# Patient Record
Sex: MALE | Race: WHITE | NOT HISPANIC OR LATINO | Employment: FULL TIME | URBAN - METROPOLITAN AREA
[De-identification: names, ages, dates, MRNs, and addresses within clinical notes are randomized per-mention and may not be internally consistent; named-entity substitution may affect disease eponyms.]

---

## 2017-01-03 ENCOUNTER — CLINICAL SUPPORT (OUTPATIENT)
Dept: REHABILITATION | Facility: HOSPITAL | Age: 72
End: 2017-01-03
Attending: INTERNAL MEDICINE
Payer: MEDICARE

## 2017-01-03 DIAGNOSIS — G89.29 CHRONIC PAIN OF RIGHT ANKLE: ICD-10-CM

## 2017-01-03 DIAGNOSIS — M25.571 CHRONIC PAIN OF RIGHT ANKLE: ICD-10-CM

## 2017-01-03 DIAGNOSIS — R26.2 DIFFICULTY WALKING: ICD-10-CM

## 2017-01-03 DIAGNOSIS — M79.671 PAIN OF RIGHT HEEL: Primary | ICD-10-CM

## 2017-01-03 PROCEDURE — 97035 APP MDLTY 1+ULTRASOUND EA 15: CPT

## 2017-01-03 PROCEDURE — 97110 THERAPEUTIC EXERCISES: CPT

## 2017-01-03 NOTE — PROGRESS NOTES
Physical Therapy Progress Note     Name: Arnaldo HOLLINGSWORTH [facility] Number: [id number]  Diagnosis:   Encounter Diagnoses   Name Primary?    Difficulty walking     Chronic pain of right ankle     Pain of right heel Yes     Physician: Norm Perdomo MD  Treatment Orders: Therapeutic exercise  Past Medical History   Diagnosis Date    Allergy      seasonal    Gout, joint     Hyperlipidemia        Precautions: standard    Evaluation Date: 8/15/16  Visit # authorized: 1/20  Authorization period: 12/31/16  Plan of care Expiration: 12/30/16    Referring Provider: Norm Perdomo MD    Time in: 1  Time out: 1:45  45 min. 1:1    Subjective     Pt reports: he is feeling better; sometimes he feels some pain upon initiation of gait that improves with the longer he walks; notes consistency with stretching after warm-up and at the end of work out. Pt. denies purchasing heel lift and/or night splint as recommended by MD. Pt. has not been performing his exercises as of late due to not feeling well. Pt. requested to only receive US and kinesiotaping today's visit as a result.  Pain Scale: before treatment: 1 currently; after treatment: 0    Objective      Patient received: individual therapy to increase strength, endurance, ROM, flexibility, posture and core stabilization with 1 patients with activities as follows:     Therapeutic exercise: Arnaldo received therapeutic exercises to develop strength, endurance, ROM, flexibility, posture and core stabilization for 25 minutes including: no exercise performed except for stretching due to illness  warm-up:    Prone:  Supine:    · bridge with isometric hip abduction using BTB: 3x8 with 3 to 5 sec. hold NP  · piriformis stretch bilaterally: 2x1' NP  Sidelying:   · s/l clams: BTB: 2x10 NP  Sitting:    Standing:   Up on two down on one: bilaterally: 3x10 ea. NP  · standing gastroc./soleus stretch on slant board: 2x1 min. bilaterally  · standing hip abduction/extension combined  "motion with BTB: 3x10 with verbal/tactile cues for proper form NP  · TM walking backwards: 5' with verbal cuing for TKE NP  · SLS: 3x30 sec. NP  Machines:  · Leg press: up on two down on one: 140# 2x8 (bilaterally) NP  · Leg press: SL right: 100#: 2x8 NP    Kinesiotaping  Patient was screened for use of kinesiotape and was cleared for use. Pt. received kinesiotaping on right achilles: web cut, along with mechanical correction right achilles tendon with a "I" strip and gastroc. inhibition with "Y" strip (reaapplied 1/3/17)  1. Has the patient ever had a reaction to the adhesive in bandaids? Yes/No: No  2. Has the patient ever uses athletic/kinesiotape in the past? Yes/No: No  3. Is the patient hemodynamically impaired (PE, DVT, CHF, Kidney failure)? Yes/No: No  4. Can the PT/PTA apply the tape to your skin? Yes/No: Yes    Patient was instructed on duration to wear the tape, proper drying techniques for the tape, and to remove the tape if he/she has any skin irritation: including, but not exclusive to, redness/itchiness/discomfort. Patient verbalized understanding of these instructions.    Functional Status Measures:    Intake Score   5th visit 10th visit  Pts Physical FS Primary Measure     64    72  62                           Risk Adjustment Statistical FOTO  51        PT reviewed FOTO scores for Arnaldo Donovan on 01/03/2017.   FOTO scores were entered into EPIC - see media section.    Ultrasound:  Patient receives ultrasound  for pain control and decreased inflammation @ 20% duty cycle, 3 Mhz, applied to right achilles, intensity = 1.2 w/cm2 for 8 minutes.    Written Home Exercises Provided: (5 min.)  including: review of current exercise regimen Pt demo good understanding of the education provided. Arnaldo demonstrated good return demonstration of activities.     Pt. education:  · Posture re-education, body/gait mechanics, HEP, POC compliance; and activity modification/avoidance   · No spiritual or " educational barriers to learning provided  · Pt has no cultural, educational or language barriers to learning provided.    Assessment     PT reeducated on the need to comply with purchase of heel lift and night splint to improve long term outcome. Pt. purchased a heel cushion, but a lift as recommended by MD. PT gave pt. additional medical supplier recommendations in hope to improve compliance. Pt. verbalized understanding and stated he would purchase as recommended. PT also suggested a reduction in frequency of treatment to wean off of care and give time for patient to purchase items and use to not benefit. Pt. agreed with plan. Pt will continue to benefit from skilled outpatient physical therapy to address the remaining functional deficits, provide pt/family education, activity modification/avoidance and to maximize pt's level of independence in the home and community environment.     Anticipated barriers to physical therapy: chronicity of condition    · Pt's spiritual, cultural and educational needs considered and pt agreeable to plan of care and goals as stated below:   Medical necessity is demonstrated by the following IMPAIRMENTS/PROBLEM LIST:  1) Pain limiting function  2) Gait abnormality: right ITB/ER, bilateral ankle EV  3) Hip/knee/ankle weakness  4) Decreased gastroc/soleus/piriformis/ITB flexibility  5) Decreased hip/ankle ROM  6) Joint mobility: bilateral hip posterior glide  7) Lack of HEP     GOALS:   Short Term Goals: 4 weeks  1. Pt. to report decreased right ankle/foot pain </= 4/10 at worst to increase tolerance for community ambulation MET  2. Pt. to demonstrate increased right ankle soleus flexibility to 15 degrees to improve ease with partial squatting   3. Pt. to demonstrate increased MMT for left gluteus medius to 4-/5 to increase stability during SL activities. MET  4. Pt. reports a decrease in sensitivity with stretching and consistent orthotic use. MET   5. Pt to tolerate HEP to improve  ROM and independence with ADL's MET     Long Term Goals: 8 weeks  1. Pt. to report decreased right ankle/foot pain </ = 2/10 at worst to increase tolerance for community ambulation/prolonged standing  2. Pt. to demonstrate increased right ankle soleus flexibility to 20 degrees to improve ease with partial squatting  3. Pt. to demonstrate increased MMT for left gluteus medius to 4+/5 to increase stability during SLS high level activities.  4. Pt. to demonstrate improved LE flexibility of rectus femoris, ITB, and iliopsoas improving his gait mechanics.  5. Pt to demonstrate increased MMT for bilateral ankle EV to 4+/5 to increase ankle stability during WB tasks. MET  6. Pt. demonstrate increased MMT for bilateral ankle DF to 5/5 to increase ease with toe clearance. MET  7. Pt will report at CJ level (20-40% impaired) on LE FOTO survey for mobility to demonstrate increase in LE function and mobility in home and community environment.   8. Pt. to be able to ambulate community distances while wearing orthotics without report of right achilles tendon soreness  9. Pt to be Independent with HEP to improve ROM and independence with ADL's    Plan   Continue with established Plan of Care towards PT goals 1x/wk for an additional 4 to 6wks

## 2017-01-04 NOTE — PLAN OF CARE
Physical Therapy Progress Note     Name: Arnaldo HOLLINGSWORTH [facility] Number: [id number]  Diagnosis:   Encounter Diagnoses   Name Primary?    Difficulty walking     Chronic pain of right ankle     Pain of right heel Yes     Physician: Norm Perdomo MD  Treatment Orders: Therapeutic exercise  Past Medical History   Diagnosis Date    Allergy      seasonal    Gout, joint     Hyperlipidemia        Precautions: standard    Evaluation Date: 8/15/16  Visit # authorized: 1/20  Authorization period: 12/31/16  Plan of care Expiration: 12/30/16    Referring Provider: Norm Perdomo MD    Time in: 1  Time out: 1:45  45 min. 1:1    Subjective     Pt reports: he is feeling better; sometimes he feels some pain upon initiation of gait that improves with the longer he walks; notes consistency with stretching after warm-up and at the end of work out. Pt. denies purchasing heel lift and/or night splint as recommended by MD. Pt. has not been performing his exercises as of late due to not feeling well. Pt. requested to only receive US and kinesiotaping today's visit as a result.  Pain Scale: before treatment: 1 currently; after treatment: 0    Objective      Patient received: individual therapy to increase strength, endurance, ROM, flexibility, posture and core stabilization with 1 patients with activities as follows:     Therapeutic exercise: Arnaldo received therapeutic exercises to develop strength, endurance, ROM, flexibility, posture and core stabilization for 15 minutes including: no exercise performed except for stretching due to illness  warm-up:    Prone:  Supine:    · bridge with isometric hip abduction using BTB: 3x8 with 3 to 5 sec. hold NP  · piriformis stretch bilaterally: 2x1' NP  Sidelying:   · s/l clams: BTB: 2x10 NP  Sitting:    Standing:   Up on two down on one: bilaterally: 3x10 ea. NP  · standing gastroc./soleus stretch on slant board: 2x1 min. bilaterally  · standing hip abduction/extension combined  "motion with BTB: 3x10 with verbal/tactile cues for proper form NP  · TM walking backwards: 5' with verbal cuing for TKE NP  · SLS: 3x30 sec. NP  Machines:  · Leg press: up on two down on one: 140# 2x8 (bilaterally) NP  · Leg press: SL right: 100#: 2x8 NP    Kinesiotaping  Patient was screened for use of kinesiotape and was cleared for use. Pt. received kinesiotaping on right achilles: web cut, along with mechanical correction right achilles tendon with a "I" strip and gastroc. inhibition with "Y" strip (reaapplied 1/3/17)  1. Has the patient ever had a reaction to the adhesive in bandaids? Yes/No: No  2. Has the patient ever uses athletic/kinesiotape in the past? Yes/No: No  3. Is the patient hemodynamically impaired (PE, DVT, CHF, Kidney failure)? Yes/No: No  4. Can the PT/PTA apply the tape to your skin? Yes/No: Yes    Patient was instructed on duration to wear the tape, proper drying techniques for the tape, and to remove the tape if he/she has any skin irritation: including, but not exclusive to, redness/itchiness/discomfort. Patient verbalized understanding of these instructions.    Functional Status Measures:    Intake Score   5th visit 10th visit  Pts Physical FS Primary Measure     64    72  62                           Risk Adjustment Statistical FOTO  51        PT reviewed FOTO scores for Arnaldo Donovan on 01/03/2017.   FOTO scores were entered into EPIC - see media section.    Ultrasound:  Patient receives ultrasound  for pain control and decreased inflammation @ 20% duty cycle, 3 Mhz, applied to right achilles, intensity = 1.2 w/cm2 for 8 minutes.    Written Home Exercises Provided: (5 min.)  including: review of current exercise regimen Pt demo good understanding of the education provided. Arnaldo demonstrated good return demonstration of activities.     Pt. education:  · Posture re-education, body/gait mechanics, HEP, POC compliance; and activity modification/avoidance   · No spiritual or " educational barriers to learning provided  · Pt has no cultural, educational or language barriers to learning provided.    Assessment     PT reeducated on the need to comply with purchase of heel lift and night splint to improve long term outcome. Pt. purchased a heel cushion, but a lift as recommended by MD. PT gave pt. additional medical supplier recommendations in hope to improve compliance. Pt. verbalized understanding and stated he would purchase as recommended. PT also suggested a reduction in frequency of treatment to wean off of care and give time for patient to purchase items and use to not benefit. Pt. agreed with plan. Pt will continue to benefit from skilled outpatient physical therapy to address the remaining functional deficits, provide pt/family education, activity modification/avoidance and to maximize pt's level of independence in the home and community environment.     Anticipated barriers to physical therapy: chronicity of condition    · Pt's spiritual, cultural and educational needs considered and pt agreeable to plan of care and goals as stated below:   Medical necessity is demonstrated by the following IMPAIRMENTS/PROBLEM LIST:  1) Pain limiting function  2) Gait abnormality: right ITB/ER, bilateral ankle EV  3) Hip/knee/ankle weakness  4) Decreased gastroc/soleus/piriformis/ITB flexibility  5) Decreased hip/ankle ROM  6) Joint mobility: bilateral hip posterior glide  7) Lack of HEP     GOALS:   Short Term Goals: 4 weeks  1. Pt. to report decreased right ankle/foot pain </= 4/10 at worst to increase tolerance for community ambulation MET  2. Pt. to demonstrate increased right ankle soleus flexibility to 15 degrees to improve ease with partial squatting   3. Pt. to demonstrate increased MMT for left gluteus medius to 4-/5 to increase stability during SL activities. MET  4. Pt. reports a decrease in sensitivity with stretching and consistent orthotic use. MET   5. Pt to tolerate HEP to improve  ROM and independence with ADL's MET     Long Term Goals: 8 weeks  1. Pt. to report decreased right ankle/foot pain </ = 2/10 at worst to increase tolerance for community ambulation/prolonged standing  2. Pt. to demonstrate increased right ankle soleus flexibility to 20 degrees to improve ease with partial squatting  3. Pt. to demonstrate increased MMT for left gluteus medius to 4+/5 to increase stability during SLS high level activities.  4. Pt. to demonstrate improved LE flexibility of rectus femoris, ITB, and iliopsoas improving his gait mechanics.  5. Pt to demonstrate increased MMT for bilateral ankle EV to 4+/5 to increase ankle stability during WB tasks. MET  6. Pt. demonstrate increased MMT for bilateral ankle DF to 5/5 to increase ease with toe clearance. MET  7. Pt will report at CJ level (20-40% impaired) on LE FOTO survey for mobility to demonstrate increase in LE function and mobility in home and community environment.   8. Pt. to be able to ambulate community distances while wearing orthotics without report of right achilles tendon soreness  9. Pt to be Independent with HEP to improve ROM and independence with ADL's    Plan   Continue with established Plan of Care towards PT goals 1x/wk for an additional 4 to 6wks

## 2017-01-06 ENCOUNTER — CLINICAL SUPPORT (OUTPATIENT)
Dept: REHABILITATION | Facility: HOSPITAL | Age: 72
End: 2017-01-06
Attending: INTERNAL MEDICINE
Payer: MEDICARE

## 2017-01-06 ENCOUNTER — OFFICE VISIT (OUTPATIENT)
Dept: DERMATOLOGY | Facility: CLINIC | Age: 72
End: 2017-01-06
Payer: MEDICARE

## 2017-01-06 DIAGNOSIS — M25.571 RIGHT ANKLE PAIN, UNSPECIFIED CHRONICITY: ICD-10-CM

## 2017-01-06 DIAGNOSIS — L71.9 ROSACEA, ACNE: ICD-10-CM

## 2017-01-06 DIAGNOSIS — L71.9 ROSACEA: Primary | ICD-10-CM

## 2017-01-06 DIAGNOSIS — B07.0 PLANTAR WART: ICD-10-CM

## 2017-01-06 DIAGNOSIS — R26.2 DIFFICULTY WALKING: ICD-10-CM

## 2017-01-06 DIAGNOSIS — B07.9 VIRAL WARTS, UNSPECIFIED TYPE: ICD-10-CM

## 2017-01-06 DIAGNOSIS — M79.671 PAIN OF RIGHT HEEL: Primary | ICD-10-CM

## 2017-01-06 PROCEDURE — 1159F MED LIST DOCD IN RCRD: CPT | Mod: S$GLB,,, | Performed by: DERMATOLOGY

## 2017-01-06 PROCEDURE — 97110 THERAPEUTIC EXERCISES: CPT

## 2017-01-06 PROCEDURE — 99999 PR PBB SHADOW E&M-EST. PATIENT-LVL II: CPT | Mod: PBBFAC,,, | Performed by: DERMATOLOGY

## 2017-01-06 PROCEDURE — 17110 DESTRUCTION B9 LES UP TO 14: CPT | Mod: S$GLB,,, | Performed by: DERMATOLOGY

## 2017-01-06 PROCEDURE — 99213 OFFICE O/P EST LOW 20 MIN: CPT | Mod: 25,S$GLB,, | Performed by: DERMATOLOGY

## 2017-01-06 PROCEDURE — 97035 APP MDLTY 1+ULTRASOUND EA 15: CPT

## 2017-01-06 PROCEDURE — 1157F ADVNC CARE PLAN IN RCRD: CPT | Mod: S$GLB,,, | Performed by: DERMATOLOGY

## 2017-01-06 PROCEDURE — 1160F RVW MEDS BY RX/DR IN RCRD: CPT | Mod: S$GLB,,, | Performed by: DERMATOLOGY

## 2017-01-06 RX ORDER — DOXYCYCLINE 40 MG/1
40 CAPSULE ORAL DAILY
Qty: 30 CAPSULE | Refills: 5 | Status: SHIPPED | OUTPATIENT
Start: 2017-01-06 | End: 2017-01-11

## 2017-01-06 NOTE — LETTER
January 8, 2017      Xiao Turner MD  [address]  [facility] [address]           [facility] - Dermatology  [address]  [place] [address]  Phone: [phone]  Fax: [phone]          Patient: Arnaldo Donovan   MR Number: [id number]   YOB: 1945   Date of Visit: 1/6/2017       Dear Dr. Xiao Turner:    Thank you for referring Arnaldo Donovan to me for evaluation. Attached you will find relevant portions of my assessment and plan of care.    If you have questions, please do not hesitate to call me. I look forward to following Arnaldo Donovan along with you.    Sincerely,    Pamela Naik MD    Enclosure  CC:  No Recipients    If you would like to receive this communication electronically, please contact [email or web address] or [phone] to request more information on [facility] access.    For providers and/or their staff who would like to refer a patient to [facility], please contact us through our one-stop-shop provider referral line, [facility], at [phone].    If you feel you have received this communication in error or would no longer like to receive these types of communications, please e-mail [email or web address]

## 2017-01-06 NOTE — PATIENT INSTRUCTIONS
CRYOSURGERY      Your doctor has used a method called cryosurgery to treat your skin condition. Cryosurgery refers to the use of very cold substances to treat a variety of skin conditions such as warts, pre-skin cancers, molluscum contagiosum, sun spots, and several benign growths. The substance we use in cryosurgery is liquid nitrogen and is so cold (-195 degrees Celsius) that is burns when administered.     Following treatment in the office, the skin may immediately burn and become red. You may find the area around the lesion is affected as well. It is sometimes necessary to treat not only the lesion, but a small area of the surrounding normal skin to achieve a good response.     A blister, and even a blood filled blister, may form after treatment.   This is a normal response. If the blister is painful, it is acceptable to sterilize a needle and with rubbing alcohol and gently pop the blister. It is important that you gently wash the area with soap and warm water as the blister fluid may contain wart virus if a wart was treated. Do no remove the roof of the blister.     The area treated can take anywhere from 1-3 weeks to heal. Healing time depends on the kind skin lesion treated, the location, and how aggressively the lesion was treated. It is recommended that the areas treated are covered with Vaseline or bacitracin ointment and a band-aid. If a band-aid is not practical, just ointment applied several times per day will do. Keeping these areas moist will speed the healing time.    Treatment with liquid nitrogen can leave a scar. In dark skin, it may be a light or dark scar, in light skin it may be a white or pink scar. These will generally fade with time, but may never go away completely.     If you have any concerns after your treatment, please feel free to call the office.       [address] [facility] [place] [address]/ [phone] FAX/ [email or web address]    Summer Sun Protection      The  [facility] would like to remind you of the importance of sun protection all year round and particularly during the summer when the suns rays are the strongest. It has been proven that both acute and chronic sun exposure damages our cells and leads to skin cancer. Beyond skin cancer, the sun causes 90% of the symptoms of pre-mature skin aging, including wrinkles, lentigines (brown spots), and thin, easily bruised skin. Proper sun protection can help prevent these unwanted conditions.    Many patients report that the dont go in the sun. It has been shown that the average person receives 18 hours of incidental sun exposure per week during activities such as walking through parking lots, driving, or sitting next to windows. This accumulates to several bad sunburns per year!    In choosing sunscreen, you want one that protects against both UVA and UVB rays. It is recommended that you use one of SPF 30 or higher. It is important to apply the sunscreen about 20 minutes prior to sun exposure. Most sunscreens are chemical sunscreens and a reaction must take place in the skin so that they are effective. If they are applied and then you are immediately exposed to the sun or start sweating, this reaction has not had time to take place and you are therefore unprotected. Sunscreen needs to be reapplied every 2 hours if you are participating in water sports or sweating. We recommend Elta MD or Neutrogena Ultra Sheer Dry Touch SPF 55 for daily use; however there are many options and it is most important for you to find one that you will use on a consistent basis.    If you have sensitive skin, you may do best with a sunscreen that contains only physical blockers such as titanium dioxide or zinc oxide. These are typically thicker and harder to apply, however they afford very good protection. Neutrogena Sensitive Skin, Blue Lizard Sensitive Skin (pink top) or Neutrogena Pure and Free are popular ones.      Aside from sunscreen, clothes with UV protection, wide brimmed hats, and sunglasses are other means of sun protection that we recommend.                        [facility]  [address] [facility] [address] [facility]  [facility] [address]  Dept: [phone]  Dept Fax: [phone]

## 2017-01-06 NOTE — PROGRESS NOTES
Subjective:       Patient ID:  Arnaldo Donovan is a 71 y.o. male who presents for   Chief Complaint   Patient presents with    Lesion     Left Foot     Lesion   Affected locations: left foot  Duration: 3 years  Signs and Symptoms: Hard.  Severity: mild  Timing: intermittent  Aggravated by: friction  Relieving factors/Treatments tried: nothing  Improvement on treatment: no relief      Also interested in treatment for rosacea. Present x yrs. Gets redness and bumps. Has tried metrogel and sodium sulfacetamide/sulfur cleanser with only minimal relief.    Review of Systems   Constitutional: Positive for fatigue. Negative for fever, chills, weight loss, weight gain, night sweats and malaise.   Skin: Negative for daily sunscreen use and recent sunburn.   Hematologic/Lymphatic: Does not bruise/bleed easily.        Objective:    Physical Exam   Constitutional: He appears well-developed and well-nourished. No distress.   Neurological: He is alert and oriented to person, place, and time. He is not disoriented.   Psychiatric: He has a normal mood and affect.   Skin:   Areas Examined (abnormalities noted in diagram):   Head / Face Inspection Performed  Neck Inspection Performed  LLE Inspection Performed                  Diagram Legend     Erythematous scaling macule/papule c/w actinic keratosis       Vascular papule c/w angioma      Pigmented verrucoid papule/plaque c/w seborrheic keratosis      Yellow umbilicated papule c/w sebaceous hyperplasia      Irregularly shaped tan macule c/w lentigo     1-2 mm smooth white papules consistent with Milia      Movable subcutaneous cyst with punctum c/w epidermal inclusion cyst      Subcutaneous movable cyst c/w pilar cyst      Firm pink to brown papule c/w dermatofibroma      Pedunculated fleshy papule(s) c/w skin tag(s)      Evenly pigmented macule c/w junctional nevus     Mildly variegated pigmented, slightly irregular-bordered macule c/w mildly atypical nevus      Flesh colored  to evenly pigmented papule c/w intradermal nevus       Pink pearly papule/plaque c/w basal cell carcinoma      Erythematous hyperkeratotic cursted plaque c/w SCC      Surgical scar with no sign of skin cancer recurrence      Open and closed comedones      Inflammatory papules and pustules      Verrucoid papule consistent consistent with wart     Erythematous eczematous patches and plaques     Dystrophic onycholytic nail with subungual debris c/w onychomycosis     Umbilicated papule    Erythematous-base heme-crusted tan verrucoid plaque consistent with inflamed seborrheic keratosis     Erythematous Silvery Scaling Plaque c/w Psoriasis     See annotation      Assessment / Plan:        Rosacea  -     ORACEA 40 mg capsule; Take 1 capsule (40 mg total) by mouth once daily.  Dispense: 30 capsule; Refill: 5  Pt will continue with his other treatments as well.  Patient instructed in importance of daily broad spectrum sunscreen use with spf at least 30. Sun avoidance and topical protection/protective clothing discussed.    Plantar wart  Cryosurgery procedure note:    Verbal consent from the patient is obtained. Liquid nitrogen cryosurgery is applied to 1 verruca with prior paring, as detailed in the physical exam, to produce a freeze injury. 1 consecutive freeze thaw cycle applied to each verruca. The patient is aware that blisters (possibly blood blisters) may form.      Return in about 4 weeks (around 2/3/2017).

## 2017-01-06 NOTE — PROGRESS NOTES
Physical Therapy Progress Note     Name[facility]SW[id number]  Diagnosis:   No diagnosis found.  Physician: Norm Perdomo MD  Treatment Orders: Therapeutic exercise  Past Medical History   Diagnosis Date    Allergy      seasonal    Gout, joint     Hyperlipidemia        Precautions: standard    Evaluation Date: 8/15/16  Visit # authorized: 2/20  Authorization period: 12/31/16  Plan of care Expiration: 12/30/16    Referring Provider: Norm Perdomo MD    Time in: 9  Time out: 10:10  30 min. 1:1    Subjective     Pt reports: he is feeling better; denies much of any pain. Pt. purchased heel lifts, but pt. states the heel lifts push him out of shoes with the orthotics. Pt. has not purchased the night splint as of yet.  Pain Scale: before treatment: 1 currently; after treatment: 0    Objective      Patient received: individual therapy to increase strength, endurance, ROM, flexibility, posture and core stabilization with 1 patients with activities as follows:     Therapeutic exercise: Arnaldo received therapeutic exercises to develop strength, endurance, ROM, flexibility, posture and core stabilization for 25 minutes including: no exercise performed except for stretching due to illness  warm-up:    Prone:  Supine:    · bridge with isometric hip abduction using BTB: 3x8 with 3 to 5 sec. hold NP  · piriformis stretch bilaterally: 2x1' NP  Sidelying:   · s/l clams: BTB: 2x10 NP  Sitting:    Standing:   Up on two down on one: bilaterally: 3x10 ea.   · standing gastroc./soleus stretch on slant board: 2x1 min. bilaterally  · standing hip abduction/extension combined motion with BTB: 3x10 with verbal/tactile cues for proper form   · TM walking backwards: 5' with verbal cuing for TKE NP  · SLS: 3x30 sec.  Machines:  · Leg press: up on two down on one: 140# 2x8 (bilaterally) NP  · Leg press: SL right: 100#: 2x8 NP    Kinesiotaping  Patient was screened for use of kinesiotape and was cleared  "for use. Pt. received kinesiotaping on right achilles: web cut, along with mechanical correction right achilles tendon with a "I" strip and gastroc. inhibition with "Y" strip (reaapplied 1/6/17)  1. Has the patient ever had a reaction to the adhesive in bandaids? Yes/No: No  2. Has the patient ever uses athletic/kinesiotape in the past? Yes/No: No  3. Is the patient hemodynamically impaired (PE, DVT, CHF, Kidney failure)? Yes/No: No  4. Can the PT/PTA apply the tape to your skin? Yes/No: Yes    Patient was instructed on duration to wear the tape, proper drying techniques for the tape, and to remove the tape if he/she has any skin irritation: including, but not exclusive to, redness/itchiness/discomfort. Patient verbalized understanding of these instructions.    Functional Status Measures:    Intake Score   5th visit 10th visit  Pts Physical FS Primary Measure     64    72  62                           Risk Adjustment Statistical FOTO  51        PT reviewed FOTO scores for Arnaldo Donovan on 01/06/2017.   FOTO scores were entered into [employer] - see media section.    Ultrasound:  Patient receives ultrasound  for pain control and decreased inflammation @ 20% duty cycle, 3 Mhz, applied to right achilles, intensity = 1.2 w/cm2 for 8 minutes.    Written Home Exercises Provided: (5 min.)  including: review of current exercise regimen Pt demo good understanding of the education provided. Aranldo demonstrated good return demonstration of activities.     Pt. education:  · Posture re-education, body/gait mechanics, HEP, POC compliance; and activity modification/avoidance   · No spiritual or educational barriers to learning provided  · Pt has no cultural, educational or language barriers to learning provided.    Assessment     PT educated to pull apart the heel lift and use the heel lift at the least amount. Pt. verbalized understanding. Pt. will purchase nigh splint. Will continue to progress as kal. Pt will continue to " benefit from skilled outpatient physical therapy to address the remaining functional deficits, provide pt/family education, activity modification/avoidance and to maximize pt's level of independence in the home and community environment.     Anticipated barriers to physical therapy: chronicity of condition    · Pt's spiritual, cultural and educational needs considered and pt agreeable to plan of care and goals as stated below:   Medical necessity is demonstrated by the following IMPAIRMENTS/PROBLEM LIST:  1) Pain limiting function  2) Gait abnormality: right ITB/ER, bilateral ankle EV  3) Hip/knee/ankle weakness  4) Decreased gastroc/soleus/piriformis/ITB flexibility  5) Decreased hip/ankle ROM  6) Joint mobility: bilateral hip posterior glide  7) Lack of HEP     GOALS:   Short Term Goals: 4 weeks  1. Pt. to report decreased right ankle/foot pain </= 4/10 at worst to increase tolerance for community ambulation MET  2. Pt. to demonstrate increased right ankle soleus flexibility to 15 degrees to improve ease with partial squatting   3. Pt. to demonstrate increased MMT for left gluteus medius to 4-/5 to increase stability during SL activities. MET  4. Pt. reports a decrease in sensitivity with stretching and consistent orthotic use. MET   5. Pt to tolerate HEP to improve ROM and independence with ADL's MET     Long Term Goals: 8 weeks  1. Pt. to report decreased right ankle/foot pain </ = 2/10 at worst to increase tolerance for community ambulation/prolonged standing  2. Pt. to demonstrate increased right ankle soleus flexibility to 20 degrees to improve ease with partial squatting  3. Pt. to demonstrate increased MMT for left gluteus medius to 4+/5 to increase stability during SLS high level activities.  4. Pt. to demonstrate improved LE flexibility of rectus femoris, ITB, and iliopsoas improving his gait mechanics.  5. Pt to demonstrate increased MMT for bilateral ankle EV to 4+/5 to increase ankle stability during  WB tasks. MET  6. Pt. demonstrate increased MMT for bilateral ankle DF to 5/5 to increase ease with toe clearance. MET  7. Pt will report at CJ level (20-40% impaired) on LE FOTO survey for mobility to demonstrate increase in LE function and mobility in home and community environment.   8. Pt. to be able to ambulate community distances while wearing orthotics without report of right achilles tendon soreness  9. Pt to be Independent with HEP to improve ROM and independence with ADL's    Plan   Continue with established Plan of Care towards PT goals 1x/wk for an additional 4 to 6wks

## 2017-01-09 ENCOUNTER — OFFICE VISIT (OUTPATIENT)
Dept: INTERNAL MEDICINE | Facility: CLINIC | Age: 72
End: 2017-01-09
Attending: FAMILY MEDICINE
Payer: MEDICARE

## 2017-01-09 VITALS
HEART RATE: 78 BPM | SYSTOLIC BLOOD PRESSURE: 132 MMHG | BODY MASS INDEX: 30.65 KG/M2 | WEIGHT: 231.25 LBS | HEIGHT: 73 IN | DIASTOLIC BLOOD PRESSURE: 88 MMHG

## 2017-01-09 DIAGNOSIS — I10 HYPERTENSION, BENIGN: ICD-10-CM

## 2017-01-09 PROCEDURE — 1160F RVW MEDS BY RX/DR IN RCRD: CPT | Mod: S$GLB,,, | Performed by: FAMILY MEDICINE

## 2017-01-09 PROCEDURE — 1126F AMNT PAIN NOTED NONE PRSNT: CPT | Mod: S$GLB,,, | Performed by: FAMILY MEDICINE

## 2017-01-09 PROCEDURE — 99213 OFFICE O/P EST LOW 20 MIN: CPT | Mod: S$GLB,,, | Performed by: FAMILY MEDICINE

## 2017-01-09 PROCEDURE — 99499 UNLISTED E&M SERVICE: CPT | Mod: S$GLB,,, | Performed by: FAMILY MEDICINE

## 2017-01-09 PROCEDURE — 1157F ADVNC CARE PLAN IN RCRD: CPT | Mod: S$GLB,,, | Performed by: FAMILY MEDICINE

## 2017-01-09 PROCEDURE — 3079F DIAST BP 80-89 MM HG: CPT | Mod: S$GLB,,, | Performed by: FAMILY MEDICINE

## 2017-01-09 PROCEDURE — 99999 PR PBB SHADOW E&M-EST. PATIENT-LVL III: CPT | Mod: PBBFAC,,, | Performed by: FAMILY MEDICINE

## 2017-01-09 PROCEDURE — 3075F SYST BP GE 130 - 139MM HG: CPT | Mod: S$GLB,,, | Performed by: FAMILY MEDICINE

## 2017-01-09 PROCEDURE — 1159F MED LIST DOCD IN RCRD: CPT | Mod: S$GLB,,, | Performed by: FAMILY MEDICINE

## 2017-01-09 RX ORDER — AMLODIPINE BESYLATE 5 MG/1
5 TABLET ORAL DAILY
Qty: 90 TABLET | Refills: 1 | Status: SHIPPED | OUTPATIENT
Start: 2017-01-09 | End: 2017-10-19

## 2017-01-09 NOTE — PATIENT INSTRUCTIONS
Your test results will be communicated to you via: My [facility], Telephone or Letter.  If you have not received your test results within one week. Please contact the clinic.

## 2017-01-10 NOTE — PROGRESS NOTES
Subjective:       Patient ID: Arnaldo Donovan is a 71 y.o. male.    Chief Complaint: Hypertension (6 mos f/u)    HPI Comments: Pt presents today for BP f/u. Feels great. No concerns today. Is going to PT for tendon issues, and states that this is why doing cardio has been hard for him.  Also Needs med RF    Hypertension   Pertinent negatives include no chest pain, palpitations or shortness of breath.     Review of Systems   Constitutional: Negative for activity change and appetite change.   HENT: Negative.    Eyes: Negative for photophobia and visual disturbance.   Respiratory: Negative for chest tightness and shortness of breath.    Cardiovascular: Negative for chest pain, palpitations and leg swelling.   Neurological: Negative for dizziness, weakness and light-headedness.       Objective:      Physical Exam    Assessment:       1. Hypertension, benign        Plan:         HTN: controlled on meds  Hypertension, benign  -     amlodipine (NORVASC) 5 MG tablet; Take 1 tablet (5 mg total) by mouth once daily.  Dispense: 90 tablet; Refill: 1    lifestyle mods encouraged such as swimming       RTC prn symptoms or q 6 mos for annual exam  Pt is being seen by PT and states that his tendonitis is controlled at this time

## 2017-01-11 DIAGNOSIS — L71.9 ROSACEA: Primary | ICD-10-CM

## 2017-01-11 RX ORDER — DOXYCYCLINE 50 MG/1
50 CAPSULE ORAL DAILY
Qty: 30 CAPSULE | Refills: 3 | Status: SHIPPED | OUTPATIENT
Start: 2017-01-11 | End: 2017-05-29 | Stop reason: SDUPTHER

## 2017-01-12 ENCOUNTER — TELEPHONE (OUTPATIENT)
Dept: DERMATOLOGY | Facility: CLINIC | Age: 72
End: 2017-01-12

## 2017-01-12 ENCOUNTER — CLINICAL SUPPORT (OUTPATIENT)
Dept: REHABILITATION | Facility: HOSPITAL | Age: 72
End: 2017-01-12
Attending: INTERNAL MEDICINE
Payer: MEDICARE

## 2017-01-12 DIAGNOSIS — R26.2 DIFFICULTY WALKING: ICD-10-CM

## 2017-01-12 DIAGNOSIS — M25.571 CHRONIC PAIN OF RIGHT ANKLE: ICD-10-CM

## 2017-01-12 DIAGNOSIS — G89.29 CHRONIC PAIN OF RIGHT ANKLE: ICD-10-CM

## 2017-01-12 DIAGNOSIS — M79.671 PAIN OF RIGHT HEEL: Primary | ICD-10-CM

## 2017-01-12 PROCEDURE — 97110 THERAPEUTIC EXERCISES: CPT

## 2017-01-12 PROCEDURE — 97035 APP MDLTY 1+ULTRASOUND EA 15: CPT

## 2017-01-12 NOTE — PROGRESS NOTES
Physical Therapy Progress Note     Name: Arnaldo HOLLINGSWORTH [facility] Number: [id number]  Diagnosis:   Encounter Diagnoses   Name Primary?    Difficulty walking     Chronic pain of right ankle     Pain of right heel Yes     Physician: Norm Perdomo MD  Treatment Orders: Therapeutic exercise  Past Medical History   Diagnosis Date    Allergy      seasonal    Gout, joint     Hyperlipidemia        Precautions: standard    Evaluation Date: 8/15/16  Visit # authorized: 2/20  Authorization period: 12/31/16  Plan of care Expiration: 12/30/16    Referring Provider: Norm Perdomo MD    Time in: 9  Time out: 10  30 min. 1:1    Subjective     Pt reports: he is feeling better; denies much of any pain. Pt. wore in heel lifts bilaterally measuring 1/4 inch. Pt. notes compliance with use, although, unaware of assistance. Pt. notes compliance with stretching ,but has not returned to the gym since sickness two weeks ago.   Pain Scale: before treatment: 1 currently; after treatment: 0    Objective      Patient received: individual therapy to increase strength, endurance, ROM, flexibility, posture and core stabilization with 1 patients with activities as follows:     Therapeutic exercise: Arnaldo received therapeutic exercises to develop strength, endurance, ROM, flexibility, posture and core stabilization for 25 minutes including: no exercise performed except for stretching due to illness  warm-up:    Prone:  Supine:    · bridge with isometric hip abduction using BTB: 3x8 with 3 to 5 sec. hold NP  · piriformis stretch bilaterally: 2x1' NP  Sidelying:   · s/l clams: BTB: 2x10 NP  Sitting:  · ankle pres with GTB all planes: 2x8  Standing:   Up on two down on one: bilaterally: 3x10 ea.   · standing gastroc./soleus stretch on slant board: 2x1 min. bilaterally  · standing hip abduction/extension combined motion with BTB: 3x10 with verbal/tactile cues for proper form   · TM walking backwards: 5' with verbal cuing for TKE  NP  · SLS: 3x30 sec.  Machines:  · Leg press: up on two down on one: 140# 2x8 (bilaterally) NP  · Leg press: SL right: 100#: 2x8 NP    Manual therapy: Arnaldo  received the following manual therapy techniques x 5 min. to include soft tissue and joint mobilizations were applied to the: right achilles To include:   Vacuum/cupping: STM was performed to right achilles to decrease muscle tightness, increase circulation and promote healing process for 5 min. The pt's skin was monitored for redness adjusting pressure as needed. The pt was instructed in possible side effects of bruising and/or soreness.     Kinesiotaping  Patient was screened for use of kinesiotape and was cleared for use. Pt. received kinesiotaping on right achilles: web cut for swelling  1. Has the patient ever had a reaction to the adhesive in bandaids? Yes/No: No  2. Has the patient ever uses athletic/kinesiotape in the past? Yes/No: No  3. Is the patient hemodynamically impaired (PE, DVT, CHF, Kidney failure)? Yes/No: No  4. Can the PT/PTA apply the tape to your skin? Yes/No: Yes    Patient was instructed on duration to wear the tape, proper drying techniques for the tape, and to remove the tape if he/she has any skin irritation: including, but not exclusive to, redness/itchiness/discomfort. Patient verbalized understanding of these instructions.    Functional Status Measures:    Intake Score   5th visit 10th visit  Pts Physical FS Primary Measure     64    72  62                           Risk Adjustment Statistical FOTO  51        PT reviewed FOTO scores for Arnaldo Donovan on 01/12/2017.   FOTO scores were entered into EPIC - see media section.    Ultrasound:  Patient receives ultrasound  for pain control and decreased inflammation @ 20% duty cycle, 3 Mhz, applied to right achilles, intensity = 1.2 w/cm2 for 8 minutes.    Written Home Exercises Provided: (5 min.)  including: review of current exercise regimen Pt demo good understanding of the  education provided. Arnaldo demonstrated good return demonstration of activities.     Pt. education:  · Posture re-education, body/gait mechanics, HEP, POC compliance; and activity modification/avoidance   · No spiritual or educational barriers to learning provided  · Pt has no cultural, educational or language barriers to learning provided.    Assessment     Pt. still has purchased night splint as recommended by MD. Pt. notes compliance with stretching, heel lifts, and activity avoidance. Pt. was able to return to 3mile walk with minimal to no irritation afterwards. Pt. was given an updated HEP, additional kinesiotape, and advised to purchase night splint to assist with resting achilles tendon at night. Pt. verbalized understanding. Will continue to progress as kal. Reassessment next visit. Pt will continue to benefit from skilled outpatient physical therapy to address the remaining functional deficits, provide pt/family education, activity modification/avoidance and to maximize pt's level of independence in the home and community environment.     Anticipated barriers to physical therapy: chronicity of condition    · Pt's spiritual, cultural and educational needs considered and pt agreeable to plan of care and goals as stated below:   Medical necessity is demonstrated by the following IMPAIRMENTS/PROBLEM LIST:  1) Pain limiting function  2) Gait abnormality: right ITB/ER, bilateral ankle EV  3) Hip/knee/ankle weakness  4) Decreased gastroc/soleus/piriformis/ITB flexibility  5) Decreased hip/ankle ROM  6) Joint mobility: bilateral hip posterior glide  7) Lack of HEP     GOALS:   Short Term Goals: 4 weeks  1. Pt. to report decreased right ankle/foot pain </= 4/10 at worst to increase tolerance for community ambulation MET  2. Pt. to demonstrate increased right ankle soleus flexibility to 15 degrees to improve ease with partial squatting   3. Pt. to demonstrate increased MMT for left gluteus medius to 4-/5 to increase  stability during SL activities. MET  4. Pt. reports a decrease in sensitivity with stretching and consistent orthotic use. MET   5. Pt to tolerate HEP to improve ROM and independence with ADL's MET     Long Term Goals: 8 weeks  1. Pt. to report decreased right ankle/foot pain </ = 2/10 at worst to increase tolerance for community ambulation/prolonged standing  2. Pt. to demonstrate increased right ankle soleus flexibility to 20 degrees to improve ease with partial squatting  3. Pt. to demonstrate increased MMT for left gluteus medius to 4+/5 to increase stability during SLS high level activities.  4. Pt. to demonstrate improved LE flexibility of rectus femoris, ITB, and iliopsoas improving his gait mechanics.  5. Pt to demonstrate increased MMT for bilateral ankle EV to 4+/5 to increase ankle stability during WB tasks. MET  6. Pt. demonstrate increased MMT for bilateral ankle DF to 5/5 to increase ease with toe clearance. MET  7. Pt will report at CJ level (20-40% impaired) on LE FOTO survey for mobility to demonstrate increase in LE function and mobility in home and community environment.   8. Pt. to be able to ambulate community distances while wearing orthotics without report of right achilles tendon soreness  9. Pt to be Independent with HEP to improve ROM and independence with ADL's    Plan   Continue with established Plan of Care towards PT goals 1x/wk for an additional 4 to 6wks

## 2017-01-20 ENCOUNTER — DOCUMENTATION ONLY (OUTPATIENT)
Dept: REHABILITATION | Facility: HOSPITAL | Age: 72
End: 2017-01-20

## 2017-01-20 NOTE — PROGRESS NOTES
Pt. arrived for scheduled appointment a 1/2 hr late. Pt. chose to defer appointment due to busy schedule. Pt. states he has not purchased the night splint. Pt. will comply with next scheduled appointment on Friday the 27th.

## 2017-01-27 ENCOUNTER — CLINICAL SUPPORT (OUTPATIENT)
Dept: REHABILITATION | Facility: HOSPITAL | Age: 72
End: 2017-01-27
Attending: INTERNAL MEDICINE
Payer: MEDICARE

## 2017-01-27 DIAGNOSIS — R26.2 DIFFICULTY WALKING: ICD-10-CM

## 2017-01-27 DIAGNOSIS — M25.571 CHRONIC PAIN OF RIGHT ANKLE: ICD-10-CM

## 2017-01-27 DIAGNOSIS — M79.671 PAIN OF RIGHT HEEL: Primary | ICD-10-CM

## 2017-01-27 DIAGNOSIS — G89.29 CHRONIC PAIN OF RIGHT ANKLE: ICD-10-CM

## 2017-01-27 PROCEDURE — 97110 THERAPEUTIC EXERCISES: CPT

## 2017-01-27 NOTE — PROGRESS NOTES
Physical Therapy Reassessment     Name: Arnaldo HOLLINGSWORTH [facility] Number: [id number]  Diagnosis:   Encounter Diagnoses   Name Primary?    Difficulty walking     Chronic pain of right ankle     Pain of right heel Yes     Physician: Norm Perdomo MD  Treatment Orders: Therapeutic exercise  Past Medical History   Diagnosis Date    Allergy      seasonal    Gout, joint     Hyperlipidemia        Precautions: standard    Evaluation Date: 8/15/16  Visit # authorized: 4/20 (19 visits in total)  Authorization period: 12/31/16  Plan of care Expiration: 12/30/16    Referring Provider: Norm Perdomo MD    Time in: 8:30  Time out: 9:30  30 min. 1:1    Subjective     Pt reports: he walked yesterday without any pain; compliance with stretching daily and heel raises 5 out of 7 days. Last week he got a little fasciculatioin in his calf that came and went; occurred on/off throughout the day. Notes improvement in muscle cramping with walking (2 miles in the morning and 2 miles in the evening). Denies compliance with hip strengthening: not performing bridges and/or clams as recommended or plantar fascia stretch.   Pain Scale: before treatment: .5 currently; after treatment: 0    Objective      Patient received: individual therapy to increase strength, endurance, ROM, flexibility, posture and core stabilization with 1 patients with activities as follows:     Therapeutic exercise: Arnaldo received therapeutic exercises to develop strength, endurance, ROM, flexibility, posture and core stabilization for 25 minutes including:   Ankle Active/Passive ROM: (measured in degrees)   Ankle RLE LLE   Dorsiflexion with knees straight 10/15 10/10   Dorsiflexion with knees bent 15/20 15/20   Great toe extension 45/55 55/60   Plantarflexion 50 50   Inversion 40 40   Eversion 15 20       Strength: (graded 1-5 out of 5)      RLE LLE   Hip flexion:  4/5 4/5   Hip ER 5/5 5-/5   Hip IR 5/5 5/5   Knee extension: 5/5 5/5   Ankle DF: 5/5  "5/5   Great toe: 5/5 5-/5   Posterior fibers of Gluteus  medius: 5-/5 4/5   Ankle IV: 5-/5 5-/5   Ankle EV: 5-/5 5-/5   Knee flexion:  5-/5 4+/5   Ankle PF 5/5 5/5   Hip extension 3/5 3-/5   Gluteus rachael 3-/5 3/5       Joint Mobility: TCJ: 3-/6; bilateral: posterior glide hip: 2+/6      Palpation: TTP right achilles tendon at calcaneus      warm-up:    Prone:  Supine:    · bridge with isometric hip abduction using BTB: 3x8 with 3 to 5 sec. hold reviewed  · piriformis stretch bilaterally: 2x1' NP  Sidelying:   · s/l clams: BTB: 2x10 NP  Sitting:  · ankle pres with GTB all planes: 2x8  Standing:   plantar fascia stretch: 3x30 sec.    Up on two down on one: bilaterally: 3x10 ea.   · standing gastroc./soleus stretch on slant board: 2x1 min. bilaterally  · standing hip abduction/extension combined motion with BTB: 3x10 with verbal/tactile cues for proper form   · TM walking backwards: 5' with verbal cuing for TKE NP  · SLS: 3x30 sec.  Machines:  · Leg press: up on two down on one: 140# 2x8 (bilaterally) NP  · Leg press: SL right: 100#: 2x8 NP    Manual therapy: Arnaldo  received the following manual therapy techniques x 5 min. to include soft tissue and joint mobilizations were applied to the: right achilles To include:   Vacuum/cupping: STM was performed to right achilles to decrease muscle tightness, increase circulation and promote healing process for 5 min. The pt's skin was monitored for redness adjusting pressure as needed. The pt was instructed in possible side effects of bruising and/or soreness.     Kinesiotaping  Patient was screened for use of kinesiotape and was cleared for use. Pt. received kinesiotaping on right achilles: web cut for swelling and fascia lift up with an "I" strip   1. Has the patient ever had a reaction to the adhesive in bandaids? Yes/No: No  2. Has the patient ever uses athletic/kinesiotape in the past? Yes/No: No  3. Is the patient hemodynamically impaired (PE, DVT, CHF, Kidney " failure)? Yes/No: No  4. Can the PT/PTA apply the tape to your skin? Yes/No: Yes    Patient was instructed on duration to wear the tape, proper drying techniques for the tape, and to remove the tape if he/she has any skin irritation: including, but not exclusive to, redness/itchiness/discomfort. Patient verbalized understanding of these instructions.    Functional Status Measures:    Intake Score   5th visit 10th visit  Pts Physical FS Primary Measure     64    72  62                           Risk Adjustment Statistical FOTO  51        PT reviewed FOTO scores for Arnaldo Donovan on 01/27/2017.   FOTO scores were entered into [employer] - see media section.    Written Home Exercises Provided: (5 min.)  including: review of current exercise regimen Pt demo good understanding of the education provided. Arnaldo demonstrated good return demonstration of activities.     Pt. education:  · Posture re-education, body/gait mechanics, HEP, POC compliance; and activity modification/avoidance   · No spiritual or educational barriers to learning provided  · Pt has no cultural, educational or language barriers to learning provided.    Assessment     Pt. notes less pain overall; however, has not been compliant with all of his exercises as recommended. Pt. was reeducated on the need to comply with hip strengthening to assist with level pelvis during SLS including walking. Additionally, pt. lost 10 deg. of his great toe extension more than likely associated with not performing his stretching. Pt. reeducated on normal gait mechanics and the assistance of the great toe extension to assist with push off. Pt. verbalizes understanding and states he with comply with recommendations. Pt. purchased night splint; however, has been only been wearing it for a few days. Unable to assess benefit as of yet..     Pt.'s lack of compliance with exercise regimen appears to be the cause of his slow progress. Will reassess necessity of tx next visit. Pt.  will continue to benefit from skilled outpatient physical therapy to address the remaining functional deficits, provide pt/family education, activity modification/avoidance and to maximize pt's level of independence in the home and community environment.     Anticipated barriers to physical therapy: chronicity of condition    · Pt's spiritual, cultural and educational needs considered and pt agreeable to plan of care and goals as stated below:   Medical necessity is demonstrated by the following IMPAIRMENTS/PROBLEM LIST:  1) Pain limiting function  2) Gait abnormality: right ITB/ER, bilateral ankle EV  3) Hip/knee/ankle weakness  4) Decreased gastroc/soleus/piriformis/ITB flexibility  5) Decreased hip/ankle ROM  6) Joint mobility: bilateral hip posterior glide  7) Lack of HEP     GOALS:   Short Term Goals: 4 weeks  1. Pt. to report decreased right ankle/foot pain </= 4/10 at worst to increase tolerance for community ambulation MET  2. Pt. to demonstrate increased right ankle soleus flexibility to 15 degrees to improve ease with partial squatting MET  3. Pt. to demonstrate increased MMT for left gluteus medius to 4-/5 to increase stability during SL activities. MET  4. Pt. reports a decrease in sensitivity with stretching and consistent orthotic use. MET   5. Pt to tolerate HEP to improve ROM and independence with ADL's MET     Long Term Goals: 8 weeks  1. Pt. to report decreased right ankle/foot pain </ = 2/10 at worst to increase tolerance for community ambulation/prolonged standing MET  2. Pt. to demonstrate increased right ankle soleus flexibility to 20 degrees to improve ease with partial squatting  3. Pt. to demonstrate increased MMT for left gluteus medius to 4+/5 to increase stability during SLS high level activities.  4. Pt. to demonstrate improved LE flexibility of rectus femoris, ITB, and iliopsoas improving his gait mechanics.  5. Pt to demonstrate increased MMT for bilateral ankle EV to 4+/5 to increase  ankle stability during WB tasks. MET  6. Pt. demonstrate increased MMT for bilateral ankle DF to 5/5 to increase ease with toe clearance. MET  7. Pt will report at CJ level (20-40% impaired) on LE FOTO survey for mobility to demonstrate increase in LE function and mobility in home and community environment.   8. Pt. to be able to ambulate community distances while wearing orthotics without report of right achilles tendon soreness  9. Pt to be Independent with HEP to improve ROM and independence with ADL's    Plan   Continue with established Plan of Care towards PT goals 1x/wk for an additional 4 to 6wks

## 2017-01-31 RX ORDER — ALLOPURINOL 300 MG/1
300 TABLET ORAL DAILY
Qty: 90 TABLET | Refills: 1 | Status: SHIPPED | OUTPATIENT
Start: 2017-01-31 | End: 2017-05-29 | Stop reason: SDUPTHER

## 2017-01-31 NOTE — TELEPHONE ENCOUNTER
----- Message from Joan Garrison sent at 1/31/2017 12:29 PM CST -----  [facility] called they need a refill approval for the patient medication   allopurinol (ZYLOPRIM) 300 MG tablet 90 tablet. Please fax to [phone]

## 2017-02-03 ENCOUNTER — CLINICAL SUPPORT (OUTPATIENT)
Dept: REHABILITATION | Facility: HOSPITAL | Age: 72
End: 2017-02-03
Attending: INTERNAL MEDICINE
Payer: MEDICARE

## 2017-02-03 DIAGNOSIS — G89.29 CHRONIC PAIN OF RIGHT ANKLE: ICD-10-CM

## 2017-02-03 DIAGNOSIS — M79.671 PAIN OF RIGHT HEEL: Primary | ICD-10-CM

## 2017-02-03 DIAGNOSIS — R26.2 DIFFICULTY WALKING: ICD-10-CM

## 2017-02-03 DIAGNOSIS — M25.571 CHRONIC PAIN OF RIGHT ANKLE: ICD-10-CM

## 2017-02-03 PROCEDURE — G8980 MOBILITY D/C STATUS: HCPCS | Mod: CJ

## 2017-02-03 PROCEDURE — G8978 MOBILITY CURRENT STATUS: HCPCS | Mod: CJ

## 2017-02-03 PROCEDURE — 97110 THERAPEUTIC EXERCISES: CPT

## 2017-02-03 NOTE — PROGRESS NOTES
Physical Therapy Progress Note     Name: Arnaldo HOLLINGSWORTH [facility] Number: [id number]  Diagnosis:   Encounter Diagnoses   Name Primary?    Difficulty walking     Chronic pain of right ankle     Pain of right heel Yes     Physician: Norm Perdomo MD  Treatment Orders: Therapeutic exercise  Past Medical History   Diagnosis Date    Allergy      seasonal    Gout, joint     Hyperlipidemia        Precautions: standard    Evaluation Date: 8/15/16  Visit # authorized: 5/20 (20 visits in total)  Authorization period: 12/31/16  Plan of care Expiration: 2/24/17    Referring Provider: Norm Perdomo MD    Time in: 8:30  Time out: 930  30 min. 1:1    Subjective     Pt reports: he is feeling pretty good; not much of any pain. Pt. notes increased compliance with plantar fascia stretching, hip/ankle strengthening, and night splint/heel lifts.   Pain Scale: before treatment: .5 currently; after treatment: 0    Objective      Patient received: individual therapy to increase strength, endurance, ROM, flexibility, posture and core stabilization with 1 patients with activities as follows:     Therapeutic exercise: Arnaldo received therapeutic exercises to develop strength, endurance, ROM, flexibility, posture and core stabilization for 25 minutes including:       warm-up:  TM: walking 1.8mph 5' with verbal/visual cues for proper gait mechanics    Supine:    · bridge with isometric hip abduction using BTB: 3x8 with 3 to 5 sec. hold reviewed  · piriformis stretch bilaterally: 2x1' NP  Sidelying:   · s/l clams: BTB: 2x10  Sitting:  · ankle pres with GTB all planes: 2x8 review for HEP  Standing:   plantar fascia stretch: 3x30 sec.    Up on two down on one: bilaterally: 3x10 ea.   · standing gastroc./soleus stretch on slant board: 2x1 min. bilaterally  · standing hip abduction/extension combined motion with BTB: 3x10 with verbal/tactile cues for proper form NP  · SLS: 3x30 sec. NP  Machines:  · Leg press: up on two down on  one: 140# 2x8 (bilaterally) NP  · Leg press: SL right: 100#: 2x8 NP    Manual therapy: rAnaldo  received the following manual therapy techniques x 5 min. to include soft tissue and joint mobilizations were applied to the: right achilles To include:   Joint mobilization  · left lateral hip distractionx3    Functional Status Measures:    Intake Score   5th visit 10th visit      2/3/17  Pts Physical FS Primary Measure     64    72  62                     64    Risk Adjustment Statistical FOTO  51        PT reviewed FOTO scores for Arnaldo Donovan on 02/03/2017.   FOTO scores were entered into [employer] - see media section.    Written Home Exercises Provided: (5 min.)  including: review of current exercise regimen Pt demo good understanding of the education provided. Arnaldo demonstrated good return demonstration of activities.     Pt. education:  · Posture re-education, body/gait mechanics, HEP, POC compliance; and activity modification/avoidance   · No spiritual or educational barriers to learning provided  · Pt has no cultural, educational or language barriers to learning provided.    Assessment     Pt. agreed with plan to continue on his own until seen by Podiatrist. Pt. was given reeducation on necessity for proper gait mechanics, hip/ankle strengthening, stretching, and compliance with night splint. Pt. is still challenged with engaging gluteus medius during gait as he often demonstrates a lurch type gait and scuffs feet. Pt. was reeducated on proper heel strike to assist with hip/pelvis stability and gastroc/soleus length. Pt. verbalized understanding and states he will be compliant with recommendations. Pt. was given an updated HEP and will be reassessed next visit after MD f/u.    Anticipated barriers to physical therapy: chronicity of condition    · Pt's spiritual, cultural and educational needs considered and pt agreeable to plan of care and goals as stated below:   Medical necessity is demonstrated by the  following IMPAIRMENTS/PROBLEM LIST:  1) Pain limiting function  2) Gait abnormality: right ITB/ER, bilateral ankle EV  3) Hip/knee/ankle weakness  4) Decreased gastroc/soleus/piriformis/ITB flexibility  5) Decreased hip/ankle ROM  6) Joint mobility: bilateral hip posterior glide  7) Lack of HEP     GOALS:   Short Term Goals: 4 weeks  1. Pt. to report decreased right ankle/foot pain </= 4/10 at worst to increase tolerance for community ambulation MET  2. Pt. to demonstrate increased right ankle soleus flexibility to 15 degrees to improve ease with partial squatting MET  3. Pt. to demonstrate increased MMT for left gluteus medius to 4-/5 to increase stability during SL activities. MET  4. Pt. reports a decrease in sensitivity with stretching and consistent orthotic use. MET   5. Pt to tolerate HEP to improve ROM and independence with ADL's MET     Long Term Goals: 8 weeks  1. Pt. to report decreased right ankle/foot pain </ = 2/10 at worst to increase tolerance for community ambulation/prolonged standing MET  2. Pt. to demonstrate increased right ankle soleus flexibility to 20 degrees to improve ease with partial squatting  3. Pt. to demonstrate increased MMT for left gluteus medius to 4+/5 to increase stability during SLS high level activities.  4. Pt. to demonstrate improved LE flexibility of rectus femoris, ITB, and iliopsoas improving his gait mechanics.  5. Pt to demonstrate increased MMT for bilateral ankle EV to 4+/5 to increase ankle stability during WB tasks. MET  6. Pt. demonstrate increased MMT for bilateral ankle DF to 5/5 to increase ease with toe clearance. MET  7. Pt will report at CJ level (20-40% impaired) on LE FOTO survey for mobility to demonstrate increase in LE function and mobility in home and community environment.   8. Pt. to be able to ambulate community distances while wearing orthotics without report of right achilles tendon soreness  9. Pt to be Independent with HEP to improve ROM and  independence with ADL's    Plan   Continue with established Plan of Care towards PT goals 1x/wk for an additional 4 to 6wks

## 2017-02-03 NOTE — PLAN OF CARE
Physical Therapy Progress Note     Name: Arnaldo HOLLINGSWORTH [facility] Number: [id number]  Diagnosis:   Encounter Diagnoses   Name Primary?    Difficulty walking     Chronic pain of right ankle     Pain of right heel Yes     Physician: Norm Perdomo MD  Treatment Orders: Therapeutic exercise  Past Medical History   Diagnosis Date    Allergy      seasonal    Gout, joint     Hyperlipidemia        Precautions: standard    Evaluation Date: 8/15/16  Visit # authorized: 5/20 (20 visits in total)  Authorization period: 12/31/16  Plan of care Expiration: 2/24/17    Referring Provider: Norm Perdomo MD    Time in: 8:30  Time out: 930  30 min. 1:1    Subjective     Pt reports: he is feeling pretty good; not much of any pain. Pt. notes increased compliance with plantar fascia stretching, hip/ankle strengthening, and night splint/heel lifts.   Pain Scale: before treatment: .5 currently; after treatment: 0    Objective      Patient received: individual therapy to increase strength, endurance, ROM, flexibility, posture and core stabilization with 1 patients with activities as follows:     Therapeutic exercise: Arnaldo received therapeutic exercises to develop strength, endurance, ROM, flexibility, posture and core stabilization for 25 minutes including:       warm-up:  TM: walking 1.8mph 5' with verbal/visual cues for proper gait mechanics    Supine:    · bridge with isometric hip abduction using BTB: 3x8 with 3 to 5 sec. hold reviewed  · piriformis stretch bilaterally: 2x1' NP  Sidelying:   · s/l clams: BTB: 2x10  Sitting:  · ankle pres with GTB all planes: 2x8 review for HEP  Standing:   plantar fascia stretch: 3x30 sec.    Up on two down on one: bilaterally: 3x10 ea.   · standing gastroc./soleus stretch on slant board: 2x1 min. bilaterally  · standing hip abduction/extension combined motion with BTB: 3x10 with verbal/tactile cues for proper form NP  · SLS: 3x30 sec. NP  Machines:  · Leg press: up on two down on  one: 140# 2x8 (bilaterally) NP  · Leg press: SL right: 100#: 2x8 NP    Manual therapy: Arnaldo  received the following manual therapy techniques x 5 min. to include soft tissue and joint mobilizations were applied to the: right achilles To include:   Joint mobilization  · left lateral hip distractionx3    Functional Status Measures:    Intake Score   5th visit 10th visit      2/3/17  Pts Physical FS Primary Measure     64    72  62                     64    Risk Adjustment Statistical FOTO  51        PT reviewed FOTO scores for Arnaldo Donovan on 02/03/2017.   FOTO scores were entered into [employer] - see media section.    Written Home Exercises Provided: (5 min.)  including: review of current exercise regimen Pt demo good understanding of the education provided. Arnaldo demonstrated good return demonstration of activities.     Pt. education:  · Posture re-education, body/gait mechanics, HEP, POC compliance; and activity modification/avoidance   · No spiritual or educational barriers to learning provided  · Pt has no cultural, educational or language barriers to learning provided.    Assessment     Pt. agreed with plan to continue on his own until seen by Podiatrist. Pt. was given reeducation on necessity for proper gait mechanics, hip/ankle strengthening, stretching, and compliance with night splint. Pt. is still challenged with engaging gluteus medius during gait as he often demonstrates a lurch type gait and scuffs feet. Pt. was reeducated on proper heel strike to assist with hip/pelvis stability and gastroc/soleus length. Pt. verbalized understanding and states he will be compliant with recommendations. Pt. was given an updated HEP and will be reassessed next visit after MD f/u.    Anticipated barriers to physical therapy: chronicity of condition    · Pt's spiritual, cultural and educational needs considered and pt agreeable to plan of care and goals as stated below:   Medical necessity is demonstrated by the  following IMPAIRMENTS/PROBLEM LIST:  1) Pain limiting function  2) Gait abnormality: right ITB/ER, bilateral ankle EV  3) Hip/knee/ankle weakness  4) Decreased gastroc/soleus/piriformis/ITB flexibility  5) Decreased hip/ankle ROM  6) Joint mobility: bilateral hip posterior glide  7) Lack of HEP     GOALS:   Short Term Goals: 4 weeks  1. Pt. to report decreased right ankle/foot pain </= 4/10 at worst to increase tolerance for community ambulation MET  2. Pt. to demonstrate increased right ankle soleus flexibility to 15 degrees to improve ease with partial squatting MET  3. Pt. to demonstrate increased MMT for left gluteus medius to 4-/5 to increase stability during SL activities. MET  4. Pt. reports a decrease in sensitivity with stretching and consistent orthotic use. MET   5. Pt to tolerate HEP to improve ROM and independence with ADL's MET     Long Term Goals: 8 weeks  1. Pt. to report decreased right ankle/foot pain </ = 2/10 at worst to increase tolerance for community ambulation/prolonged standing MET  2. Pt. to demonstrate increased right ankle soleus flexibility to 20 degrees to improve ease with partial squatting  3. Pt. to demonstrate increased MMT for left gluteus medius to 4+/5 to increase stability during SLS high level activities.  4. Pt. to demonstrate improved LE flexibility of rectus femoris, ITB, and iliopsoas improving his gait mechanics.  5. Pt to demonstrate increased MMT for bilateral ankle EV to 4+/5 to increase ankle stability during WB tasks. MET  6. Pt. demonstrate increased MMT for bilateral ankle DF to 5/5 to increase ease with toe clearance. MET  7. Pt will report at CJ level (20-40% impaired) on LE FOTO survey for mobility to demonstrate increase in LE function and mobility in home and community environment.   8. Pt. to be able to ambulate community distances while wearing orthotics without report of right achilles tendon soreness  9. Pt to be Independent with HEP to improve ROM and  independence with ADL's    Plan   Continue with established Plan of Care towards PT goals 1x/wk for an additional 4 to 6wks

## 2017-02-17 ENCOUNTER — OFFICE VISIT (OUTPATIENT)
Dept: PODIATRY | Facility: CLINIC | Age: 72
End: 2017-02-17
Payer: MEDICARE

## 2017-02-17 VITALS — WEIGHT: 237 LBS | BODY MASS INDEX: 31.41 KG/M2 | HEIGHT: 73 IN

## 2017-02-17 DIAGNOSIS — M76.61 ACHILLES TENDINITIS OF RIGHT LOWER EXTREMITY: Primary | ICD-10-CM

## 2017-02-17 DIAGNOSIS — G89.29 CHRONIC PAIN OF RIGHT ANKLE: ICD-10-CM

## 2017-02-17 DIAGNOSIS — M25.571 CHRONIC PAIN OF RIGHT ANKLE: ICD-10-CM

## 2017-02-17 PROCEDURE — 1160F RVW MEDS BY RX/DR IN RCRD: CPT | Mod: S$GLB,,, | Performed by: PODIATRIST

## 2017-02-17 PROCEDURE — 99999 PR PBB SHADOW E&M-EST. PATIENT-LVL III: CPT | Mod: PBBFAC,,, | Performed by: PODIATRIST

## 2017-02-17 PROCEDURE — 1125F AMNT PAIN NOTED PAIN PRSNT: CPT | Mod: S$GLB,,, | Performed by: PODIATRIST

## 2017-02-17 PROCEDURE — 1157F ADVNC CARE PLAN IN RCRD: CPT | Mod: S$GLB,,, | Performed by: PODIATRIST

## 2017-02-17 PROCEDURE — 1159F MED LIST DOCD IN RCRD: CPT | Mod: S$GLB,,, | Performed by: PODIATRIST

## 2017-02-17 PROCEDURE — 99212 OFFICE O/P EST SF 10 MIN: CPT | Mod: S$GLB,,, | Performed by: PODIATRIST

## 2017-02-17 NOTE — PROGRESS NOTES
Subjective:      Patient ID: Arnaldo Donovan is a 71 y.o. male.    Chief Complaint: Tendonitis (right)    Sharp and sore pain right posterior ankle/achilles.  Some improvement from stretches, heel lifts, orthotics, activity modification, prn nsaid.  Denies trauma and surgery.    Review of Systems   Constitution: Negative for chills, diaphoresis, fever, malaise/fatigue and night sweats.   Cardiovascular: Negative for claudication, cyanosis, leg swelling and syncope.   Skin: Negative for color change, dry skin, nail changes, rash, suspicious lesions and unusual hair distribution.   Musculoskeletal: Positive for stiffness. Negative for falls, joint pain, joint swelling, muscle cramps and muscle weakness.   Gastrointestinal: Negative for constipation, diarrhea, nausea and vomiting.   Neurological: Negative for brief paralysis, disturbances in coordination, focal weakness, numbness, paresthesias, sensory change and tremors.           Objective:      Physical Exam   Constitutional: He appears well-developed and well-nourished. He is cooperative. No distress.   Cardiovascular:   Pulses:       Popliteal pulses are 2+ on the right side, and 2+ on the left side.        Dorsalis pedis pulses are 1+ on the right side, and 1+ on the left side.        Posterior tibial pulses are 2+ on the right side, and 2+ on the left side.   Capillary refill 3 seconds all toes/distal feet, all toes/both feet warm to touch.      Negative lymphadenopathy bilateral popliteal fossa and tarsal tunnel.      Negavie lower extremity edema bilateral.     Musculoskeletal:        Right ankle: He exhibits normal range of motion, no swelling, no ecchymosis, no deformity, no laceration and normal pulse. Achilles tendon exhibits pain. Achilles tendon exhibits no defect (bulbus hypertrophy without defect about 8cm proximal to insertion) and normal Rodgers's test results.   Normal angle, base, station of gait. All ten toes without clubbing, cyanosis, or  signs of ischemia.  No pain to palpation bilateral lower extremities.  Range of motion, stability, muscle strength, and muscle tone normal bilateral feet and legs.     Lymphadenopathy: No inguinal adenopathy noted on the right or left side.   Negative lymphadenopathy bilateral popliteal fossa and tarsal tunnel.   Neurological: He is alert. He has normal strength. He displays no atrophy and no tremor. No sensory deficit. He exhibits normal muscle tone. He displays no seizure activity. Gait normal.   Reflex Scores:       Patellar reflexes are 2+ on the right side and 2+ on the left side.       Achilles reflexes are 2+ on the right side and 2+ on the left side.  Negative tinel sign to percussion sural, superficial peroneal, deep peroneal, saphenous, and posterior tibial nerves right and left ankles and feet.     Skin: Skin is warm, dry and intact. No abrasion, no bruising, no burn, no ecchymosis, no laceration, no lesion and no rash noted. He is not diaphoretic. No cyanosis or erythema. No pallor. Nails show no clubbing.     Skin is normal age and health appropriate color, turgor, texture, and temperature bilateral lower extremities without ulceration, hyperpigmentation, discoloration, masses nodules or cords palpated.  No ecchymosis, erythema, edema, or cardinal signs of infection bilateral lower extremities.               Assessment:       Encounter Diagnoses   Name Primary?    Achilles tendinitis of right lower extremity Yes    Chronic pain of right ankle          Plan:       Arnaldo was seen today for tendonitis.    Diagnoses and all orders for this visit:    Achilles tendinitis of right lower extremity    Chronic pain of right ankle      I counseled the patient on his conditions, their implications and medical management.        Continue stretches, inserts, athletic shoes with increased activity, PT, nsaid prn.    Continue night splint, heel lifts, custom orthotics.          Return if symptoms worsen or fail to  improve.

## 2017-05-29 ENCOUNTER — PATIENT MESSAGE (OUTPATIENT)
Dept: INTERNAL MEDICINE | Facility: CLINIC | Age: 72
End: 2017-05-29

## 2017-05-29 ENCOUNTER — PATIENT MESSAGE (OUTPATIENT)
Dept: DERMATOLOGY | Facility: CLINIC | Age: 72
End: 2017-05-29

## 2017-05-29 DIAGNOSIS — M1A.00X0 IDIOPATHIC CHRONIC GOUT WITHOUT TOPHUS, UNSPECIFIED SITE: Primary | ICD-10-CM

## 2017-05-29 DIAGNOSIS — L71.9 ROSACEA: ICD-10-CM

## 2017-05-29 DIAGNOSIS — M76.60 ACHILLES TENDINITIS, UNSPECIFIED LATERALITY: ICD-10-CM

## 2017-05-30 RX ORDER — ALLOPURINOL 300 MG/1
300 TABLET ORAL DAILY
Qty: 30 TABLET | Refills: 1 | Status: SHIPPED | OUTPATIENT
Start: 2017-05-30 | End: 2017-09-30 | Stop reason: SDUPTHER

## 2017-05-30 NOTE — TELEPHONE ENCOUNTER
Left a detailed message informing the pt hat his rx and ref for [facility] was approve will fax to [facility]

## 2017-06-01 ENCOUNTER — PATIENT MESSAGE (OUTPATIENT)
Dept: DERMATOLOGY | Facility: CLINIC | Age: 72
End: 2017-06-01

## 2017-06-01 RX ORDER — DOXYCYCLINE 50 MG/1
50 CAPSULE ORAL DAILY
Qty: 30 CAPSULE | Refills: 3 | Status: SHIPPED | OUTPATIENT
Start: 2017-06-01 | End: 2017-10-04 | Stop reason: SDUPTHER

## 2017-08-25 DIAGNOSIS — Z12.11 COLON CANCER SCREENING: ICD-10-CM

## 2017-09-30 DIAGNOSIS — M1A.00X0 IDIOPATHIC CHRONIC GOUT WITHOUT TOPHUS, UNSPECIFIED SITE: ICD-10-CM

## 2017-10-02 RX ORDER — ALLOPURINOL 300 MG/1
300 TABLET ORAL DAILY
Qty: 30 TABLET | Refills: 1 | Status: SHIPPED | OUTPATIENT
Start: 2017-10-02 | End: 2017-12-11 | Stop reason: SDUPTHER

## 2017-10-03 DIAGNOSIS — L71.9 ROSACEA: ICD-10-CM

## 2017-10-04 DIAGNOSIS — L71.9 ROSACEA: ICD-10-CM

## 2017-10-04 RX ORDER — DOXYCYCLINE 50 MG/1
50 CAPSULE ORAL DAILY
Qty: 30 CAPSULE | Refills: 3 | OUTPATIENT
Start: 2017-10-04

## 2017-10-05 RX ORDER — DOXYCYCLINE 50 MG/1
50 CAPSULE ORAL DAILY
Qty: 30 CAPSULE | Refills: 3 | Status: SHIPPED | OUTPATIENT
Start: 2017-10-05 | End: 2018-05-08 | Stop reason: SINTOL

## 2017-10-18 ENCOUNTER — TELEPHONE (OUTPATIENT)
Dept: BARIATRICS | Facility: CLINIC | Age: 72
End: 2017-10-18

## 2017-10-18 NOTE — PROGRESS NOTES
Subjective:       Patient ID: Arnaldo Donovan is a 72 y.o. male.    Chief Complaint: No chief complaint on file.    CC:I would like to lose about 15-20 lbs.     Current attempts at weight loss: New pt to me, referred by No referring provider defined for this encounter. , with Patient Active Problem List:     Rosacea     Gout, chronic     Abnormal blood sugar     Hyperlipemia     Essential hypertension- stopped amlodipine on his own. States most home readings have been ok     Difficulty walking     Right ankle pain     Pain of right heel  Lab Results       Component                Value               Date                       CHOL                     272 (H)             07/14/2016                 CHOL                     260 (H)             09/02/2015                 CHOL                     251 (H)             04/09/2015            Lab Results       Component                Value               Date                       HDL                      67                  07/14/2016                 HDL                      61                  09/02/2015                 HDL                      52                  04/09/2015            Lab Results       Component                Value               Date                       LDLCALC                  182.8 (H)           07/14/2016                 LDLCALC                  175.4 (H)           09/02/2015                 LDLCALC                  164.6 (H)           04/09/2015            Lab Results       Component                Value               Date                       TRIG                     111                 07/14/2016                 TRIG                     118                 09/02/2015                 TRIG                     172 (H)             04/09/2015            Lab Results       Component                Value               Date                       CHOLHDL                  24.6                07/14/2016                 CHOLHDL                  23.5        "         09/02/2015                 CHOLHDL                  20.7                04/09/2015               Does exercise 3-4 times a week walking. Has tendonitis     Previous diet attempts: Signed up for ww. Has not used it yet.     History of medication for loss: Denies    Heaviest weight:237# earlier this year.     Lightest weight: 150# when in the [employer]    Goal weight: 190-200#      Typical eating patterns: . Lives with wife. Share in cooking.   Breakfast: Half grapefruit, 4-5 oz OJ and corn bread. Weekends: OJ, scrambled egg(2 eggs) with cornbread or english muffin     Lunch: [employer] yesterday. [place] - chicken salad with chips, sushi. Weekends: similar or popcorn or yogurt.   Dinner: Chicken with sweet potato and peas. Sometimes with salad. Weekends: Chinese food- pepper shrimp with dumplings.     Snacks: Skinny pop, blueberries and SF whipped cream.     Beverages: Water, wine- mostly on weekends, OJ, Coffee black. Occasional beers    Willingness to change: 6/10      BMR: 1861        Review of Systems   Constitutional: Negative for chills and fever.   Respiratory: Negative for apnea and shortness of breath.         Some snoring.    Cardiovascular: Negative for chest pain and leg swelling.   Gastrointestinal: Positive for constipation. Negative for diarrhea.        + GERD   Genitourinary: Negative for difficulty urinating and dysuria.        Nocturia 2-4 times a night.    Musculoskeletal: Negative for arthralgias and back pain.   Neurological: Negative for dizziness and light-headedness.   Psychiatric/Behavioral: Positive for sleep disturbance. Negative for dysphoric mood. The patient is not nervous/anxious.        Objective:     BP (!) 150/80   Pulse 83   Ht 6' 1" (1.854 m)   Wt 105 kg (231 lb 7.7 oz)   BMI 30.54 kg/m²     Physical Exam   Constitutional: He is oriented to person, place, and time. He appears well-developed. No distress.   obesity   HENT:   Head: Normocephalic and atraumatic.   Eyes: " Pupils are equal, round, and reactive to light. No scleral icterus.   Neck: Normal range of motion. Neck supple. No thyromegaly present.   Cardiovascular: Normal rate, regular rhythm and normal heart sounds.  Exam reveals no gallop and no friction rub.    No murmur heard.  Pulmonary/Chest: Effort normal and breath sounds normal. No respiratory distress. He has no wheezes.   Abdominal: Soft. Bowel sounds are normal. He exhibits no distension. There is no tenderness.   Musculoskeletal: Normal range of motion. He exhibits no edema.   Neurological: He is alert and oriented to person, place, and time.   Skin: Skin is warm and dry.   Psychiatric: He has a normal mood and affect. His behavior is normal. Judgment normal.   Vitals reviewed.      Assessment:       1. Essential hypertension    2. Obesity (BMI 30.0-34.9)        Plan:         1. Essential hypertension  Follow-up with PCP. Expect improvement with weight loss. Avoid stimulant anorectics     2. Obesity (BMI 30.0-34.9)   Contrave and topiramate discussed as medication. He will message in 2 weeks about if he feels he would want to add meds.     Continue with exercise. Add some type of resistance training 2-3 days a week. These can be body weight exercises, light weight or elastic bands. [employer] and [employer] are great sources for free work out plans and videos.      Limit alcohol to 4 drinks per week.       3 meals a day made up of the following:  Unlimited green vegetables, tomatoes, mushrooms, spaghetti squash, cauliflower, meat, poultry, seafood, eggs and hard cheeses.   Milk and plain yogurt  Dressings, seasonings, condiments, etc should have less than 2 g sugars.   beans or nuts can have 1 x a day.   1-2 servings of citrus fruits, berries, pineapple or melon a day (1/2 cup)  Avoid fried foods    No grains, rice, pasta, potatoes, bread, corn, peas, oatmeal, grits, tortillas, crackers, chips    No soda, sweet tea, juices or lemonade    [email or web address] for  recipes. Moderate carb intake

## 2017-10-19 ENCOUNTER — OFFICE VISIT (OUTPATIENT)
Dept: INTERNAL MEDICINE | Facility: CLINIC | Age: 72
End: 2017-10-19
Payer: MEDICARE

## 2017-10-19 ENCOUNTER — OFFICE VISIT (OUTPATIENT)
Dept: BARIATRICS | Facility: CLINIC | Age: 72
End: 2017-10-19
Payer: MEDICARE

## 2017-10-19 ENCOUNTER — IMMUNIZATION (OUTPATIENT)
Dept: INTERNAL MEDICINE | Facility: CLINIC | Age: 72
End: 2017-10-19
Payer: MEDICARE

## 2017-10-19 VITALS
BODY MASS INDEX: 30.68 KG/M2 | HEART RATE: 83 BPM | DIASTOLIC BLOOD PRESSURE: 90 MMHG | SYSTOLIC BLOOD PRESSURE: 136 MMHG | HEIGHT: 73 IN | WEIGHT: 231.5 LBS

## 2017-10-19 VITALS
HEART RATE: 83 BPM | SYSTOLIC BLOOD PRESSURE: 150 MMHG | WEIGHT: 231.5 LBS | DIASTOLIC BLOOD PRESSURE: 80 MMHG | HEIGHT: 73 IN | BODY MASS INDEX: 30.68 KG/M2

## 2017-10-19 DIAGNOSIS — I70.0 ATHEROSCLEROSIS OF AORTA: ICD-10-CM

## 2017-10-19 DIAGNOSIS — E78.5 HYPERLIPIDEMIA, UNSPECIFIED HYPERLIPIDEMIA TYPE: ICD-10-CM

## 2017-10-19 DIAGNOSIS — I77.819 ECTATIC AORTA: ICD-10-CM

## 2017-10-19 DIAGNOSIS — M1A.00X0 IDIOPATHIC CHRONIC GOUT WITHOUT TOPHUS, UNSPECIFIED SITE: ICD-10-CM

## 2017-10-19 DIAGNOSIS — I10 ESSENTIAL HYPERTENSION: ICD-10-CM

## 2017-10-19 DIAGNOSIS — Z00.00 ENCOUNTER FOR PREVENTIVE HEALTH EXAMINATION: Primary | ICD-10-CM

## 2017-10-19 DIAGNOSIS — L71.9 ROSACEA: ICD-10-CM

## 2017-10-19 DIAGNOSIS — Z23 NEED FOR 23-POLYVALENT PNEUMOCOCCAL POLYSACCHARIDE VACCINE: ICD-10-CM

## 2017-10-19 DIAGNOSIS — E66.9 OBESITY (BMI 30.0-34.9): ICD-10-CM

## 2017-10-19 DIAGNOSIS — R73.09 ABNORMAL BLOOD SUGAR: ICD-10-CM

## 2017-10-19 DIAGNOSIS — I10 ESSENTIAL HYPERTENSION: Primary | ICD-10-CM

## 2017-10-19 PROBLEM — E66.811 OBESITY (BMI 30.0-34.9): Status: ACTIVE | Noted: 2017-10-19

## 2017-10-19 PROCEDURE — G0009 ADMIN PNEUMOCOCCAL VACCINE: HCPCS | Mod: S$GLB,,, | Performed by: NURSE PRACTITIONER

## 2017-10-19 PROCEDURE — 99215 OFFICE O/P EST HI 40 MIN: CPT | Mod: S$GLB,,, | Performed by: INTERNAL MEDICINE

## 2017-10-19 PROCEDURE — 90662 IIV NO PRSV INCREASED AG IM: CPT | Mod: S$GLB,,, | Performed by: FAMILY MEDICINE

## 2017-10-19 PROCEDURE — G0008 ADMIN INFLUENZA VIRUS VAC: HCPCS | Mod: S$GLB,,, | Performed by: FAMILY MEDICINE

## 2017-10-19 PROCEDURE — 99999 PR PBB SHADOW E&M-EST. PATIENT-LVL III: CPT | Mod: PBBFAC,,, | Performed by: INTERNAL MEDICINE

## 2017-10-19 PROCEDURE — 90732 PPSV23 VACC 2 YRS+ SUBQ/IM: CPT | Mod: S$GLB,,, | Performed by: NURSE PRACTITIONER

## 2017-10-19 PROCEDURE — 99499 UNLISTED E&M SERVICE: CPT | Mod: S$GLB,,, | Performed by: INTERNAL MEDICINE

## 2017-10-19 PROCEDURE — G0439 PPPS, SUBSEQ VISIT: HCPCS | Mod: S$GLB,,, | Performed by: NURSE PRACTITIONER

## 2017-10-19 PROCEDURE — 99999 PR PBB SHADOW E&M-EST. PATIENT-LVL IV: CPT | Mod: PBBFAC,,, | Performed by: NURSE PRACTITIONER

## 2017-10-19 PROCEDURE — 99499 UNLISTED E&M SERVICE: CPT | Mod: S$GLB,,, | Performed by: NURSE PRACTITIONER

## 2017-10-19 NOTE — PROGRESS NOTES
"Arnaldo Donovan presented for a  Medicare AWV and comprehensive Health Risk Assessment today. The following components were reviewed and updated:    · Medical history  · Family History  · Social history  · Allergies and Current Medications  · Health Risk Assessment  · Health Maintenance  · Care Team     ** See Completed Assessments for Annual Wellness Visit within the encounter summary.**       The following assessments were completed:  · Living Situation  · CAGE  · Depression Screening  · Timed Get Up and Go  · Whisper Test  · Cognitive Function Screening  · Nutrition Screening  · ADL Screening  · PAQ Screening          Vitals:    10/19/17 0933   BP: (!) 136/90   BP Location: Right arm   Patient Position: Sitting   Pulse: 83   Weight: 105 kg (231 lb 7.7 oz)   Height: 6' 1" (1.854 m)     Body mass index is 30.54 kg/m².  Physical Exam   Constitutional: He is oriented to person, place, and time. He appears well-developed and well-nourished. No distress.   HENT:   Head: Normocephalic and atraumatic.   Eyes: No scleral icterus.   Neck: Normal range of motion. Neck supple.   Cardiovascular: Normal rate, regular rhythm, normal heart sounds and intact distal pulses.    Pulmonary/Chest: Effort normal and breath sounds normal. No respiratory distress.   Abdominal: Soft. Bowel sounds are normal. He exhibits no distension.   Musculoskeletal: Normal range of motion. He exhibits no edema.   Neurological: He is alert and oriented to person, place, and time.   Skin: Skin is warm and dry. He is not diaphoretic.   Psychiatric: He has a normal mood and affect. His behavior is normal.         Diagnoses and health risks identified today and associated recommendations/orders:    1. Encounter for preventive health examination  Assessments completed  Preventative health recommendations reviewed    - Ambulatory Referral to Medical Fitness (MEDFIT)  - [facility] ASSIGN QUESTIONNAIRE SERIES (MEDFIT)  - [facility] Patient Entered [facility] " [facility] (MEDFIT)    2. Atherosclerosis of aorta  Stable. Seen on us from 07/19/16  Patient has taken a statin in the past, but stopped this d/t leg cramping  Followed by PCP.     3. Ectatic aorta  Stable. Seen on us from 07/19/16  Slight infrarenal ectasia within the distal aorta  Encouraged adequate blood pressure control  Followed by PCP.     4. Essential hypertension  Stable.  Discussed decreasing salt and caffeine intake, staying hydrating.  Monitoring blood pressure occasionally at home   Followed by PCP.     5. Hyperlipidemia, unspecified hyperlipidemia type  Stable.   Patient has taken a statin in the past, but stopped this d/t leg cramping  Followed by PCP.     6. Abnormal blood sugar  Stable. Last HgbA1C was 5.9  Diet controlled  Followed by PCP.     7. Rosacea  Stable.   Controlled with current medical therapy  Followed by dermatology    8. Idiopathic chronic gout without tophus, unspecified site  Stable.   Controlled with current medical therapy  Followed by PCP.     9. Obesity (BMI 30.0-34.9)  Stable.   Medical fitness referral placed  Followed by bariatrics and pcp    10. Need for 23-polyvalent pneumococcal polysaccharide vaccine  - (In Office Administered) Pneumococcal Polysaccharide Vaccine (23 Valent) (SQ/IM)      Provided Arnaldo with a 5-10 year written screening schedule and personal prevention plan. Recommendations were developed using the USPSTF age appropriate recommendations. Education, counseling, and referrals were provided as needed. After Visit Summary printed and given to patient which includes a list of additional screenings\tests needed.    Return in about 4 weeks (around 11/16/2017) for a routine visit with your primary care provider or sooner if problems arise. .    Anel Briceno NP

## 2017-10-19 NOTE — LETTER
[facility]  [address]  [facility] [address]  Phone: [phone]  Fax: [phone] October 19, 2017      Xiao Turner MD  [address]  [facility] [address]    Patient: Arnaldo Donovan   MR Number: [id number]   YOB: 1945   Date of Visit: 10/19/2017     Dear Dr. Turner:    Thank you for referring Arnaldo Donovan to me for evaluation. Below are the relevant portions of my assessment and plan of care.    ASSESSMENT  1. Essential hypertension    2. Obesity (BMI 30.0-34.9)      PLAN: 1. Essential hypertension - Follow-up with PCP. Expect improvement with weight loss. Avoid stimulant anorectics      2. Obesity (BMI 30.0-34.9) - Contrave and Topiramate discussed as medication. He will message in 2 weeks about if he feels he would want to add meds.      Continue with exercise. Add some type of resistance training 2-3 days a week. These can be body weight exercises, light weight or elastic bands. [employer] and [employer] are great sources for free work out plans and videos.       Limit alcohol to 4 drinks per week. [email or web address] for recipes. Moderate carb intake    The patient was given individualized diet, exercise, and follow-up instructions.      If you have questions, please do not hesitate to call me. I look forward to following Arnaldo along with you.    Sincerely,    Donna Groves MD   Medical Weight Loss   [facility]   SATURNINO/sujey

## 2017-10-19 NOTE — PATIENT INSTRUCTIONS
Contrave and topiramate discussed as medication.     Continue with exercise. Add some type of resistance training 2-3 days a week. These can be body weight exercises, light weight or elastic bands. [employer] and [employer] are great sources for free work out plans and videos.      Limit alcohol to 4 drinks per week.       3 meals a day made up of the following:  Unlimited green vegetables, tomatoes, mushrooms, spaghetti squash, cauliflower, meat, poultry, seafood, eggs and hard cheeses.   Milk and plain yogurt  Dressings, seasonings, condiments, etc should have less than 2 g sugars.   beans or nuts can have 1 x a day.   1-2 servings of citrus fruits, berries, pineapple or melon a day (1/2 cup)  Avoid fried foods    No grains, rice, pasta, potatoes, bread, corn, peas, oatmeal, grits, tortillas, crackers, chips    No soda, sweet tea, juices or lemonade    Www.dietdoctor.[email or web address] for recipes. Moderate carb intake    Fruits and Vegetables       Include 1-2 servings of fruit daily.      1 serving of fruit includes ½ cup unsweetened applesauce, ½ medium banana, tennis ball size piece of fruit, 17 grapes, 1 cup melon, 1 cup strawberries, ¼ cup dried fruit     Include 2-3 servings of vegetables daily. 1 serving is 1 cup raw or ½ cup cooked.     Non-starchy vegetables include artichoke, asparagus, baby corn, bamboo shoots, beans: green/Italian/wax, bean sprouts, beets, broccoli, [place] sprouts, cabbage, carrots, cauliflower, celery, cucumber, eggplant, green onions or scallions, greens, jicama, leeks, mushrooms, okra, onions, pea pods, peppers, radishes, spinach, summer squash, tomatoes and salsa, turnips, vegetable juice cocktail, water chestnuts, zucchini        Meal Ideas for Regular Bariatric Diet  *Recipes and products available at www.bariatriceating.com      Breakfast: (15-20g protein)    - Egg white omelet: 2 egg whites or ½ cup Egg Beaters. (Optional proteins: cheese, shrimp, black beans, chicken, sliced turkey)  (Optional veggies: tomatoes, salsa, spinach, mushrooms, onions, green peppers, or small slice avocado)     - Egg and sausage: 1 egg or ¼ cup Egg Beaters (any variety), with 1 iman or 2 links of Turkey sausage or Veggie breakfast sausage (Terabitz or Apttus)    - Crust-less breakfast quiche: To make a glass pie dish, mix 4oz part skim Ricotta, 1 cup skim milk, and 2 eggs as your base. Add protein: shredded cheese, sliced lean ham or turkey, turkey alberts/sausage. Add veggies: tomato, onion, green onion, mushroom, green pepper, spinach, etc.    - Yogurt parfait: Mix 1 - 6oz container Dannon Light N Fit vanilla yogurt, with ¼ cup Kashi Go Lean cereal    - Cottage cheese and fruit: ½ cup part-skim cottage cheese or ricotta cheese topped with fresh fruit or sugar free preserves     - Cheryl Mcbride's Vanilla Egg custard* (add 2 Tbsp instant coffee granules to make Cappuccino Custard*)    - Hi-Protein café latte (skim milk, decaf coffee, 1 scoop protein powder). Optional to add Sugar free syrup or extract flavoring.    Lunch: (20-30g protein)    - ½ cup Black bean soup (Homemade or Progresso), with ¼ cup shredded low-fat cheese. Top with chopped tomato or fresh salsa.     - Lean deli turkey breast and low-fat sliced cheese, mustard or light choudhary to moisten, rolled up together, or wrapped in a [place] lettuce leaf    - Chicken salad made from dinner leftovers, moisten with low-fat salad dressing or light choudhary. Also try leftover salmon, shrimp, tuna or boiled eggs. Serve ½ cup over dark green salad    - Fat-free canned refried beans, topped with ¼ cup shredded low-fat cheese. Top with chopped tomato or fresh salsa.     - Greek salad: Top mixed greens with 1-2oz grilled chicken, tomatoes, red onions, 2-3 kalamata olives, and sprinkle lightly with feta cheese. Spritz with Balsamic vinegar to taste.     - Crust-less lunch quiche: To make a glass pie dish, mix 4oz part skim Ricotta, 1 cup skim milk, and 2 eggs as your  base. Add protein: shredded cheese, sliced lean ham or turkey, shrimp, chicken. Add veggies: tomato, onion, green onion, mushroom, green pepper, spinach, artichoke, broccoli, etc.    - Pizza bake: tomato sauce, low-fat shredded mozzarella and turkey pepperoni or Taiwanese alberts. Add any veggies.    - Cucumber crab bites: Spread ¼ cup crab dip (lump crabmeat + light cream cheese and green onions) over sliced cucumber.     - Chicken with light spinach and artichoke dip*: Puree in : 6oz cooked and drained spinach, 2 cloves garlic, 1 can cannelloni beans, ½ cup chopped green onions, 1 can drained artichoke hearts (not marinated in oil), lemon juice and basil. Mix in 2oz chopped up chicken.    Supper: (20-30g protein)    - Serve grilled fish over dark green salad tossed with low-fat dressing, served with grilled asparagus matta     - Rotisserie chicken salad: served with sliced strawberries, walnuts, fat-free feta cheese crumbles and 1 tbsp Chavezs Own Light Raspberry [place] Vinaigrette    - Shrimp cocktail: Dip cold boiled shrimp in homemade low-sugar cocktail sauce (1/2 cup Capri One Carb ketchup, 2 tbsp horseradish, 1/4 tsp hot sauce, 1 tsp Worcestershire sauce, 1 tbsp freshly-squeezed lemon juice). Serve with dark green salad, walnuts, and crumbled blue cheese drizzled with olive oil and Balsamic vinegar    - Tuna Melt: Spread tuna salad onto 2 thick slices of tomato. Top with low-fat cheese and broil until cheese is melted. May also be made with chicken salad of shrimp salad. Beemer with different types of cheeses.    - Homemade low-fat Chili using extra lean ground beef or ground turkey. Top with shredded cheese and salsa as desired. May add dollop fat-free sour cream if desired    - Dinner Omelet with shrimp or chicken and onion, green peppers and chives.    - No noodle lasagna: Use sliced zucchini or eggplant in place of noodles.  Layer with part skim ricotta cheese and low sugar meat sauce  (use very lean ground beef or ground turkey).    - Mexican chicken bake: Bake chunks of chicken breast or thigh with taco seasoning, Pace brand enchilada sauce, green onions and low-fat cheese. Serve with ¼ cup black beans or fat free refried beans topped with chopped tomatoes or salsa.    - Kathryn frozen meatballs, simmered in Classico Marinara sauce. Different flavors of salsa or spaghetti sauce create different dishes! Sprinkle with parmesan cheese. Serve with grilled or steamed veggies, or a dark green salad.    - Simmer boneless skinless chicken thigh chunks in Classico Marinara sauce or roasted salsa until tender with chopped onion, bell pepper, garlic, mushrooms, spinach, etc.     - Hamburger, without the bun, dressed the way you like. Served with grilled or steamed veggies.    - Eggplant parmesan: Bake slices of eggplant at 350 degrees for 15 minutes. Layer tomato sauce, sliced eggplant and low-fat mozzarella cheese in a baking dish and cover with foil. Bake 30-40 more minutes or until bubbly. Uncover and bake at 400 degrees for about 15 more minutes, or until top is slightly crisp.    - Fish tacos: grilled/baked white fish, wrapped in [place] lettuce leaf, topped with salsa, shredded low-fat cheese, and light coleslaw.    Snacks: (100-200 calories; >5g protein)    - 1 low-fat cheese stick with 8 cherry tomatoes or 1 serving fresh fruit  - 4 thin slices fat-free turkey breast and 1 slice low-fat cheese  - 4 thin slices fat-free honey ham with wedge of melon  - 1/4 cup unsalted nuts with ½ cup fruit  - 6-oz container Dannon Light n Fit vanilla yogurt, topped with 1oz unsalted nuts         - apple, celery or baby carrots spread with 2 Tbsp natural peanut butter or almond butter   - apple slices with 1 oz slice low-fat cheese  - celery, cucumber, bell pepper or baby carrots dipped in ¼ cup hummus bean spread or light spinach and artichoke dip (*recipe in lunch section)  - 100 calorie bag microwave light  popcorn with 3 tbsp grated parmesan cheese  - Kobi Valdez Beef Steak - 14g protein! (similar to beef jerky)  - 2 wedges [place] - Light Herb & Garlic Cheese with sliced cucumber or green bell pepper  - 1/2 cup low-fat cottage cheese with ¼ cup fruit or ¼ cup salsa  - RTD Protein drinks: Atkins, Low Carb Slim Fast, EAS light, Muscle Milk Light, etc.  - Homemade Protein drinks: GNC Soy95, Isopure, Nectar, UNJURY, Whey Gourmet, etc. Mix 1 scoop powder with 8oz skim/1% milk or light soymilk.  - Protein bars: Atkins, EAS, Pure Protein, Think Thin, Detour, etc. Must have 0-4 grams sugar - Read the label.    Takeout Options: No more than twice/week  Deli - Salads (no pasta or rice), meats, cheeses. Roasted chicken. Lox (salmon)    Mexican - Platters which don't include tortillas, chips, or rice. Go easy on the beans. Example: Fajitas without the tortillas. Ask the  not to bring chips to the table if they are too tempting.    Greek - Meat or fish and vegetable, but no bread or rice. Including hummus, baba ganoush, etc, is OK. Most sit-down Greek restaurants can provide you with cucumber slices for dipping instead of gloria bread.    Fast Food (Avoid as much as possible) - Salads (no croutons and limit salad dressing to 2 tbsp), grilled chicken sandwich without the bun and ask for no choudhary. Nathalias low fat chili or Taco Bell pintos and cheese.    BBQ - The meats are fine if you ask for sauces on the side, but most of the traditional side dishes are loaded with carbs. Nikunj slaw, baked beans and BBQ sauce are typically made with sugar.    Chinese - Nothing deep-fried, no rice or noodles. Many Chinese sauces have starch and sugar in them, so you'll have to use your judgement. If you find that these sauces trigger cravings, or cause Dumping, you can ask for the sauce to be made without sugar or just use soy sauce.    Eating well to be healthy and lose weight does not have to be hard. It also does not have to be time  consuming or expensive. There a lots of ways you can work in healthy choices into your day. Many of these are easy, quick and even family friendly!    Homemade hazelnut au lait  Brew your favorite brand of hazelnut flavored coffee (Community makes a good one). Microwave 1/2 cup of milk that fits your eating plan (whole, skim or sugar-free almond milk can all work). Add half to 1 oz sugar free hazelnut syrup.     Quick and easy breakfast  1-2 boiled eggs or mini-frittatas with a tangerine. The boiled eggs and mini-frittatas can both be made ahead and last for up to 4 days in the refrigerator. Bonus if you portion them out in ready to go containers or zipper bags.     Breakfast Egg Muffins with Alberts and Spinach  Makes 12 muffins  Ingredients    6 eggs  ¼ cup milk  ¼ teaspoon salt  2 cups grated cheddar cheese  3/4 cup spinach, cooked and drained (about 8 oz fresh spinach)  6 alberts slices, cooked, drained of fat, and chopped  1/2 cup grated Parmesan cheese (optional)    Instructions      Preheat oven to 350 degrees. Use a regular 12-cup muffin pan. Spray the muffin pan with non-stick cooking spray.  In a large bowl, beat eggs until smooth. Add milk, salt, Cheddar cheese and mix. Stir spinach, cooked alberts into the egg mixture. Ladle the egg mixture into greased muffin cups ¾ full.  Top each muffin cup with grated Parmesan cheese.  Bake for 25 minutes. Remove from the oven, let the muffins cool for 30 minutes before removing them from the pan.      Be a brown bagger! When you make dinner, plan for an extra helping. When you serve your plate for dinner, serve an additional helping into a container that you can take with you the next day. If you don't have a refrigerator available during the day, an insulated lunch bag and ice packs will help you safely store you lunch.     Cold Brewed Iced tea. Fill a pitcher with 64 oz filtered water. Add either 4 regular tea bags of your choice or a large iced tea bag. Refrigerate  over night then remove the tea bags. The tea will not be bitter and is super flavorful. Get creative! Try combinations like green tea and hibiscus tea or black tea with lemon zinger. Add orange or lemon slices for even more flavor.     Snack wisely. Protein filled snacks will fill you up, allowing you to get by with fewer calories. String cheese, pork skins (chicharrones), turkey pepperoni, or celery with cream cheese will all fit the bill.       Ditch the take out. Turkey tacos (with or without a low carb tortilla), burgers (without the bun), or fun stir fries are all quick and easy. The whole family will be happy, and you can save calories and money.      Orange Chicken Stir stein with asparagus   Makes 6 servings  Ingredients:    1.5 lbs boneless skinless chicken breast/tenders, diced into 1-inch pieces  1 Tbsp extra virgin olive or avocado oil, divided  2 lb asparagus, end portions trimmed and remainder diced into 1 1/2-inch pieces  1 small yellow onion, sliced into thin strips  8 oz button mushrooms, sliced  1 Tbsp peeled and finely grated fresh julissa  4 cloves garlic, minced  1/2 cup low-sodium chicken broth  Juice of 2 fresh oranges  2 Tbsp low sodium soy sauce  2 Tbsp cornstarch  Sea salt and freshly ground black pepper    Directions:    In a 12-inch non-stick wok, heat 1/2 oil over moderately high heat. Once oil is hot, add diced chicken and season lightly with salt and pepper. Sauté until cooked through, tossing occasionally, about 5-6 minutes.  Place chicken on a large plate and set aside. Return wok, reduce to medium-high heat, add remaining oil.  Once oil is hot, add asparagus, yellow onion and mushrooms, and sauté until tender-crisp, about 4 - 5 minutes, adding in garlic and julissa during the last 1 minute of sautéing.  Meanwhile, in a mixing bowl whisk together chicken broth, orange juice, soy sauce and cornstarch until well blended.  Pour chicken broth mixture into skillet with veggies, season with  salt and pepper to taste, and bring mixture to a light boil, stirring constantly. Allow mixture to gently boil, stirring constantly, until thickened, about 1 minute.  Toss chicken into mixture and serve immediately over cauliflower rice or Shirataki noodles.      Skinny Chicken Tortilla Soup  Makes 7 servings    2 teaspoons olive oil  1 cup onion, chopped (about 1 small)  2 cups celery, sliced (about 4 medium stalks)  4 garlic cloves, minced  4 medium tomatoes, chopped  2 cups water  4 cups low-sodium organic chicken broth  3 cups chopped and/or shredded rotisserie chicken, skinless  2 cups sliced carrots (about 3 medium)  1 teaspoon dried oregano leaves  2 teaspoons chili powder  1 teaspoon garlic powder  2 teaspoons cumin  ½ teaspoon cayenne pepper (add less or omit, if you don't want a spicy soup)  ½ teaspoon sea salt + more to taste  ½ teaspoon pepper + more to taste    Directions:   Put all ingredients into a large crock pot. Cook on low for 5-6 hours.     Optional garnish with chopped avocado, chopped fresh cilantro, crumbled Cotija cheese, sour cream, Greek yogurt, your favorite hot sauce.           Vegan Avocado Banana Chocolate Pudding  Makes 4 servings  Ingredients    1 1/2 ripe avocados  2 ripe bananas  6 tbsp raw cacao powder or unsweetened cocoa powder  2-3 tbsp maple syrup (or calorie free sweetener)  1/4 cup almond milk  Instructions    Blend everything together in a  until the consistency is smooth and velvety. Taste and see if more sweetener is needed and stir to make sure everything is evenly mixed. Blend a second time if needed.  Top with banana slices, raw cacao nibs, almond butter, or any other toppings and enjoy!

## 2017-10-19 NOTE — PATIENT INSTRUCTIONS
Counseling and Referral of Other Preventative  (Italic type indicates deductible and co-insurance are waived)    Patient Name: Arnaldo Donovan  Today's Date: 10/19/2017      SERVICE LIMITATIONS RECOMMENDATION    Vaccines    · Pneumococcal (once after 65)    · Influenza (annually)    · Hepatitis B (if medium/high risk)    · Prevnar 13      Hepatitis B medium/high risk factors:       - End-stage renal disease       - Hemophiliacs who received Factor VII or         IX concentrates       - Clients of institutions for the mentally             retarded       - Persons who live in the same house as          a HepB carrier       - Homosexual men       - Illicit injectable drug abusers     Pneumococcal: Ordered today     Influenza: Ordered today     Hepatitis B: Not given in clinic     Prevnar 13: Done, no repeat necessary    Prostate cancer screening (annually to age 75)     Prostate specific antigen (PSA) Shared decision making with Provider. Sometimes a co-pay may be required if the patient decides to have this test. The USPSTF no longer recommends prostate cancer screening routinely in medicine: every 1 year, defer to PCP    Colorectal cancer screening (to age 75)    · Fecal occult blood test (annual)  · Flexible sigmoidoscopy (5y)  · Screening colonoscopy (10y)  · Barium enema   Recommended to patient, declined    Diabetes self-management training (no USPSTF recommendations)  Requires referral by treating physician for patient with diabetes or renal disease. 10 hours of initial DSMT sessions of no less than 30 minutes each in a continuous 12-month period. 2 hours of follow-up DSMT in subsequent years.  N/A    Glaucoma screening (no USPSTF recommendation)  Diabetes mellitus, family history   , age 50 or over    American, age 65 or over  Recommend follow up with eye care professional regularly       Medical nutrition therapy for diabetes or renal disease (no recommended schedule)  Requires  referral by treating physician for patient with diabetes or renal disease or kidney transplant within the past 3 years.  Can be provided in same year as diabetes self-management training (DSMT), and CMS recommends medical nutrition therapy take place after DSMT. Up to 3 hours for initial year and 2 hours in subsequent years.  Order to medical fitness placed    Cardiovascular screening blood tests (every 5 years)  · Fasting lipid panel  Order as a panel if possible  Last done 07/16, recommend to repeat every 1  year    Diabetes screening tests (at least every 3 years, Medicare covers annually or at 6-month intervals for prediabetic patients)  · Fasting blood sugar (FBS) or glucose tolerance test (GTT)  Patient must be diagnosed with one of the following:       - Hypertension       - Dyslipidemia       - Obesity (BMI 30kg/m2)       - Previous elevated impaired FBS or GTT       ... or any two of the following:       - Overweight (BMI 25 but <30)       - Family history of diabetes       - Age 65 or older       - History of gestational diabetes or birth of baby weighing more than 9 pounds  Last done 07/16, recommend to repeat every 1  year    Abdominal aortic aneurysm screening (once)  · Sonogram   Limited to patients who meet one of the following criteria:       - Men who are 65-75 years old and have smoked more than 100 cigarette in their lifetime       - Anyone with a family history of abdominal aortic aneurysm       - Anyone recommended for screening by the USPSTF  Done, no repeat necessary    HIV screening (annually for increased risk patients)  · HIV-1 and HIV-2 by EIA, or RACH, rapid antibody test or oral mucosa transudate  Patients must be at increased risk for HIV infection per USPSTF guidelines or pregnant. Tests covered annually for patient at increased risk or as requested by the patient. Pregnant patients may receive up to 3 tests during pregnancy.  Risks discussed, screening is not recommended    Smoking  cessation counseling (up to 8 sessions per year)  Patients must be asymptomatic of tobacco-related conditions to receive as a preventative service.  Non-smoker    Subsequent annual wellness visit  At least 12 months since last AWV  Return in one year     The following information is provided to all patients.  This information is to help you find resources for any of the problems found today that may be affecting your health:                Living healthy guide: www.[facility].[place].[facility]      Understanding Diabetes: www.diabetes.org      Eating healthy: www.cdc.gov/healthyweight      CDC home safety checklist: www.cdc.gov/steadi/patient.html      Agency on Aging: www.goea.[place].[facility]      Alcoholics anonymous (AA): www.aa.org      Physical Activity: www.kate.nih.gov/xn9hcgf      Tobacco use: www.quitwithusla.org   \    Schedule appointment with Dr. Turner and eye doctor at check out desk.

## 2017-11-21 ENCOUNTER — PATIENT MESSAGE (OUTPATIENT)
Dept: DERMATOLOGY | Facility: CLINIC | Age: 72
End: 2017-11-21

## 2017-11-21 DIAGNOSIS — L71.9 ROSACEA: Primary | ICD-10-CM

## 2017-11-30 DIAGNOSIS — M1A.00X0 IDIOPATHIC CHRONIC GOUT WITHOUT TOPHUS, UNSPECIFIED SITE: ICD-10-CM

## 2017-11-30 RX ORDER — ALLOPURINOL 300 MG/1
TABLET ORAL
Qty: 30 TABLET | Refills: 1 | OUTPATIENT
Start: 2017-11-30

## 2017-12-04 DIAGNOSIS — M1A.00X0 IDIOPATHIC CHRONIC GOUT WITHOUT TOPHUS, UNSPECIFIED SITE: ICD-10-CM

## 2017-12-04 RX ORDER — ALLOPURINOL 300 MG/1
300 TABLET ORAL DAILY
Qty: 30 TABLET | Refills: 1 | OUTPATIENT
Start: 2017-12-04

## 2017-12-09 ENCOUNTER — PATIENT MESSAGE (OUTPATIENT)
Dept: INTERNAL MEDICINE | Facility: CLINIC | Age: 72
End: 2017-12-09

## 2017-12-09 DIAGNOSIS — M1A.00X0 IDIOPATHIC CHRONIC GOUT WITHOUT TOPHUS, UNSPECIFIED SITE: ICD-10-CM

## 2017-12-11 DIAGNOSIS — M1A.00X0 IDIOPATHIC CHRONIC GOUT WITHOUT TOPHUS, UNSPECIFIED SITE: ICD-10-CM

## 2017-12-11 RX ORDER — ALLOPURINOL 300 MG/1
TABLET ORAL
Qty: 30 TABLET | Refills: 1 | OUTPATIENT
Start: 2017-12-11

## 2017-12-11 RX ORDER — ALLOPURINOL 300 MG/1
300 TABLET ORAL DAILY
Qty: 90 TABLET | Refills: 0 | Status: SHIPPED | OUTPATIENT
Start: 2017-12-11 | End: 2018-03-10 | Stop reason: SDUPTHER

## 2018-02-28 ENCOUNTER — DOCUMENTATION ONLY (OUTPATIENT)
Dept: REHABILITATION | Facility: HOSPITAL | Age: 73
End: 2018-02-28

## 2018-02-28 NOTE — PROGRESS NOTES
PHYSICAL THERAPY DISCHARGE SUMMARY     [id number]    Diagnosis: Difficulty walking, chronic right ankle, and pain in heel  Physician: Norm Perdomo MD  Treatment Orders: Therapeutic exercise  Past Medical History:   Diagnosis Date    Allergy     seasonal    Gout, joint     Hyperlipidemia        Initial visit: 8/16/16  Date of Last visit: 2/3/17  Date of Discharge Note:  2/28/18  Total Visits Received: 19  Missed Visits: 3  ASSESSMENT   Status Towards Goals Met:  Not met due to non-compliance    Goals Not achieved and why: see above    Discharge reason : Pt has not re-scheduled further follow-up sessions    G-CODE: Discharge g-code not filed due to discharge note being documentation only. Upon eval Pt was at CJ at least 20% < 40% impaired, limited or restricted on the FOTO with g-code goal set at CJ at least 20% < 40% impaired, limited or restricted..    PLAN   This patient is discharged from Physical Therapy Services.     Medical necessity is demonstrated by the following IMPAIRMENTS/PROBLEM LIST:  1) Pain limiting function  2) Gait abnormality: right ITB/ER, bilateral ankle EV  3) Hip/knee/ankle weakness  4) Decreased gastroc/soleus/piriformis/ITB flexibility  5) Decreased hip/ankle ROM  6) Joint mobility: bilateral hip posterior glide  7) Lack of HEP     GOALS:   Short Term Goals: 4 weeks  1. Pt. to report decreased right ankle/foot pain </= 4/10 at worst to increase tolerance for community ambulation MET  2. Pt. to demonstrate increased right ankle soleus flexibility to 15 degrees to improve ease with partial squatting MET  3. Pt. to demonstrate increased MMT for left gluteus medius to 4-/5 to increase stability during SL activities. MET  4. Pt. reports a decrease in sensitivity with stretching and consistent orthotic use. MET   5. Pt to tolerate HEP to improve ROM and independence with ADL's MET     Long Term Goals: 8 weeks  1. Pt. to report decreased right  ankle/foot pain </ = 2/10 at worst to increase tolerance for community ambulation/prolonged standing MET  2. Pt. to demonstrate increased right ankle soleus flexibility to 20 degrees to improve ease with partial squatting  3. Pt. to demonstrate increased MMT for left gluteus medius to 4+/5 to increase stability during SLS high level activities.  4. Pt. to demonstrate improved LE flexibility of rectus femoris, ITB, and iliopsoas improving his gait mechanics.  5. Pt to demonstrate increased MMT for bilateral ankle EV to 4+/5 to increase ankle stability during WB tasks. MET  6. Pt. demonstrate increased MMT for bilateral ankle DF to 5/5 to increase ease with toe clearance. MET  7. Pt will report at CJ level (20-40% impaired) on LE FOTO survey for mobility to demonstrate increase in LE function and mobility in home and community environment.   8. Pt. to be able to ambulate community distances while wearing orthotics without report of right achilles tendon soreness  9. Pt to be Independent with HEP to improve ROM and independence with ADL's

## 2018-03-10 DIAGNOSIS — M1A.00X0 IDIOPATHIC CHRONIC GOUT WITHOUT TOPHUS, UNSPECIFIED SITE: ICD-10-CM

## 2018-03-10 RX ORDER — ALLOPURINOL 300 MG/1
300 TABLET ORAL DAILY
Qty: 90 TABLET | Refills: 0 | Status: SHIPPED | OUTPATIENT
Start: 2018-03-10 | End: 2018-06-12 | Stop reason: SDUPTHER

## 2018-03-29 ENCOUNTER — TELEPHONE (OUTPATIENT)
Dept: INTERNAL MEDICINE | Facility: CLINIC | Age: 73
End: 2018-03-29

## 2018-03-29 NOTE — TELEPHONE ENCOUNTER
Call to patient and stated that provider recommended over the counter tylenol rather than aspirin. Patient voiced understanding.  Valentina Griffith RN

## 2018-03-29 NOTE — TELEPHONE ENCOUNTER
Patient taking Monodox prescribed by Dr. Botello. Patient c/o side effects related to Monodox. Last dose was Tuesday evening. Patient has chosen to stop taking Monodox. Encouraged to notify Dr. Botello to identify if the medication needs to adjusted or substituted.    Requesting provider advice to taking over the counter aspirin for the side effects.  Valentina Griffith RN

## 2018-03-29 NOTE — TELEPHONE ENCOUNTER
----- Message from Emily Ronald sent at 3/29/2018  9:03 AM CDT -----  Contact: pt  _  1st Request  _  2nd Request  _  3rd Request        Who: pt    Why: Requesting a call back in regards to side affects from doxycycline (MONODOX) 50 MG Cap. Aching, headaches and nausausness.  Please return the call at earliest convenience. Thanks!    What Number to Call Back:[phone]      When to Expect a call back: (Within 24 hours)

## 2018-05-08 ENCOUNTER — OFFICE VISIT (OUTPATIENT)
Dept: INTERNAL MEDICINE | Facility: CLINIC | Age: 73
End: 2018-05-08
Attending: FAMILY MEDICINE
Payer: MEDICARE

## 2018-05-08 VITALS
SYSTOLIC BLOOD PRESSURE: 150 MMHG | DIASTOLIC BLOOD PRESSURE: 86 MMHG | HEIGHT: 73 IN | BODY MASS INDEX: 29.65 KG/M2 | HEART RATE: 74 BPM | WEIGHT: 223.75 LBS

## 2018-05-08 DIAGNOSIS — Z00.00 ENCOUNTER FOR WELLNESS EXAMINATION: ICD-10-CM

## 2018-05-08 DIAGNOSIS — L71.9 ROSACEA: ICD-10-CM

## 2018-05-08 DIAGNOSIS — Z12.11 SCREEN FOR COLON CANCER: ICD-10-CM

## 2018-05-08 DIAGNOSIS — M1A.00X0 IDIOPATHIC CHRONIC GOUT WITHOUT TOPHUS, UNSPECIFIED SITE: ICD-10-CM

## 2018-05-08 DIAGNOSIS — I70.0 ATHEROSCLEROSIS OF AORTA: ICD-10-CM

## 2018-05-08 DIAGNOSIS — R35.0 INCREASED FREQUENCY OF URINATION: ICD-10-CM

## 2018-05-08 DIAGNOSIS — E78.5 HYPERLIPIDEMIA, UNSPECIFIED HYPERLIPIDEMIA TYPE: ICD-10-CM

## 2018-05-08 DIAGNOSIS — Z12.5 ENCOUNTER FOR SCREENING FOR MALIGNANT NEOPLASM OF PROSTATE: ICD-10-CM

## 2018-05-08 DIAGNOSIS — I10 ESSENTIAL HYPERTENSION: Primary | ICD-10-CM

## 2018-05-08 PROCEDURE — 3079F DIAST BP 80-89 MM HG: CPT | Mod: CPTII,S$GLB,, | Performed by: FAMILY MEDICINE

## 2018-05-08 PROCEDURE — 99214 OFFICE O/P EST MOD 30 MIN: CPT | Mod: S$GLB,,, | Performed by: FAMILY MEDICINE

## 2018-05-08 PROCEDURE — 3077F SYST BP >= 140 MM HG: CPT | Mod: CPTII,S$GLB,, | Performed by: FAMILY MEDICINE

## 2018-05-08 PROCEDURE — 99499 UNLISTED E&M SERVICE: CPT | Mod: S$PBB,,, | Performed by: FAMILY MEDICINE

## 2018-05-08 PROCEDURE — 99999 PR PBB SHADOW E&M-EST. PATIENT-LVL III: CPT | Mod: PBBFAC,,, | Performed by: FAMILY MEDICINE

## 2018-05-08 RX ORDER — VALSARTAN 160 MG/1
160 TABLET ORAL DAILY
Qty: 30 TABLET | Refills: 1 | Status: SHIPPED | OUTPATIENT
Start: 2018-05-08 | End: 2018-06-05 | Stop reason: SDUPTHER

## 2018-05-08 NOTE — PATIENT INSTRUCTIONS
Your test results will be communicated to you via: [facility], Telephone or Letter.  If you have not received your test results within one week. Please contact the clinic.        Our policy is that you take your blood pressure medication two hours prior to coming to your scheduled appointment.  If at that time your blood pressure is elevated you will have to return within 2 -4 weeks for a nurse blood pressure follow up until control.    Thanks for choosing [facility].

## 2018-05-08 NOTE — PROGRESS NOTES
Subjective:       Patient ID: Arnaldo Donovan is a 73 y.o. male.    Chief Complaint: Medicare AWV and Hypertension    Pt presents today for annual exam as well as follow up for HTN. Per pt, he was taking norvasc at 5 mg and then on his own doubled the dosing to 10 mg. Pt states that his readings at home have been in the low 140's/90's. Pt states that he is not sure what to do now since he stopped the amlodipine due to LE swelling.  Knows that he does need something moving forward    otw feels well      Hypertension   Pertinent negatives include no chest pain, palpitations or shortness of breath.     Review of Systems   Constitutional: Negative for activity change and appetite change.   HENT: Negative.    Eyes: Negative for photophobia and visual disturbance.   Respiratory: Negative for chest tightness and shortness of breath.    Cardiovascular: Negative for chest pain, palpitations and leg swelling.   Neurological: Positive for dizziness. Negative for weakness and light-headedness.   All other systems reviewed and are negative.      Objective:      Physical Exam   Constitutional: He is oriented to person, place, and time. He appears well-developed and well-nourished. No distress.   HENT:   Head: Normocephalic and atraumatic.   Right Ear: External ear normal.   Left Ear: External ear normal.   Mouth/Throat: Oropharynx is clear and moist. No oropharyngeal exudate.   Eyes: Conjunctivae and EOM are normal. Pupils are equal, round, and reactive to light.   Neck: Normal range of motion. Neck supple. No thyromegaly present.   Cardiovascular: Normal rate, regular rhythm, normal heart sounds and intact distal pulses.    No murmur heard.  Pulmonary/Chest: Effort normal and breath sounds normal. No respiratory distress. He has no wheezes. He has no rales. He exhibits no tenderness.   Abdominal: Soft. Bowel sounds are normal. He exhibits no distension and no mass. There is no tenderness. There is no rebound and no guarding.    Musculoskeletal: Normal range of motion. He exhibits no edema or tenderness.   Lymphadenopathy:     He has no cervical adenopathy.   Neurological: He is alert and oriented to person, place, and time. He has normal reflexes. He displays normal reflexes. No cranial nerve deficit. He exhibits normal muscle tone. Coordination normal.   Skin: Skin is warm and dry. He is not diaphoretic. No erythema. No pallor.   Psychiatric: He has a normal mood and affect. His behavior is normal. Judgment and thought content normal.       Assessment:       1. Encounter for wellness examination    2. Essential hypertension    3. Atherosclerosis of aorta    4. Hyperlipidemia, unspecified hyperlipidemia type    5. Rosacea    6. Idiopathic chronic gout without tophus, unspecified site    7. Increased frequency of urination    8. Screen for colon cancer    9. Encounter for screening for malignant neoplasm of prostate         Plan:       Annual PE wnl.  Labs pending.    HTN: would suggest valsartan 160 mg daily. SE's of med d.w pt.   RTC 2 weeks for nurse Bp check    ED prompts d/w pt  RTC prn symptoms  Encounter for wellness examination  -     CBC auto differential; Future; Expected date: 05/08/2018  -     Comprehensive metabolic panel; Future; Expected date: 05/08/2018  -     Lipid panel; Future; Expected date: 05/08/2018  -     TSH; Future; Expected date: 05/08/2018  -     PSA, Screening; Future; Expected date: 05/08/2018    Essential hypertension  -     CBC auto differential; Future; Expected date: 05/08/2018  -     Comprehensive metabolic panel; Future; Expected date: 05/08/2018  -     Lipid panel; Future; Expected date: 05/08/2018  -     TSH; Future; Expected date: 05/08/2018  -     PSA, Screening; Future; Expected date: 05/08/2018  -     valsartan (DIOVAN) 160 MG tablet; Take 1 tablet (160 mg total) by mouth once daily.  Dispense: 30 tablet; Refill: 1    Atherosclerosis of aorta  -     CBC auto differential; Future; Expected date:  05/08/2018  -     Comprehensive metabolic panel; Future; Expected date: 05/08/2018  -     Lipid panel; Future; Expected date: 05/08/2018  -     TSH; Future; Expected date: 05/08/2018  -     PSA, Screening; Future; Expected date: 05/08/2018  -     valsartan (DIOVAN) 160 MG tablet; Take 1 tablet (160 mg total) by mouth once daily.  Dispense: 30 tablet; Refill: 1    Hyperlipidemia, unspecified hyperlipidemia type  -     CBC auto differential; Future; Expected date: 05/08/2018  -     Comprehensive metabolic panel; Future; Expected date: 05/08/2018  -     Lipid panel; Future; Expected date: 05/08/2018  -     TSH; Future; Expected date: 05/08/2018  -     PSA, Screening; Future; Expected date: 05/08/2018  -     valsartan (DIOVAN) 160 MG tablet; Take 1 tablet (160 mg total) by mouth once daily.  Dispense: 30 tablet; Refill: 1    Rosacea  -     CBC auto differential; Future; Expected date: 05/08/2018  -     Comprehensive metabolic panel; Future; Expected date: 05/08/2018  -     Lipid panel; Future; Expected date: 05/08/2018  -     TSH; Future; Expected date: 05/08/2018  -     PSA, Screening; Future; Expected date: 05/08/2018  -     valsartan (DIOVAN) 160 MG tablet; Take 1 tablet (160 mg total) by mouth once daily.  Dispense: 30 tablet; Refill: 1    Idiopathic chronic gout without tophus, unspecified site  -     CBC auto differential; Future; Expected date: 05/08/2018  -     Comprehensive metabolic panel; Future; Expected date: 05/08/2018  -     Lipid panel; Future; Expected date: 05/08/2018  -     TSH; Future; Expected date: 05/08/2018  -     PSA, Screening; Future; Expected date: 05/08/2018  -     valsartan (DIOVAN) 160 MG tablet; Take 1 tablet (160 mg total) by mouth once daily.  Dispense: 30 tablet; Refill: 1    Increased frequency of urination  -     PSA, Screening; Future; Expected date: 05/08/2018    Screen for colon cancer  -     Ambulatory referral to Gastroenterology    Encounter for screening for malignant neoplasm  of prostate   -     PSA, Screening; Future; Expected date: 05/08/2018

## 2018-05-09 ENCOUNTER — LAB VISIT (OUTPATIENT)
Dept: LAB | Facility: OTHER | Age: 73
End: 2018-05-09
Attending: FAMILY MEDICINE
Payer: MEDICARE

## 2018-05-09 ENCOUNTER — PATIENT MESSAGE (OUTPATIENT)
Dept: INTERNAL MEDICINE | Facility: CLINIC | Age: 73
End: 2018-05-09

## 2018-05-09 DIAGNOSIS — I70.0 ATHEROSCLEROSIS OF AORTA: ICD-10-CM

## 2018-05-09 DIAGNOSIS — Z00.00 ENCOUNTER FOR WELLNESS EXAMINATION: ICD-10-CM

## 2018-05-09 DIAGNOSIS — M1A.00X0 IDIOPATHIC CHRONIC GOUT WITHOUT TOPHUS, UNSPECIFIED SITE: ICD-10-CM

## 2018-05-09 DIAGNOSIS — E78.5 HYPERLIPIDEMIA, UNSPECIFIED HYPERLIPIDEMIA TYPE: ICD-10-CM

## 2018-05-09 DIAGNOSIS — I10 ESSENTIAL HYPERTENSION: ICD-10-CM

## 2018-05-09 DIAGNOSIS — Z12.5 ENCOUNTER FOR SCREENING FOR MALIGNANT NEOPLASM OF PROSTATE: ICD-10-CM

## 2018-05-09 DIAGNOSIS — R35.0 INCREASED FREQUENCY OF URINATION: ICD-10-CM

## 2018-05-09 DIAGNOSIS — R73.09 ELEVATED GLUCOSE: Primary | ICD-10-CM

## 2018-05-09 DIAGNOSIS — L71.9 ROSACEA: ICD-10-CM

## 2018-05-09 DIAGNOSIS — R73.09 ELEVATED GLUCOSE: ICD-10-CM

## 2018-05-09 LAB
ALBUMIN SERPL BCP-MCNC: 3.6 G/DL
ALP SERPL-CCNC: 67 U/L
ALT SERPL W/O P-5'-P-CCNC: 11 U/L
ANION GAP SERPL CALC-SCNC: 5 MMOL/L
AST SERPL-CCNC: 17 U/L
BASOPHILS # BLD AUTO: 0.02 K/UL
BASOPHILS NFR BLD: 0.3 %
BILIRUB SERPL-MCNC: 0.7 MG/DL
BUN SERPL-MCNC: 18 MG/DL
CALCIUM SERPL-MCNC: 8.7 MG/DL
CHLORIDE SERPL-SCNC: 106 MMOL/L
CHOLEST SERPL-MCNC: 233 MG/DL
CHOLEST/HDLC SERPL: 3.9 {RATIO}
CO2 SERPL-SCNC: 29 MMOL/L
COMPLEXED PSA SERPL-MCNC: 0.52 NG/ML
CREAT SERPL-MCNC: 0.9 MG/DL
DIFFERENTIAL METHOD: NORMAL
EOSINOPHIL # BLD AUTO: 0.4 K/UL
EOSINOPHIL NFR BLD: 6 %
ERYTHROCYTE [DISTWIDTH] IN BLOOD BY AUTOMATED COUNT: 13.6 %
EST. GFR  (AFRICAN AMERICAN): >60 ML/MIN/1.73 M^2
EST. GFR  (NON AFRICAN AMERICAN): >60 ML/MIN/1.73 M^2
ESTIMATED AVG GLUCOSE: 114 MG/DL
GLUCOSE SERPL-MCNC: 124 MG/DL
HBA1C MFR BLD HPLC: 5.6 %
HCT VFR BLD AUTO: 45.4 %
HDLC SERPL-MCNC: 60 MG/DL
HDLC SERPL: 25.8 %
HGB BLD-MCNC: 14.9 G/DL
LDLC SERPL CALC-MCNC: 155.6 MG/DL
LYMPHOCYTES # BLD AUTO: 2.5 K/UL
LYMPHOCYTES NFR BLD: 42.5 %
MCH RBC QN AUTO: 29.7 PG
MCHC RBC AUTO-ENTMCNC: 32.8 G/DL
MCV RBC AUTO: 91 FL
MONOCYTES # BLD AUTO: 0.5 K/UL
MONOCYTES NFR BLD: 8.7 %
NEUTROPHILS # BLD AUTO: 2.5 K/UL
NEUTROPHILS NFR BLD: 42.3 %
NONHDLC SERPL-MCNC: 173 MG/DL
PLATELET # BLD AUTO: 210 K/UL
PMV BLD AUTO: 10.5 FL
POTASSIUM SERPL-SCNC: 4.5 MMOL/L
PROT SERPL-MCNC: 6.8 G/DL
RBC # BLD AUTO: 5.01 M/UL
SODIUM SERPL-SCNC: 140 MMOL/L
TRIGL SERPL-MCNC: 87 MG/DL
TSH SERPL DL<=0.005 MIU/L-ACNC: 1.09 UIU/ML
WBC # BLD AUTO: 5.97 K/UL

## 2018-05-09 PROCEDURE — 80061 LIPID PANEL: CPT

## 2018-05-09 PROCEDURE — 80053 COMPREHEN METABOLIC PANEL: CPT

## 2018-05-09 PROCEDURE — 83036 HEMOGLOBIN GLYCOSYLATED A1C: CPT

## 2018-05-09 PROCEDURE — 36415 COLL VENOUS BLD VENIPUNCTURE: CPT

## 2018-05-09 PROCEDURE — 84443 ASSAY THYROID STIM HORMONE: CPT

## 2018-05-09 PROCEDURE — 85025 COMPLETE CBC W/AUTO DIFF WBC: CPT

## 2018-05-09 PROCEDURE — 84153 ASSAY OF PSA TOTAL: CPT

## 2018-05-09 NOTE — TELEPHONE ENCOUNTER
a1c added please see if lab can add, or pt will need to come in to have this done since glucose is elevated.  Thanks,.  PV

## 2018-05-14 ENCOUNTER — PES CALL (OUTPATIENT)
Dept: ADMINISTRATIVE | Facility: CLINIC | Age: 73
End: 2018-05-14

## 2018-06-05 DIAGNOSIS — E78.5 HYPERLIPIDEMIA, UNSPECIFIED HYPERLIPIDEMIA TYPE: ICD-10-CM

## 2018-06-05 DIAGNOSIS — M1A.00X0 IDIOPATHIC CHRONIC GOUT WITHOUT TOPHUS, UNSPECIFIED SITE: ICD-10-CM

## 2018-06-05 DIAGNOSIS — I10 ESSENTIAL HYPERTENSION: ICD-10-CM

## 2018-06-05 DIAGNOSIS — L71.9 ROSACEA: ICD-10-CM

## 2018-06-05 DIAGNOSIS — I70.0 ATHEROSCLEROSIS OF AORTA: ICD-10-CM

## 2018-06-05 RX ORDER — VALSARTAN 160 MG/1
160 TABLET ORAL DAILY
Qty: 90 TABLET | Refills: 1 | Status: SHIPPED | OUTPATIENT
Start: 2018-06-05 | End: 2018-08-07 | Stop reason: SDUPTHER

## 2018-06-05 NOTE — TELEPHONE ENCOUNTER
Patient Comment: When I went for my consult on the colonoscopy my blood pressure was 109/67. Also, unlike the previous blood pressure med, I am not having side effects. My prescription needs to be renewed. Would do a prescription for a 90-day supply?

## 2018-06-12 DIAGNOSIS — M1A.00X0 IDIOPATHIC CHRONIC GOUT WITHOUT TOPHUS, UNSPECIFIED SITE: ICD-10-CM

## 2018-06-12 RX ORDER — ALLOPURINOL 300 MG/1
300 TABLET ORAL DAILY
Qty: 90 TABLET | Refills: 0 | Status: SHIPPED | OUTPATIENT
Start: 2018-06-12 | End: 2018-09-12 | Stop reason: SDUPTHER

## 2018-06-20 DIAGNOSIS — L71.9 ROSACEA: ICD-10-CM

## 2018-06-20 RX ORDER — IVERMECTIN 10 MG/G
CREAM TOPICAL
Qty: 30 G | Refills: 3 | Status: SHIPPED | OUTPATIENT
Start: 2018-06-20 | End: 2018-09-12

## 2018-06-20 NOTE — TELEPHONE ENCOUNTER
I have printed out the Rx for soolantra as requested. Please find out if he wants you to mail it to him or if he wants to come pick it up. It's in the red folder on Richelle's desk.

## 2018-08-07 ENCOUNTER — OFFICE VISIT (OUTPATIENT)
Dept: PRIMARY CARE CLINIC | Facility: CLINIC | Age: 73
End: 2018-08-07
Attending: FAMILY MEDICINE
Payer: MEDICARE

## 2018-08-07 VITALS
SYSTOLIC BLOOD PRESSURE: 120 MMHG | DIASTOLIC BLOOD PRESSURE: 86 MMHG | HEART RATE: 67 BPM | WEIGHT: 226.31 LBS | HEIGHT: 73 IN | BODY MASS INDEX: 29.99 KG/M2

## 2018-08-07 DIAGNOSIS — I70.0 ATHEROSCLEROSIS OF AORTA: ICD-10-CM

## 2018-08-07 DIAGNOSIS — M1A.00X0 IDIOPATHIC CHRONIC GOUT WITHOUT TOPHUS, UNSPECIFIED SITE: ICD-10-CM

## 2018-08-07 DIAGNOSIS — L71.9 ROSACEA: ICD-10-CM

## 2018-08-07 DIAGNOSIS — E78.5 HYPERLIPIDEMIA, UNSPECIFIED HYPERLIPIDEMIA TYPE: ICD-10-CM

## 2018-08-07 DIAGNOSIS — M79.645 THUMB PAIN, LEFT: Primary | ICD-10-CM

## 2018-08-07 DIAGNOSIS — I10 ESSENTIAL HYPERTENSION: ICD-10-CM

## 2018-08-07 PROCEDURE — 99214 OFFICE O/P EST MOD 30 MIN: CPT | Mod: S$GLB,,, | Performed by: FAMILY MEDICINE

## 2018-08-07 PROCEDURE — 3079F DIAST BP 80-89 MM HG: CPT | Mod: CPTII,S$GLB,, | Performed by: FAMILY MEDICINE

## 2018-08-07 PROCEDURE — 3074F SYST BP LT 130 MM HG: CPT | Mod: CPTII,S$GLB,, | Performed by: FAMILY MEDICINE

## 2018-08-07 PROCEDURE — 99999 PR PBB SHADOW E&M-EST. PATIENT-LVL III: CPT | Mod: PBBFAC,,, | Performed by: FAMILY MEDICINE

## 2018-08-07 RX ORDER — VALSARTAN 160 MG/1
160 TABLET ORAL DAILY
Qty: 90 TABLET | Refills: 1 | Status: SHIPPED | OUTPATIENT
Start: 2018-08-07 | End: 2019-05-08 | Stop reason: SDUPTHER

## 2018-08-15 NOTE — PROGRESS NOTES
Subjective:       Patient ID: Arnaldo Donovan is a 73 y.o. male.    Chief Complaint: Hypertension    Pt presents today for BP f/u. Feels great. No concerns today exc for pain along base of thumb due to guitar playing left hand. Denies any numbness, tingling,   Also Needs med RF      Hypertension   Pertinent negatives include no chest pain, palpitations or shortness of breath.     Review of Systems   Constitutional: Negative for activity change and appetite change.   HENT: Negative.    Eyes: Negative for photophobia and visual disturbance.   Respiratory: Negative for chest tightness and shortness of breath.    Cardiovascular: Negative for chest pain, palpitations and leg swelling.   Musculoskeletal: Positive for arthralgias.   Neurological: Negative for dizziness, weakness and light-headedness.   All other systems reviewed and are negative.      Objective:      Physical Exam   Constitutional: He is oriented to person, place, and time. He appears well-developed and well-nourished.   HENT:   Head: Normocephalic and atraumatic.   Eyes: Conjunctivae and EOM are normal. Pupils are equal, round, and reactive to light. Right eye exhibits no discharge. Left eye exhibits no discharge. No scleral icterus.   Neck: Normal range of motion. Neck supple. No JVD present.   Cardiovascular: Normal rate, regular rhythm, normal heart sounds and intact distal pulses.   Pulmonary/Chest: Effort normal and breath sounds normal.   Musculoskeletal: Normal range of motion. He exhibits tenderness (pos point tenderness and triangle at base of elft thumb. tinnels and phalens neg. pos radial and  5/5). He exhibits no edema.   Neurological: He is alert and oriented to person, place, and time. He has normal reflexes. No cranial nerve deficit. He exhibits normal muscle tone. Coordination normal.   Skin: Skin is warm and dry.   Psychiatric: He has a normal mood and affect. His behavior is normal. Judgment and thought content normal.        Assessment:       1. Essential hypertension    2. Atherosclerosis of aorta    3. Hyperlipidemia, unspecified hyperlipidemia type    4. Rosacea    5. Idiopathic chronic gout without tophus, unspecified site      6. Thumb pain  Plan:         HTN: controlled on meds  Essential hypertension  -     valsartan (DIOVAN) 160 MG tablet; Take 1 tablet (160 mg total) by mouth once daily.  Dispense: 90 tablet; Refill: 1    Atherosclerosis of aorta  -     valsartan (DIOVAN) 160 MG tablet; Take 1 tablet (160 mg total) by mouth once daily.  Dispense: 90 tablet; Refill: 1    Hyperlipidemia, unspecified hyperlipidemia type  -     valsartan (DIOVAN) 160 MG tablet; Take 1 tablet (160 mg total) by mouth once daily.  Dispense: 90 tablet; Refill: 1    Rosacea  -     valsartan (DIOVAN) 160 MG tablet; Take 1 tablet (160 mg total) by mouth once daily.  Dispense: 90 tablet; Refill: 1    Idiopathic chronic gout without tophus, unspecified site  -     valsartan (DIOVAN) 160 MG tablet; Take 1 tablet (160 mg total) by mouth once daily.  Dispense: 90 tablet; Refill: 1    lifestyle mods encouraged such as swimming       RTC prn symptoms or q 6 mos for annual exam  Pt advised to obtain wrist splint, NSAIDS, and avoid triggering activity

## 2018-09-12 ENCOUNTER — PATIENT MESSAGE (OUTPATIENT)
Dept: DERMATOLOGY | Facility: CLINIC | Age: 73
End: 2018-09-12

## 2018-09-12 DIAGNOSIS — M1A.00X0 IDIOPATHIC CHRONIC GOUT WITHOUT TOPHUS, UNSPECIFIED SITE: ICD-10-CM

## 2018-09-12 DIAGNOSIS — L71.9 ROSACEA: ICD-10-CM

## 2018-09-12 RX ORDER — ALLOPURINOL 300 MG/1
300 TABLET ORAL DAILY
Qty: 90 TABLET | Refills: 0 | Status: SHIPPED | OUTPATIENT
Start: 2018-09-12 | End: 2018-11-23 | Stop reason: SDUPTHER

## 2018-09-12 RX ORDER — IVERMECTIN 10 MG/G
CREAM TOPICAL
Qty: 60 G | Refills: 3 | Status: SHIPPED | OUTPATIENT
Start: 2018-09-12

## 2018-09-12 NOTE — TELEPHONE ENCOUNTER
----- Message from Cristina Mccain sent at 9/12/2018 11:54 AM CDT -----  Contact: [facility]       Can the clinic reply in [facility]: no    Please refill the medication(s) listed below. Please call the patient when the prescription(s) is ready for  at this phone number   [phone] (home)       Medication #1 allopurinol (ZYLOPRIM) 300 MG tablet        Preferred Pharmacy:   [facility]/pharmacy #[id number] - [place] - [address]  [address]  [facility] [address]  Phone: [phone] Fax: [phone]

## 2018-10-09 ENCOUNTER — PES CALL (OUTPATIENT)
Dept: ADMINISTRATIVE | Facility: CLINIC | Age: 73
End: 2018-10-09

## 2018-11-13 ENCOUNTER — PES CALL (OUTPATIENT)
Dept: ADMINISTRATIVE | Facility: CLINIC | Age: 73
End: 2018-11-13

## 2018-11-21 ENCOUNTER — PATIENT MESSAGE (OUTPATIENT)
Dept: PRIMARY CARE CLINIC | Facility: CLINIC | Age: 73
End: 2018-11-21

## 2018-11-21 DIAGNOSIS — M1A.00X0 IDIOPATHIC CHRONIC GOUT WITHOUT TOPHUS, UNSPECIFIED SITE: ICD-10-CM

## 2018-11-22 ENCOUNTER — PATIENT MESSAGE (OUTPATIENT)
Dept: PRIMARY CARE CLINIC | Facility: CLINIC | Age: 73
End: 2018-11-22

## 2018-11-23 RX ORDER — ALLOPURINOL 300 MG/1
300 TABLET ORAL DAILY
Qty: 4 TABLET | Refills: 2 | Status: SHIPPED | OUTPATIENT
Start: 2018-11-23 | End: 2018-11-27 | Stop reason: SDUPTHER

## 2018-11-23 NOTE — TELEPHONE ENCOUNTER
RACHEL for patient on home and mobile that medication was sent to [facility] on [address] in [place], [place]. Advised him he needed to call or go on [facility] to make an appointment to be seen.

## 2018-11-23 NOTE — TELEPHONE ENCOUNTER
Pended 3 day supply of allopurinol to [facility] pharmacy in [place], per patient's request. Sent message to covering provider.

## 2018-11-27 ENCOUNTER — TELEPHONE (OUTPATIENT)
Dept: ADMINISTRATIVE | Facility: HOSPITAL | Age: 73
End: 2018-11-27

## 2018-11-27 DIAGNOSIS — M1A.00X0 IDIOPATHIC CHRONIC GOUT WITHOUT TOPHUS, UNSPECIFIED SITE: Primary | ICD-10-CM

## 2018-11-27 RX ORDER — ALLOPURINOL 300 MG/1
300 TABLET ORAL DAILY
Qty: 90 TABLET | Refills: 1 | Status: SHIPPED | OUTPATIENT
Start: 2018-11-27 | End: 2019-01-30 | Stop reason: SDUPTHER

## 2018-11-27 NOTE — TELEPHONE ENCOUNTER
The patient was phoned about considering statin therapy but there was no answer and I was unable to leave a message.    Nesha STEPHENSON LPN  Clinical Care Coordinator  Internal Medicine  [employer]

## 2018-11-28 ENCOUNTER — OFFICE VISIT (OUTPATIENT)
Dept: PRIMARY CARE CLINIC | Facility: CLINIC | Age: 73
End: 2018-11-28
Attending: FAMILY MEDICINE
Payer: MEDICARE

## 2018-11-28 VITALS
DIASTOLIC BLOOD PRESSURE: 90 MMHG | HEIGHT: 72 IN | BODY MASS INDEX: 30.28 KG/M2 | HEART RATE: 63 BPM | WEIGHT: 223.56 LBS | SYSTOLIC BLOOD PRESSURE: 120 MMHG

## 2018-11-28 DIAGNOSIS — I10 ESSENTIAL HYPERTENSION: ICD-10-CM

## 2018-11-28 DIAGNOSIS — J00 COMMON COLD: Primary | ICD-10-CM

## 2018-11-28 DIAGNOSIS — J30.2 SEASONAL ALLERGIES: ICD-10-CM

## 2018-11-28 PROCEDURE — 99999 PR PBB SHADOW E&M-EST. PATIENT-LVL III: CPT | Mod: PBBFAC,HCNC,, | Performed by: FAMILY MEDICINE

## 2018-11-28 PROCEDURE — 99213 OFFICE O/P EST LOW 20 MIN: CPT | Mod: HCNC,S$GLB,, | Performed by: FAMILY MEDICINE

## 2018-11-28 PROCEDURE — 3080F DIAST BP >= 90 MM HG: CPT | Mod: CPTII,HCNC,S$GLB, | Performed by: FAMILY MEDICINE

## 2018-11-28 PROCEDURE — 3074F SYST BP LT 130 MM HG: CPT | Mod: CPTII,HCNC,S$GLB, | Performed by: FAMILY MEDICINE

## 2018-11-28 PROCEDURE — 1101F PT FALLS ASSESS-DOCD LE1/YR: CPT | Mod: CPTII,HCNC,S$GLB, | Performed by: FAMILY MEDICINE

## 2018-11-28 NOTE — PROGRESS NOTES
Subjective:       Patient ID: Arnaldo Donovan is a 73 y.o. male.    Chief Complaint: Cough (nonproductive x 3 week) and Nasal Congestion    Pt presents today for concerns that he is having a cough that is still persisting x 3 months. Per pt, it has improved however and he feels better overall with cough easing up. Denies any f/n/c/v/d/HA/cp/sob/rash/travel abroad/sick contacts    Has taken delysm which helps for the cough           Review of Systems   Constitutional: Positive for activity change. Negative for appetite change, chills, diaphoresis, fatigue, fever and unexpected weight change.   HENT: Positive for congestion, postnasal drip, rhinorrhea, sinus pressure and sore throat. Negative for ear discharge, ear pain, facial swelling, hearing loss, nosebleeds and tinnitus.    Eyes: Negative.    Respiratory: Positive for cough.    Cardiovascular: Negative.    Gastrointestinal: Negative.    Genitourinary: Negative.    Musculoskeletal: Negative.  Negative for neck pain and neck stiffness.   Skin: Negative.    Neurological: Negative.    Psychiatric/Behavioral: Negative.    All other systems reviewed and are negative.      Objective:      Physical Exam   Constitutional: He is oriented to person, place, and time. He appears well-developed and well-nourished.   HENT:   Head: Normocephalic and atraumatic.   Right Ear: No tenderness. Tympanic membrane is injected. Tympanic membrane is not erythematous. A middle ear effusion is present.   Left Ear: No tenderness. Tympanic membrane is not erythematous. A middle ear effusion is present.   Nose: Mucosal edema, rhinorrhea and sinus tenderness present. Right sinus exhibits frontal sinus tenderness. Left sinus exhibits frontal sinus tenderness.   Mouth/Throat: Uvula is midline and mucous membranes are normal. Posterior oropharyngeal edema and posterior oropharyngeal erythema present. No oropharyngeal exudate or tonsillar abscesses.   Eyes: Conjunctivae and EOM are normal.  Pupils are equal, round, and reactive to light. Right eye exhibits no discharge. Left eye exhibits no discharge. No scleral icterus.   Neck: Normal range of motion. Neck supple. No JVD present.   Cardiovascular: Normal rate, regular rhythm, normal heart sounds and intact distal pulses.   Pulmonary/Chest: Effort normal and breath sounds normal.   Musculoskeletal: Normal range of motion. He exhibits no edema or tenderness.   Neurological: He is alert and oriented to person, place, and time. He has normal reflexes. No cranial nerve deficit. He exhibits normal muscle tone. Coordination normal.   Skin: Skin is warm and dry.   Psychiatric: He has a normal mood and affect. His behavior is normal. Judgment and thought content normal.       Assessment:       1. Essential hypertension        Plan:       HTN controlled  Reassured pt that cough is resolving and exam shows some seasonal congestion as well as perhaps a common cold caused by a  Virus. Pt reassured. Rest, fluids and NSAIDS prn  RTC prn symptoms

## 2019-01-02 ENCOUNTER — TELEPHONE (OUTPATIENT)
Dept: PRIMARY CARE CLINIC | Facility: CLINIC | Age: 74
End: 2019-01-02

## 2019-01-02 NOTE — TELEPHONE ENCOUNTER
Mr. Donovan states that he had persistent cough when he was seen 11/28 , and now again right before polo coughing and nasal congestion had been taking the delysm previous wondering if there was anything else he could take? Informed  that he could try the Coricidin HBP, mucinex ,zyrtec , Flonase OTC for the next couple because if viral it just have to run it's course. And if not better by Monday call for an appointment. Conversation was understood and it ended.

## 2019-01-02 NOTE — TELEPHONE ENCOUNTER
----- Message from Mely Mojica sent at 1/2/2019  2:44 PM CST -----  Contact: PT            Name of Who is Calling: MAKAYLA JOSHI [[id number]]      What is the request in detail:pt calling in regards to cough..please advise      Can the clinic reply by [facility]: no      What Number to [facility]: [phone]

## 2019-01-29 ENCOUNTER — PATIENT MESSAGE (OUTPATIENT)
Dept: PRIMARY CARE CLINIC | Facility: CLINIC | Age: 74
End: 2019-01-29

## 2019-01-30 RX ORDER — ALLOPURINOL 300 MG/1
1 TABLET ORAL DAILY
COMMUNITY
Start: 2019-01-29 | End: 2019-07-30 | Stop reason: SDUPTHER

## 2019-05-06 PROBLEM — Z78.9 STATIN INTOLERANCE: Status: ACTIVE | Noted: 2019-05-06

## 2019-05-08 ENCOUNTER — OFFICE VISIT (OUTPATIENT)
Dept: PRIMARY CARE CLINIC | Facility: CLINIC | Age: 74
End: 2019-05-08
Attending: FAMILY MEDICINE
Payer: MEDICARE

## 2019-05-08 ENCOUNTER — TELEPHONE (OUTPATIENT)
Dept: DERMATOLOGY | Facility: CLINIC | Age: 74
End: 2019-05-08

## 2019-05-08 VITALS
DIASTOLIC BLOOD PRESSURE: 80 MMHG | BODY MASS INDEX: 30.93 KG/M2 | HEIGHT: 72 IN | OXYGEN SATURATION: 97 % | HEART RATE: 64 BPM | WEIGHT: 228.38 LBS | SYSTOLIC BLOOD PRESSURE: 126 MMHG

## 2019-05-08 DIAGNOSIS — Z00.00 ENCOUNTER FOR MEDICARE ANNUAL WELLNESS EXAM: ICD-10-CM

## 2019-05-08 DIAGNOSIS — Z12.5 ENCOUNTER FOR SCREENING FOR MALIGNANT NEOPLASM OF PROSTATE: ICD-10-CM

## 2019-05-08 DIAGNOSIS — I10 ESSENTIAL HYPERTENSION: ICD-10-CM

## 2019-05-08 DIAGNOSIS — I10 ESSENTIAL (PRIMARY) HYPERTENSION: ICD-10-CM

## 2019-05-08 DIAGNOSIS — I70.0 ATHEROSCLEROSIS OF AORTA: ICD-10-CM

## 2019-05-08 DIAGNOSIS — E78.5 HYPERLIPIDEMIA, UNSPECIFIED HYPERLIPIDEMIA TYPE: ICD-10-CM

## 2019-05-08 DIAGNOSIS — L71.9 ROSACEA: ICD-10-CM

## 2019-05-08 DIAGNOSIS — R79.9 ABNORMAL FINDING OF BLOOD CHEMISTRY: ICD-10-CM

## 2019-05-08 DIAGNOSIS — N40.1 BENIGN PROSTATIC HYPERPLASIA WITH URINARY FREQUENCY: ICD-10-CM

## 2019-05-08 DIAGNOSIS — R35.0 INCREASED FREQUENCY OF URINATION: Primary | ICD-10-CM

## 2019-05-08 DIAGNOSIS — D22.9 NEVUS: ICD-10-CM

## 2019-05-08 DIAGNOSIS — M1A.00X0 IDIOPATHIC CHRONIC GOUT WITHOUT TOPHUS, UNSPECIFIED SITE: ICD-10-CM

## 2019-05-08 DIAGNOSIS — R35.0 BENIGN PROSTATIC HYPERPLASIA WITH URINARY FREQUENCY: ICD-10-CM

## 2019-05-08 PROCEDURE — 1101F PT FALLS ASSESS-DOCD LE1/YR: CPT | Mod: HCNC,CPTII,S$GLB, | Performed by: FAMILY MEDICINE

## 2019-05-08 PROCEDURE — 3079F DIAST BP 80-89 MM HG: CPT | Mod: HCNC,CPTII,S$GLB, | Performed by: FAMILY MEDICINE

## 2019-05-08 PROCEDURE — 99214 OFFICE O/P EST MOD 30 MIN: CPT | Mod: HCNC,S$GLB,, | Performed by: FAMILY MEDICINE

## 2019-05-08 PROCEDURE — 99499 RISK ADDL DX/OHS AUDIT: ICD-10-PCS | Mod: S$GLB,,, | Performed by: FAMILY MEDICINE

## 2019-05-08 PROCEDURE — 3074F PR MOST RECENT SYSTOLIC BLOOD PRESSURE < 130 MM HG: ICD-10-PCS | Mod: HCNC,CPTII,S$GLB, | Performed by: FAMILY MEDICINE

## 2019-05-08 PROCEDURE — 99214 PR OFFICE/OUTPT VISIT, EST, LEVL IV, 30-39 MIN: ICD-10-PCS | Mod: HCNC,S$GLB,, | Performed by: FAMILY MEDICINE

## 2019-05-08 PROCEDURE — 99999 PR PBB SHADOW E&M-EST. PATIENT-LVL V: CPT | Mod: PBBFAC,HCNC,, | Performed by: FAMILY MEDICINE

## 2019-05-08 PROCEDURE — 3079F PR MOST RECENT DIASTOLIC BLOOD PRESSURE 80-89 MM HG: ICD-10-PCS | Mod: HCNC,CPTII,S$GLB, | Performed by: FAMILY MEDICINE

## 2019-05-08 PROCEDURE — 1101F PR PT FALLS ASSESS DOC 0-1 FALLS W/OUT INJ PAST YR: ICD-10-PCS | Mod: HCNC,CPTII,S$GLB, | Performed by: FAMILY MEDICINE

## 2019-05-08 PROCEDURE — 99499 UNLISTED E&M SERVICE: CPT | Mod: S$GLB,,, | Performed by: FAMILY MEDICINE

## 2019-05-08 PROCEDURE — 99999 PR PBB SHADOW E&M-EST. PATIENT-LVL V: ICD-10-PCS | Mod: PBBFAC,HCNC,, | Performed by: FAMILY MEDICINE

## 2019-05-08 PROCEDURE — 3074F SYST BP LT 130 MM HG: CPT | Mod: HCNC,CPTII,S$GLB, | Performed by: FAMILY MEDICINE

## 2019-05-08 RX ORDER — VALSARTAN 160 MG/1
160 TABLET ORAL DAILY
Qty: 90 TABLET | Refills: 1 | Status: SHIPPED | OUTPATIENT
Start: 2019-05-08 | End: 2019-11-23 | Stop reason: SDUPTHER

## 2019-05-08 RX ORDER — TAMSULOSIN HYDROCHLORIDE 0.4 MG/1
0.4 CAPSULE ORAL DAILY
Qty: 30 CAPSULE | Refills: 1 | Status: SHIPPED | OUTPATIENT
Start: 2019-05-08 | End: 2019-06-11 | Stop reason: SDUPTHER

## 2019-05-08 NOTE — PROGRESS NOTES
Subjective:       Patient ID: Arnaldo Donovan is a 74 y.o. male.    Chief Complaint: Annual Exam    Pt presents today for annual exam as well as follow up for HTN follow up as well. Took his med this AM and with repeat reading was in 120's. otw feels well   Wants me to look at two moles one left foot and one on forehead  Also does admit that he has increased urination at night. Denies any increased thirst. Pt denies any blood in urine nor any dribbling,    Pt also notes that on occasion he feels like his scrotum is turning on itself. Pt states that it is painful for a few mins and then resolves. Has happened only a few times and last one awhile back.    Cannot think of any triggering factors but does note he wears tight pants and this is when he feels this occurring.     PSA normal last year. Will repeat this year      Hypertension   Pertinent negatives include no chest pain, palpitations or shortness of breath.     Review of Systems   Constitutional: Negative for activity change and appetite change.   HENT: Negative.    Eyes: Negative for photophobia and visual disturbance.   Respiratory: Negative for chest tightness and shortness of breath.    Cardiovascular: Negative for chest pain, palpitations and leg swelling.   Genitourinary: Positive for frequency and testicular pain. Negative for difficulty urinating, discharge, dysuria, enuresis, flank pain, genital sores, hematuria, penile pain, penile swelling and scrotal swelling.   Skin: Positive for color change.   Neurological: Negative for dizziness, weakness and light-headedness.   All other systems reviewed and are negative.      Objective:      Physical Exam   Constitutional: He is oriented to person, place, and time. He appears well-developed and well-nourished. No distress.   HENT:   Head: Normocephalic and atraumatic.   Right Ear: External ear normal.   Left Ear: External ear normal.   Mouth/Throat: Oropharynx is clear and moist. No oropharyngeal exudate.    Eyes: Pupils are equal, round, and reactive to light. Conjunctivae and EOM are normal.   Neck: Normal range of motion. Neck supple. No thyromegaly present.   Cardiovascular: Normal rate, regular rhythm, normal heart sounds and intact distal pulses.   No murmur heard.  Pulmonary/Chest: Effort normal and breath sounds normal. No respiratory distress. He has no wheezes. He has no rales. He exhibits no tenderness.   Abdominal: Soft. Bowel sounds are normal. He exhibits no distension and no mass. There is no tenderness. There is no rebound and no guarding.   Musculoskeletal: Normal range of motion. He exhibits no edema or tenderness.   Lymphadenopathy:     He has no cervical adenopathy.   Neurological: He is alert and oriented to person, place, and time. He has normal reflexes. He displays normal reflexes. No cranial nerve deficit. He exhibits normal muscle tone. Coordination normal.   Skin: Skin is warm and dry. He is not diaphoretic. No erythema. No pallor.        Psychiatric: He has a normal mood and affect. His behavior is normal. Judgment and thought content normal.       Assessment:       1. Encounter for Medicare annual wellness exam    2. Essential (primary) hypertension     3. Encounter for screening for malignant neoplasm of prostate     4. Essential hypertension    5. Atherosclerosis of aorta    6. Hyperlipidemia, unspecified hyperlipidemia type    7. Rosacea    8. Idiopathic chronic gout without tophus, unspecified site    9. Nevus    10. Increased frequency of urination    11. Abnormal finding of blood chemistry         Plan:       Annual PE wnl.  Labs pending.  Scrotal pain: observe and see if pattern. Wear loose clothing. Consider referral if symptoms persist  BPH: PSA pending. Trial of flomax. SE's of med d/w pt  Moles: refer to derm for skin screen  HTN: repeat is controlled.      ED prompts d/w pt  RTC prn symptoms  Encounter for Medicare annual wellness exam  -     CBC auto differential; Future;  Expected date: 05/08/2019  -     Comprehensive metabolic panel; Future; Expected date: 05/08/2019  -     Lipid panel; Future; Expected date: 05/08/2019  -     TSH; Future; Expected date: 05/08/2019  -     PSA, Screening; Future; Expected date: 05/08/2019    Essential (primary) hypertension   -     Lipid panel; Future; Expected date: 05/08/2019  -     TSH; Future; Expected date: 05/08/2019  -     Hypertension Digital Medicine ([facility]) Enrollment Order  -     Hypertension Digital Medicine ([facility]): Assign Onboarding Questionnaires    Encounter for screening for malignant neoplasm of prostate   -     PSA, Screening; Future; Expected date: 05/08/2019    Essential hypertension  -     valsartan (DIOVAN) 160 MG tablet; Take 1 tablet (160 mg total) by mouth once daily.  Dispense: 90 tablet; Refill: 1    Atherosclerosis of aorta  -     valsartan (DIOVAN) 160 MG tablet; Take 1 tablet (160 mg total) by mouth once daily.  Dispense: 90 tablet; Refill: 1    Hyperlipidemia, unspecified hyperlipidemia type  -     valsartan (DIOVAN) 160 MG tablet; Take 1 tablet (160 mg total) by mouth once daily.  Dispense: 90 tablet; Refill: 1    Rosacea  -     valsartan (DIOVAN) 160 MG tablet; Take 1 tablet (160 mg total) by mouth once daily.  Dispense: 90 tablet; Refill: 1    Idiopathic chronic gout without tophus, unspecified site  -     valsartan (DIOVAN) 160 MG tablet; Take 1 tablet (160 mg total) by mouth once daily.  Dispense: 90 tablet; Refill: 1    Nevus  -     Ambulatory Referral to Dermatology    Increased frequency of urination  -     Hemoglobin A1c; Future; Expected date: 05/08/2019    Abnormal finding of blood chemistry   -     Hemoglobin A1c; Future; Expected date: 05/08/2019    Other orders  -     tamsulosin (FLOMAX) 0.4 mg Cap; Take 1 capsule (0.4 mg total) by mouth once daily.  Dispense: 30 capsule; Refill: 1

## 2019-05-08 NOTE — TELEPHONE ENCOUNTER
Called PT in regards to helping schedule sooner appointment. LVM for PT to call back at his convenience.

## 2019-05-09 ENCOUNTER — LAB VISIT (OUTPATIENT)
Dept: LAB | Facility: HOSPITAL | Age: 74
End: 2019-05-09
Attending: FAMILY MEDICINE
Payer: MEDICARE

## 2019-05-09 DIAGNOSIS — Z00.00 ENCOUNTER FOR MEDICARE ANNUAL WELLNESS EXAM: ICD-10-CM

## 2019-05-09 DIAGNOSIS — R35.0 INCREASED FREQUENCY OF URINATION: ICD-10-CM

## 2019-05-09 DIAGNOSIS — Z12.5 ENCOUNTER FOR SCREENING FOR MALIGNANT NEOPLASM OF PROSTATE: ICD-10-CM

## 2019-05-09 DIAGNOSIS — R79.9 ABNORMAL FINDING OF BLOOD CHEMISTRY: ICD-10-CM

## 2019-05-09 DIAGNOSIS — I10 ESSENTIAL (PRIMARY) HYPERTENSION: ICD-10-CM

## 2019-05-09 LAB
ALBUMIN SERPL BCP-MCNC: 3.7 G/DL (ref 3.5–5.2)
ALP SERPL-CCNC: 69 U/L (ref 55–135)
ALT SERPL W/O P-5'-P-CCNC: 14 U/L (ref 10–44)
ANION GAP SERPL CALC-SCNC: 8 MMOL/L (ref 8–16)
AST SERPL-CCNC: 19 U/L (ref 10–40)
BASOPHILS # BLD AUTO: 0.04 K/UL (ref 0–0.2)
BASOPHILS NFR BLD: 0.7 % (ref 0–1.9)
BILIRUB SERPL-MCNC: 0.7 MG/DL (ref 0.1–1)
BUN SERPL-MCNC: 17 MG/DL (ref 8–23)
CALCIUM SERPL-MCNC: 9.3 MG/DL (ref 8.7–10.5)
CHLORIDE SERPL-SCNC: 103 MMOL/L (ref 95–110)
CHOLEST SERPL-MCNC: 279 MG/DL (ref 120–199)
CHOLEST/HDLC SERPL: 4.2 {RATIO} (ref 2–5)
CO2 SERPL-SCNC: 27 MMOL/L (ref 23–29)
COMPLEXED PSA SERPL-MCNC: 0.36 NG/ML (ref 0–4)
CREAT SERPL-MCNC: 1 MG/DL (ref 0.5–1.4)
DIFFERENTIAL METHOD: NORMAL
EOSINOPHIL # BLD AUTO: 0.3 K/UL (ref 0–0.5)
EOSINOPHIL NFR BLD: 5.6 % (ref 0–8)
ERYTHROCYTE [DISTWIDTH] IN BLOOD BY AUTOMATED COUNT: 13.2 % (ref 11.5–14.5)
EST. GFR  (AFRICAN AMERICAN): >60 ML/MIN/1.73 M^2
EST. GFR  (NON AFRICAN AMERICAN): >60 ML/MIN/1.73 M^2
ESTIMATED AVG GLUCOSE: 117 MG/DL (ref 68–131)
GLUCOSE SERPL-MCNC: 117 MG/DL (ref 70–110)
HBA1C MFR BLD HPLC: 5.7 % (ref 4–5.6)
HCT VFR BLD AUTO: 45.9 % (ref 40–54)
HDLC SERPL-MCNC: 66 MG/DL (ref 40–75)
HDLC SERPL: 23.7 % (ref 20–50)
HGB BLD-MCNC: 14.8 G/DL (ref 14–18)
IMM GRANULOCYTES # BLD AUTO: 0.01 K/UL (ref 0–0.04)
IMM GRANULOCYTES NFR BLD AUTO: 0.2 % (ref 0–0.5)
LDLC SERPL CALC-MCNC: 194.8 MG/DL (ref 63–159)
LYMPHOCYTES # BLD AUTO: 2.5 K/UL (ref 1–4.8)
LYMPHOCYTES NFR BLD: 41.1 % (ref 18–48)
MCH RBC QN AUTO: 29.1 PG (ref 27–31)
MCHC RBC AUTO-ENTMCNC: 32.2 G/DL (ref 32–36)
MCV RBC AUTO: 90 FL (ref 82–98)
MONOCYTES # BLD AUTO: 0.6 K/UL (ref 0.3–1)
MONOCYTES NFR BLD: 9.5 % (ref 4–15)
NEUTROPHILS # BLD AUTO: 2.6 K/UL (ref 1.8–7.7)
NEUTROPHILS NFR BLD: 42.9 % (ref 38–73)
NONHDLC SERPL-MCNC: 213 MG/DL
NRBC BLD-RTO: 0 /100 WBC
PLATELET # BLD AUTO: 195 K/UL (ref 150–350)
PMV BLD AUTO: 11.2 FL (ref 9.2–12.9)
POTASSIUM SERPL-SCNC: 4.6 MMOL/L (ref 3.5–5.1)
PROT SERPL-MCNC: 7 G/DL (ref 6–8.4)
RBC # BLD AUTO: 5.08 M/UL (ref 4.6–6.2)
SODIUM SERPL-SCNC: 138 MMOL/L (ref 136–145)
TRIGL SERPL-MCNC: 91 MG/DL (ref 30–150)
TSH SERPL DL<=0.005 MIU/L-ACNC: 1.43 UIU/ML (ref 0.4–4)
WBC # BLD AUTO: 6.08 K/UL (ref 3.9–12.7)

## 2019-05-09 PROCEDURE — 85025 COMPLETE CBC W/AUTO DIFF WBC: CPT | Mod: HCNC

## 2019-05-09 PROCEDURE — 84153 ASSAY OF PSA TOTAL: CPT | Mod: HCNC

## 2019-05-09 PROCEDURE — 80053 COMPREHEN METABOLIC PANEL: CPT | Mod: HCNC

## 2019-05-09 PROCEDURE — 36415 COLL VENOUS BLD VENIPUNCTURE: CPT | Mod: HCNC,PN

## 2019-05-09 PROCEDURE — 83036 HEMOGLOBIN GLYCOSYLATED A1C: CPT | Mod: HCNC

## 2019-05-09 PROCEDURE — 84443 ASSAY THYROID STIM HORMONE: CPT | Mod: HCNC

## 2019-05-09 PROCEDURE — 80061 LIPID PANEL: CPT | Mod: HCNC

## 2019-06-06 ENCOUNTER — TELEPHONE (OUTPATIENT)
Dept: DERMATOLOGY | Facility: CLINIC | Age: 74
End: 2019-06-06

## 2019-06-06 ENCOUNTER — PATIENT MESSAGE (OUTPATIENT)
Dept: DERMATOLOGY | Facility: CLINIC | Age: 74
End: 2019-06-06

## 2019-06-06 NOTE — TELEPHONE ENCOUNTER
L/M's on pt.'s cell and home #'s. Dr Ortega will be out of the office on Friday. He's to call back to reschedule.

## 2019-06-10 ENCOUNTER — OFFICE VISIT (OUTPATIENT)
Dept: DERMATOLOGY | Facility: CLINIC | Age: 74
End: 2019-06-10
Payer: MEDICARE

## 2019-06-10 DIAGNOSIS — D18.00 ANGIOMA: ICD-10-CM

## 2019-06-10 DIAGNOSIS — B07.0 VERRUCA PLANTARIS: ICD-10-CM

## 2019-06-10 DIAGNOSIS — L82.1 SEBORRHEIC KERATOSIS: Primary | ICD-10-CM

## 2019-06-10 DIAGNOSIS — D22.5 MELANOCYTIC NEVI OF TRUNK: ICD-10-CM

## 2019-06-10 DIAGNOSIS — D22.4 MELANOCYTIC NEVUS OF SCALP: ICD-10-CM

## 2019-06-10 PROCEDURE — 99213 OFFICE O/P EST LOW 20 MIN: CPT | Mod: 25,S$GLB,, | Performed by: DERMATOLOGY

## 2019-06-10 PROCEDURE — 1101F PR PT FALLS ASSESS DOC 0-1 FALLS W/OUT INJ PAST YR: ICD-10-PCS | Mod: CPTII,S$GLB,, | Performed by: DERMATOLOGY

## 2019-06-10 PROCEDURE — 17110 DESTRUCTION B9 LES UP TO 14: CPT | Mod: S$GLB,,, | Performed by: DERMATOLOGY

## 2019-06-10 PROCEDURE — 17110 PR DESTRUCTION BENIGN LESIONS UP TO 14: ICD-10-PCS | Mod: S$GLB,,, | Performed by: DERMATOLOGY

## 2019-06-10 PROCEDURE — 1101F PT FALLS ASSESS-DOCD LE1/YR: CPT | Mod: CPTII,S$GLB,, | Performed by: DERMATOLOGY

## 2019-06-10 PROCEDURE — 99213 PR OFFICE/OUTPT VISIT, EST, LEVL III, 20-29 MIN: ICD-10-PCS | Mod: 25,S$GLB,, | Performed by: DERMATOLOGY

## 2019-06-10 NOTE — LETTER
Purnima 10, 2019        Xiao Turner MD  [address] [place] [address]             [facility] - Dermatology  [address] [address]  Phone: [phone]  Fax: [phone]   Patient: Arnaldo Donovan   MR Number: [id number]   YOB: 1945   Date of Visit: 6/10/2019       Dear Dr. Turner:    Thank you for referring Arnaldo Donovan to me for evaluation. Below are the relevant portions of my assessment and plan of care.            If you have questions, please do not hesitate to call me. I look forward to following Arnaldo along with you.    Sincerely,      Rae Ortega MD           CC  No Recipients

## 2019-06-10 NOTE — PROGRESS NOTES
Subjective:       Patient ID:  Arnaldo Donovan is a 74 y.o. male who presents for   Chief Complaint   Patient presents with    Skin Check     TBSE    Lesion     forehead, 3 months, denies bleeding or pain     PT is here today for TBSE with a c/o of lesion to his forehead that has been present for 3 months.       Lesion  - Initial  Affected locations: forehead  Duration: 3 months  Signs / symptoms: asymptomatic  Severity: mild  Timing: constant  Aggravated by: nothing  Relieving factors/Treatments tried: nothing  Improvement on treatment: no relief      Review of Systems   Skin: Negative for daily sunscreen use, activity-related sunscreen use and wears hat.   Hematologic/Lymphatic: Does not bruise/bleed easily.        Objective:    Physical Exam   Constitutional: He appears well-developed and well-nourished. No distress.   HENT:   Mouth/Throat: Lips normal.    Eyes: Lids are normal.  No conjunctival no injection.   Neurological: He is alert and oriented to person, place, and time. He is not disoriented.   Psychiatric: He has a normal mood and affect.   Skin:   Areas Examined (abnormalities noted in diagram):   Scalp / Hair Palpated and Inspected  Head / Face Inspection Performed  Neck Inspection Performed  Chest / Axilla Inspection Performed  Abdomen Inspection Performed  Genitals / Buttocks / Groin Inspection Performed  Back Inspection Performed  RUE Inspected  LUE Inspection Performed  RLE Inspected  LLE Inspection Performed  Nails and Digits Inspection Performed                       Diagram Legend     Erythematous scaling macule/papule c/w actinic keratosis       Vascular papule c/w angioma      Pigmented verrucoid papule/plaque c/w seborrheic keratosis      Yellow umbilicated papule c/w sebaceous hyperplasia      Irregularly shaped tan macule c/w lentigo     1-2 mm smooth white papules consistent with Milia      Movable subcutaneous cyst with punctum c/w epidermal inclusion cyst      Subcutaneous movable  cyst c/w pilar cyst      Firm pink to brown papule c/w dermatofibroma      Pedunculated fleshy papule(s) c/w skin tag(s)      Evenly pigmented macule c/w junctional nevus     Mildly variegated pigmented, slightly irregular-bordered macule c/w mildly atypical nevus      Flesh colored to evenly pigmented papule c/w intradermal nevus       Pink pearly papule/plaque c/w basal cell carcinoma      Erythematous hyperkeratotic cursted plaque c/w SCC      Surgical scar with no sign of skin cancer recurrence      Open and closed comedones      Inflammatory papules and pustules      Verrucoid papule consistent consistent with wart     Erythematous eczematous patches and plaques     Dystrophic onycholytic nail with subungual debris c/w onychomycosis     Umbilicated papule    Erythematous-base heme-crusted tan verrucoid plaque consistent with inflamed seborrheic keratosis     Erythematous Silvery Scaling Plaque c/w Psoriasis     See annotation      Assessment / Plan:        Seborrheic keratosis  These are benign, inherited growths without a malignant potential. Reassurance given to patient. No treatment is necessary. Handout was provided.    Angioma  This is a benign vascular lesion. Reassurance given. No treatment required.    Melanocytic nevi of trunk  Melanocytic nevus of scalp  Multiple benign-appearing nevi present on exam today. Reassurance provided. Counseled patient to periodically examine moles and return to clinic if any changes in size, shape, or color are noted or if it becomes symptomatic (bleeding, itching, pain, etc).    Verruca plantaris  Cryosurgery procedure note:  Verbal consent obtained from patient, including but not limited to risk of hypopigmentation/hyperpigmentation, scar, recurrence of lesion, and need for multiple treatments. After paring lesions with a #15 blade, liquid nitrogen cryosurgery was applied to 1 lesion(s) to produce a freeze injury. 2 consecutive freeze-thaw cycle(s) were applied to each  lesion. Counseled patient that blisters (possibly blood blisters) may form and instructed patient on wound care with gentle cleansing and use of Vaseline ointment to keep moist until healed. Handout was provided, and patient was instructed to return to clinic in 3-4 weeks if lesions do not completely resolve.    Follow up in about 1 month (around 7/8/2019), or if symptoms worsen or fail to improve.

## 2019-06-11 RX ORDER — TAMSULOSIN HYDROCHLORIDE 0.4 MG/1
CAPSULE ORAL
Qty: 90 CAPSULE | Refills: 1 | Status: SHIPPED | OUTPATIENT
Start: 2019-06-11 | End: 2020-02-18 | Stop reason: SDUPTHER

## 2019-07-18 ENCOUNTER — OFFICE VISIT (OUTPATIENT)
Dept: DERMATOLOGY | Facility: CLINIC | Age: 74
End: 2019-07-18
Payer: MEDICARE

## 2019-07-18 DIAGNOSIS — B07.0 VERRUCA PLANTARIS: Primary | ICD-10-CM

## 2019-07-18 PROCEDURE — 99499 UNLISTED E&M SERVICE: CPT | Mod: S$GLB,,, | Performed by: DERMATOLOGY

## 2019-07-18 PROCEDURE — 99499 NO LOS: ICD-10-PCS | Mod: S$GLB,,, | Performed by: DERMATOLOGY

## 2019-07-18 PROCEDURE — 17110 PR DESTRUCTION BENIGN LESIONS UP TO 14: ICD-10-PCS | Mod: S$GLB,,, | Performed by: DERMATOLOGY

## 2019-07-18 PROCEDURE — 17110 DESTRUCTION B9 LES UP TO 14: CPT | Mod: S$GLB,,, | Performed by: DERMATOLOGY

## 2019-07-18 NOTE — PROGRESS NOTES
Subjective:       Patient ID:  Arnaldo Donovan is a 74 y.o. male who presents for   Chief Complaint   Patient presents with    Warts     L heel     Warts  - Follow-up  Symptom course: improving  Currently using: OTC sal acid.  Affected locations: left foot  Signs / symptoms: rough and dryness      Review of Systems   Musculoskeletal: Negative for joint swelling and arthralgias.   Skin: Negative for recent sunburn.   Hematologic/Lymphatic: Does not bruise/bleed easily.        Objective:    Physical Exam   Constitutional: He appears well-developed and well-nourished. No distress.   Neurological: He is alert and oriented to person, place, and time. He is not disoriented.   Psychiatric: He has a normal mood and affect.   Skin:   Areas Examined (abnormalities noted in diagram):   LLE Inspection Performed             Diagram Legend     Erythematous scaling macule/papule c/w actinic keratosis       Vascular papule c/w angioma      Pigmented verrucoid papule/plaque c/w seborrheic keratosis      Yellow umbilicated papule c/w sebaceous hyperplasia      Irregularly shaped tan macule c/w lentigo     1-2 mm smooth white papules consistent with Milia      Movable subcutaneous cyst with punctum c/w epidermal inclusion cyst      Subcutaneous movable cyst c/w pilar cyst      Firm pink to brown papule c/w dermatofibroma      Pedunculated fleshy papule(s) c/w skin tag(s)      Evenly pigmented macule c/w junctional nevus     Mildly variegated pigmented, slightly irregular-bordered macule c/w mildly atypical nevus      Flesh colored to evenly pigmented papule c/w intradermal nevus       Pink pearly papule/plaque c/w basal cell carcinoma      Erythematous hyperkeratotic cursted plaque c/w SCC      Surgical scar with no sign of skin cancer recurrence      Open and closed comedones      Inflammatory papules and pustules      Verrucoid papule consistent consistent with wart     Erythematous eczematous patches and plaques      Dystrophic onycholytic nail with subungual debris c/w onychomycosis     Umbilicated papule    Erythematous-base heme-crusted tan verrucoid plaque consistent with inflamed seborrheic keratosis     Erythematous Silvery Scaling Plaque c/w Psoriasis     See annotation      Assessment / Plan:        Verruca plantaris    Cryosurgery procedure note:  Verbal consent obtained from patient, including but not limited to risk of hypopigmentation/hyperpigmentation, scar, recurrence of lesion, and need for multiple treatments. After paring lesions with a #15 blade, liquid nitrogen cryosurgery was applied to 1 lesion(s) to produce a freeze injury. 2 consecutive freeze-thaw cycle(s) were applied to each lesion. Counseled patient that blisters (possibly blood blisters) may form and instructed patient on wound care with gentle cleansing and use of Vaseline ointment to keep moist until healed. Handout was provided, and patient was instructed to return to clinic in 3-4 weeks if lesions do not completely resolve.    Follow up in about 4 weeks (around 8/15/2019).

## 2019-07-30 RX ORDER — ALLOPURINOL 300 MG/1
TABLET ORAL
Qty: 90 TABLET | Refills: 1 | Status: SHIPPED | OUTPATIENT
Start: 2019-07-30 | End: 2020-01-16

## 2019-08-19 ENCOUNTER — OFFICE VISIT (OUTPATIENT)
Dept: DERMATOLOGY | Facility: CLINIC | Age: 74
End: 2019-08-19
Payer: MEDICARE

## 2019-08-19 DIAGNOSIS — B07.0 VERRUCA PLANTARIS: Primary | ICD-10-CM

## 2019-08-19 PROCEDURE — 17110 DESTRUCTION B9 LES UP TO 14: CPT | Mod: S$GLB,,, | Performed by: DERMATOLOGY

## 2019-08-19 PROCEDURE — 99499 UNLISTED E&M SERVICE: CPT | Mod: S$GLB,,, | Performed by: DERMATOLOGY

## 2019-08-19 PROCEDURE — 99499 NO LOS: ICD-10-PCS | Mod: S$GLB,,, | Performed by: DERMATOLOGY

## 2019-08-19 PROCEDURE — 17110 PR DESTRUCTION BENIGN LESIONS UP TO 14: ICD-10-PCS | Mod: S$GLB,,, | Performed by: DERMATOLOGY

## 2019-08-19 NOTE — PROGRESS NOTES
Subjective:       Patient ID:  Arnaldo Donovan is a 74 y.o. male who presents for   Chief Complaint   Patient presents with    Warts     left heel, smaller      Warts  - Follow-up  Symptom course: improving  Currently using: cryo.  Affected locations: left foot  Signs / symptoms: asymptomatic  Severity: mild to moderate      Review of Systems   Skin: Negative for itching and rash.   Hematologic/Lymphatic: Does not bruise/bleed easily.        Objective:    Physical Exam   Constitutional: He appears well-developed and well-nourished. No distress.   HENT:   Mouth/Throat: Lips normal.    Eyes: Lids are normal.  No conjunctival no injection.   Neurological: He is alert and oriented to person, place, and time. He is not disoriented.   Psychiatric: He has a normal mood and affect.   Skin:   Areas Examined (abnormalities noted in diagram):   LLE Inspection Performed             Diagram Legend     Erythematous scaling macule/papule c/w actinic keratosis       Vascular papule c/w angioma      Pigmented verrucoid papule/plaque c/w seborrheic keratosis      Yellow umbilicated papule c/w sebaceous hyperplasia      Irregularly shaped tan macule c/w lentigo     1-2 mm smooth white papules consistent with Milia      Movable subcutaneous cyst with punctum c/w epidermal inclusion cyst      Subcutaneous movable cyst c/w pilar cyst      Firm pink to brown papule c/w dermatofibroma      Pedunculated fleshy papule(s) c/w skin tag(s)      Evenly pigmented macule c/w junctional nevus     Mildly variegated pigmented, slightly irregular-bordered macule c/w mildly atypical nevus      Flesh colored to evenly pigmented papule c/w intradermal nevus       Pink pearly papule/plaque c/w basal cell carcinoma      Erythematous hyperkeratotic cursted plaque c/w SCC      Surgical scar with no sign of skin cancer recurrence      Open and closed comedones      Inflammatory papules and pustules      Verrucoid papule consistent consistent with wart      Erythematous eczematous patches and plaques     Dystrophic onycholytic nail with subungual debris c/w onychomycosis     Umbilicated papule    Erythematous-base heme-crusted tan verrucoid plaque consistent with inflamed seborrheic keratosis     Erythematous Silvery Scaling Plaque c/w Psoriasis     See annotation      Assessment / Plan:        Verruca plantaris    Cryosurgery procedure note:  Verbal consent obtained from patient, including but not limited to risk of hypopigmentation/hyperpigmentation, scar, recurrence of lesion, and need for multiple treatments. After paring lesions with a #15 blade, liquid nitrogen cryosurgery was applied to 1 lesion(s) to produce a freeze injury. 2 consecutive freeze-thaw cycle(s) were applied to each lesion. Counseled patient that blisters (possibly blood blisters) may form and instructed patient on wound care with gentle cleansing and use of Vaseline ointment to keep moist until healed. Handout was provided, and patient was instructed to return to clinic in 3-4 weeks if lesions do not completely resolve.    Restart sal acid at home.    Follow up in about 4 weeks (around 9/16/2019), or if symptoms worsen or fail to improve.

## 2019-11-23 DIAGNOSIS — L71.9 ROSACEA: ICD-10-CM

## 2019-11-23 DIAGNOSIS — I10 ESSENTIAL HYPERTENSION: ICD-10-CM

## 2019-11-23 DIAGNOSIS — E78.5 HYPERLIPIDEMIA, UNSPECIFIED HYPERLIPIDEMIA TYPE: ICD-10-CM

## 2019-11-23 DIAGNOSIS — I70.0 ATHEROSCLEROSIS OF AORTA: ICD-10-CM

## 2019-11-23 DIAGNOSIS — M1A.00X0 IDIOPATHIC CHRONIC GOUT WITHOUT TOPHUS, UNSPECIFIED SITE: ICD-10-CM

## 2019-11-25 RX ORDER — VALSARTAN 160 MG/1
TABLET ORAL
Qty: 90 TABLET | Refills: 1 | Status: SHIPPED | OUTPATIENT
Start: 2019-11-25 | End: 2020-03-23

## 2020-01-16 RX ORDER — ALLOPURINOL 300 MG/1
TABLET ORAL
Qty: 90 TABLET | Refills: 1 | Status: SHIPPED | OUTPATIENT
Start: 2020-01-16 | End: 2020-07-17 | Stop reason: SDUPTHER

## 2020-02-18 ENCOUNTER — OFFICE VISIT (OUTPATIENT)
Dept: PRIMARY CARE CLINIC | Facility: CLINIC | Age: 75
End: 2020-02-18
Attending: FAMILY MEDICINE
Payer: MEDICARE

## 2020-02-18 VITALS
HEART RATE: 66 BPM | BODY MASS INDEX: 30.28 KG/M2 | DIASTOLIC BLOOD PRESSURE: 80 MMHG | OXYGEN SATURATION: 98 % | HEIGHT: 73 IN | SYSTOLIC BLOOD PRESSURE: 122 MMHG | WEIGHT: 228.5 LBS

## 2020-02-18 DIAGNOSIS — R79.9 ABNORMAL FINDING OF BLOOD CHEMISTRY, UNSPECIFIED: ICD-10-CM

## 2020-02-18 DIAGNOSIS — R94.6 ABNORMAL RESULTS OF THYROID FUNCTION STUDIES: ICD-10-CM

## 2020-02-18 DIAGNOSIS — I10 ESSENTIAL HYPERTENSION: ICD-10-CM

## 2020-02-18 DIAGNOSIS — Z00.00 ENCOUNTER FOR MEDICARE ANNUAL WELLNESS EXAM: ICD-10-CM

## 2020-02-18 DIAGNOSIS — M25.561 CHRONIC PAIN OF RIGHT KNEE: Primary | ICD-10-CM

## 2020-02-18 DIAGNOSIS — E78.5 HYPERLIPIDEMIA, UNSPECIFIED HYPERLIPIDEMIA TYPE: ICD-10-CM

## 2020-02-18 DIAGNOSIS — Z12.5 SCREENING FOR MALIGNANT NEOPLASM OF PROSTATE: ICD-10-CM

## 2020-02-18 DIAGNOSIS — G89.29 CHRONIC PAIN OF RIGHT KNEE: Primary | ICD-10-CM

## 2020-02-18 PROCEDURE — 3079F DIAST BP 80-89 MM HG: CPT | Mod: CPTII,S$GLB,, | Performed by: FAMILY MEDICINE

## 2020-02-18 PROCEDURE — 3074F PR MOST RECENT SYSTOLIC BLOOD PRESSURE < 130 MM HG: ICD-10-PCS | Mod: CPTII,S$GLB,, | Performed by: FAMILY MEDICINE

## 2020-02-18 PROCEDURE — 3074F SYST BP LT 130 MM HG: CPT | Mod: CPTII,S$GLB,, | Performed by: FAMILY MEDICINE

## 2020-02-18 PROCEDURE — 99214 PR OFFICE/OUTPT VISIT, EST, LEVL IV, 30-39 MIN: ICD-10-PCS | Mod: S$GLB,,, | Performed by: FAMILY MEDICINE

## 2020-02-18 PROCEDURE — 1101F PT FALLS ASSESS-DOCD LE1/YR: CPT | Mod: CPTII,S$GLB,, | Performed by: FAMILY MEDICINE

## 2020-02-18 PROCEDURE — 99999 PR PBB SHADOW E&M-EST. PATIENT-LVL IV: ICD-10-PCS | Mod: PBBFAC,,, | Performed by: FAMILY MEDICINE

## 2020-02-18 PROCEDURE — 1159F MED LIST DOCD IN RCRD: CPT | Mod: S$GLB,,, | Performed by: FAMILY MEDICINE

## 2020-02-18 PROCEDURE — 3079F PR MOST RECENT DIASTOLIC BLOOD PRESSURE 80-89 MM HG: ICD-10-PCS | Mod: CPTII,S$GLB,, | Performed by: FAMILY MEDICINE

## 2020-02-18 PROCEDURE — 1126F AMNT PAIN NOTED NONE PRSNT: CPT | Mod: S$GLB,,, | Performed by: FAMILY MEDICINE

## 2020-02-18 PROCEDURE — 1126F PR PAIN SEVERITY QUANTIFIED, NO PAIN PRESENT: ICD-10-PCS | Mod: S$GLB,,, | Performed by: FAMILY MEDICINE

## 2020-02-18 PROCEDURE — 99214 OFFICE O/P EST MOD 30 MIN: CPT | Mod: S$GLB,,, | Performed by: FAMILY MEDICINE

## 2020-02-18 PROCEDURE — 1101F PR PT FALLS ASSESS DOC 0-1 FALLS W/OUT INJ PAST YR: ICD-10-PCS | Mod: CPTII,S$GLB,, | Performed by: FAMILY MEDICINE

## 2020-02-18 PROCEDURE — 99999 PR PBB SHADOW E&M-EST. PATIENT-LVL IV: CPT | Mod: PBBFAC,,, | Performed by: FAMILY MEDICINE

## 2020-02-18 PROCEDURE — 1159F PR MEDICATION LIST DOCUMENTED IN MEDICAL RECORD: ICD-10-PCS | Mod: S$GLB,,, | Performed by: FAMILY MEDICINE

## 2020-02-18 RX ORDER — TAMSULOSIN HYDROCHLORIDE 0.4 MG/1
1 CAPSULE ORAL DAILY
Qty: 90 CAPSULE | Refills: 1 | Status: SHIPPED | OUTPATIENT
Start: 2020-02-18 | End: 2020-08-09

## 2020-02-18 NOTE — PATIENT INSTRUCTIONS
Arnaldo,     We are always striving for excellence. Should you receive a patient experience survey electronically or by mail, we would appreciate if you would take a few moments to give us your feedback. These surveys let us know our strengths as well as areas of opportunity for improvement to better serve you.    Thank you for your time,  Candida Mas MA

## 2020-02-18 NOTE — PROGRESS NOTES
Subjective:       Patient ID: Arnaldo Donovan is a 74 y.o. male.    Chief Complaint: Follow-up (6 months f/u)    Pt presents today for annual exam as well as follow up for HTN. Feels well  Does c/o knee pain right. Noticed when he runs and when he goes up and down stairs. Has not taken anything for this    Hypertension   Pertinent negatives include no chest pain, palpitations or shortness of breath.   Follow-up   Pertinent negatives include no chest pain or weakness.     Review of Systems   Constitutional: Negative.  Negative for activity change and appetite change.   HENT: Negative.    Eyes: Negative for photophobia and visual disturbance.   Respiratory: Negative for chest tightness and shortness of breath.    Cardiovascular: Negative for chest pain, palpitations and leg swelling.   Neurological: Negative for dizziness, weakness and light-headedness.   All other systems reviewed and are negative.      Objective:      Physical Exam   Constitutional: He is oriented to person, place, and time. He appears well-developed and well-nourished. No distress.   HENT:   Head: Normocephalic and atraumatic.   Right Ear: External ear normal.   Left Ear: External ear normal.   Mouth/Throat: Oropharynx is clear and moist. No oropharyngeal exudate.   Eyes: Pupils are equal, round, and reactive to light. Conjunctivae and EOM are normal.   Neck: Normal range of motion. Neck supple. No thyromegaly present.   Cardiovascular: Normal rate, regular rhythm, normal heart sounds and intact distal pulses.   No murmur heard.  Pulmonary/Chest: Effort normal and breath sounds normal. No respiratory distress. He has no wheezes. He has no rales. He exhibits no tenderness.   Abdominal: Soft. Bowel sounds are normal. He exhibits no distension and no mass. There is no tenderness. There is no rebound and no guarding.   Musculoskeletal: Normal range of motion. He exhibits tenderness (pos crepitus right knee ). He exhibits no edema.    Lymphadenopathy:     He has no cervical adenopathy.   Neurological: He is alert and oriented to person, place, and time. He has normal reflexes. He displays normal reflexes. No cranial nerve deficit. He exhibits normal muscle tone. Coordination normal.   Skin: Skin is warm and dry. He is not diaphoretic. No erythema. No pallor.   Psychiatric: He has a normal mood and affect. His behavior is normal. Judgment and thought content normal.       Assessment:       1. Encounter for Medicare annual wellness exam    2. Hyperlipidemia, unspecified hyperlipidemia type    3. Essential hypertension    4. Abnormal results of thyroid function studies     5. Abnormal finding of blood chemistry, unspecified     6. Chronic pain of right knee    7. Screening for malignant neoplasm of prostate        Plan:       Annual PE wnl.  Labs pending.    HTN: would suggest valsartan 160 mg daily. SE's of med d.w pt.   RTC 2 weeks for nurse Bp check    ED prompts d/w pt  RTC prn symptoms  Encounter for Medicare annual wellness exam  -     TSH; Future; Expected date: 02/18/2020  -     CBC auto differential; Future; Expected date: 02/18/2020  -     Lipid panel; Future; Expected date: 02/18/2020  -     Comprehensive metabolic panel; Future; Expected date: 02/18/2020    Hyperlipidemia, unspecified hyperlipidemia type  -     Lipid panel; Future; Expected date: 02/18/2020  -     Ambulatory referral/consult to Cardiology; Future; Expected date: 02/25/2020    Essential hypertension  -     Comprehensive metabolic panel; Future; Expected date: 02/18/2020  -     Ambulatory referral/consult to Cardiology; Future; Expected date: 02/25/2020    Abnormal results of thyroid function studies   -     TSH; Future; Expected date: 02/18/2020    Abnormal finding of blood chemistry, unspecified   -     CBC auto differential; Future; Expected date: 02/18/2020    Chronic pain of right knee  -     Ambulatory referral/consult to Sports Medicine; Future; Expected date:  02/25/2020    Screening for malignant neoplasm of prostate  -     PSA, Screening; Future; Expected date: 02/18/2020    Other orders  -     tamsulosin (FLOMAX) 0.4 mg Cap; Take 1 capsule (0.4 mg total) by mouth once daily.  Dispense: 90 capsule; Refill: 1    RTC prn symptoms    Pt wants referral to [facility] sports med for knee pain

## 2020-03-18 ENCOUNTER — OFFICE VISIT (OUTPATIENT)
Dept: CARDIOLOGY | Facility: CLINIC | Age: 75
End: 2020-03-18
Payer: MEDICARE

## 2020-03-18 DIAGNOSIS — Z78.9 STATIN INTOLERANCE: Primary | ICD-10-CM

## 2020-03-18 DIAGNOSIS — E55.9 VITAMIN D DEFICIENCY, UNSPECIFIED: Primary | ICD-10-CM

## 2020-03-18 DIAGNOSIS — E78.00 PURE HYPERCHOLESTEROLEMIA: ICD-10-CM

## 2020-03-18 DIAGNOSIS — I10 ESSENTIAL HYPERTENSION: ICD-10-CM

## 2020-03-18 PROCEDURE — 1101F PT FALLS ASSESS-DOCD LE1/YR: CPT | Mod: HCNC,CPTII,95, | Performed by: INTERNAL MEDICINE

## 2020-03-18 PROCEDURE — 1101F PR PT FALLS ASSESS DOC 0-1 FALLS W/OUT INJ PAST YR: ICD-10-PCS | Mod: HCNC,CPTII,95, | Performed by: INTERNAL MEDICINE

## 2020-03-18 PROCEDURE — 1159F PR MEDICATION LIST DOCUMENTED IN MEDICAL RECORD: ICD-10-PCS | Mod: HCNC,95,, | Performed by: INTERNAL MEDICINE

## 2020-03-18 PROCEDURE — 1159F MED LIST DOCD IN RCRD: CPT | Mod: HCNC,95,, | Performed by: INTERNAL MEDICINE

## 2020-03-18 PROCEDURE — 99203 PR OFFICE/OUTPT VISIT, NEW, LEVL III, 30-44 MIN: ICD-10-PCS | Mod: HCNC,95,, | Performed by: INTERNAL MEDICINE

## 2020-03-18 PROCEDURE — 99203 OFFICE O/P NEW LOW 30 MIN: CPT | Mod: HCNC,95,, | Performed by: INTERNAL MEDICINE

## 2020-03-18 RX ORDER — PRAVASTATIN SODIUM 20 MG/1
20 TABLET ORAL DAILY
Qty: 30 TABLET | Refills: 11 | Status: SHIPPED | OUTPATIENT
Start: 2020-03-18 | End: 2021-04-28

## 2020-03-18 NOTE — PROGRESS NOTES
Subjective:   Patient ID:  Arnaldo Donovan is a 75 y.o. male who presents for evaluation of No chief complaint on file.      HPI: .The patient location is: home  The chief complaint leading to consultation is: hyperlipidemia and statin intolerance  Visit type: Virtual visit with synchronous audio and video  Total time spent with patient: 30 minutes  Each patient to whom he or she provides medical services by telemedicine is:  (1) informed of the relationship between the physician and patient and the respective role of any other health care provider with respect to management of the patient; and (2) notified that he or she may decline to receive medical services by telemedicine and may withdraw from such care at any time.    Notes: He presents today for evaluation of hyperlipidemia. He was on atorvastatin and simvastatin in the past but stopped them a few years ago due to significant myalgias. His symptoms resolved after stopping the medication. He has treated HTN and no other cardiac history. He currently walks 3-4 days a week and does not follow any particular diet. A friend of his told him about the PCSK-9 inhibitors, and he is wondering if he is a candidate. He has no known vascular disease but was told he had vascular calcifications seen on an xray by Sports Medicine.    Past Medical History:   Diagnosis Date    Allergy     seasonal    Gout, joint     Hyperlipidemia     Hypertension        Past Surgical History:   Procedure Laterality Date    VASECTOMY  1988       Social History     Socioeconomic History    Marital status:      Spouse name: Not on file    Number of children: Not on file    Years of education: Not on file    Highest education level: Not on file   Occupational History    Occupation:      Employer: [employer]   Social Needs    Financial resource strain: Not on file    Food insecurity:     Worry: Not on file     Inability: Not on file    Transportation needs:      Medical: Not on file     Non-medical: Not on file   Tobacco Use    Smoking status: Former Smoker     Packs/day: 0.50     Years: 15.00     Pack years: 7.50     Last attempt to quit: 10/19/1977     Years since quittin.4    Smokeless tobacco: Never Used   Substance and Sexual Activity    Alcohol use: Yes     Comment: 3-4 times weekly    Drug use: No    Sexual activity: Yes     Partners: Female     Comment:  with 3 children   Lifestyle    Physical activity:     Days per week: Not on file     Minutes per session: Not on file    Stress: Not on file   Relationships    Social connections:     Talks on phone: Not on file     Gets together: Not on file     Attends Yazidism service: Not on file     Active member of club or organization: Not on file     Attends meetings of clubs or organizations: Not on file     Relationship status: Not on file   Other Topics Concern    Not on file   Social History Narrative    Not on file       Family History   Problem Relation Age of Onset    Hypertension Mother     Myasthenia gravis Mother         affects eye    Dementia Father     No Known Problems Sister     No Known Problems Brother     No Known Problems Daughter     Melanoma Neg Hx        Patient's Medications   New Prescriptions    PRAVASTATIN (PRAVACHOL) 20 MG TABLET    Take 1 tablet (20 mg total) by mouth once daily.   Previous Medications    ALLOPURINOL (ZYLOPRIM) 300 MG TABLET    TAKE 1 TABLET (300 MG TOTAL) BY MOUTH ONCE DAILY    IVERMECTIN (SOOLANTRA) 1 % CREA    AAA on face qhs and wash off in morning    TAMSULOSIN (FLOMAX) 0.4 MG CAP    Take 1 capsule (0.4 mg total) by mouth once daily.    VALSARTAN (DIOVAN) 160 MG TABLET    TAKE 1 TABLET BY MOUTH ONCE DAILY   Modified Medications    No medications on file   Discontinued Medications    No medications on file       Review of Systems   Constitution: Negative for malaise/fatigue and weight gain.   HENT: Negative for hearing loss.    Eyes: Negative for  visual disturbance.   Cardiovascular: Negative for chest pain, claudication, dyspnea on exertion, leg swelling, near-syncope, orthopnea, palpitations, paroxysmal nocturnal dyspnea and syncope.   Respiratory: Negative for cough, shortness of breath, sleep disturbances due to breathing, snoring and wheezing.    Endocrine: Negative for cold intolerance, heat intolerance, polydipsia, polyphagia and polyuria.   Hematologic/Lymphatic: Negative for bleeding problem. Does not bruise/bleed easily.   Skin: Negative for rash and suspicious lesions.   Musculoskeletal: Negative for arthritis, falls, joint pain, muscle weakness and myalgias.   Gastrointestinal: Negative for abdominal pain, change in bowel habit, constipation, diarrhea, heartburn, hematochezia, melena and nausea.   Genitourinary: Negative for hematuria and nocturia.   Neurological: Negative for excessive daytime sleepiness, dizziness, headaches, light-headedness, loss of balance and weakness.   Psychiatric/Behavioral: Negative for depression. The patient is not nervous/anxious.    Allergic/Immunologic: Negative for environmental allergies.       There were no vitals taken for this visit.    Objective:   Physical Exam    Lab Results   Component Value Date     05/09/2019    K 4.6 05/09/2019     05/09/2019    CO2 27 05/09/2019    BUN 17 05/09/2019    CREATININE 1.0 05/09/2019     (H) 05/09/2019    HGBA1C 5.7 (H) 05/09/2019    AST 19 05/09/2019    ALT 14 05/09/2019    ALBUMIN 3.7 05/09/2019    PROT 7.0 05/09/2019    BILITOT 0.7 05/09/2019    WBC 6.08 05/09/2019    HGB 14.8 05/09/2019    HCT 45.9 05/09/2019    MCV 90 05/09/2019     05/09/2019    TSH 1.426 05/09/2019    CHOL 279 (H) 05/09/2019    HDL 66 05/09/2019    LDLCALC 194.8 (H) 05/09/2019    TRIG 91 05/09/2019       Assessment:     1. Statin intolerance    2. Essential hypertension :Continue valsartan.   3. Pure hypercholesterolemia : He is not currently a candidate for a PCSK-9  inhibitor due to lack of documented cardiovascular disease. Recommend starting magnesium oxide 200 mg daily and co-Q 10 200 mg daily for two weeks. After two weeks, start pravastatin 20 mg daily. If tolerating after one month will up-titrate to the maximum tolerated dose and then recheck FLP. We discussed adding zetia once he is on the maximal tolerated dose of pravastatin. Following a heart health diet such as the Mediterranean Diet is recommended in addition to 150 minutes a week of moderate intensity exercise to lower cholesterol, maintain a healthy body weight, and improve overall cardiovascular health. Moving to a plant based diet is another option for lowering LDL. He will follow up vis My Chart in 4 weeks to let me know how he is doing on the pravastatin.         Plan:     Diagnoses and all orders for this visit:    Statin intolerance  -     pravastatin (PRAVACHOL) 20 MG tablet; Take 1 tablet (20 mg total) by mouth once daily.    Essential hypertension    Pure hypercholesterolemia  -     pravastatin (PRAVACHOL) 20 MG tablet; Take 1 tablet (20 mg total) by mouth once daily.        Thank you for allowing me to participate in this patient's care. Please do not hesitate to contact me with any questions or concerns.

## 2020-03-19 ENCOUNTER — LAB VISIT (OUTPATIENT)
Dept: LAB | Facility: OTHER | Age: 75
End: 2020-03-19
Attending: FAMILY MEDICINE
Payer: MEDICARE

## 2020-03-19 ENCOUNTER — PATIENT MESSAGE (OUTPATIENT)
Dept: CARDIOLOGY | Facility: CLINIC | Age: 75
End: 2020-03-19

## 2020-03-19 DIAGNOSIS — I10 ESSENTIAL HYPERTENSION: ICD-10-CM

## 2020-03-19 DIAGNOSIS — E78.5 HYPERLIPIDEMIA, UNSPECIFIED HYPERLIPIDEMIA TYPE: ICD-10-CM

## 2020-03-19 DIAGNOSIS — R94.6 ABNORMAL RESULTS OF THYROID FUNCTION STUDIES: ICD-10-CM

## 2020-03-19 DIAGNOSIS — R79.9 ABNORMAL FINDING OF BLOOD CHEMISTRY, UNSPECIFIED: ICD-10-CM

## 2020-03-19 DIAGNOSIS — Z12.5 SCREENING FOR MALIGNANT NEOPLASM OF PROSTATE: ICD-10-CM

## 2020-03-19 DIAGNOSIS — E55.9 VITAMIN D DEFICIENCY, UNSPECIFIED: ICD-10-CM

## 2020-03-19 DIAGNOSIS — Z00.00 ENCOUNTER FOR MEDICARE ANNUAL WELLNESS EXAM: ICD-10-CM

## 2020-03-19 LAB
25(OH)D3+25(OH)D2 SERPL-MCNC: 12 NG/ML (ref 30–96)
ALBUMIN SERPL BCP-MCNC: 3.8 G/DL (ref 3.5–5.2)
ALP SERPL-CCNC: 64 U/L (ref 55–135)
ALT SERPL W/O P-5'-P-CCNC: 15 U/L (ref 10–44)
ANION GAP SERPL CALC-SCNC: 9 MMOL/L (ref 8–16)
AST SERPL-CCNC: 17 U/L (ref 10–40)
BASOPHILS # BLD AUTO: 0.04 K/UL (ref 0–0.2)
BASOPHILS NFR BLD: 0.6 % (ref 0–1.9)
BILIRUB SERPL-MCNC: 0.7 MG/DL (ref 0.1–1)
BUN SERPL-MCNC: 16 MG/DL (ref 8–23)
CALCIUM SERPL-MCNC: 9.3 MG/DL (ref 8.7–10.5)
CHLORIDE SERPL-SCNC: 105 MMOL/L (ref 95–110)
CHOLEST SERPL-MCNC: 246 MG/DL (ref 120–199)
CHOLEST/HDLC SERPL: 3.8 {RATIO} (ref 2–5)
CO2 SERPL-SCNC: 25 MMOL/L (ref 23–29)
COMPLEXED PSA SERPL-MCNC: 0.41 NG/ML (ref 0–4)
CREAT SERPL-MCNC: 1 MG/DL (ref 0.5–1.4)
DIFFERENTIAL METHOD: ABNORMAL
EOSINOPHIL # BLD AUTO: 0.4 K/UL (ref 0–0.5)
EOSINOPHIL NFR BLD: 6.6 % (ref 0–8)
ERYTHROCYTE [DISTWIDTH] IN BLOOD BY AUTOMATED COUNT: 13.2 % (ref 11.5–14.5)
EST. GFR  (AFRICAN AMERICAN): >60 ML/MIN/1.73 M^2
EST. GFR  (NON AFRICAN AMERICAN): >60 ML/MIN/1.73 M^2
GLUCOSE SERPL-MCNC: 106 MG/DL (ref 70–110)
HCT VFR BLD AUTO: 45.1 % (ref 40–54)
HDLC SERPL-MCNC: 64 MG/DL (ref 40–75)
HDLC SERPL: 26 % (ref 20–50)
HGB BLD-MCNC: 14.3 G/DL (ref 14–18)
IMM GRANULOCYTES # BLD AUTO: 0.01 K/UL (ref 0–0.04)
IMM GRANULOCYTES NFR BLD AUTO: 0.1 % (ref 0–0.5)
LDLC SERPL CALC-MCNC: 167 MG/DL (ref 63–159)
LYMPHOCYTES # BLD AUTO: 2.9 K/UL (ref 1–4.8)
LYMPHOCYTES NFR BLD: 43.1 % (ref 18–48)
MCH RBC QN AUTO: 28.7 PG (ref 27–31)
MCHC RBC AUTO-ENTMCNC: 31.7 G/DL (ref 32–36)
MCV RBC AUTO: 91 FL (ref 82–98)
MONOCYTES # BLD AUTO: 0.5 K/UL (ref 0.3–1)
MONOCYTES NFR BLD: 8.1 % (ref 4–15)
NEUTROPHILS # BLD AUTO: 2.8 K/UL (ref 1.8–7.7)
NEUTROPHILS NFR BLD: 41.5 % (ref 38–73)
NONHDLC SERPL-MCNC: 182 MG/DL
NRBC BLD-RTO: 0 /100 WBC
PLATELET # BLD AUTO: 195 K/UL (ref 150–350)
PMV BLD AUTO: 10.8 FL (ref 9.2–12.9)
POTASSIUM SERPL-SCNC: 4.5 MMOL/L (ref 3.5–5.1)
PROT SERPL-MCNC: 6.9 G/DL (ref 6–8.4)
RBC # BLD AUTO: 4.98 M/UL (ref 4.6–6.2)
SODIUM SERPL-SCNC: 139 MMOL/L (ref 136–145)
TRIGL SERPL-MCNC: 75 MG/DL (ref 30–150)
TSH SERPL DL<=0.005 MIU/L-ACNC: 1.38 UIU/ML (ref 0.4–4)
WBC # BLD AUTO: 6.7 K/UL (ref 3.9–12.7)

## 2020-03-19 PROCEDURE — 85025 COMPLETE CBC W/AUTO DIFF WBC: CPT | Mod: HCNC

## 2020-03-19 PROCEDURE — 82306 VITAMIN D 25 HYDROXY: CPT | Mod: HCNC

## 2020-03-19 PROCEDURE — 84443 ASSAY THYROID STIM HORMONE: CPT | Mod: HCNC

## 2020-03-19 PROCEDURE — 80053 COMPREHEN METABOLIC PANEL: CPT | Mod: HCNC

## 2020-03-19 PROCEDURE — 36415 COLL VENOUS BLD VENIPUNCTURE: CPT | Mod: HCNC

## 2020-03-19 PROCEDURE — 80061 LIPID PANEL: CPT | Mod: HCNC

## 2020-03-19 PROCEDURE — 84153 ASSAY OF PSA TOTAL: CPT | Mod: HCNC

## 2020-03-22 DIAGNOSIS — M1A.00X0 IDIOPATHIC CHRONIC GOUT WITHOUT TOPHUS, UNSPECIFIED SITE: ICD-10-CM

## 2020-03-22 DIAGNOSIS — E78.5 HYPERLIPIDEMIA, UNSPECIFIED HYPERLIPIDEMIA TYPE: ICD-10-CM

## 2020-03-22 DIAGNOSIS — I70.0 ATHEROSCLEROSIS OF AORTA: ICD-10-CM

## 2020-03-22 DIAGNOSIS — L71.9 ROSACEA: ICD-10-CM

## 2020-03-22 DIAGNOSIS — I10 ESSENTIAL HYPERTENSION: ICD-10-CM

## 2020-03-23 RX ORDER — VALSARTAN 160 MG/1
TABLET ORAL
Qty: 90 TABLET | Refills: 0 | Status: SHIPPED | OUTPATIENT
Start: 2020-03-23 | End: 2020-07-06

## 2020-03-24 ENCOUNTER — PATIENT MESSAGE (OUTPATIENT)
Dept: PRIMARY CARE CLINIC | Facility: CLINIC | Age: 75
End: 2020-03-24

## 2020-03-25 ENCOUNTER — TELEPHONE (OUTPATIENT)
Dept: PRIMARY CARE CLINIC | Facility: CLINIC | Age: 75
End: 2020-03-25

## 2020-03-26 ENCOUNTER — TELEPHONE (OUTPATIENT)
Dept: UROLOGY | Facility: CLINIC | Age: 75
End: 2020-03-26

## 2020-03-26 ENCOUNTER — TELEPHONE (OUTPATIENT)
Dept: PRIMARY CARE CLINIC | Facility: CLINIC | Age: 75
End: 2020-03-26

## 2020-03-26 ENCOUNTER — OFFICE VISIT (OUTPATIENT)
Dept: INTERNAL MEDICINE | Facility: CLINIC | Age: 75
End: 2020-03-26
Payer: MEDICARE

## 2020-03-26 DIAGNOSIS — L29.0 ANAL PRURITUS: ICD-10-CM

## 2020-03-26 DIAGNOSIS — K62.5 RECTAL BLEEDING: Primary | ICD-10-CM

## 2020-03-26 DIAGNOSIS — K63.5 POLYP OF COLON, UNSPECIFIED PART OF COLON, UNSPECIFIED TYPE: ICD-10-CM

## 2020-03-26 DIAGNOSIS — K57.90 DIVERTICULOSIS: ICD-10-CM

## 2020-03-26 PROCEDURE — 1101F PR PT FALLS ASSESS DOC 0-1 FALLS W/OUT INJ PAST YR: ICD-10-PCS | Mod: HCNC,CPTII,95, | Performed by: INTERNAL MEDICINE

## 2020-03-26 PROCEDURE — 99214 PR OFFICE/OUTPT VISIT, EST, LEVL IV, 30-39 MIN: ICD-10-PCS | Mod: HCNC,95,, | Performed by: INTERNAL MEDICINE

## 2020-03-26 PROCEDURE — 1159F MED LIST DOCD IN RCRD: CPT | Mod: HCNC,95,, | Performed by: INTERNAL MEDICINE

## 2020-03-26 PROCEDURE — 1159F PR MEDICATION LIST DOCUMENTED IN MEDICAL RECORD: ICD-10-PCS | Mod: HCNC,95,, | Performed by: INTERNAL MEDICINE

## 2020-03-26 PROCEDURE — 99214 OFFICE O/P EST MOD 30 MIN: CPT | Mod: HCNC,95,, | Performed by: INTERNAL MEDICINE

## 2020-03-26 PROCEDURE — 1101F PT FALLS ASSESS-DOCD LE1/YR: CPT | Mod: HCNC,CPTII,95, | Performed by: INTERNAL MEDICINE

## 2020-03-26 RX ORDER — POLYETHYLENE GLYCOL 3350 17 G/17G
17 POWDER, FOR SOLUTION ORAL DAILY
Qty: 30 EACH | Refills: 11 | Status: SHIPPED | OUTPATIENT
Start: 2020-03-26 | End: 2021-04-28

## 2020-03-26 NOTE — TELEPHONE ENCOUNTER
"Patient reports bloody stool two days ago. Patient states he noticed a "fair amount of blood" and then a "trace amount" of bright red blood and reports taking Advil for several days prior as well as mild abdominal discomfort. Denies blood today, fever or nausea. Please advise.    ----- Message from Jonnie Poole sent at 3/25/2020  4:49 PM CDT -----  Contact: MAKAYLA JOSHI [[id number]]  Name of Who is Calling: MAKAYLA JOSHI [[id number]]    What is the request in detail:MAKAYLA JOSHI [[id number]] is requesting a call back in regards to blood in stool ...  Please contact to further discuss and advise      Can the clinic reply by [facility]: yes      What Number to Call Back if not in [facility]:  [phone]    "

## 2020-03-26 NOTE — PROGRESS NOTES
The patient location is: home  The chief complaint leading to consultation is: rectal bleeding.  The patient elected for a virtual visit to decrease risk of exposure to Coronavirus.  Visit type: Virtual visit with synchronous audio and video  Total time spent with patient: 30 minutes  Each patient to whom he or she provides medical services by telemedicine is:  (1) informed of the relationship between the physician and patient and the respective role of any other health care provider with respect to management of the patient; and (2) notified that he or she may decline to receive medical services by telemedicine and may withdraw from such care at any time.    Subjective:       Patient ID: Arnaldo Donovan is a 75 y.o. male who  has a past medical history of Allergy, Gout, joint, Hyperlipidemia, and Hypertension.    Chief Complaint: Rectal Bleeding     History was obtained from the patient and supplemented through chart review   He is a patient of Dr. Turner.  Was last seen  for chronic right knee pain.    HPI    Blood in Stool:  Has hard, pebbly stools.  Eats a well balanced diet.  Blood in stool started 2 days ago.  Does not remember straining that day.  Had 5 BMs that day, which was unusual; blood in stool gradually increased in intensity with each BM and lightened up.  No bleeding yesterday.  Last episode today with trace amount of bright red on the side of his stool.  No rectal pain.  Had mild abd discomfort at the mid abd inferior to the umbilicus 2 days ago that improved after BM; abd pain resolved.  Has some rectal itching.    Normal PO intake.  No N/V.  No melena.  Denies CP, SOB, lightheadedness, dizziness.  Has chronic palpitations, but no change.    Has been taking ibuprofen, 1-2/day for 2-3 weeks for ITB; was taking it daily and recently stopped it d/t rectal bleeding.  Not on anticoagulation.   ETOH: some glasses of wine 2-3 nights, 2 cocktails.  No h/o liver disease.    Labs last week without  anemia, renal dysfunction, transaminitis.    C scope  with [facility] with 1 polyp, mild diverticulosis.  Recommend high-fiber diet, colonoscopy in 5 years.    Review of Systems   Constitutional: Negative for activity change and fever.   HENT: Negative for rhinorrhea and sneezing.    Eyes: Negative for redness and itching.   Respiratory: Negative for shortness of breath and wheezing.    Cardiovascular: Negative for chest pain and palpitations.   Gastrointestinal: Positive for abdominal pain, blood in stool and constipation. Negative for diarrhea, nausea, rectal pain and vomiting.   Genitourinary: Negative for dysuria and hematuria.   Musculoskeletal: Positive for arthralgias. Negative for gait problem.   Skin: Negative for color change and rash.   Neurological: Negative for dizziness and light-headedness.   Hematological: Negative for adenopathy.   Psychiatric/Behavioral: Negative for confusion. The patient is not nervous/anxious.          Past Medical History:   Diagnosis Date    Allergy     seasonal    Gout, joint     Hyperlipidemia     Hypertension      Past Surgical History:   Procedure Laterality Date    VASECTOMY  1988     Family History   Problem Relation Age of Onset    Hypertension Mother     Myasthenia gravis Mother         affects eye    Dementia Father     No Known Problems Sister     No Known Problems Brother     No Known Problems Daughter     Melanoma Neg Hx      Social History     Socioeconomic History    Marital status:      Spouse name: Not on file    Number of children: Not on file    Years of education: Not on file    Highest education level: Not on file   Occupational History    Occupation:      Employer: [employer]   Social Needs    Financial resource strain: Not on file    Food insecurity:     Worry: Not on file     Inability: Not on file    Transportation needs:     Medical: Not on file     Non-medical: Not on file   Tobacco Use    Smoking status:  Former Smoker     Packs/day: 0.50     Years: 15.00     Pack years: 7.50     Last attempt to quit: 10/19/1977     Years since quittin.4    Smokeless tobacco: Never Used   Substance and Sexual Activity    Alcohol use: Yes     Comment: 3-4 times weekly    Drug use: No    Sexual activity: Yes     Partners: Female     Comment:  with 3 children   Lifestyle    Physical activity:     Days per week: Not on file     Minutes per session: Not on file    Stress: Not on file   Relationships    Social connections:     Talks on phone: Not on file     Gets together: Not on file     Attends Hindu service: Not on file     Active member of club or organization: Not on file     Attends meetings of clubs or organizations: Not on file     Relationship status: Not on file   Other Topics Concern    Not on file   Social History Narrative    Not on file     Objective:      There were no vitals filed for this visit.   Physical Exam   Constitutional: He appears well-developed and well-nourished. No distress.   HENT:   Head: Normocephalic and atraumatic.   Eyes: Right eye exhibits no discharge. Left eye exhibits no discharge.   Pulmonary/Chest: Effort normal. No respiratory distress.   Speaking in complete sentences.   Neurological: He is alert.   Skin: He is not diaphoretic. No erythema. No pallor.   Psychiatric: He has a normal mood and affect. His behavior is normal.         Lab Results   Component Value Date    WBC 6.70 2020    HGB 14.3 2020    HCT 45.1 2020     2020    CHOL 246 (H) 2020    TRIG 75 2020    HDL 64 2020    ALT 15 2020    AST 17 2020     2020    K 4.5 2020     2020    CREATININE 1.0 2020    BUN 16 2020    CO2 25 2020    TSH 1.381 2020    PSA 0.41 2020    GLUF 109 2009    HGBA1C 5.7 (H) 2019       The 10-year ASCVD risk score (Mickie CHISHOLM Jr., et al., 2013) is: 24.6%    Values  used to calculate the score:      Age: 75 years      Sex: Male      Is Non- : No      Diabetic: No      Tobacco smoker: No      Systolic Blood Pressure: 122 mmHg      Is BP treated: Yes      HDL Cholesterol: 64 mg/dL      Total Cholesterol: 246 mg/dL    (Imaging have been independently reviewed)  Abdominal ultrasound with plaque, slight ectasia.  No aneurysm.    Assessment:       1. Rectal bleeding    2. Polyp of colon, unspecified part of colon, unspecified type    3. Diverticulosis    4. Anal pruritus          Plan:       Arnaldo was seen today for rectal bleeding.    Diagnoses and all orders for this visit:    Rectal bleeding  Comments:  DDx hemorrhoids, fissure, diverticulosis. Hydration, fiber. Miralax, stool softeners PRN. Stopped NSAIDs. If persistent or worsening, refer to GI. ED prompts.  Orders:  -     polyethylene glycol (GLYCOLAX) 17 gram PwPk; Take 17 g by mouth once daily. 17 g dissolved in 8 oz of water once daily and titrate up or down (max 34 g daily) to effect.    Polyp of colon, unspecified part of colon, unspecified type  Comments:  Cscope 2018. Rec repeating in 5 years.    Diverticulosis  Comments:  Rec hydration, high fiber diet.  Orders:  -     polyethylene glycol (GLYCOLAX) 17 gram PwPk; Take 17 g by mouth once daily. 17 g dissolved in 8 oz of water once daily and titrate up or down (max 34 g daily) to effect.    Anal pruritus  Comments:  Avoid moisture. Can try barrier ointments, topical steroids, but no more than 7 days to avoid atrophy.    Other orders  -     Cancel: Occult blood x 1, stool; Future  -     Cancel: Occult blood x 1, stool; Future  -     Cancel: Occult blood x 1, stool; Future  -     Cancel: Ambulatory referral/consult to Gastroenterology; Future         Side effects of medication(s) were discussed in detail and patient voiced understanding.  Patient will call back for any issues or complications.     RTC PRN.

## 2020-03-26 NOTE — TELEPHONE ENCOUNTER
Per MD instructions, told patient to avoid Advil and instead use Tylenol, maintain adequate fiber intake and monitor for blood, n/v and abdominal pain. Instructed patient to notify office if any of the above symptoms occur, and that severe symptoms could require ED visit. Patient expressed understanding.

## 2020-03-26 NOTE — TELEPHONE ENCOUNTER
Pt had c-scope less than 2 years ago per records. Would try avoiding advil or similar and use tylenol. Ensure fiber intake is adequate. As long as not having anymore blood, abd pain, n/v or other concerning symptoms, then ok to monitor for now. However, if any of above recurs, he will need appt. If severe, then ED.

## 2020-05-06 ENCOUNTER — OFFICE VISIT (OUTPATIENT)
Dept: ORTHOPEDICS | Facility: CLINIC | Age: 75
End: 2020-05-06
Payer: MEDICARE

## 2020-05-06 VITALS
HEIGHT: 73 IN | SYSTOLIC BLOOD PRESSURE: 130 MMHG | WEIGHT: 228 LBS | DIASTOLIC BLOOD PRESSURE: 82 MMHG | BODY MASS INDEX: 30.22 KG/M2

## 2020-05-06 DIAGNOSIS — M99.05 SOMATIC DYSFUNCTION OF PELVIS REGION: ICD-10-CM

## 2020-05-06 DIAGNOSIS — M25.561 CHRONIC PAIN OF RIGHT KNEE: Primary | ICD-10-CM

## 2020-05-06 DIAGNOSIS — M99.06 SOMATIC DYSFUNCTION OF LOWER EXTREMITY: ICD-10-CM

## 2020-05-06 DIAGNOSIS — G89.29 CHRONIC PAIN OF RIGHT KNEE: Primary | ICD-10-CM

## 2020-05-06 DIAGNOSIS — M76.31 IT BAND SYNDROME, RIGHT: ICD-10-CM

## 2020-05-06 PROCEDURE — 97110 THERAPEUTIC EXERCISES: CPT | Mod: GP,HCNC,S$GLB, | Performed by: ORTHOPAEDIC SURGERY

## 2020-05-06 PROCEDURE — 97110 PR THERAPEUTIC EXERCISES: ICD-10-PCS | Mod: GP,HCNC,S$GLB, | Performed by: ORTHOPAEDIC SURGERY

## 2020-05-06 PROCEDURE — 98925 OSTEOPATH MANJ 1-2 REGIONS: CPT | Mod: HCNC,S$GLB,, | Performed by: ORTHOPAEDIC SURGERY

## 2020-05-06 PROCEDURE — 1101F PR PT FALLS ASSESS DOC 0-1 FALLS W/OUT INJ PAST YR: ICD-10-PCS | Mod: HCNC,CPTII,S$GLB, | Performed by: ORTHOPAEDIC SURGERY

## 2020-05-06 PROCEDURE — 99999 PR PBB SHADOW E&M-EST. PATIENT-LVL II: ICD-10-PCS | Mod: PBBFAC,HCNC,, | Performed by: ORTHOPAEDIC SURGERY

## 2020-05-06 PROCEDURE — 99203 OFFICE O/P NEW LOW 30 MIN: CPT | Mod: 25,HCNC,S$GLB, | Performed by: ORTHOPAEDIC SURGERY

## 2020-05-06 PROCEDURE — 1159F MED LIST DOCD IN RCRD: CPT | Mod: HCNC,S$GLB,, | Performed by: ORTHOPAEDIC SURGERY

## 2020-05-06 PROCEDURE — 99203 PR OFFICE/OUTPT VISIT, NEW, LEVL III, 30-44 MIN: ICD-10-PCS | Mod: 25,HCNC,S$GLB, | Performed by: ORTHOPAEDIC SURGERY

## 2020-05-06 PROCEDURE — 98925 PR OSTEOPATHIC MANIP,1-2 BODY REGN: ICD-10-PCS | Mod: HCNC,S$GLB,, | Performed by: ORTHOPAEDIC SURGERY

## 2020-05-06 PROCEDURE — 99999 PR PBB SHADOW E&M-EST. PATIENT-LVL II: CPT | Mod: PBBFAC,HCNC,, | Performed by: ORTHOPAEDIC SURGERY

## 2020-05-06 PROCEDURE — 1125F AMNT PAIN NOTED PAIN PRSNT: CPT | Mod: HCNC,S$GLB,, | Performed by: ORTHOPAEDIC SURGERY

## 2020-05-06 PROCEDURE — 3075F PR MOST RECENT SYSTOLIC BLOOD PRESS GE 130-139MM HG: ICD-10-PCS | Mod: HCNC,CPTII,S$GLB, | Performed by: ORTHOPAEDIC SURGERY

## 2020-05-06 PROCEDURE — 1101F PT FALLS ASSESS-DOCD LE1/YR: CPT | Mod: HCNC,CPTII,S$GLB, | Performed by: ORTHOPAEDIC SURGERY

## 2020-05-06 PROCEDURE — 1159F PR MEDICATION LIST DOCUMENTED IN MEDICAL RECORD: ICD-10-PCS | Mod: HCNC,S$GLB,, | Performed by: ORTHOPAEDIC SURGERY

## 2020-05-06 PROCEDURE — 3079F PR MOST RECENT DIASTOLIC BLOOD PRESSURE 80-89 MM HG: ICD-10-PCS | Mod: HCNC,CPTII,S$GLB, | Performed by: ORTHOPAEDIC SURGERY

## 2020-05-06 PROCEDURE — 1125F PR PAIN SEVERITY QUANTIFIED, PAIN PRESENT: ICD-10-PCS | Mod: HCNC,S$GLB,, | Performed by: ORTHOPAEDIC SURGERY

## 2020-05-06 PROCEDURE — 3075F SYST BP GE 130 - 139MM HG: CPT | Mod: HCNC,CPTII,S$GLB, | Performed by: ORTHOPAEDIC SURGERY

## 2020-05-06 PROCEDURE — 3079F DIAST BP 80-89 MM HG: CPT | Mod: HCNC,CPTII,S$GLB, | Performed by: ORTHOPAEDIC SURGERY

## 2020-05-06 NOTE — PROGRESS NOTES
"CC: right knee pain    75 y.o. Male presents today for evaluation of his right knee pain. Patient is unable to recall a specific mechanism of knee injury and denies prior history of knee injury. He does recall falling forward directly onto his knee approximately 6 months ago, but does not believe this is contributing to his current bout of pain. Patient reports he had been frequently walking for exercise and then began to add running to his routine over the past 2-3 months. When asked where his pain is located he gestures to the lateral aspect of his knee and describes the quality of his pain as aching. He also admits to feeling as though his knee is "tight" and can have difficulty fully flexing his knee. Patient states he has continued to exercise, but has since stopped running and cut back on the frequency of his walks. He denies radicular symptoms and denies this problem waking him up at night. Patient admits to initially seeking care for this problem at [facility] 3/2/2020.  How long: Patient admits to experiencing right knee pain since the beginning of February.   What makes it better: Patient has not been able to identify anything to improve his pain at this time.   What makes it worse: Patient admits to increased pain with walking up and down stairs.   Does it radiate: Patient admits to experiencing his pain radiate proximally towards his hip when performing banded hip abduction.  Attempted treatments: Patient states she has performing quadriceps, hamstrings and calf stretches several times per week. He is taking tumeric daily in addition to applying ice. Patient had CSI administered 3/2/2020 but reports he experienced less than 24 hours of pain relief with this. He denies prior history of knee surgery.    Pain score: 4/10  Any mechanical symptoms: Patient admits to mechanical symptoms with pivoting motions.   Feelings of instability: Denies feelings of instability.   Affect on ADLs: Denies this " problem affecting his ability to perform ADLs.     REVIEW OF SYSTEMS:   Constitution: Patient denies fever, chills, night sweats, and weight changes.  Eyes: Patient denies eye pain or vision changes.  HENT: Patient denies headache, ear pain, sore throat, or nasal discharge.  CVS: Patient denies chest pain.  Lungs: Patient denies shortness of breath or cough.  Abd: Patient denies stomach pain, nausea, or vomiting.  Skin: Patient denies skin rash or itching.    Hematologic/Lymphatic: Patient denies easy bruising.   Musculoskeletal: Patient denies recent falls. See HPI.  Psych: Patient denies any current anxiety or nervousness.    PAST MEDICAL HISTORY:   Past Medical History:   Diagnosis Date    Allergy     seasonal    Gout, joint     Hyperlipidemia     Hypertension        PAST SURGICAL HISTORY:   Past Surgical History:   Procedure Laterality Date    VASECTOMY         FAMILY HISTORY:   Family History   Problem Relation Age of Onset    Hypertension Mother     Myasthenia gravis Mother         affects eye    Dementia Father     No Known Problems Sister     No Known Problems Brother     No Known Problems Daughter     Melanoma Neg Hx        SOCIAL HISTORY:   Social History     Socioeconomic History    Marital status:      Spouse name: Not on file    Number of children: Not on file    Years of education: Not on file    Highest education level: Not on file   Occupational History    Occupation:      Employer: [employer]   Social Needs    Financial resource strain: Not on file    Food insecurity:     Worry: Not on file     Inability: Not on file    Transportation needs:     Medical: Not on file     Non-medical: Not on file   Tobacco Use    Smoking status: Former Smoker     Packs/day: 0.50     Years: 15.00     Pack years: 7.50     Last attempt to quit: 10/19/1977     Years since quittin.5    Smokeless tobacco: Never Used   Substance and Sexual Activity    Alcohol use: Yes      "Comment: 3-4 times weekly    Drug use: No    Sexual activity: Yes     Partners: Female     Comment:  with 3 children   Lifestyle    Physical activity:     Days per week: Not on file     Minutes per session: Not on file    Stress: Not on file   Relationships    Social connections:     Talks on phone: Not on file     Gets together: Not on file     Attends [employer] service: Not on file     Active member of club or organization: Not on file     Attends meetings of clubs or organizations: Not on file     Relationship status: Not on file   Other Topics Concern    Not on file   Social History Narrative    Not on file       MEDICATIONS:     Current Outpatient Medications:     allopurinol (ZYLOPRIM) 300 MG tablet, TAKE 1 TABLET (300 MG TOTAL) BY MOUTH ONCE DAILY, Disp: 90 tablet, Rfl: 1    ivermectin (SOOLANTRA) 1 % Crea, AAA on face qhs and wash off in morning, Disp: 60 g, Rfl: 3    polyethylene glycol (GLYCOLAX) 17 gram PwPk, Take 17 g by mouth once daily. 17 g dissolved in 8 oz of water once daily and titrate up or down (max 34 g daily) to effect., Disp: 30 each, Rfl: 11    pravastatin (PRAVACHOL) 20 MG tablet, Take 1 tablet (20 mg total) by mouth once daily., Disp: 30 tablet, Rfl: 11    tamsulosin (FLOMAX) 0.4 mg Cap, Take 1 capsule (0.4 mg total) by mouth once daily., Disp: 90 capsule, Rfl: 1    valsartan (DIOVAN) 160 MG tablet, TAKE 1 TABLET BY MOUTH ONCE DAILY, Disp: 90 tablet, Rfl: 0    ALLERGIES:   Review of patient's allergies indicates:   Allergen Reactions    Doxycycline      HEADACHE (PRESSURE)    Statins-hmg-coa reductase inhibitors         PHYSICAL EXAMINATION:  /82   Ht 6' 1" (1.854 m)   Wt 103.4 kg (228 lb)   BMI 30.08 kg/m²   Vitals signs and nursing note have been reviewed.  General: In no acute distress, well developed, well nourished, no diaphoresis  Eyes: EOM full and smooth, no eye redness or discharge  HENT: normocephalic and atraumatic, neck supple, trachea midline, no " nasal discharge, no external ear redness or discharge  Cardiovascular: 2+ and symmetric DP pulses bilaterally, no LE edema  Lungs: respirations non-labored, no conversational dyspnea   Abd: non-distended, no rigidity  MSK: no amputation or deformity, no swelling of extremities  Neuro: AAOx3, CN2-12 grossly intact  Skin: No rashes, warm and dry  Psychiatric: cooperative, pleasant, mood and affect appropriate for age    MUSCULOSKELETAL EXAM:    RIGHT KNEE EXAMINATION   Affected side is compared to contralateral knee     Observation:  No edema, erythema, ecchymosis, or effusion noted.  No muscle atrophy of the thighs and calves noted.  No obvious bony deformities noted.   No genu valgus/varum noted. No recurvatum noted.    No tibial internal/external torsion.    No pes planus/cavus.    Tenderness:  Patella - none    Lateral joint line - present, inferior  Quad tendon - none   Medial joint line - none  Patellar tendon - none   Medial plica - none  Tibial tubercle - none   Lateral plica - none  Pes anserine - none   MCL prox - none  Distal ITB - present   MCL distal - none  MFC - none    LCL prox - none  LFC - none    LCL distal - none  Tibia - none    Fibula - none    No obvious bursae, plicae, popliteal cysts, or tendon derangement palpated.          ROM:   Active extension to 0° on left without hyperextension, lag, crepitus, or patellar J sign.   Active extension to 0° on right without hyperextension, lag, crepitus, or patellar J sign.   Active flexion to 135° on left and 135° on right.    Strength: (bilaterally)  Knee Flexion - 5/5 on left and 5/5 on right  Knee Extension - 5/5 on left and 5/5 on right  Hip Flexion - 5/5 on left and 5/5 on right  Hip Extension - 5/5 on left and 5/5 on right  Ankle dorsiflexion - 5/5 on left and 5/5 on right  Ankle Plantarflexion - 5/5 on left and 5/5 on right    Patellofemoral Exam:  Patellar ballottement - negative  Bulge sign - negative  Patellar grind - negative  No patellar  laxity with medial and lateral translation   No apprehension with medial and lateral patellar translation.     Meniscus Testing:     No pain with terminal extension.  Mild pain with forced flexion at lateral knee  Melyssas test - negative   Bounce home test - negative    Ligament Testing:  Lachman's test - negative  No laxity with anterior drawer.  No laxity with posterior drawer.    No laxity with varus testing at 0 and 30 degrees.  No laxity with valgus testing at 0 and 30 degrees.    IT Band Testing:  Lisandros test - negative   Noble Compression test - positive    Myofascial restriction right lower extremity  Fascial herniated trigger point posterior aspect of the distal IT band on the right  Right anterior innominate rotation    Neurovascular Examination:   Normal gait without antalgia.  Sensation intact to light touch in the obturator, lateral/intermediate/medial/posterior femoral cutaneous, saphenous, and common peroneal nerves bilaterally.  Pulses intact at the DP and PT arteries bilaterally.    Capillary refill intact <2 seconds in all toes bilaterally.      IMAGIN. X-ray obtained 3/2/2020 at [facility] due to right knee pain.  Disc not available, patient only has report.   2. X-ray images were reviewed personally by me and then directly with patient.  3. FINDINGS: X-ray images obtained demonstrate mild multi-compartment osteophytosis and borderline tibiofemoral joint space narrowing. There is quadriceps and patellar enthesopathy and a small joint effusion.   4. IMPRESSION: As above.       ASSESSMENT:      ICD-10-CM ICD-9-CM   1. Chronic pain of right knee M25.561 719.46    G89.29 338.29   2. It band syndrome, right M76.31 728.89   3. Somatic dysfunction of pelvis region M99.05 739.5   4. Somatic dysfunction of lower extremity M99.06 739.6         PLAN:  1-2.  Chronic right knee pain/IT band syndrome -     - Arnaldo admits to right knee pain beginning in early February without known mechanism  of injury.  Pain bothers him the most when walking up and down stairs and with lateral motion.  He was initially seen at [facility] in March and he was given an intra-articular cortisone injection, which offered him no relief.    - XRs obtained 3/2/2020 from [facility]. See above for further detail on the report brought in by the patient.    - Based on his description/body language of pain and somatic dysfunction identified on exam, I discussed osteopathic manipulation as a treatment option today.  He consents to evaluation and treatment.  See below.    - HEP for IT band prescribed today, including clams, 4-directional hip, glute max/glute med retraining. Handout provided, explained, and exercises were demonstrated as needed. Encouraged to do daily.  [address]98 HOME EXERCISE PROGRAM (HEP):  The patient was taught a homegoing physical therapy regimen as described above. The patient demonstrated understanding of the exercises and proper technique of their execution. This interaction took 15 minutes.       3-4.  Somatic dysfunction of pelvis, lower extremity regions -     - OMT 1-2 regions. Oral consent obtained. Reviewed benefits and potential side effects. OMT indicated today due to signs and symptoms as well as local and remote somatic dysfunction findings and their related neurokinetic, lymphatic, fascial and/or arteriovenous body connections. OMT techniques used: Myofascial Release, Muscle Energy and Fascial Distortion Model. Treatment was tolerated well. Improvement noted in segmental mobility post-treatment in dysfunctional regions. There were no adverse events and no complications immediately following treatment. Advised plenty of water to help alleviate soreness.      Future planning includes - possibly more OMT if helpful and if indicated, formal physical therapy    All questions were answered to the best of my ability and all concerns were addressed at this time.    Follow up in 2-3 weeks for above, or  sooner if needed.      This note is dictated using the M*Modal Fluency Direct word recognition program. There are word recognition mistakes that are occasionally missed on review.    As per the guidelines from [facility], this patient's condition is considered time sensitive.  The visit could not be done via telemedicine. Treatment performed during this visit was medically necessary is to avoid further harm to the patient's underlying condition.

## 2020-05-06 NOTE — LETTER
May 7, 2020      Xiao Turner MD  [address]  [address]  [place] [address]           [facility]  [address]  [facility] [address]  Phone: [phone]  Fax: [phone]          Patient: Arnaldo Donovan   MR Number: [id number]   YOB: 1945   Date of Visit: 5/6/2020       Dear Dr. Xiao Turner:    Thank you for referring Arnaldo Donovan to me for evaluation. Attached you will find relevant portions of my assessment and plan of care.    If you have questions, please do not hesitate to call me. I look forward to following Arnaldo Donovan along with you.    Sincerely,    Joe Solis, DO    Enclosure  CC:  No Recipients    If you would like to receive this communication electronically, please contact [email or web address] or [phone] to request more information on [employer] access.    For providers and/or their staff who would like to refer a patient to [facility], please contact us through our one-stop-shop provider referral line, [facility], at [phone].    If you feel you have received this communication in error or would no longer like to receive these types of communications, please e-mail [email or web address]

## 2020-05-20 ENCOUNTER — OFFICE VISIT (OUTPATIENT)
Dept: ORTHOPEDICS | Facility: CLINIC | Age: 75
End: 2020-05-20
Payer: MEDICARE

## 2020-05-20 VITALS
WEIGHT: 228 LBS | SYSTOLIC BLOOD PRESSURE: 120 MMHG | HEIGHT: 73 IN | BODY MASS INDEX: 30.22 KG/M2 | DIASTOLIC BLOOD PRESSURE: 76 MMHG

## 2020-05-20 DIAGNOSIS — M76.31 IT BAND SYNDROME, RIGHT: ICD-10-CM

## 2020-05-20 DIAGNOSIS — M25.561 CHRONIC PAIN OF RIGHT KNEE: Primary | ICD-10-CM

## 2020-05-20 DIAGNOSIS — G89.29 CHRONIC PAIN OF RIGHT KNEE: Primary | ICD-10-CM

## 2020-05-20 PROCEDURE — 3078F DIAST BP <80 MM HG: CPT | Mod: HCNC,CPTII,S$GLB, | Performed by: ORTHOPAEDIC SURGERY

## 2020-05-20 PROCEDURE — 1101F PR PT FALLS ASSESS DOC 0-1 FALLS W/OUT INJ PAST YR: ICD-10-PCS | Mod: HCNC,CPTII,S$GLB, | Performed by: ORTHOPAEDIC SURGERY

## 2020-05-20 PROCEDURE — 1125F PR PAIN SEVERITY QUANTIFIED, PAIN PRESENT: ICD-10-PCS | Mod: HCNC,S$GLB,, | Performed by: ORTHOPAEDIC SURGERY

## 2020-05-20 PROCEDURE — 99214 PR OFFICE/OUTPT VISIT, EST, LEVL IV, 30-39 MIN: ICD-10-PCS | Mod: HCNC,S$GLB,, | Performed by: ORTHOPAEDIC SURGERY

## 2020-05-20 PROCEDURE — 1101F PT FALLS ASSESS-DOCD LE1/YR: CPT | Mod: HCNC,CPTII,S$GLB, | Performed by: ORTHOPAEDIC SURGERY

## 2020-05-20 PROCEDURE — 1125F AMNT PAIN NOTED PAIN PRSNT: CPT | Mod: HCNC,S$GLB,, | Performed by: ORTHOPAEDIC SURGERY

## 2020-05-20 PROCEDURE — 1159F PR MEDICATION LIST DOCUMENTED IN MEDICAL RECORD: ICD-10-PCS | Mod: HCNC,S$GLB,, | Performed by: ORTHOPAEDIC SURGERY

## 2020-05-20 PROCEDURE — 3074F SYST BP LT 130 MM HG: CPT | Mod: HCNC,CPTII,S$GLB, | Performed by: ORTHOPAEDIC SURGERY

## 2020-05-20 PROCEDURE — 99999 PR PBB SHADOW E&M-EST. PATIENT-LVL III: CPT | Mod: PBBFAC,HCNC,, | Performed by: ORTHOPAEDIC SURGERY

## 2020-05-20 PROCEDURE — 99214 OFFICE O/P EST MOD 30 MIN: CPT | Mod: HCNC,S$GLB,, | Performed by: ORTHOPAEDIC SURGERY

## 2020-05-20 PROCEDURE — 1159F MED LIST DOCD IN RCRD: CPT | Mod: HCNC,S$GLB,, | Performed by: ORTHOPAEDIC SURGERY

## 2020-05-20 PROCEDURE — 3074F PR MOST RECENT SYSTOLIC BLOOD PRESSURE < 130 MM HG: ICD-10-PCS | Mod: HCNC,CPTII,S$GLB, | Performed by: ORTHOPAEDIC SURGERY

## 2020-05-20 PROCEDURE — 3078F PR MOST RECENT DIASTOLIC BLOOD PRESSURE < 80 MM HG: ICD-10-PCS | Mod: HCNC,CPTII,S$GLB, | Performed by: ORTHOPAEDIC SURGERY

## 2020-05-20 PROCEDURE — 99999 PR PBB SHADOW E&M-EST. PATIENT-LVL III: ICD-10-PCS | Mod: PBBFAC,HCNC,, | Performed by: ORTHOPAEDIC SURGERY

## 2020-05-20 RX ORDER — MELOXICAM 7.5 MG/1
7.5 TABLET ORAL DAILY
Qty: 14 TABLET | Refills: 0 | Status: SHIPPED | OUTPATIENT
Start: 2020-05-20 | End: 2020-08-27 | Stop reason: SDUPTHER

## 2020-05-20 NOTE — PROGRESS NOTES
"CC: right knee pain    Arnaldo is here today for follow up evaluation of his right knee pain. Patient reports his pain is 5/10 today. He admits to one day of pain relief following OMT. He states he has been performing his HEP twice per week and has not appreciated an improvement in his symptoms with this. Patient states he has been walking frequently for exercise and will walk on average 2-2.5 miles. He admits to increased pain with this activity and has at times noticed that he is limping. When asked where he hurts he gestures to the lateral aspect of his right knee and indicates his pain will radiate anteriorly in front of his knee. He denies falls or trauma since his last visit.     Recall from visit on 5/6/2020  75 y.o. Male presents today for evaluation of his right knee pain. Patient is unable to recall a specific mechanism of knee injury and denies prior history of knee injury. He does recall falling forward directly onto his knee approximately 6 months ago, but does not believe this is contributing to his current bout of pain. Patient reports he had been frequently walking for exercise and then began to add running to his routine over the past 2-3 months. When asked where his pain is located he gestures to the lateral aspect of his knee and describes the quality of his pain as aching. He also admits to feeling as though his knee is "tight" and can have difficulty fully flexing his knee. Patient states he has continued to exercise, but has since stopped running and cut back on the frequency of his walks. He denies radicular symptoms and denies this problem waking him up at night. Patient admits to initially seeking care for this problem at [facility] 3/2/2020.  How long: Patient admits to experiencing right knee pain since the beginning of February.   What makes it better: Patient has not been able to identify anything to improve his pain at this time.   What makes it worse: Patient admits to " increased pain with walking up and down stairs.   Does it radiate: Patient admits to experiencing his pain radiate proximally towards his hip when performing banded hip abduction.  Attempted treatments: Patient states she has performing quadriceps, hamstrings and calf stretches several times per week. He is taking tumeric daily in addition to applying ice. Patient had CSI administered 3/2/2020 but reports he experienced less than 24 hours of pain relief with this. He denies prior history of knee surgery.    Pain score: 4/10  Any mechanical symptoms: Patient admits to mechanical symptoms with pivoting motions.   Feelings of instability: Denies feelings of instability.   Affect on ADLs: Denies this problem affecting his ability to perform ADLs.     REVIEW OF SYSTEMS:   Constitution: Patient denies fever, chills, night sweats, and weight changes.  Eyes: Patient denies eye pain or vision changes.  HENT: Patient denies headache, ear pain, sore throat, or nasal discharge.  CVS: Patient denies chest pain.  Lungs: Patient denies shortness of breath or cough.  Abd: Patient denies stomach pain, nausea, or vomiting.  Skin: Patient denies skin rash or itching.    Hematologic/Lymphatic: Patient denies easy bruising.   Musculoskeletal: Patient denies recent falls. See HPI.  Psych: Patient denies any current anxiety or nervousness.    PAST MEDICAL HISTORY:   Past Medical History:   Diagnosis Date    Allergy     seasonal    Gout, joint     Hyperlipidemia     Hypertension        PAST SURGICAL HISTORY:   Past Surgical History:   Procedure Laterality Date    VASECTOMY  1988       FAMILY HISTORY:   Family History   Problem Relation Age of Onset    Hypertension Mother     Myasthenia gravis Mother         affects eye    Dementia Father     No Known Problems Sister     No Known Problems Brother     No Known Problems Daughter     Melanoma Neg Hx        SOCIAL HISTORY:   Social History     Socioeconomic History    Marital  status:      Spouse name: Not on file    Number of children: Not on file    Years of education: Not on file    Highest education level: Not on file   Occupational History    Occupation:      Employer: [employer]   Social Needs    Financial resource strain: Not on file    Food insecurity:     Worry: Not on file     Inability: Not on file    Transportation needs:     Medical: Not on file     Non-medical: Not on file   Tobacco Use    Smoking status: Former Smoker     Packs/day: 0.50     Years: 15.00     Pack years: 7.50     Last attempt to quit: 10/19/1977     Years since quittin.6    Smokeless tobacco: Never Used   Substance and Sexual Activity    Alcohol use: Yes     Comment: 3-4 times weekly    Drug use: No    Sexual activity: Yes     Partners: Female     Comment:  with 3 children   Lifestyle    Physical activity:     Days per week: Not on file     Minutes per session: Not on file    Stress: Not on file   Relationships    Social connections:     Talks on phone: Not on file     Gets together: Not on file     Attends Mandaeism service: Not on file     Active member of club or organization: Not on file     Attends meetings of clubs or organizations: Not on file     Relationship status: Not on file   Other Topics Concern    Not on file   Social History Narrative    Not on file       MEDICATIONS:     Current Outpatient Medications:     allopurinol (ZYLOPRIM) 300 MG tablet, TAKE 1 TABLET (300 MG TOTAL) BY MOUTH ONCE DAILY, Disp: 90 tablet, Rfl: 1    ivermectin (SOOLANTRA) 1 % Crea, AAA on face qhs and wash off in morning, Disp: 60 g, Rfl: 3    meloxicam (MOBIC) 7.5 MG tablet, Take 1 tablet (7.5 mg total) by mouth once daily., Disp: 14 tablet, Rfl: 0    polyethylene glycol (GLYCOLAX) 17 gram PwPk, Take 17 g by mouth once daily. 17 g dissolved in 8 oz of water once daily and titrate up or down (max 34 g daily) to effect., Disp: 30 each, Rfl: 11    pravastatin (PRAVACHOL)  "20 MG tablet, Take 1 tablet (20 mg total) by mouth once daily., Disp: 30 tablet, Rfl: 11    tamsulosin (FLOMAX) 0.4 mg Cap, Take 1 capsule (0.4 mg total) by mouth once daily., Disp: 90 capsule, Rfl: 1    valsartan (DIOVAN) 160 MG tablet, TAKE 1 TABLET BY MOUTH ONCE DAILY, Disp: 90 tablet, Rfl: 0    ALLERGIES:   Review of patient's allergies indicates:   Allergen Reactions    Doxycycline      HEADACHE (PRESSURE)    Statins-hmg-coa reductase inhibitors         PHYSICAL EXAMINATION:  /76   Ht 6' 1" (1.854 m)   Wt 103.4 kg (228 lb)   BMI 30.08 kg/m²   Vitals signs and nursing note have been reviewed.  General: In no acute distress, well developed, well nourished, no diaphoresis  Eyes: EOM full and smooth, no eye redness or discharge  HENT: normocephalic and atraumatic, neck supple, trachea midline, no nasal discharge, no external ear redness or discharge  Cardiovascular: 2+ and symmetric DP pulses bilaterally, no LE edema  Lungs: respirations non-labored, no conversational dyspnea   Abd: non-distended, no rigidity  MSK: no amputation or deformity, no swelling of extremities  Neuro: AAOx3, CN2-12 grossly intact  Skin: No rashes, warm and dry  Psychiatric: cooperative, pleasant, mood and affect appropriate for age    MUSCULOSKELETAL EXAM:    RIGHT KNEE EXAMINATION   Affected side is compared to contralateral knee     Observation:  No edema, erythema, ecchymosis, or effusion noted.  No muscle atrophy of the thighs and calves noted.  No obvious bony deformities noted.   No genu valgus/varum noted. No recurvatum noted.    No tibial internal/external torsion.    No pes planus/cavus.    Tenderness:  Patella - none    Lateral joint line - present, inferior  Quad tendon - none   Medial joint line - none  Patellar tendon - none   Medial plica - none  Tibial tubercle - none   Lateral plica - none  Pes anserine - none   MCL prox - none  Distal ITB - present   MCL distal - none  MFC - none    LCL prox - none  LFC - " none    LCL distal - none  Tibia - none    Fibula - none  Tenderness along the distal IT band up to mid thigh    No obvious bursae, plicae, popliteal cysts, or tendon derangement palpated.          ROM:   Active extension to 0° on left without hyperextension, lag, crepitus, or patellar J sign.   Active extension to 0° on right without hyperextension, lag, crepitus, or patellar J sign.   Active flexion to 135° on left and 135° on right.    Strength: (bilaterally)  Knee Flexion - 5/5 on left and 5/5 on right  Knee Extension - 5/5 on left and 5/5 on right  Hip Flexion - 5/5 on left and 5/5 on right  Hip Extension - 5/5 on left and 5/5 on right  Ankle dorsiflexion - 5/5 on left and 5/5 on right  Ankle Plantarflexion - 5/5 on left and 5/5 on right    Patellofemoral Exam:  Patellar ballottement - negative  Bulge sign - negative  Patellar grind - negative  No patellar laxity with medial and lateral translation   No apprehension with medial and lateral patellar translation.     Meniscus Testing:     Mild pain with terminal extension.  Mild pain with forced flexion at lateral knee  Melyssas test - negative   Bounce home test - negative    Ligament Testing:  Lachman's test - negative  No laxity with anterior drawer.  No laxity with posterior drawer.    No laxity with varus testing at 0 and 30 degrees.  No laxity with valgus testing at 0 and 30 degrees.    IT Band Testing:  Lisandros test - positive  Noble Compression test - positive    Myofascial restriction right lower extremity  Fascial herniated trigger point posterior aspect of the distal IT band on the right  Right anterior innominate rotation    Neurovascular Examination:   Normal gait without antalgia.  Sensation intact to light touch in the obturator, lateral/intermediate/medial/posterior femoral cutaneous, saphenous, and common peroneal nerves bilaterally.  Pulses intact at the DP and PT arteries bilaterally.    Capillary refill intact <2 seconds in all toes  bilaterally.      ASSESSMENT:      ICD-10-CM ICD-9-CM   1. Chronic pain of right knee M25.561 719.46    G89.29 338.29   2. It band syndrome, right M76.31 728.89         PLAN:  1-2.  Chronic right knee pain/IT band syndrome - unchanged    - Arnaldo admits to right knee pain beginning in early February without known mechanism of injury.  Pain bothers him the most when walking up and down stairs and with lateral motion.  He was initially seen at [facility] in March and he was given an intra-articular cortisone injection, which offered him no relief. He admits to pain relief with OMT for one day and states his pain has since remained unchanged.    - Continue with previously prescribed HEP for IT band prescribed today, including clams, 4-directional hip, glute max/glute med retraining.  To help with exercises, physical therapy referral has been placed.    - Mobic 7.5 mg daily for 2 weeks to help with the anti-inflammatory effect.  He does admit to some rectal bleeding that was related to an anti-inflammatory in the past, so I discussed the risks of this and to take this medicine with food and to stop it immediately if he notices rectal bleeding return.  Understanding was expressed.      Future planning includes - possibly more OMT if indicated, if not improved with PT consider MRI, increase Mobic does if tolerated well    All questions were answered to the best of my ability and all concerns were addressed at this time.    Follow up after physical therapy, or sooner if needed.      This note is dictated using the M*Modal Fluency Direct word recognition program. There are word recognition mistakes that are occasionally missed on review.

## 2020-05-20 NOTE — PROGRESS NOTES
"CC: right knee pain    Pain with walking // OMT relief for 24 hours // 2 HEP     75 y.o. Male presents today for evaluation of his right knee pain. Patient is unable to recall a specific mechanism of knee injury and denies prior history of knee injury. He does recall falling forward directly onto his knee approximately 6 months ago, but does not believe this is contributing to his current bout of pain. Patient reports he had been frequently walking for exercise and then began to add running to his routine over the past 2-3 months. When asked where his pain is located he gestures to the lateral aspect of his knee and describes the quality of his pain as aching. He also admits to feeling as though his knee is "tight" and can have difficulty fully flexing his knee. Patient states he has continued to exercise, but has since stopped running and cut back on the frequency of his walks. He denies radicular symptoms and denies this problem waking him up at night. Patient admits to initially seeking care for this problem at [facility] 3/2/2020.  How long: Patient admits to experiencing right knee pain since the beginning of February.   What makes it better: Patient has not been able to identify anything to improve his pain at this time.   What makes it worse: Patient admits to increased pain with walking up and down stairs.   Does it radiate: Patient admits to experiencing his pain radiate proximally towards his hip when performing banded hip abduction.  Attempted treatments: Patient states she has performing quadriceps, hamstrings and calf stretches several times per week. He is taking tumeric daily in addition to applying ice. Patient had CSI administered 3/2/2020 but reports he experienced less than 24 hours of pain relief with this. He denies prior history of knee surgery.    Pain score: 4/10  Any mechanical symptoms: Patient admits to mechanical symptoms with pivoting motions.   Feelings of instability: " Denies feelings of instability.   Affect on ADLs: Denies this problem affecting his ability to perform ADLs.     REVIEW OF SYSTEMS:   Constitution: Patient denies fever, chills, night sweats, and weight changes.  Eyes: Patient denies eye pain or vision changes.  HENT: Patient denies headache, ear pain, sore throat, or nasal discharge.  CVS: Patient denies chest pain.  Lungs: Patient denies shortness of breath or cough.  Abd: Patient denies stomach pain, nausea, or vomiting.  Skin: Patient denies skin rash or itching.    Hematologic/Lymphatic: Patient denies easy bruising.   Musculoskeletal: Patient denies recent falls. See HPI.  Psych: Patient denies any current anxiety or nervousness.    PAST MEDICAL HISTORY:   Past Medical History:   Diagnosis Date    Allergy     seasonal    Gout, joint     Hyperlipidemia     Hypertension        PAST SURGICAL HISTORY:   Past Surgical History:   Procedure Laterality Date    VASECTOMY         FAMILY HISTORY:   Family History   Problem Relation Age of Onset    Hypertension Mother     Myasthenia gravis Mother         affects eye    Dementia Father     No Known Problems Sister     No Known Problems Brother     No Known Problems Daughter     Melanoma Neg Hx        SOCIAL HISTORY:   Social History     Socioeconomic History    Marital status:      Spouse name: Not on file    Number of children: Not on file    Years of education: Not on file    Highest education level: Not on file   Occupational History    Occupation:      Employer: [employer]   Social Needs    Financial resource strain: Not on file    Food insecurity:     Worry: Not on file     Inability: Not on file    Transportation needs:     Medical: Not on file     Non-medical: Not on file   Tobacco Use    Smoking status: Former Smoker     Packs/day: 0.50     Years: 15.00     Pack years: 7.50     Last attempt to quit: 10/19/1977     Years since quittin.6    Smokeless tobacco: Never  "Used   Substance and Sexual Activity    Alcohol use: Yes     Comment: 3-4 times weekly    Drug use: No    Sexual activity: Yes     Partners: Female     Comment:  with 3 children   Lifestyle    Physical activity:     Days per week: Not on file     Minutes per session: Not on file    Stress: Not on file   Relationships    Social connections:     Talks on phone: Not on file     Gets together: Not on file     Attends Caodaism service: Not on file     Active member of club or organization: Not on file     Attends meetings of clubs or organizations: Not on file     Relationship status: Not on file   Other Topics Concern    Not on file   Social History Narrative    Not on file       MEDICATIONS:     Current Outpatient Medications:     allopurinol (ZYLOPRIM) 300 MG tablet, TAKE 1 TABLET (300 MG TOTAL) BY MOUTH ONCE DAILY, Disp: 90 tablet, Rfl: 1    ivermectin (SOOLANTRA) 1 % Crea, AAA on face qhs and wash off in morning, Disp: 60 g, Rfl: 3    polyethylene glycol (GLYCOLAX) 17 gram PwPk, Take 17 g by mouth once daily. 17 g dissolved in 8 oz of water once daily and titrate up or down (max 34 g daily) to effect., Disp: 30 each, Rfl: 11    pravastatin (PRAVACHOL) 20 MG tablet, Take 1 tablet (20 mg total) by mouth once daily., Disp: 30 tablet, Rfl: 11    tamsulosin (FLOMAX) 0.4 mg Cap, Take 1 capsule (0.4 mg total) by mouth once daily., Disp: 90 capsule, Rfl: 1    valsartan (DIOVAN) 160 MG tablet, TAKE 1 TABLET BY MOUTH ONCE DAILY, Disp: 90 tablet, Rfl: 0    ALLERGIES:   Review of patient's allergies indicates:   Allergen Reactions    Doxycycline      HEADACHE (PRESSURE)    Statins-hmg-coa reductase inhibitors         PHYSICAL EXAMINATION:  /76   Ht 6' 1" (1.854 m)   Wt 103.4 kg (228 lb)   BMI 30.08 kg/m²   Vitals signs and nursing note have been reviewed.  General: In no acute distress, well developed, well nourished, no diaphoresis  Eyes: EOM full and smooth, no eye redness or discharge  HENT: " normocephalic and atraumatic, neck supple, trachea midline, no nasal discharge, no external ear redness or discharge  Cardiovascular: 2+ and symmetric DP pulses bilaterally, no LE edema  Lungs: respirations non-labored, no conversational dyspnea   Abd: non-distended, no rigidity  MSK: no amputation or deformity, no swelling of extremities  Neuro: AAOx3, CN2-12 grossly intact  Skin: No rashes, warm and dry  Psychiatric: cooperative, pleasant, mood and affect appropriate for age    MUSCULOSKELETAL EXAM:    RIGHT KNEE EXAMINATION   Affected side is compared to contralateral knee     Observation:  No edema, erythema, ecchymosis, or effusion noted.  No muscle atrophy of the thighs and calves noted.  No obvious bony deformities noted.   No genu valgus/varum noted. No recurvatum noted.    No tibial internal/external torsion.    No pes planus/cavus.    Tenderness:  Patella - none    Lateral joint line - present, inferior  Quad tendon - none   Medial joint line - none  Patellar tendon - none   Medial plica - none  Tibial tubercle - none   Lateral plica - none  Pes anserine - none   MCL prox - none  Distal ITB - present   MCL distal - none  MFC - none    LCL prox - none  LFC - none    LCL distal - none  Tibia - none    Fibula - none    No obvious bursae, plicae, popliteal cysts, or tendon derangement palpated.          ROM:   Active extension to 0° on left without hyperextension, lag, crepitus, or patellar J sign.   Active extension to 0° on right without hyperextension, lag, crepitus, or patellar J sign.   Active flexion to 135° on left and 135° on right.    Strength: (bilaterally)  Knee Flexion - 5/5 on left and 5/5 on right  Knee Extension - 5/5 on left and 5/5 on right  Hip Flexion - 5/5 on left and 5/5 on right  Hip Extension - 5/5 on left and 5/5 on right  Ankle dorsiflexion - 5/5 on left and 5/5 on right  Ankle Plantarflexion - 5/5 on left and 5/5 on right    Patellofemoral Exam:  Patellar ballottement -  negative  Bulge sign - negative  Patellar grind - negative  No patellar laxity with medial and lateral translation   No apprehension with medial and lateral patellar translation.     Meniscus Testing:     No pain with terminal extension.  Mild pain with forced flexion at lateral knee  Melyssas test - negative   Bounce home test - negative    Ligament Testing:  Lachman's test - negative  No laxity with anterior drawer.  No laxity with posterior drawer.    No laxity with varus testing at 0 and 30 degrees.  No laxity with valgus testing at 0 and 30 degrees.    IT Band Testing:  Lisandros test - negative   Noble Compression test - positive    Myofascial restriction right lower extremity  Fascial herniated trigger point posterior aspect of the distal IT band on the right  Right anterior innominate rotation    Neurovascular Examination:   Normal gait without antalgia.  Sensation intact to light touch in the obturator, lateral/intermediate/medial/posterior femoral cutaneous, saphenous, and common peroneal nerves bilaterally.  Pulses intact at the DP and PT arteries bilaterally.    Capillary refill intact <2 seconds in all toes bilaterally.      IMAGIN. X-ray obtained 3/2/2020 at [facility] due to right knee pain.  Disc not available, patient only has report.   2. X-ray images were reviewed personally by me and then directly with patient.  3. FINDINGS: X-ray images obtained demonstrate mild multi-compartment osteophytosis and borderline tibiofemoral joint space narrowing. There is quadriceps and patellar enthesopathy and a small joint effusion.   4. IMPRESSION: As above.       ASSESSMENT:    No diagnosis found.      PLAN:  1-2.  Chronic right knee pain/IT band syndrome -     - Arnaldo admits to right knee pain beginning in early February without known mechanism of injury.  Pain bothers him the most when walking up and down stairs and with lateral motion.  He was initially seen at [facility] in March and he was  given an intra-articular cortisone injection, which offered him no relief.    - XRs obtained 3/2/2020 from [facility]. See above for further detail on the report brought in by the patient.    - Based on his description/body language of pain and somatic dysfunction identified on exam, I discussed osteopathic manipulation as a treatment option today.  He consents to evaluation and treatment.  See below.    - HEP for IT band prescribed today, including clams, 4-directional hip, glute max/glute med retraining. Handout provided, explained, and exercises were demonstrated as needed. Encouraged to do daily.  46231 HOME EXERCISE PROGRAM (HEP):  The patient was taught a homegoing physical therapy regimen as described above. The patient demonstrated understanding of the exercises and proper technique of their execution. This interaction took 15 minutes.       3-4.  Somatic dysfunction of pelvis, lower extremity regions -     - OMT 1-2 regions. Oral consent obtained. Reviewed benefits and potential side effects. OMT indicated today due to signs and symptoms as well as local and remote somatic dysfunction findings and their related neurokinetic, lymphatic, fascial and/or arteriovenous body connections. OMT techniques used: Myofascial Release, Muscle Energy and Fascial Distortion Model. Treatment was tolerated well. Improvement noted in segmental mobility post-treatment in dysfunctional regions. There were no adverse events and no complications immediately following treatment. Advised plenty of water to help alleviate soreness.      Future planning includes - possibly more OMT if helpful and if indicated, formal physical therapy    All questions were answered to the best of my ability and all concerns were addressed at this time.    Follow up in 2-3 weeks for above, or sooner if needed.      This note is dictated using the M*Modal Fluency Direct word recognition program. There are word recognition mistakes that are  occasionally missed on review.    As per the guidelines from [facility], this patient's condition is considered time sensitive.  The visit could not be done via telemedicine. Treatment performed during this visit was medically necessary is to avoid further harm to the patient's underlying condition.

## 2020-05-27 ENCOUNTER — CLINICAL SUPPORT (OUTPATIENT)
Dept: REHABILITATION | Facility: OTHER | Age: 75
End: 2020-05-27
Payer: MEDICARE

## 2020-05-27 DIAGNOSIS — M76.31 IT BAND SYNDROME, RIGHT: ICD-10-CM

## 2020-05-27 DIAGNOSIS — R29.898 WEAKNESS OF BOTH HIPS: ICD-10-CM

## 2020-05-27 DIAGNOSIS — G89.29 CHRONIC PAIN OF RIGHT KNEE: ICD-10-CM

## 2020-05-27 DIAGNOSIS — M25.561 CHRONIC PAIN OF RIGHT KNEE: ICD-10-CM

## 2020-05-27 PROCEDURE — 97161 PT EVAL LOW COMPLEX 20 MIN: CPT | Mod: HCNC,PN | Performed by: PHYSICAL THERAPIST

## 2020-05-27 NOTE — PLAN OF CARE
[facility]  Physical Therapy Initial Evaluation    Date: 5/27/2020   Name: Arnaldo Donovan  Clinic Number: [id number]    Therapy Diagnosis:   1. Chronic pain of right knee  Ambulatory referral/consult to Physical/Occupational Therapy   2. It band syndrome, right  Ambulatory referral/consult to Physical/Occupational Therapy   3. Weakness of both hips         Physician: Joe Solis,     Physician Orders: PT Eval and Treat   Neuromuscular re-education, gait retraining, eccentric strengthening of the TFL, glutes, knee musculature, establish HEP, other modalities as seen fit     Distal IT band syndrome       Medical Diagnosis from Referral: M25.561,G89.29 (ICD-10-CM) - Chronic pain of right knee M76.31 (ICD-10-CM) - It band syndrome, right     Evaluation Date: 5/27/2020  Authorization Period Expiration: 6/21/2020  Plan of Care Expiration: 8/21/2020  Visit # / Visits authorized: 1/ 1    Time In: 10:50am  Time Out: 11:30am  Total Appointment Time (timed & untimed codes): 40 minutes    Precautions: Standard    Subjective   Date of onset: 3 months  History of current condition - Arnaldo reports: Onset of R knee pain with no known injury. Pain is lateral R knee worse with prolonged sitting, ascending/ descending stairs, prolonged running and walking. He had to stop running and will get pain around 1.5 mile rosi walking. Patient reports marked improvements since beginning the anti-inflammatories. No pain at rest and does not disturb sleep at night. Reports clicking R knee. Denies any buckling or locking. He would like to learn what exercises are safe for him to perform and worried the medication is just masking underlying issues. Hx of other R sided injuries which he has had PT for including Chronic achilles tendonitis and calf pain. Achilles tendonitis resolved with the PT and calf pain was attributed to cholesterol medications that has since resolved. Denies any back pain. Has rigid custom  orthotics from 20 years ago that he wears while running and in his work shoes daily.      Medical History:   Past Medical History:   Diagnosis Date    Allergy     seasonal    Gout, joint     Hyperlipidemia     Hypertension        Surgical History:   Arnaldo Donovan  has a past surgical history that includes Vasectomy (1988).    Medications:   Arnaldo has a current medication list which includes the following prescription(s): allopurinol, ivermectin, meloxicam, polyethylene glycol, pravastatin, tamsulosin, and valsartan.    Allergies:   Review of patient's allergies indicates:   Allergen Reactions    Doxycycline      HEADACHE (PRESSURE)    Statins-hmg-coa reductase inhibitors         Imaging, xrays hip::     NO RADIOGRAPHIC ABNORMALITIES ARE SEEN.      Prior Therapy: yes, for achilles tenonitis   Social History:    Occupation: sedentary desk work, working from home    Prior Level of Function: marathon runner, 4-6 miles recreational just prior to R knee pain  Current Level of Function: walking frequently for exercise and will walk on average 2-2.5 miles with a little difficulty, quite a bit of difficulty running, difficulty squatting and painful with stair climbing       Pain:  Current 0/10, worst 6/10, best 0/10   Location: right lateral knee and thigh  Description: Aching and Tight  Aggravating Factors: Standing and up/down stairs,   Easing Factors: rest and antiinflammatories     Pts goals: To be able to return to running    Objective     Observation: calm and pleasant mood    Posture: increased thoracic kyphosis, anterior pelvic shift, level iliac crest and popliteal folds       Selective Functional Movement Assessment/ Lower quarter screen:  FN: functional, non-painful  FP: functional, painful  DP: dysfunctional, painful  DN: dysfunctional, non-painful    Multi-Segmental Flexion: DN (fingertips to mid shin)  Multi-Segmental Extension: DN (Non uniform spinal curve, acromion does not pass  heels)  Multi-Segmental Rotation:   Right:DN (50%)  Left:DN (50%)      Lower Extremity Strength  Right LE  Left LE    Knee extension: 4+/5 Knee extension: 5/5   Knee flexion: 5/5 Knee flexion: 5/5   Hip flexion: 5/5 Hip flexion: 5/5   Hip extension:  4-/5 Hip extension: 4-/5   Hip abduction: 4+/5 Hip abduction: 5/5   Hip adduction: 5/5 Hip adduction 5/5   Ankle dorsiflexion: 5/5 Ankle dorsiflexion: 5/5   Ankle plantarflexion: 5/5 Ankle plantarflexion: 5/5         Special Tests:  Thessaly's: positive  SL Squat: positive R   Noble Compression test - positive  Supine to sit: Right anterior innominate rotation   Melyssa's: negative    Function:    - SLS R: <5 sec with increased postural sway  - SLS L: <5 sec with LOB  - Squat: dysfunctional, painful   - Sit <--> Stand:indep  - Bed Mobility: indep        Joint Mobility: hypomobility grossly thoracolumbar spine to PAs, decreased R medial glides and tilt  Patellar mobility     Palpation: TTP gerdy's tubercle,     Sensation: BUE light touch grossly intact    Flexibility:    Ely's test: R = 110 degrees ; L = 115 degrees   SLR: 50 deg B   Lisandro's test: R = + ; L = +   Shaw test: R = + ; L = + for 2 jt tighntes    Edema: absent      Limitation/Restriction for FOTO hip Survey    Therapist reviewed FOTO scores for Arnaldo Donovan on 5/27/2020.   FOTO documents entered into [employer] - see Media section.    Limitation Score: 45% (31% goal)         TREATMENT     Home Exercises and Patient Education Provided    Education provided:   - continue with HEP issued in doctor's office   -educated on role of PT including benefits of shoe orthotics, gluteal strengthening, and neuromuscular re-education for management of condition    Written Home Exercises Provided: no, will issue next visit due to time constraints.      Assessment   Arnaldo is a 75 y.o. male referred to outpatient Physical Therapy with a medical diagnosis of R iliotibial band syndrome and main c/o lateral R knee pain. Pt  presents with decreased flexibility hip flexors, hamstrings, TFL, weakness through B gluteals, decreased motor control/ stability, spinal stiffness.     Pt prognosis is Good.   Pt will benefit from skilled outpatient Physical Therapy to address the deficits stated above and in the chart below, provide pt/family education, and to maximize pt's level of independence.     Plan of care discussed with patient: Yes  Pt's spiritual, cultural and educational needs considered and patient is agreeable to the plan of care and goals as stated below:     Anticipated Barriers for therapy: none    Medical Necessity is demonstrated by the following  History  Co-morbidities and personal factors that may impact the plan of care Co-morbidities:   none    Personal Factors:   no deficits     low   Examination  Body Structures and Functions, activity limitations and participation restrictions that may impact the plan of care Body Regions:   back  lower extremities    Body Systems:    ROM  strength  gait  motor control    Participation Restrictions:   Running, walking, stairs    Activity limitations:   Learning and applying knowledge  no deficits    General Tasks and Commands  no deficits    Communication  no deficits    Mobility  walking    Self care  no deficits    Domestic Life  no deficits    Interactions/Relationships  no deficits    Life Areas  no deficits    Community and Social Life  recreation and leisure         high   Clinical Presentation stable and uncomplicated low   Decision Making/ Complexity Score: low     Goals:  Short Term Goals: 6 weeks   1. Patient to demonstrate increased flexibility hip flexors as noted by 10 degree increase in prone knee flexion for improved posture and gait  2. Patient to report <4/10 pain with walking 2 miles or greater  3. Patient to be able to ascend/ descend 1 flight of stairs with minimal to no difficulty    Long Term Goals: 12 weeks   1. Patient to be independent with home exercise program  for improved self management of condition  2. Patient to have decreased subjective report of disability as noted by a score of 31% on the FOTO questionnaire   3. Patient to increased strength in BLE's to 5/5 or greater for improved performance of ADL's   4. Patient to have improved balance as noted by SLS 10 sec or greater for return to running    Plan   Plan of care Certification: 5/27/2020 to 8/21/2020.    Outpatient Physical Therapy 2 times weekly for 10 weeks to include the following interventions: Gait Training, Manual Therapy, Moist Heat/ Ice, Neuromuscular Re-ed, Patient Education, Therapeutic Activites, Therapeutic Exercise and dry needling.     Sheeba Salinas, PT

## 2020-06-02 ENCOUNTER — CLINICAL SUPPORT (OUTPATIENT)
Dept: REHABILITATION | Facility: OTHER | Age: 75
End: 2020-06-02
Payer: MEDICARE

## 2020-06-02 DIAGNOSIS — M76.31 IT BAND SYNDROME, RIGHT: ICD-10-CM

## 2020-06-02 DIAGNOSIS — R29.898 WEAKNESS OF BOTH HIPS: ICD-10-CM

## 2020-06-02 PROCEDURE — 97110 THERAPEUTIC EXERCISES: CPT | Mod: HCNC,PN | Performed by: PHYSICAL THERAPIST

## 2020-06-02 PROCEDURE — 97140 MANUAL THERAPY 1/> REGIONS: CPT | Mod: HCNC,PN | Performed by: PHYSICAL THERAPIST

## 2020-06-02 NOTE — PROGRESS NOTES
"  Physical Therapy Treatment Note     Name: Arnaldo HOLLINGSWORTH [facility] Number: [id number]    Therapy Diagnosis:   Encounter Diagnoses   Name Primary?    It band syndrome, right     Weakness of both hips      Physician: Joe Solis DO    Visit Date: 6/2/2020       Medical Diagnosis from Referral: M25.561,G89.29 (ICD-10-CM) - Chronic pain of right knee M76.31 (ICD-10-CM) - It band syndrome, right      Evaluation Date: 5/27/2020  Authorization Period Expiration: 6/21/2020  Plan of Care Expiration: 8/21/2020  Visit # / Visits authorized: 1/ 1      Time In: 12:15pm  Time Out: 1:00pm  Total Billable Time: 45 minutes    Precautions: Standard    Subjective     Pt reports: walking 2 miles and some lateral knee pain during the walk. Woke up with some pain this morning but none currently   He was compliant with home exercise program.  Response to previous treatment: none  Functional change: none    Pain: 0/10  Location: right lateral knee      Objective     Arnaldo received therapeutic exercises to develop strength, endurance, ROM, posture and core stabilization for 25 minutes including:    Supine IT band str with strap 30" x 3  Piriformis str 30" x 3  Prone rectus femoris str 2 min (pt prefers standing due to discomfort lying prone with mask donned)  Bridging with ball squeeze 5" x 20  Clams BTB 3 sec x 20 ea    Arnaldo received the following manual therapy techniques: Joint mobilizations were applied to the: R knee for 10 minutes, including:    Patella joint mobilizations all planes 30' x 3  Tibiofemoral joint mobs AP  PA to tibfib jt    Application of FDN: Pt educated on benefits and potential side effects of dry needling. Educated pt on benefits, precautions, side effects followign IDN. Educated pt to use heat following treatment sessions if pt is experiencing pain or soreness. Pt verbalized good understanding of education.  Pt signed written consent to dry needling Rx. Pt gave verbal consent for DN    Pt received dry " needling to the below listed muscles using 75mm, 60mm needles.  Tensor fascia latae R  Gluteus medius/ minimus R  Vastus lateralis R (2 points)      Home Exercises Provided and Patient Education Provided     Education provided:   - issued HEP with gluteal/ TFL stretching    Written Home Exercises Provided: yes.  Exercises were reviewed and Arnaldo was able to demonstrate them prior to the end of the session.  Arnaldo demonstrated good  understanding of the education provided.     See EMR under Patient Instructions for exercises provided 6/2/2020.    Assessment     Good tolerance to dry needling without adverse effects.   Arnaldo is progressing well towards his goals.   Pt prognosis is Good.     Pt will continue to benefit from skilled outpatient physical therapy to address the deficits listed in the problem list box on initial evaluation, provide pt/family education and to maximize pt's level of independence in the home and community environment.     Pt's spiritual, cultural and educational needs considered and pt agreeable to plan of care and goals.     Anticipated barriers to physical therapy: none    Goals:   Short Term Goals: 6 weeks   1. Patient to demonstrate increased flexibility hip flexors as noted by 10 degree increase in prone knee flexion for improved posture and gait- in progress, not met  2. Patient to report <4/10 pain with walking 2 miles or greater - in progress, not met  3. Patient to be able to ascend/ descend 1 flight of stairs with minimal to no difficulty - in progress, not met     Long Term Goals: 12 weeks   1. Patient to be independent with home exercise program for improved self management of condition  - in progress, not met  2. Patient to have decreased subjective report of disability as noted by a score of 31% on the FOTO questionnaire  - in progress, not met  3. Patient to increased strength in BLE's to 5/5 or greater for improved performance of ADL's  - in progress, not met  4. Patient to  have improved balance as noted by SLS 10 sec or greater for return to running - in progress, not met    Plan     Continue per POC for neuromuscular re-education, gluteal strengthening, core stabilization, and manual therapy     Sheeba Salinas, PT

## 2020-06-04 ENCOUNTER — CLINICAL SUPPORT (OUTPATIENT)
Dept: REHABILITATION | Facility: OTHER | Age: 75
End: 2020-06-04
Payer: MEDICARE

## 2020-06-04 DIAGNOSIS — R29.898 WEAKNESS OF BOTH HIPS: ICD-10-CM

## 2020-06-04 DIAGNOSIS — M76.31 IT BAND SYNDROME, RIGHT: ICD-10-CM

## 2020-06-04 PROCEDURE — 97110 THERAPEUTIC EXERCISES: CPT | Mod: HCNC,PN

## 2020-06-04 PROCEDURE — 97140 MANUAL THERAPY 1/> REGIONS: CPT | Mod: HCNC,PN

## 2020-06-04 NOTE — PROGRESS NOTES
"  Physical Therapy Treatment Note     Name: Arnaldo HOLLINGSWORTH [facility] Number: [id number]    Therapy Diagnosis:   Encounter Diagnoses   Name Primary?    It band syndrome, right     Weakness of both hips      Physician: Joe Solis DO    Visit Date: 6/4/2020       Medical Diagnosis from Referral: M25.561,G89.29 (ICD-10-CM) - Chronic pain of right knee M76.31 (ICD-10-CM) - It band syndrome, right      Evaluation Date: 5/27/2020  Authorization Period Expiration: 6/21/2020  Plan of Care Expiration: 8/21/2020  Visit # / Visits authorized: 3 (1/20)    Time In: 1:45 pm  Time Out: 2:27  pm  Total Billable Time: 43 minutes    Precautions: Standard    Subjective     Pt reports: he did fine with the last session. States he walked twice since his last session. Stated he had R lateral thigh soreness last night. Yesterday was the last day he was on anti-inflammatory medication.    He was compliant with home exercise program.  Response to previous treatment: felt good  Functional change: no change reported    Pain: 1/10  Location: right lateral knee      Objective     Arnaldo received therapeutic exercises to develop strength, endurance, ROM, posture and core stabilization for 12 minutes including:    Supine IT band str with strap 30" x 3  Piriformis str 30" x 3  Prone rectus femoris str 2 min (pt prefers standing due to discomfort lying prone with mask donned)  Bridging with ball squeeze 5" x 20  Clams BTB 3 sec x 20 ea    Arnaldo received the following manual therapy techniques: Joint mobilizations were applied to the: R knee for 10 minutes, including:    Patella joint mobilizations all planes 30' x 3  Tibiofemoral joint mobs AP  PA to tibfib jt    15 minutes Application of FDN: Pt educated on benefits and potential side effects of dry needling. Educated pt on benefits, precautions, side effects followign IDN. Educated pt to use heat following treatment sessions if pt is experiencing pain or soreness. Pt verbalized good " understanding of education.  Pt signed written consent to dry needling Rx. Pt gave verbal consent for DN    Pt received dry needling to the below listed muscles using 75mm, 60mm needles.  Tensor fascia latae R  Gluteus medius/ minimus R  Mid IT band  Vastus lateralis R (2 points)      Home Exercises Provided and Patient Education Provided     Education provided:     No new HEP given today    Written Home Exercises Provided: Patient instructed to continue with previous HEP  Exercises were reviewed and Arnaldo was able to demonstrate them prior to the end of the session.  Arnaldo demonstrated good  understanding of the education provided.     See EMR under Patient Instructions for exercises provided 6/2/2020.    Assessment     Good soft tissue response to dry needling evident by increased grasp with unilateral winding at all insertion points. Winding performed every 5 minutes during treatment. No adverse effects following treatment.    Arnaldo is progressing well towards his goals.   Pt prognosis is Good.     Pt will continue to benefit from skilled outpatient physical therapy to address the deficits listed in the problem list box on initial evaluation, provide pt/family education and to maximize pt's level of independence in the home and community environment.     Pt's spiritual, cultural and educational needs considered and pt agreeable to plan of care and goals.     Anticipated barriers to physical therapy: none    Goals:   Short Term Goals: 6 weeks   1. Patient to demonstrate increased flexibility hip flexors as noted by 10 degree increase in prone knee flexion for improved posture and gait- in progress, not met  2. Patient to report <4/10 pain with walking 2 miles or greater - in progress, not met  3. Patient to be able to ascend/ descend 1 flight of stairs with minimal to no difficulty - in progress, not met     Long Term Goals: 12 weeks   1. Patient to be independent with home exercise program for improved self  management of condition  - in progress, not met  2. Patient to have decreased subjective report of disability as noted by a score of 31% on the FOTO questionnaire  - in progress, not met  3. Patient to increased strength in BLE's to 5/5 or greater for improved performance of ADL's  - in progress, not met  4. Patient to have improved balance as noted by SLS 10 sec or greater for return to running - in progress, not met    Plan     Continue per POC for neuromuscular re-education, gluteal strengthening, core stabilization, and manual therapy     Jimmie Reyes, PT

## 2020-06-09 ENCOUNTER — CLINICAL SUPPORT (OUTPATIENT)
Dept: REHABILITATION | Facility: OTHER | Age: 75
End: 2020-06-09
Payer: MEDICARE

## 2020-06-09 DIAGNOSIS — M76.31 IT BAND SYNDROME, RIGHT: ICD-10-CM

## 2020-06-09 DIAGNOSIS — R29.898 WEAKNESS OF BOTH HIPS: ICD-10-CM

## 2020-06-09 PROCEDURE — 97140 MANUAL THERAPY 1/> REGIONS: CPT | Mod: HCNC,PN

## 2020-06-09 PROCEDURE — 97110 THERAPEUTIC EXERCISES: CPT | Mod: HCNC,PN

## 2020-06-09 NOTE — PROGRESS NOTES
"  Physical Therapy Treatment Note     Name: Arnaldo HOLLINGSWORTH [facility] Number: [id number]    Therapy Diagnosis:   Encounter Diagnoses   Name Primary?    It band syndrome, right     Weakness of both hips      Physician: Joe Solis DO    Visit Date: 6/9/2020       Medical Diagnosis from Referral: M25.561,G89.29 (ICD-10-CM) - Chronic pain of right knee M76.31 (ICD-10-CM) - It band syndrome, right      Evaluation Date: 5/27/2020  Authorization Period Expiration: 6/21/2020  Plan of Care Expiration: 8/21/2020  Visit # / Visits authorized: 3 (1/20)    Time In: 5:03 pm  Time Out: 5:48  pm  Total Billable Time: 43 minutes    Precautions: Standard    Subjective     Pt reports: he is not limping up a step most of the time    He was compliant with home exercise program.  Response to previous treatment: felt good  Functional change: he is not getting less pain with sit to stand    Pain: 1/10  Location: right lateral knee      Objective     Arnaldo received therapeutic exercises to develop strength, endurance, ROM, posture and core stabilization for 18 minutes including:    Supine IT band str with strap 30" x 3  Piriformis str 30" x 3  SKTC 3 x 30"  Side lying rectus femoris stretch  3 x 30"  Bridging with ball squeeze 5" x 20  Side lying clams BTB 3 sec x 20 ea    Arnaldo received the following manual therapy techniques: Joint mobilizations were applied to the: R knee for 10 minutes, including:    Patella joint mobilizations all planes 30' x 3  Tibiofemoral joint mobs AP  PA to tibfib jt    15 minutes Application of FDN: Pt educated on benefits and potential side effects of dry needling. Educated pt on benefits, precautions, side effects followign IDN. Educated pt to use heat following treatment sessions if pt is experiencing pain or soreness. Pt verbalized good understanding of education.  Pt signed written consent to dry needling Rx. Pt gave verbal consent for DN    Pt received dry needling to the below listed muscles " using 75mm, 60mm needles.  Tensor fascia latae R  Gluteus medius/ minimus R  Mid IT band  Vastus lateralis R (2 points)      Home Exercises Provided and Patient Education Provided     Education provided:     No new HEP given today    Written Home Exercises Provided: Patient instructed to continue with previous HEP  Exercises were reviewed and Arnaldo was able to demonstrate them prior to the end of the session.  Arnaldo demonstrated good  understanding of the education provided.     See EMR under Patient Instructions for exercises provided 6/2/2020.    Assessment     Good soft tissue response to dry needling evident by increased grasp with unilateral winding at all insertion points. Winding performed every 5 minutes during treatment. No adverse effects following treatment. Good mobility noted today with talo/fib joint mobilizations.    Arnaldo is progressing well towards his goals.   Pt prognosis is Good.     Pt will continue to benefit from skilled outpatient physical therapy to address the deficits listed in the problem list box on initial evaluation, provide pt/family education and to maximize pt's level of independence in the home and community environment.     Pt's spiritual, cultural and educational needs considered and pt agreeable to plan of care and goals.     Anticipated barriers to physical therapy: none    Goals:   Short Term Goals: 6 weeks   1. Patient to demonstrate increased flexibility hip flexors as noted by 10 degree increase in prone knee flexion for improved posture and gait- in progress, not met  2. Patient to report <4/10 pain with walking 2 miles or greater - in progress, not met  3. Patient to be able to ascend/ descend 1 flight of stairs with minimal to no difficulty - in progress, not met     Long Term Goals: 12 weeks   1. Patient to be independent with home exercise program for improved self management of condition  - in progress, not met  2. Patient to have decreased subjective report of  disability as noted by a score of 31% on the FOTO questionnaire  - in progress, not met  3. Patient to increased strength in BLE's to 5/5 or greater for improved performance of ADL's  - in progress, not met  4. Patient to have improved balance as noted by SLS 10 sec or greater for return to running - in progress, not met    Plan     Continue per POC for neuromuscular re-education, gluteal strengthening, core stabilization, and manual therapy     Jimmie Reyes, PT

## 2020-06-11 ENCOUNTER — CLINICAL SUPPORT (OUTPATIENT)
Dept: REHABILITATION | Facility: OTHER | Age: 75
End: 2020-06-11
Payer: MEDICARE

## 2020-06-11 DIAGNOSIS — M76.31 IT BAND SYNDROME, RIGHT: ICD-10-CM

## 2020-06-11 DIAGNOSIS — R29.898 WEAKNESS OF BOTH HIPS: ICD-10-CM

## 2020-06-11 PROCEDURE — 97140 MANUAL THERAPY 1/> REGIONS: CPT | Mod: HCNC,PN | Performed by: PHYSICAL THERAPIST

## 2020-06-11 PROCEDURE — 97110 THERAPEUTIC EXERCISES: CPT | Mod: HCNC,PN | Performed by: PHYSICAL THERAPIST

## 2020-06-11 NOTE — PROGRESS NOTES
"  Physical Therapy Treatment Note     Name: Arnaldo HOLLINGSWORTH [facility]  Clinic Number: [id number]    Therapy Diagnosis:   Encounter Diagnoses   Name Primary?    It band syndrome, right     Weakness of both hips      Physician: Joe Solis DO    Visit Date: 6/11/2020       Medical Diagnosis from Referral: M25.561,G89.29 (ICD-10-CM) - Chronic pain of right knee M76.31 (ICD-10-CM) - It band syndrome, right      Evaluation Date: 5/27/2020  Authorization Period Expiration: 6/21/2020  Plan of Care Expiration: 8/21/2020  Visit # / Visits authorized: 5 (4/20)    Time In: 1:15 pm  Time Out: 2:00  pm  Total Billable Time: 45 minutes    Precautions: Standard    Subjective     Pt reports:having a lot of pain after last session with walking and going up the stairs.     He was compliant with home exercise program.  Response to previous treatment: felt good  Functional change: he is not getting less pain with sit to stand    Pain: 1/10  Location: right lateral knee      Objective     R knee ROM: 0-0-120 deg with ERP in both flex/ ext    Arnaldo received therapeutic exercises to develop strength, endurance, ROM, posture and core stabilization for 25 minutes including:    Heel props x 3 min  QS 10" 10  SLR 2 x 10  Supine IT band str with strap 30" x 3  Bridging with ball squeeze 5" x 20    Reviewed for HEP:  Piriformis str 30" x 3  SKTC 3 x 30"  Side lying rectus femoris stretch  3 x 30"  Side lying clams BTB 3 sec x 20 ea    Arnaldo received the following manual therapy techniques: Joint mobilizations were applied to the: R knee for 15 minutes, including:    Patella joint mobilizations all planes 30' x 3  Tibiofemoral joint mobs AP  PA to tibfib jt    STM to IT band    Home Exercises Provided and Patient Education Provided     Education provided:     Added exercises on 6/11/2020    Written Home Exercises Provided: Patient instructed to continue with previous HEP  Exercises were reviewed and Arnaldo was able to demonstrate them prior to " the end of the session.  Arnaldo demonstrated good  understanding of the education provided.     See EMR under Patient Instructions for exercises provided 6/2/2020.    Assessment     Pt presents to clinic with decreased R knee extension with erp. Good response to skilled interventions with full passive extension following.     Arnaldo is progressing well towards his goals.   Pt prognosis is Good.     Pt will continue to benefit from skilled outpatient physical therapy to address the deficits listed in the problem list box on initial evaluation, provide pt/family education and to maximize pt's level of independence in the home and community environment.     Pt's spiritual, cultural and educational needs considered and pt agreeable to plan of care and goals.     Anticipated barriers to physical therapy: none    Goals:   Short Term Goals: 6 weeks   1. Patient to demonstrate increased flexibility hip flexors as noted by 10 degree increase in prone knee flexion for improved posture and gait- in progress, not met  2. Patient to report <4/10 pain with walking 2 miles or greater - in progress, not met  3. Patient to be able to ascend/ descend 1 flight of stairs with minimal to no difficulty - in progress, not met     Long Term Goals: 12 weeks   1. Patient to be independent with home exercise program for improved self management of condition  - in progress, not met  2. Patient to have decreased subjective report of disability as noted by a score of 31% on the FOTO questionnaire  - in progress, not met  3. Patient to increased strength in BLE's to 5/5 or greater for improved performance of ADL's  - in progress, not met  4. Patient to have improved balance as noted by SLS 10 sec or greater for return to running - in progress, not met    Plan     Continue per POC for neuromuscular re-education, gluteal strengthening, core stabilization, and manual therapy     Sheeba Salinas, PT

## 2020-06-12 NOTE — PROGRESS NOTES
"  Physical Therapy Treatment Note     Name: Arnaldo HOLLINGSWORTH [facility] Number: [id number]    Therapy Diagnosis:   Encounter Diagnoses   Name Primary?    It band syndrome, right Yes    Weakness of both hips      Physician: Joe Solis DO    Visit Date: 6/16/2020       Medical Diagnosis from Referral: M25.561,G89.29 (ICD-10-CM) - Chronic pain of right knee M76.31 (ICD-10-CM) - It band syndrome, right      Evaluation Date: 5/27/2020  Authorization Period Expiration: 6/21/2020  Plan of Care Expiration: 8/21/2020  Visit # / Visits authorized: 6 (5/20)    Time In: 11:30 pm  Time Out: 12:05  pm  Total Billable Time: 35 minutes    Precautions: Standard    Subjective     Pt reports: good improvement in knee pain after previous visit. Reports he liked the new manual therapy.     He was compliant with home exercise program.  Response to previous treatment: felt good  Functional change: he is not getting less pain with sit to stand    Pain: 1/10  Location: right lateral knee      Objective     R knee ROM: 0-0-120 deg with ERP in both flex/ ext    Arnaldo received therapeutic exercises to develop strength, endurance, ROM, posture and core stabilization for 20 minutes including:    Heel props x 3 min  QS 10" 10  SLR 2 x 10  Supine IT band str with strap 30" x 3  Bridging with ball squeeze 5" x 20    Reviewed for HEP:  Piriformis str 30" x 3  SKTC 3 x 30"  Side lying rectus femoris stretch  3 x 30"  Side lying clams BTB 3 sec x 20 ea    Arnaldo received the following manual therapy techniques: Joint mobilizations were applied to the: R knee for 15 minutes, including:    Patella joint mobilizations all planes 30' x 3  Tibiofemoral joint mobs AP  PA to tibfib jt    STM to IT band, with instrument assist and cupping today.    Home Exercises Provided and Patient Education Provided     Education provided:     Added exercises on 6/11/2020    Written Home Exercises Provided: Patient instructed to continue with previous HEP  Exercises " were reviewed and Arnaldo was able to demonstrate them prior to the end of the session.  Arnaldo demonstrated good  understanding of the education provided.     See EMR under Patient Instructions for exercises provided 6/2/2020.    Assessment     Presents with decreased myofascial mobility of lateral retinaculum as well as distal ITB. Cupping introduced today with good tolerance and mild petechia noted.    Arnaldo is progressing well towards his goals.   Pt prognosis is Good.     Pt will continue to benefit from skilled outpatient physical therapy to address the deficits listed in the problem list box on initial evaluation, provide pt/family education and to maximize pt's level of independence in the home and community environment.     Pt's spiritual, cultural and educational needs considered and pt agreeable to plan of care and goals.     Anticipated barriers to physical therapy: none    Goals:   Short Term Goals: 6 weeks   1. Patient to demonstrate increased flexibility hip flexors as noted by 10 degree increase in prone knee flexion for improved posture and gait- in progress, not met  2. Patient to report <4/10 pain with walking 2 miles or greater - in progress, not met  3. Patient to be able to ascend/ descend 1 flight of stairs with minimal to no difficulty - in progress, not met     Long Term Goals: 12 weeks   1. Patient to be independent with home exercise program for improved self management of condition  - in progress, not met  2. Patient to have decreased subjective report of disability as noted by a score of 31% on the FOTO questionnaire  - in progress, not met  3. Patient to increased strength in BLE's to 5/5 or greater for improved performance of ADL's  - in progress, not met  4. Patient to have improved balance as noted by SLS 10 sec or greater for return to running - in progress, not met    Plan     Continue per POC for neuromuscular re-education, gluteal strengthening, core stabilization, and manual  therapy     Belen Varma, PT

## 2020-06-16 ENCOUNTER — CLINICAL SUPPORT (OUTPATIENT)
Dept: REHABILITATION | Facility: OTHER | Age: 75
End: 2020-06-16
Payer: MEDICARE

## 2020-06-16 DIAGNOSIS — R29.898 WEAKNESS OF BOTH HIPS: ICD-10-CM

## 2020-06-16 DIAGNOSIS — M76.31 IT BAND SYNDROME, RIGHT: Primary | ICD-10-CM

## 2020-06-16 PROCEDURE — 97110 THERAPEUTIC EXERCISES: CPT | Mod: HCNC,PN

## 2020-06-16 PROCEDURE — 97140 MANUAL THERAPY 1/> REGIONS: CPT | Mod: HCNC,PN

## 2020-06-18 ENCOUNTER — CLINICAL SUPPORT (OUTPATIENT)
Dept: REHABILITATION | Facility: OTHER | Age: 75
End: 2020-06-18
Payer: MEDICARE

## 2020-06-18 DIAGNOSIS — M76.31 IT BAND SYNDROME, RIGHT: ICD-10-CM

## 2020-06-18 DIAGNOSIS — R29.898 WEAKNESS OF BOTH HIPS: ICD-10-CM

## 2020-06-18 PROCEDURE — 97140 MANUAL THERAPY 1/> REGIONS: CPT | Mod: HCNC,PN | Performed by: PHYSICAL THERAPIST

## 2020-06-18 PROCEDURE — 97110 THERAPEUTIC EXERCISES: CPT | Mod: HCNC,PN | Performed by: PHYSICAL THERAPIST

## 2020-06-18 NOTE — PROGRESS NOTES
"  Physical Therapy Treatment Note     Name: Arnaldo HOLLINGSWORTH [facility] Number: [id number]    Therapy Diagnosis:   Encounter Diagnoses   Name Primary?    It band syndrome, right     Weakness of both hips      Physician: Joe Solis DO    Visit Date: 6/18/2020       Medical Diagnosis from Referral: M25.561,G89.29 (ICD-10-CM) - Chronic pain of right knee M76.31 (ICD-10-CM) - It band syndrome, right      Evaluation Date: 5/27/2020  Authorization Period Expiration: 6/21/2020  Plan of Care Expiration: 8/21/2020  Visit # / Visits authorized: 7 (6/20)    Time In: 1:15 pm  Time Out: 2:00  pm  Total Billable Time: 45 minutes    Precautions: Standard    Subjective     Pt reports: Only getting 1-2 day relief with PT.     He was compliant with home exercise program.  Response to previous treatment: felt good  Functional change: he is not getting less pain with sit to stand    Pain: 1/10  Location: right lateral knee      Objective     R knee ROM: 0-0-120 deg with ERP in both flex/ ext    Arnaldo received therapeutic exercises to develop strength, endurance, ROM, posture and core stabilization for 30 minutes including:    Heel props x 3 min (5# today)  QS 10" 10  SLR 2 x 10  Bridging with ball squeeze 5" x 20  Clams BTB 2 x 10 ea  Seated gastroc str with strap 30" x 3  TKE's with ball against the wall 2 min    Reviewed for HEP:  Piriformis str 30" x 3  SKTC 3 x 30"  Side lying rectus femoris stretch  3 x 30"  Supine IT band str with strap 30" x 3      Arnaldo received the following manual therapy techniques: Joint mobilizations were applied to the: R knee for 15 minutes, including:    Patella joint mobilizations all planes 30' x 3  Tibiofemoral joint mobs AP  PA to tibfib jt    STM to IT band, with instrument assist and cupping today.    Home Exercises Provided and Patient Education Provided     Education provided:     Added exercises on 6/11/2020    Written Home Exercises Provided: Patient instructed to continue with " previous HEP  Exercises were reviewed and Arnaldo was able to demonstrate them prior to the end of the session.  Arnaldo demonstrated good  understanding of the education provided.     See EMR under Patient Instructions for exercises provided 6/2/2020.    Assessment     Good response to manual techniques with improved pain reported with ambulation following. Continues with decreased R knee extension and increased weight with low load long duration stretches.     Arnaldo is progressing well towards his goals.   Pt prognosis is Good.     Pt will continue to benefit from skilled outpatient physical therapy to address the deficits listed in the problem list box on initial evaluation, provide pt/family education and to maximize pt's level of independence in the home and community environment.     Pt's spiritual, cultural and educational needs considered and pt agreeable to plan of care and goals.     Anticipated barriers to physical therapy: none    Goals:   Short Term Goals: 6 weeks   1. Patient to demonstrate increased flexibility hip flexors as noted by 10 degree increase in prone knee flexion for improved posture and gait- in progress, not met  2. Patient to report <4/10 pain with walking 2 miles or greater - in progress, not met  3. Patient to be able to ascend/ descend 1 flight of stairs with minimal to no difficulty - in progress, not met     Long Term Goals: 12 weeks   1. Patient to be independent with home exercise program for improved self management of condition  - in progress, not met  2. Patient to have decreased subjective report of disability as noted by a score of 31% on the FOTO questionnaire  - in progress, not met  3. Patient to increased strength in BLE's to 5/5 or greater for improved performance of ADL's  - in progress, not met  4. Patient to have improved balance as noted by SLS 10 sec or greater for return to running - in progress, not met    Plan     Continue per POC for neuromuscular re-education,  gluteal strengthening, core stabilization, and manual therapy     Sheeba Salinas, PT

## 2020-06-23 ENCOUNTER — CLINICAL SUPPORT (OUTPATIENT)
Dept: REHABILITATION | Facility: OTHER | Age: 75
End: 2020-06-23
Payer: MEDICARE

## 2020-06-23 DIAGNOSIS — R29.898 WEAKNESS OF BOTH HIPS: ICD-10-CM

## 2020-06-23 DIAGNOSIS — M76.31 IT BAND SYNDROME, RIGHT: ICD-10-CM

## 2020-06-23 PROCEDURE — 97110 THERAPEUTIC EXERCISES: CPT | Mod: HCNC,PN | Performed by: PHYSICAL THERAPIST

## 2020-06-23 PROCEDURE — 97140 MANUAL THERAPY 1/> REGIONS: CPT | Mod: HCNC,PN | Performed by: PHYSICAL THERAPIST

## 2020-06-23 NOTE — PROGRESS NOTES
"  Physical Therapy Treatment Note     Name: Arnaldo HOLLINGSWORTH [facility] Number: [id number]    Therapy Diagnosis:   Encounter Diagnoses   Name Primary?    It band syndrome, right     Weakness of both hips      Physician: Joe Solis DO    Visit Date: 6/23/2020       Medical Diagnosis from Referral: M25.561,G89.29 (ICD-10-CM) - Chronic pain of right knee M76.31 (ICD-10-CM) - It band syndrome, right      Evaluation Date: 5/27/2020  Authorization Period Expiration: 6/21/2020  Plan of Care Expiration: 8/21/2020  Visit # / Visits authorized: 8 (7/20)    Time In: 1:20 pm  Time Out: 2:00  pm  Total Billable Time: 40 minutes    Precautions: Standard    Subjective     Pt reports: His knee starting to feel a little better. He is feeling better with bending down to pick something up, walking, and doing stairs. He added more hamstring stretching in which seems to help too.     He was compliant with home exercise program.  Response to previous treatment: felt good  Functional change: he is not getting less pain with sit to stand    Pain: 1/10  Location: right lateral knee      Objective     R knee ROM: 0-3-125 deg with ERP in both flex/ ext            Ely's test: R = 120 degrees     Arnaldo received therapeutic exercises to develop strength, endurance, ROM, posture and core stabilization for 30 minutes including:    Heel props x 3 min (5# today)  QS 10" 10  SLR 2 x 10- nt  Bridging with ball squeeze 5" x 20  Clams BTB 2 x 10 ea  Seated gastroc str with strap 30" x 3  TKE's with ball against the wall 2 min  Prone rectus femoris stretch  3 x 30"  4 way hip with BTB x 20 ea    Reviewed for HEP:  Piriformis str 30" x 3  SKTC 3 x 30"  Supine IT band str with strap 30" x 3    Arnaldo received the following manual therapy techniques: Joint mobilizations were applied to the: R knee for 10 minutes, including:    Patella joint mobilizations all planes 30' x 3  Tibiofemoral joint mobs AP  PA to tibfib jt    STM to IT band, with " instrument assist and cupping today.    Home Exercises Provided and Patient Education Provided     Education provided:     HEP, issued blue teraband    Written Home Exercises Provided: Patient instructed to continue with previous HEP  Exercises were reviewed and Arnaldo was able to demonstrate them prior to the end of the session.  Arnaldo demonstrated good  understanding of the education provided.     See EMR under Patient Instructions for exercises provided 6/2/2020.    Assessment     Pt progressing with decreased subjective report of pain and disability. Also demonstrating increased flexibility rectus femoris and knee flexion in prone. 3/3 short term goals met    Arnaldo is progressing well towards his goals.   Pt prognosis is Good.     Pt will continue to benefit from skilled outpatient physical therapy to address the deficits listed in the problem list box on initial evaluation, provide pt/family education and to maximize pt's level of independence in the home and community environment.     Pt's spiritual, cultural and educational needs considered and pt agreeable to plan of care and goals.     Anticipated barriers to physical therapy: none    Goals:   Short Term Goals: 6 weeks   1. Patient to demonstrate increased flexibility hip flexors as noted by 10 degree increase in prone knee flexion for improved posture and gait- met  2. Patient to report <4/10 pain with walking 2 miles or greater -met  3. Patient to be able to ascend/ descend 1 flight of stairs with minimal to no difficulty -met     Long Term Goals: 12 weeks   1. Patient to be independent with home exercise program for improved self management of condition  - in progress, not met  2. Patient to have decreased subjective report of disability as noted by a score of 31% on the FOTO questionnaire  - in progress, not met  3. Patient to increased strength in BLE's to 5/5 or greater for improved performance of ADL's  - in progress, not met  4. Patient to have  improved balance as noted by SLS 10 sec or greater for return to running - in progress, not met    Plan     Continue per POC for neuromuscular re-education, gluteal strengthening, core stabilization, and manual therapy     Sheeba Salinas, PT

## 2020-06-25 ENCOUNTER — CLINICAL SUPPORT (OUTPATIENT)
Dept: REHABILITATION | Facility: OTHER | Age: 75
End: 2020-06-25
Payer: MEDICARE

## 2020-06-25 DIAGNOSIS — R29.898 WEAKNESS OF BOTH HIPS: ICD-10-CM

## 2020-06-25 DIAGNOSIS — M76.31 IT BAND SYNDROME, RIGHT: ICD-10-CM

## 2020-06-25 PROCEDURE — 97110 THERAPEUTIC EXERCISES: CPT | Mod: HCNC,PN | Performed by: PHYSICAL THERAPIST

## 2020-06-25 PROCEDURE — 97140 MANUAL THERAPY 1/> REGIONS: CPT | Mod: HCNC,PN | Performed by: PHYSICAL THERAPIST

## 2020-06-25 NOTE — PROGRESS NOTES
"  Physical Therapy Treatment Note     Name: Arnaldo HOLLINGSWORTH [facility] Number: [id number]    Therapy Diagnosis:   Encounter Diagnoses   Name Primary?    It band syndrome, right     Weakness of both hips      Physician: Joe Solis DO    Visit Date: 6/25/2020       Medical Diagnosis from Referral: M25.561,G89.29 (ICD-10-CM) - Chronic pain of right knee M76.31 (ICD-10-CM) - It band syndrome, right      Evaluation Date: 5/27/2020  Authorization Period Expiration: 6/21/2020  Plan of Care Expiration: 8/21/2020  Visit # / Visits authorized: 9 (8/20)    Time In: 1:15 pm  Time Out: 2:00  pm  Total Billable Time: 40 minutes    Precautions: Standard    Subjective     Pt reports: Continues to get better with longer lasting relief between sessions     He was compliant with home exercise program.  Response to previous treatment: felt good  Functional change: he is not getting less pain with sit to stand    Pain: 1/10 at rest, 6/10 with palpation distal IT band  Location: right lateral knee      Objective     6/23/2020:    R knee ROM: 0-3-125 deg with ERP in both flex/ ext            Ely's test: R = 120 degrees     Arnaldo received therapeutic exercises to develop strength, endurance, ROM, posture and core stabilization for 30 minutes including:    Heel props x 3 min (5# today)  QS 10" 10  SLR 2 x 10, 2#  S/L hip abduction 2# 2 x 10  Bridging with ball squeeze 5" x 20  Clams BTB 2 x 10 ea  Seated gastroc str with strap 30" x 3  TKE's with ball against the wall 2 min  Prone rectus femoris stretch  3 x 30"  +LAQ 5# x 20  +shuttle 23# Sl R 2 x 10    Reviewed for HEP:  4 way hip with BTB x 20 ea  Piriformis str 30" x 3  SKTC 3 x 30"  Supine IT band str with strap 30" x 3    Arnaldo received the following manual therapy techniques: Joint mobilizations were applied to the: R knee for 15 minutes, including:    Patella joint mobilizations all planes 30' x 3  Tibiofemoral joint mobs AP  PA to tibfib jt    STM to IT band, with " instrument assist and cupping today.    Home Exercises Provided and Patient Education Provided     Education provided:     HEP, issued blue teraband    Written Home Exercises Provided: Patient instructed to continue with previous HEP  Exercises were reviewed and Arnaldo was able to demonstrate them prior to the end of the session.  Arnaldo demonstrated good  understanding of the education provided.     See EMR under Patient Instructions for exercises provided 6/2/2020.    Assessment     Pt progressing with resisted gluteal and quad strengthening this visit     Arnaldo is progressing well towards his goals.   Pt prognosis is Good.     Pt will continue to benefit from skilled outpatient physical therapy to address the deficits listed in the problem list box on initial evaluation, provide pt/family education and to maximize pt's level of independence in the home and community environment.     Pt's spiritual, cultural and educational needs considered and pt agreeable to plan of care and goals.     Anticipated barriers to physical therapy: none    Goals:   Short Term Goals: 6 weeks   1. Patient to demonstrate increased flexibility hip flexors as noted by 10 degree increase in prone knee flexion for improved posture and gait- met  2. Patient to report <4/10 pain with walking 2 miles or greater -met  3. Patient to be able to ascend/ descend 1 flight of stairs with minimal to no difficulty -met     Long Term Goals: 12 weeks   1. Patient to be independent with home exercise program for improved self management of condition  - in progress, not met  2. Patient to have decreased subjective report of disability as noted by a score of 31% on the FOTO questionnaire  - in progress, not met  3. Patient to increased strength in BLE's to 5/5 or greater for improved performance of ADL's  - in progress, not met  4. Patient to have improved balance as noted by SLS 10 sec or greater for return to running - in progress, not met    Plan      Continue per POC for neuromuscular re-education, gluteal strengthening, core stabilization, and manual therapy     Sheeba Salinas, PT

## 2020-07-02 ENCOUNTER — CLINICAL SUPPORT (OUTPATIENT)
Dept: REHABILITATION | Facility: OTHER | Age: 75
End: 2020-07-02
Payer: MEDICARE

## 2020-07-02 DIAGNOSIS — R29.898 WEAKNESS OF BOTH HIPS: ICD-10-CM

## 2020-07-02 DIAGNOSIS — M76.31 IT BAND SYNDROME, RIGHT: ICD-10-CM

## 2020-07-02 PROCEDURE — 97110 THERAPEUTIC EXERCISES: CPT | Mod: HCNC,PN | Performed by: PHYSICAL THERAPIST

## 2020-07-02 PROCEDURE — 97140 MANUAL THERAPY 1/> REGIONS: CPT | Mod: HCNC,PN | Performed by: PHYSICAL THERAPIST

## 2020-07-02 NOTE — PROGRESS NOTES
"  Physical Therapy Treatment Note     Name: Arnaldo HOLLINGSWORTH [facility]  Clinic Number: [id number]    Therapy Diagnosis:   Encounter Diagnoses   Name Primary?    It band syndrome, right     Weakness of both hips      Physician: Joe Solis DO    Visit Date: 7/2/2020       Medical Diagnosis from Referral: M25.561,G89.29 (ICD-10-CM) - Chronic pain of right knee M76.31 (ICD-10-CM) - It band syndrome, right      Evaluation Date: 5/27/2020  Authorization Period Expiration: 6/21/2020  Plan of Care Expiration: 8/21/2020  Visit # / Visits authorized: 9 (8/20)    Time In: 0850am  Time Out:0930am  Total Billable Time: 30 minutes 1:1    Precautions: Standard    Subjective     Pt reports: Having little to no knee pain with walking. Goal is to return to running    He was compliant with home exercise program.  Response to previous treatment: felt good  Functional change: he is not getting less pain with sit to stand    Pain: 1/10 at rest, 6/10 with palpation distal IT band  Location: right lateral knee      Objective     6/23/2020:    R knee ROM: 0-3-125 deg with ERP in both flex/ ext            Ely's test: R = 120 degrees     Arnaldo received therapeutic exercises to develop strength, endurance, ROM, posture and core stabilization for 30 minutes including:    Heel props x 3 min (5#)- nt  QS 10" 10 (rollunder heel)  SLR 2 x 10, 2#  S/L hip abduction 2# 2 x 10- nt  Bridging with ball squeeze 5" x 20  Standing gastroc str with wedge 30" x 3  TKE's with ball against the wall 2 min- nt  Prone rectus femoris stretch  3 x 30"- nt  LAQ 5# x 20  shuttle 23# Sl R 2 x 10  +Step ups 2" forw/lat x 20 ea  +monster walks GTB 20 ft x 4  +alt steps trampoline x 2 min    Reviewed for HEP:  4 way hip with BTB x 20 ea  Piriformis str 30" x 3  SKTC 3 x 30"  Supine IT band str with strap 30" x 3  Clams BTB 2 x 10 ea    Arnaldo received the following manual therapy techniques: Joint mobilizations were applied to the: R knee for 15 minutes, " including:    Patella joint mobilizations all planes 30' x 3  Tibiofemoral joint mobs AP  PA to tibfib jt    STM to IT band, with instrument assist and cupping today.    Home Exercises Provided and Patient Education Provided     Education provided:     HEP, issued blue teraband    Written Home Exercises Provided: Patient instructed to continue with previous HEP  Exercises were reviewed and Arnaldo was able to demonstrate them prior to the end of the session.  Arnaldo demonstrated good  understanding of the education provided.     See EMR under Patient Instructions for exercises provided 6/2/2020.    Assessment     Pt progressing with closed chain strengthening and balance for return to PLOF. Initially R lateral knee pain with step ups and modified to 2 inch step.     Arnaldo is progressing well towards his goals.   Pt prognosis is Good.     Pt will continue to benefit from skilled outpatient physical therapy to address the deficits listed in the problem list box on initial evaluation, provide pt/family education and to maximize pt's level of independence in the home and community environment.     Pt's spiritual, cultural and educational needs considered and pt agreeable to plan of care and goals.     Anticipated barriers to physical therapy: none    Goals:   Short Term Goals: 6 weeks   1. Patient to demonstrate increased flexibility hip flexors as noted by 10 degree increase in prone knee flexion for improved posture and gait- met  2. Patient to report <4/10 pain with walking 2 miles or greater -met  3. Patient to be able to ascend/ descend 1 flight of stairs with minimal to no difficulty -met     Long Term Goals: 12 weeks   1. Patient to be independent with home exercise program for improved self management of condition  - in progress, not met  2. Patient to have decreased subjective report of disability as noted by a score of 31% on the FOTO questionnaire  - in progress, not met  3. Patient to increased strength in  BLE's to 5/5 or greater for improved performance of ADL's  - in progress, not met  4. Patient to have improved balance as noted by SLS 10 sec or greater for return to running - in progress, not met    Plan     Continue per POC for neuromuscular re-education, gluteal strengthening, core stabilization, and manual therapy     Sheeba Salinas, PT

## 2020-07-07 ENCOUNTER — CLINICAL SUPPORT (OUTPATIENT)
Dept: REHABILITATION | Facility: OTHER | Age: 75
End: 2020-07-07
Payer: MEDICARE

## 2020-07-07 DIAGNOSIS — R29.898 WEAKNESS OF BOTH HIPS: ICD-10-CM

## 2020-07-07 DIAGNOSIS — M76.31 IT BAND SYNDROME, RIGHT: ICD-10-CM

## 2020-07-07 PROCEDURE — 97110 THERAPEUTIC EXERCISES: CPT | Mod: HCNC,PN | Performed by: PHYSICAL THERAPIST

## 2020-07-07 PROCEDURE — 97140 MANUAL THERAPY 1/> REGIONS: CPT | Mod: HCNC,PN | Performed by: PHYSICAL THERAPIST

## 2020-07-07 NOTE — PROGRESS NOTES
"  Physical Therapy Treatment Note     Name: Arnaldo HOLLINGSWORTH [facility]  Clinic Number: [id number]    Therapy Diagnosis:   Encounter Diagnoses   Name Primary?    It band syndrome, right     Weakness of both hips      Physician: Joe Solis DO    Visit Date: 7/7/2020       Medical Diagnosis from Referral: M25.561,G89.29 (ICD-10-CM) - Chronic pain of right knee M76.31 (ICD-10-CM) - It band syndrome, right      Evaluation Date: 5/27/2020  Authorization Period Expiration: 6/21/2020  Plan of Care Expiration: 8/21/2020  Visit # / Visits authorized: 10 (9/20)    Time In: 0850 am  Time Out:0930 am  Total Billable Time: 30 minutes 1:1    Precautions: Standard    Subjective     Pt reports: Is up to 3 miles walking with minimal to no knee pain    He was compliant with home exercise program.  Response to previous treatment: felt good  Functional change: he is not getting less pain with sit to stand    Pain: 1/10 at rest, 6/10 with palpation distal IT band  Location: right lateral knee      Objective     6/23/2020:    R knee ROM: 0-3-125 deg with ERP in both flex/ ext            Ely's test: R = 120 degrees     Arnaldo received therapeutic exercises to develop strength, endurance, ROM, posture and core stabilization for 30 minutes including:    Heel props x 3 min (5#)- nt  QS 10" 10 (roll under heel)  SLR 2 x 10, 2#- nt  S/L hip abduction 2# 2 x 10- nt  Bridging with ball squeeze 5" x 20  Standing gastroc str with wedge 30" x 3  TKE's with ball against the wall 2 min- nt  Prone rectus femoris stretch  3 x 30"- nt  LAQ 5# x 20  Shuttle 50# B 3 x 10  Step ups 2" forw x 20 ea  Step ups lateral 4" x 20 ea  monster walks GTB 20 ft x 4  alt steps trampoline x 2 min    Reviewed for HEP:  4 way hip with BTB x 20 ea  Piriformis str 30" x 3  SKTC 3 x 30"  Supine IT band str with strap 30" x 3  Clams BTB 2 x 10 ea    Arnaldo received the following manual therapy techniques: Joint mobilizations were applied to the: R knee for 15 minutes, " including:    STM hamstrings   Patella joint mobilizations all planes 30' x 3  Tibiofemoral joint mobs AP  PA to tibfib jt    Home Exercises Provided and Patient Education Provided     Education provided:     HEP, issued blue teraband    Written Home Exercises Provided: Patient instructed to continue with previous HEP  Exercises were reviewed and Arnaldo was able to demonstrate them prior to the end of the session.  Arnaldo demonstrated good  understanding of the education provided.     See EMR under Patient Instructions for exercises provided 6/2/2020.    Assessment     Pt with improved tolerance to step ups with increased step height performed this visit.     Arnaldo is progressing well towards his goals.   Pt prognosis is Good.     Pt will continue to benefit from skilled outpatient physical therapy to address the deficits listed in the problem list box on initial evaluation, provide pt/family education and to maximize pt's level of independence in the home and community environment.     Pt's spiritual, cultural and educational needs considered and pt agreeable to plan of care and goals.     Anticipated barriers to physical therapy: none    Goals:   Short Term Goals: 6 weeks   1. Patient to demonstrate increased flexibility hip flexors as noted by 10 degree increase in prone knee flexion for improved posture and gait- met  2. Patient to report <4/10 pain with walking 2 miles or greater -met  3. Patient to be able to ascend/ descend 1 flight of stairs with minimal to no difficulty -met     Long Term Goals: 12 weeks   1. Patient to be independent with home exercise program for improved self management of condition  - in progress, not met  2. Patient to have decreased subjective report of disability as noted by a score of 31% on the FOTO questionnaire  - in progress, not met  3. Patient to increased strength in BLE's to 5/5 or greater for improved performance of ADL's  - in progress, not met  4. Patient to have  improved balance as noted by SLS 10 sec or greater for return to running - in progress, not met    Plan     Continue per POC for neuromuscular re-education, gluteal strengthening, core stabilization, and manual therapy     Sheeba Salinas, PT

## 2020-07-08 NOTE — MR AVS SNAPSHOT
[facility]  [address]  [facility] [address]  Phone: [phone]                  Arnaldo Donovan   2017 8:00 AM   Office Visit    Description:  Male : 1945   Provider:  Geronimo Soto DPM   Department:  [facility]           Reason for Visit     Tendonitis           Diagnoses this Visit        Comments    Achilles tendinitis of right lower extremity    -  Primary     Chronic pain of right ankle                To Do List           Future Appointments        Provider Department Dept Phone    2017 9:00 AM Michell Mays, PT [facility] [phone]      Goals (5 Years of Data)     None      Follow-Up and Disposition     Return if symptoms worsen or fail to improve.      [facility]     [facility] Care [facility] -  Assistance  Registered nurses in the [facility] provide clinical advisement, health education, appointment booking, and other advisory services.  Call for this free service at [phone].             Medications           Message regarding Medications     Verify the changes and/or additions to your medication regime listed below are the same as discussed with your clinician today.  If any of these changes or additions are incorrect, please notify your healthcare provider.             Verify that the below list of medications is an accurate representation of the medications you are currently taking.  If none reported, the list may be blank. If incorrect, please contact your healthcare provider. Carry this list with you in case of emergency.           Current Medications     allopurinol (ZYLOPRIM) 300 MG tablet Take 1 tablet (300 mg total) by mouth once daily.    amlodipine (NORVASC) 5 MG tablet Take 1 tablet (5 mg total) by mouth once daily.    doxycycline (MONODOX) 50 MG Cap Take 1 capsule (50 mg total) by mouth once daily.    metronidazole (METROGEL) 0.75 % gel Apply topically 2 (two) times  "daily.    sulfacetamide sodium-sulfur 10-5 % (w/w) Clsr Wash face qd - bid           Clinical Reference Information           Your Vitals Were     Height Weight BMI          6' 1" (1.854 m) 107.5 kg (237 lb) 31.27 kg/m2        Allergies as of 2/17/2017     No Known Drug Allergies      Immunizations Administered on Date of Encounter - 2/17/2017     None      Language Assistance Services     ATTENTION: Language assistance services are available, free of charge. Please call [phone].      ATENCIÓN: Si judithla gayla, tiene a amador disposición servicios gratuitos de asistencia lingüística. Llame al [phone].     LAZARUS Ý: N?u b?n nói Ti?ng Vi?t, có các d?ch v? h? tr? ngôn ng? mi?n phí dành cho b?n. G?i s? [phone].         [facility] complies with applicable Federal civil rights laws and does not discriminate on the basis of race, color, national origin, age, disability, or sex.        " Detail Level: Simple Initiate Treatment: fluorouracil 5 % topical cream Sig: Apply to scalp and right upper forehead BID x 10 days. Placebo demo shown

## 2020-07-09 ENCOUNTER — CLINICAL SUPPORT (OUTPATIENT)
Dept: REHABILITATION | Facility: OTHER | Age: 75
End: 2020-07-09
Payer: MEDICARE

## 2020-07-09 DIAGNOSIS — R29.898 WEAKNESS OF BOTH HIPS: ICD-10-CM

## 2020-07-09 DIAGNOSIS — M76.31 IT BAND SYNDROME, RIGHT: ICD-10-CM

## 2020-07-09 PROCEDURE — 97140 MANUAL THERAPY 1/> REGIONS: CPT | Mod: HCNC,PN | Performed by: PHYSICAL THERAPIST

## 2020-07-09 PROCEDURE — 97110 THERAPEUTIC EXERCISES: CPT | Mod: HCNC,PN | Performed by: PHYSICAL THERAPIST

## 2020-07-09 NOTE — PROGRESS NOTES
"  Physical Therapy Treatment Note     Name: Arnaldo HOLLINGSWORTH [facility]  Clinic Number: [id number]    Therapy Diagnosis:   Encounter Diagnoses   Name Primary?    It band syndrome, right     Weakness of both hips      Physician: Joe Solis DO    Visit Date: 7/9/2020       Medical Diagnosis from Referral: M25.561,G89.29 (ICD-10-CM) - Chronic pain of right knee M76.31 (ICD-10-CM) - It band syndrome, right      Evaluation Date: 5/27/2020  Authorization Period Expiration: 6/21/2020  Plan of Care Expiration: 8/21/2020  Visit # / Visits authorized: 11 (10/20)    Time In: 1145 am  Time Out:1230 am  Total Billable Time: 45 minutes 1:1    Precautions: Standard    Subjective     Pt reports: Sharp lateral knee pain intermittently when straightening it all the way out.     He was compliant with home exercise program.  Response to previous treatment: felt good  Functional change: he is not getting less pain with sit to stand    Pain: 1/10 at rest, 6/10 with palpation distal IT band  Location: right lateral knee      Objective     6/23/2020:    R knee ROM: 0-3-125 deg with ERP in both flex/ ext            Ely's test: R = 120 degrees     Arnaldo received therapeutic exercises to develop strength, endurance, ROM, posture and core stabilization for 30 minutes including:    Heel props x 3 min (5#)  QS 10" 10 (roll under heel)  SLR 2 x 10, 2#- nt  S/L hip abduction 2# 2 x 10- nt  Bridging with PB 5" x 20  Standing gastroc str with wedge 30" x 3  TKE's with ball against the wall 2 min- nt  Prone rectus femoris stretch  3 x 30"- nt  LAQ 5# x 20- nt  Shuttle 50# B 3 x 10 (with GTB)  Step ups 2" forw x 20 ea  Step ups lateral 4" x 20 ea  monster walks GTB 20 ft x 4  alt steps trampoline x 2 min    Reviewed for HEP:  4 way hip with BTB x 20 ea  Piriformis str 30" x 3  SKTC 3 x 30"  Supine IT band str with strap 30" x 3  Clams BTB 2 x 10 ea    Arnaldo received the following manual therapy techniques: Joint mobilizations were applied to the: R " knee for 15 minutes, including:    Kinesiotape with Y strip around patella and I stip distal IT band   Patella joint mobilizations all planes 30' x 3  Tibiofemoral joint mobs AP  PA to tibfib jt    Home Exercises Provided and Patient Education Provided     Education provided:     HEP, issued blue teraband    Written Home Exercises Provided: Patient instructed to continue with previous HEP  Exercises were reviewed and Arnaldo was able to demonstrate them prior to the end of the session.  Arnaldo demonstrated good  understanding of the education provided.     See EMR under Patient Instructions for exercises provided 6/2/2020.    Assessment     Pt with exacerbation of R knee pain with leg press, improved symptoms when performed with thera band for resisted abduction.     Arnaldo is progressing well towards his goals.   Pt prognosis is Good.     Pt will continue to benefit from skilled outpatient physical therapy to address the deficits listed in the problem list box on initial evaluation, provide pt/family education and to maximize pt's level of independence in the home and community environment.     Pt's spiritual, cultural and educational needs considered and pt agreeable to plan of care and goals.     Anticipated barriers to physical therapy: none    Goals:   Short Term Goals: 6 weeks   1. Patient to demonstrate increased flexibility hip flexors as noted by 10 degree increase in prone knee flexion for improved posture and gait- met  2. Patient to report <4/10 pain with walking 2 miles or greater -met  3. Patient to be able to ascend/ descend 1 flight of stairs with minimal to no difficulty -met     Long Term Goals: 12 weeks   1. Patient to be independent with home exercise program for improved self management of condition  - in progress, not met  2. Patient to have decreased subjective report of disability as noted by a score of 31% on the FOTO questionnaire  - in progress, not met  3. Patient to increased strength in  BLE's to 5/5 or greater for improved performance of ADL's  - in progress, not met  4. Patient to have improved balance as noted by SLS 10 sec or greater for return to running - in progress, not met    Plan     Continue per POC for neuromuscular re-education, gluteal strengthening, core stabilization, and manual therapy     Sheeba Salinas, PT

## 2020-07-14 ENCOUNTER — CLINICAL SUPPORT (OUTPATIENT)
Dept: REHABILITATION | Facility: OTHER | Age: 75
End: 2020-07-14
Payer: MEDICARE

## 2020-07-14 DIAGNOSIS — M76.31 IT BAND SYNDROME, RIGHT: ICD-10-CM

## 2020-07-14 DIAGNOSIS — R29.898 WEAKNESS OF BOTH HIPS: ICD-10-CM

## 2020-07-14 PROCEDURE — 97110 THERAPEUTIC EXERCISES: CPT | Mod: HCNC,PN | Performed by: PHYSICAL THERAPIST

## 2020-07-14 PROCEDURE — 97140 MANUAL THERAPY 1/> REGIONS: CPT | Mod: HCNC,PN | Performed by: PHYSICAL THERAPIST

## 2020-07-14 NOTE — PROGRESS NOTES
"  Physical Therapy Treatment Note     Name: Arnaldo HOLLINGSWORTH [facility]  Clinic Number: [id number]    Therapy Diagnosis:   Encounter Diagnoses   Name Primary?    It band syndrome, right     Weakness of both hips      Physician: Joe Solis DO    Visit Date: 7/14/2020       Medical Diagnosis from Referral: M25.561,G89.29 (ICD-10-CM) - Chronic pain of right knee M76.31 (ICD-10-CM) - It band syndrome, right      Evaluation Date: 5/27/2020  Authorization Period Expiration: 6/21/2020  Plan of Care Expiration: 8/21/2020  Visit # / Visits authorized: 12 (11/20)    Time In: 1:00p  Time Out:1:45pm  Total Billable Time: 45 minutes 1:1    Precautions: Standard    Subjective     Pt reports: Improvements with the tape. It lasted until Sunday     He was compliant with home exercise program.  Response to previous treatment: felt good  Functional change: he is not getting less pain with sit to stand    Pain: 1/10 at rest, 6/10 with palpation distal IT band  Location: right lateral knee      Objective     6/23/2020:    R knee ROM: 0-3-125 deg with ERP in both flex/ ext            Ely's test: R = 120 degrees     Arnaldo received therapeutic exercises to develop strength, endurance, ROM, posture and core stabilization for 30 minutes including:    Heel props x 3 min (5#)  QS 10" 10 (roll under heel)  SLR 2 x 10, 2#- nt  S/L hip abduction 2# 2 x 10- nt  Bridging with PB 5" x 20  Standing gastroc str with wedge 30" x 3  TKE's with ball against the wall 2 min- nt  Prone rectus femoris stretch  3 x 30"-(manual with PT)  LAQ 5# x 20- nt  Shuttle 62# B 3 x 10 (with GTB)  +DL jumps on Shuttle one black cord at foot of shuttle x 30 sec  Step ups 4" forw x 20 ea  Step ups lateral 4" x 20 ea  monster walks GTB 20 ft x 4  alt steps trampoline x 2 min    Supine IT band str with strap 30" x 3      Arnaldo received the following manual therapy techniques: Joint mobilizations were applied to the: R knee for 15 minutes, including:    Kinesiotape with Y " strip around patella and I stip distal IT band   Patella joint mobilizations all planes 30' x 3  Tibiofemoral joint mobs AP  PA to tibfib jt    Home Exercises Provided and Patient Education Provided     Education provided:     HEP, issued blue teraband    Written Home Exercises Provided: Patient instructed to continue with previous HEP  Exercises were reviewed and Arnaldo was able to demonstrate them prior to the end of the session.  Arnaldo demonstrated good  understanding of the education provided.     See EMR under Patient Instructions for exercises provided 6/2/2020.    Assessment     Pt tolerated progressing CKC strengthening with increased step height and resistance with double leg squat.     Arnaldo is progressing well towards his goals.   Pt prognosis is Good.     Pt will continue to benefit from skilled outpatient physical therapy to address the deficits listed in the problem list box on initial evaluation, provide pt/family education and to maximize pt's level of independence in the home and community environment.     Pt's spiritual, cultural and educational needs considered and pt agreeable to plan of care and goals.     Anticipated barriers to physical therapy: none    Goals:   Short Term Goals: 6 weeks   1. Patient to demonstrate increased flexibility hip flexors as noted by 10 degree increase in prone knee flexion for improved posture and gait- met  2. Patient to report <4/10 pain with walking 2 miles or greater -met  3. Patient to be able to ascend/ descend 1 flight of stairs with minimal to no difficulty -met     Long Term Goals: 12 weeks   1. Patient to be independent with home exercise program for improved self management of condition  - in progress, not met  2. Patient to have decreased subjective report of disability as noted by a score of 31% on the FOTO questionnaire  - in progress, not met  3. Patient to increased strength in BLE's to 5/5 or greater for improved performance of ADL's  - in  progress, not met  4. Patient to have improved balance as noted by SLS 10 sec or greater for return to running - in progress, not met    Plan     Continue per POC for neuromuscular re-education, gluteal strengthening, core stabilization, and manual therapy     Sheeba Salinas, PT

## 2020-07-16 ENCOUNTER — CLINICAL SUPPORT (OUTPATIENT)
Dept: REHABILITATION | Facility: OTHER | Age: 75
End: 2020-07-16
Payer: MEDICARE

## 2020-07-16 DIAGNOSIS — M76.31 IT BAND SYNDROME, RIGHT: ICD-10-CM

## 2020-07-16 DIAGNOSIS — R29.898 WEAKNESS OF BOTH HIPS: ICD-10-CM

## 2020-07-16 PROCEDURE — 97110 THERAPEUTIC EXERCISES: CPT | Mod: HCNC,PN | Performed by: PHYSICAL THERAPIST

## 2020-07-16 PROCEDURE — 97140 MANUAL THERAPY 1/> REGIONS: CPT | Mod: HCNC,PN | Performed by: PHYSICAL THERAPIST

## 2020-07-16 NOTE — PROGRESS NOTES
"  Physical Therapy Treatment Note     Name: Arnaldo HOLLINGSWORTH [facility]  Clinic Number: [id number]    Therapy Diagnosis:   Encounter Diagnoses   Name Primary?    It band syndrome, right     Weakness of both hips      Physician: Joe Solis DO    Visit Date: 7/16/2020       Medical Diagnosis from Referral: M25.561,G89.29 (ICD-10-CM) - Chronic pain of right knee M76.31 (ICD-10-CM) - It band syndrome, right      Evaluation Date: 5/27/2020  Authorization Period Expiration: 6/21/2020  Plan of Care Expiration: 8/21/2020  Visit # / Visits authorized: 14 (13/20)    Time In: 11:40 am  Time Out:12:20pm  Total Billable Time: 45 minutes 1:1    Precautions: Standard    Subjective     Pt reports: Walking 2.5 miles yesterday without any R knee pain. Had some with climbing stairs today. He will be out of town for 2 weeks and will schedule for follow up when he returns    He was compliant with home exercise program.  Response to previous treatment: felt good  Functional change: he is not getting less pain with sit to stand    Pain: 1/10 at rest, 6/10 with palpation distal IT band  Location: right lateral knee      Objective     6/23/2020:    R knee ROM: 0-3-125 deg with ERP in both flex/ ext            Ely's test: R = 120 degrees     Arnaldo received therapeutic exercises to develop strength, endurance, ROM, posture and core stabilization for 30 minutes including:    Heel props x 3 min (5#)  QS 10" 10 (roll under heel)  SLR 2 x 10, 2#- nt  S/L hip abduction 2# 2 x 10- nt  Bridging with PB 5" x 20  Standing gastroc str with wedge 30" x 3  TKE's with ball against the wall 2 min- nt  Prone rectus femoris stretch  3 x 30"-(manual with PT)  LAQ 5# x 20- nt  Shuttle 25# B 3 x 10 SL  DL jumps on Shuttle one black cord at foot of shuttle x 30 sec  Step ups 4" forw x 20 ea  Step ups lateral 4" x 20 ea  monster walks GTB 20 ft x 4  alt steps trampoline x 2 min  +planks 20" x 3    Supine IT band str with strap 30" x 3 (reviewd for HEP)      Arnaldo " received the following manual therapy techniques: Joint mobilizations were applied to the: R knee for 10 minutes, including:    Patella joint mobilizations all planes 30' x 3  Tibiofemoral joint mobs AP  PA to tibfib jt  IaSTM R IT band    Home Exercises Provided and Patient Education Provided     Education provided:     HEP, added planks    Written Home Exercises Provided: Patient instructed to continue with previous HEP  Exercises were reviewed and Arnaldo was able to demonstrate them prior to the end of the session.  Arnaldo demonstrated good  understanding of the education provided.     See EMR under Patient Instructions for exercises provided 6/2/2020.    Assessment     Deferred kinesiotape application due to irritation following last visit. Pt demonstrating weakness in trunk musculature noted during shuttle jumps and planks    Arnaldo is progressing well towards his goals.   Pt prognosis is Good.     Pt will continue to benefit from skilled outpatient physical therapy to address the deficits listed in the problem list box on initial evaluation, provide pt/family education and to maximize pt's level of independence in the home and community environment.     Pt's spiritual, cultural and educational needs considered and pt agreeable to plan of care and goals.     Anticipated barriers to physical therapy: none    Goals:   Short Term Goals: 6 weeks   1. Patient to demonstrate increased flexibility hip flexors as noted by 10 degree increase in prone knee flexion for improved posture and gait- met  2. Patient to report <4/10 pain with walking 2 miles or greater -met  3. Patient to be able to ascend/ descend 1 flight of stairs with minimal to no difficulty -met     Long Term Goals: 12 weeks   1. Patient to be independent with home exercise program for improved self management of condition  - in progress, not met  2. Patient to have decreased subjective report of disability as noted by a score of 31% on the FOTO  questionnaire  - in progress, not met  3. Patient to increased strength in BLE's to 5/5 or greater for improved performance of ADL's  - in progress, not met  4. Patient to have improved balance as noted by SLS 10 sec or greater for return to running - in progress, not met    Plan     Continue per POC for neuromuscular re-education, gluteal strengthening, core stabilization, and manual therapy     Sheeba Salinas, PT

## 2020-07-17 DIAGNOSIS — M10.9 GOUT, UNSPECIFIED CAUSE, UNSPECIFIED CHRONICITY, UNSPECIFIED SITE: Primary | ICD-10-CM

## 2020-07-17 RX ORDER — ALLOPURINOL 300 MG/1
300 TABLET ORAL DAILY
Qty: 90 TABLET | Refills: 1 | Status: SHIPPED | OUTPATIENT
Start: 2020-07-17 | End: 2021-01-11

## 2020-07-17 NOTE — TELEPHONE ENCOUNTER
----- Message from Jasmyne Foss sent at 7/17/2020  8:34 AM CDT -----  Regarding: Medication Refill      Medication  Refill  Request      Please refill the medication(s) listed below. Please call the patient when the prescription(s) is ready for  at this phone number  [phone] (H)      Medication #1  allopurinol (ZYLOPRIM) 300 MG tablet  //  90 day supply      Preferred Pharmacy: [facility]/pharmacy #[id number] - [place] - [address]     [phone] (Phone)  [phone] (Fax)

## 2020-08-05 ENCOUNTER — CLINICAL SUPPORT (OUTPATIENT)
Dept: REHABILITATION | Facility: OTHER | Age: 75
End: 2020-08-05
Payer: MEDICARE

## 2020-08-05 DIAGNOSIS — M76.31 IT BAND SYNDROME, RIGHT: ICD-10-CM

## 2020-08-05 DIAGNOSIS — R29.898 WEAKNESS OF BOTH HIPS: ICD-10-CM

## 2020-08-05 PROCEDURE — 97140 MANUAL THERAPY 1/> REGIONS: CPT | Mod: HCNC,PN | Performed by: PHYSICAL THERAPIST

## 2020-08-05 PROCEDURE — 97110 THERAPEUTIC EXERCISES: CPT | Mod: HCNC,PN | Performed by: PHYSICAL THERAPIST

## 2020-08-05 NOTE — PROGRESS NOTES
"  Physical Therapy Treatment Note     Name: Arnaldo HOLLINGSWORTH [facility]  Clinic Number: [id number]    Therapy Diagnosis:   Encounter Diagnoses   Name Primary?    It band syndrome, right     Weakness of both hips      Physician: Joe Solis DO    Visit Date: 8/5/2020       Medical Diagnosis from Referral: M25.561,G89.29 (ICD-10-CM) - Chronic pain of right knee M76.31 (ICD-10-CM) - It band syndrome, right      Evaluation Date: 5/27/2020  Authorization Period Expiration: 6/21/2020  Plan of Care Expiration: 8/21/2020  Visit # / Visits authorized: 14 (13/20)    Time In: 1:45 pm  Time Out:2:30 pm  Total Billable Time: 45 minutes 1:1    Precautions: Standard    Subjective     Pt reports: minimal to no pain at rest and not as many episodes of catching pain. [place] and was able to do a quick jog across the street without increase in pain. Goal is to return to running. He recently got a peloton at home work outs to do with his wife. He does have an exercise bike but does not like biking as well.     He was compliant with home exercise program.  Response to previous treatment: felt good  Functional change: he is not getting less pain with sit to stand    Pain: 1/10 at rest, 6/10 with palpation distal IT band  Location: right lateral knee      Objective     8/6/2020:    R knee ROM: 0-3-130 deg     Arnaldo received therapeutic exercises to develop strength, endurance, ROM, posture and core stabilization for 35 minutes including:    Heel props x 3 min (5#)- nt  Bridging with PB 5" x 20  Standing gastroc str with wedge 30" x 3  Prone rectus femoris stretch  3 x 30"-(reviewed for HEP)  Shuttle 25# B 3 x 10 SL  DL jumps on Shuttle one black cord at foot of shuttle x 30 sec  Step ups 4" forw x 20 ea  Step ups lateral 4" x 20 ea  monster walks GTB 20 ft x 4  alt steps trampoline x 2 min  planks 20" x 3- nt  Bird dogs x 5 (CGA for balance)    Supine IT band str with strap 30" x 3 (reviewd " for HEP)      Arnaldo received the following manual therapy techniques: Joint mobilizations were applied to the: R knee for 10 minutes, including:    Patella joint mobilizations all planes 30' x 3  Tibiofemoral joint mobs AP  PA to tibfib jt    Home Exercises Provided and Patient Education Provided     Education provided:     HEP    Written Home Exercises Provided: Patient instructed to continue with previous HEP  Exercises were reviewed and Arnaldo was able to demonstrate them prior to the end of the session.  Arnaldo demonstrated good  understanding of the education provided.     See EMR under Patient Instructions for exercises provided 6/2/2020.    Assessment     Pt last seen 7/16 due to vacation. Pt progressing with decreased frequency and intensity of pain. Continue joint stiffness and limitations in R knee extension, decreased SL balance, and lumbopevlic stability. Pt to benefit from continued skilled PT for function training program and return to running.     Arnaldo is progressing well towards his goals.   Pt prognosis is Good.     Pt will continue to benefit from skilled outpatient physical therapy to address the deficits listed in the problem list box on initial evaluation, provide pt/family education and to maximize pt's level of independence in the home and community environment.     Pt's spiritual, cultural and educational needs considered and pt agreeable to plan of care and goals.     Anticipated barriers to physical therapy: none    Goals:   Short Term Goals: 6 weeks   1. Patient to demonstrate increased flexibility hip flexors as noted by 10 degree increase in prone knee flexion for improved posture and gait- met  2. Patient to report <4/10 pain with walking 2 miles or greater -met  3. Patient to be able to ascend/ descend 1 flight of stairs with minimal to no difficulty -met     Long Term Goals: 12 weeks   1. Patient to be independent with home exercise program for improved self management of condition   - in progress, not met  2. Patient to have decreased subjective report of disability as noted by a score of 31% on the FOTO questionnaire  - in progress, not met  3. Patient to increased strength in BLE's to 5/5 or greater for improved performance of ADL's  - in progress, not met  4. Patient to have improved balance as noted by SLS 10 sec or greater for return to running - in progress, not met    Plan     Continue per POC for neuromuscular re-education, gluteal strengthening, core stabilization, and manual therapy     Sheeba Salinas, PT

## 2020-08-07 ENCOUNTER — CLINICAL SUPPORT (OUTPATIENT)
Dept: REHABILITATION | Facility: OTHER | Age: 75
End: 2020-08-07
Payer: MEDICARE

## 2020-08-07 DIAGNOSIS — R29.898 WEAKNESS OF BOTH HIPS: ICD-10-CM

## 2020-08-07 DIAGNOSIS — M76.31 IT BAND SYNDROME, RIGHT: ICD-10-CM

## 2020-08-07 PROCEDURE — 97140 MANUAL THERAPY 1/> REGIONS: CPT | Mod: HCNC,PN | Performed by: PHYSICAL THERAPIST

## 2020-08-07 PROCEDURE — 97110 THERAPEUTIC EXERCISES: CPT | Mod: HCNC,PN | Performed by: PHYSICAL THERAPIST

## 2020-08-07 NOTE — PROGRESS NOTES
"  Physical Therapy Treatment Note     Name: Arnaldo HOLLINGSWORTH [facility]  Clinic Number: [id number]    Therapy Diagnosis:   Encounter Diagnoses   Name Primary?    It band syndrome, right     Weakness of both hips      Physician: Joe Solis DO    Visit Date: 8/7/2020       Medical Diagnosis from Referral: M25.561,G89.29 (ICD-10-CM) - Chronic pain of right knee M76.31 (ICD-10-CM) - It band syndrome, right      Evaluation Date: 5/27/2020  Authorization Period Expiration: 6/21/2020  Plan of Care Expiration: 8/21/2020  Visit # / Visits authorized: 15 (14/20)    Time In: 0945am  Time Out 1030am  Total Billable Time: 45 minutes 1:1    Precautions: Standard    Subjective     Pt reports: no issues following last visit.     He was compliant with home exercise program.  Response to previous treatment: felt good  Functional change: he is not getting less pain with sit to stand    Pain: 1/10 at rest, 6/10 with palpation distal IT band  Location: right lateral knee      Objective     8/6/2020:    R knee ROM: 0-3-130 deg     Arnaldo received therapeutic exercises to develop strength, endurance, ROM, posture and core stabilization for 35 minutes including:    Heel props x 3 min (5#)- nt  Bridging with PB 5" x 20  Standing gastroc str with wedge 30" x 3  Prone rectus femoris stretch  3 x 30"-(reviewed for HEP)  Shuttle 25# B 3 x 10 SL  DL jumps on Shuttle one black cord at foot of shuttle x 30 sec  Step ups 6" forw x 20 ea  Step ups lateral 6" x 20 ea  monster walks GTB 20 ft x 4  alt steps trampoline x 2 min  planks 10" x 3    Supine IT band str with strap 30" x 3 (reviewd for HEP)      Arnaldo received the following manual therapy techniques: Joint mobilizations were applied to the: R knee for 10 minutes, including:    Patella joint mobilizations all planes 30' x 3  Tibiofemoral joint mobs AP  PA to tibfib jt    Home Exercises Provided and Patient Education Provided     Education provided:     HEP    Written Home Exercises Provided: " Patient instructed to continue with previous HEP  Exercises were reviewed and Arnaldo was able to demonstrate them prior to the end of the session.  Arnaldo demonstrated good  understanding of the education provided.     See EMR under Patient Instructions for exercises provided 6/2/2020.    Assessment     Progressing with increased step height without exacerbation of R lateral knee pain.     Arnaldo is progressing well towards his goals.   Pt prognosis is Good.     Pt will continue to benefit from skilled outpatient physical therapy to address the deficits listed in the problem list box on initial evaluation, provide pt/family education and to maximize pt's level of independence in the home and community environment.     Pt's spiritual, cultural and educational needs considered and pt agreeable to plan of care and goals.     Anticipated barriers to physical therapy: none    Goals:   Short Term Goals: 6 weeks   1. Patient to demonstrate increased flexibility hip flexors as noted by 10 degree increase in prone knee flexion for improved posture and gait- met  2. Patient to report <4/10 pain with walking 2 miles or greater -met  3. Patient to be able to ascend/ descend 1 flight of stairs with minimal to no difficulty -met     Long Term Goals: 12 weeks   1. Patient to be independent with home exercise program for improved self management of condition  - in progress, not met  2. Patient to have decreased subjective report of disability as noted by a score of 31% on the FOTO questionnaire  - in progress, not met  3. Patient to increased strength in BLE's to 5/5 or greater for improved performance of ADL's  - in progress, not met  4. Patient to have improved balance as noted by SLS 10 sec or greater for return to running - in progress, not met    Plan     Continue per POC for neuromuscular re-education, gluteal strengthening, core stabilization, and manual therapy     Sheeba Salinas, PT

## 2020-08-12 ENCOUNTER — CLINICAL SUPPORT (OUTPATIENT)
Dept: REHABILITATION | Facility: OTHER | Age: 75
End: 2020-08-12
Payer: MEDICARE

## 2020-08-12 DIAGNOSIS — R29.898 WEAKNESS OF BOTH HIPS: ICD-10-CM

## 2020-08-12 DIAGNOSIS — M76.31 IT BAND SYNDROME, RIGHT: ICD-10-CM

## 2020-08-12 PROCEDURE — 97110 THERAPEUTIC EXERCISES: CPT | Mod: HCNC,PN | Performed by: PHYSICAL THERAPIST

## 2020-08-12 NOTE — PROGRESS NOTES
"  Physical Therapy Treatment Note     Name: Arnaldo HOLLINGSWORTH [facility]  Clinic Number: [id number]    Therapy Diagnosis:   Encounter Diagnoses   Name Primary?    It band syndrome, right     Weakness of both hips      Physician: Joe Solis DO    Visit Date: 8/12/2020       Medical Diagnosis from Referral: M25.561,G89.29 (ICD-10-CM) - Chronic pain of right knee M76.31 (ICD-10-CM) - It band syndrome, right      Evaluation Date: 5/27/2020  Authorization Period Expiration: 6/21/2020  Plan of Care Expiration: 8/21/2020  Visit # / Visits authorized: 16 (15/20)    Time In: 1:45pm  Time Out 2:25pm  Total Billable Time: 40 minutes 1:1    Precautions: Standard    Subjective     Pt reports: no issues following last visit and has been going on long walks. Overall sees improvements and would like to continue PT.     He was compliant with home exercise program.  Response to previous treatment: felt good  Functional change: he is not getting less pain with sit to stand    Pain: 1/10 at rest, 3/10 with squatting  Location: right lateral knee      Objective     8/6/2020:    R knee ROM: 0-3-130 deg     Arnaldo received therapeutic exercises to develop strength, endurance, ROM, posture and core stabilization for 40 minutes including:    Heel props x 3 min (5#)- nt  SL Bridging with figure 4 5" x 20  Standing gastroc str with wedge 30" x 3  Prone rectus femoris stretch  3 x 30"-(reviewed for HEP)  Shuttle 25# B 3 x 10 SL- nt  DL jumps on Shuttle one black cord at foot of shuttle x 30 sec- nt  Step ups 6" forw x 20 ea  Step ups lateral 6" x 20 ea  U Squat x 20  monster walks GTB 20 ft x 4  alt steps trampoline x 2 min  planks 15" x 3  Dying bug 20 x 2    Supine IT band str with strap 30" x 3 (reviewd for HEP)      Arnaldo received the following manual therapy techniques: Joint mobilizations were applied to the: R knee for 00 minutes, including:    Patella joint mobilizations all planes 30' x 3  Tibiofemoral joint mobs AP  PA to tibfib " jt    Home Exercises Provided and Patient Education Provided     Education provided:     HEP    Written Home Exercises Provided: Patient instructed to continue with previous HEP  Exercises were reviewed and Arnaldo was able to demonstrate them prior to the end of the session.  Arnaldo demonstrated good  understanding of the education provided.     See EMR under Patient Instructions for exercises provided 6/2/2020.    Assessment     Progressing with core stabilization exercises. Initially exacerbation of LBP with supine alternating arm and leg flexion, pain ceased following cuing to TrA and neutral pelvis.     Arnaldo is progressing well towards his goals.   Pt prognosis is Good.     Pt will continue to benefit from skilled outpatient physical therapy to address the deficits listed in the problem list box on initial evaluation, provide pt/family education and to maximize pt's level of independence in the home and community environment.     Pt's spiritual, cultural and educational needs considered and pt agreeable to plan of care and goals.     Anticipated barriers to physical therapy: none    Goals:   Short Term Goals: 6 weeks   1. Patient to demonstrate increased flexibility hip flexors as noted by 10 degree increase in prone knee flexion for improved posture and gait- met  2. Patient to report <4/10 pain with walking 2 miles or greater -met  3. Patient to be able to ascend/ descend 1 flight of stairs with minimal to no difficulty -met     Long Term Goals: 12 weeks   1. Patient to be independent with home exercise program for improved self management of condition  - in progress, not met  2. Patient to have decreased subjective report of disability as noted by a score of 31% on the FOTO questionnaire  - in progress, not met  3. Patient to increased strength in BLE's to 5/5 or greater for improved performance of ADL's  - in progress, not met  4. Patient to have improved balance as noted by SLS 10 sec or greater for  return to running - in progress, not met    Plan     Continue per POC for neuromuscular re-education, gluteal strengthening, core stabilization, and manual therapy     Sheeba Salinas, PT

## 2020-08-14 ENCOUNTER — CLINICAL SUPPORT (OUTPATIENT)
Dept: REHABILITATION | Facility: OTHER | Age: 75
End: 2020-08-14
Payer: MEDICARE

## 2020-08-14 DIAGNOSIS — M76.31 IT BAND SYNDROME, RIGHT: ICD-10-CM

## 2020-08-14 DIAGNOSIS — R29.898 WEAKNESS OF BOTH HIPS: ICD-10-CM

## 2020-08-14 PROCEDURE — 97140 MANUAL THERAPY 1/> REGIONS: CPT | Mod: HCNC,PN | Performed by: PHYSICAL THERAPIST

## 2020-08-14 PROCEDURE — 97110 THERAPEUTIC EXERCISES: CPT | Mod: HCNC,PN | Performed by: PHYSICAL THERAPIST

## 2020-08-14 NOTE — PROGRESS NOTES
"  Physical Therapy Treatment Note     Name: Arnaldo HOLLINGSWORTH [facility]  Clinic Number: [id number]    Therapy Diagnosis:   Encounter Diagnoses   Name Primary?    It band syndrome, right     Weakness of both hips      Physician: Joe Solis DO    Visit Date: 8/14/2020       Medical Diagnosis from Referral: M25.561,G89.29 (ICD-10-CM) - Chronic pain of right knee M76.31 (ICD-10-CM) - It band syndrome, right      Evaluation Date: 5/27/2020  Authorization Period Expiration: 6/21/2020  Plan of Care Expiration: 8/21/2020  Visit # / Visits authorized: 17 (16/20)    Time In: 0945am  Time Out 1030am  Total Billable Time: 40 minutes 1:1    Precautions: Standard    Subjective     Pt reports: Pt reports having a long walk yesterday and short bouts of jogging to avoid traffic. Some increased lateral R knee pain today    He was compliant with home exercise program.  Response to previous treatment: felt good  Functional change: he is not getting less pain with sit to stand    Pain: 1/10 at rest, 3/10 with squatting  Location: right lateral knee      Objective     8/6/2020:    R knee ROM: 0-3-130 deg     Arnaldo received therapeutic exercises to develop strength, endurance, ROM, posture and core stabilization for 35 minutes including:    Heel props x 3 min (5#)- nt  SL Bridging with figure 4 5" x 20  Standing gastroc str with wedge 30" x 3  Prone rectus femoris stretch  3 x 30"-(reviewed for HEP)  Shuttle 25# B 3 x 10 SL- nt  DL jumps on Shuttle one black cord at foot of shuttle x 30 sec- nt  Step ups 6" forw x 20 ea- nt  Step ups lateral 6" x 20 ea- nt  U Squat x 20- nt  monster walks GTB 20 ft x 4- nt  alt steps trampoline x 2 min  planks 15" x 3- nt  Dying bug 20 x 2- nt  Reverse crunches with PB x 30  Steam boats OTB over disc x 20 ea  SLS disc 20" x 3 ea    Supine IT band str with strap 30" x 3 (reviewd for HEP)      Arnaldo received the following manual therapy techniques: Joint mobilizations were applied to the: R knee for 10 " minutes, including:    Patella joint mobilizations all planes 30' x 3  Tibiofemoral joint mobs AP  PA to tibfib jt    Home Exercises Provided and Patient Education Provided     Education provided:     HEP    Written Home Exercises Provided: Patient instructed to continue with previous HEP  Exercises were reviewed and Arnaldo was able to demonstrate them prior to the end of the session.  Arnaldo demonstrated good  understanding of the education provided.     See EMR under Patient Instructions for exercises provided 6/2/2020.    Assessment     Pt with TTP gerdy's tubercle and lateral IT band with exacerbation of pain with squatting. Deferred squat exercises with good tolerance to CKC strengthening in knee extension.     Arnaldo is progressing well towards his goals.   Pt prognosis is Good.     Pt will continue to benefit from skilled outpatient physical therapy to address the deficits listed in the problem list box on initial evaluation, provide pt/family education and to maximize pt's level of independence in the home and community environment.     Pt's spiritual, cultural and educational needs considered and pt agreeable to plan of care and goals.     Anticipated barriers to physical therapy: none    Goals:   Short Term Goals: 6 weeks   1. Patient to demonstrate increased flexibility hip flexors as noted by 10 degree increase in prone knee flexion for improved posture and gait- met  2. Patient to report <4/10 pain with walking 2 miles or greater -met  3. Patient to be able to ascend/ descend 1 flight of stairs with minimal to no difficulty -met     Long Term Goals: 12 weeks   1. Patient to be independent with home exercise program for improved self management of condition  - in progress, not met  2. Patient to have decreased subjective report of disability as noted by a score of 31% on the FOTO questionnaire  - in progress, not met  3. Patient to increased strength in BLE's to 5/5 or greater for improved performance of  ADL's  - in progress, not met  4. Patient to have improved balance as noted by SLS 10 sec or greater for return to running - in progress, not met    Plan     Continue per POC for neuromuscular re-education, gluteal strengthening, core stabilization, and manual therapy     Sheeba Salinas, PT

## 2020-08-18 ENCOUNTER — CLINICAL SUPPORT (OUTPATIENT)
Dept: REHABILITATION | Facility: OTHER | Age: 75
End: 2020-08-18
Payer: MEDICARE

## 2020-08-18 DIAGNOSIS — R29.898 WEAKNESS OF BOTH HIPS: ICD-10-CM

## 2020-08-18 DIAGNOSIS — M76.31 IT BAND SYNDROME, RIGHT: ICD-10-CM

## 2020-08-18 PROCEDURE — 97110 THERAPEUTIC EXERCISES: CPT | Mod: HCNC,PN | Performed by: PHYSICAL THERAPIST

## 2020-08-18 NOTE — PROGRESS NOTES
"  Physical Therapy Treatment Note     Name: Arnaldo HOLLINGSWORTH [facility]  Clinic Number: [id number]    Therapy Diagnosis:   Encounter Diagnoses   Name Primary?    It band syndrome, right     Weakness of both hips      Physician: Joe Solis DO    Visit Date: 8/18/2020       Medical Diagnosis from Referral: M25.561,G89.29 (ICD-10-CM) - Chronic pain of right knee M76.31 (ICD-10-CM) - It band syndrome, right      Evaluation Date: 5/27/2020  Authorization Period Expiration: 6/21/2020  Plan of Care Expiration: 8/21/2020  Visit # / Visits authorized: 17 (16/20)    Time In: 1:45pm  Time Out 2:30pm  Total Billable Time: 40 minutes 1:1    Precautions: Standard    Subjective     Pt reports: doing negative push ups, planks, sit ups, clams, and more core exercises at home. Some sensitivity/ pain R knee cap with kneeling. Also, Woke up with L buttock soreness that resolved after he got moving.     He was compliant with home exercise program.  Response to previous treatment: felt good  Functional change: he is not getting less pain with sit to stand    Pain: 0/10 at rest  Location: right lateral knee      Objective     8/6/2020:    R knee ROM: 0-3-130 deg     Arnaldo received therapeutic exercises to develop strength, endurance, ROM, posture and core stabilization for 40 minutes including:    Heel props x 3 min (5#)- nt  Bridging with PB 4 5" x 20  Standing gastroc str with wedge 30" x 3  Prone rectus femoris stretch  3 x 30"-(reviewed for HEP)  Shuttle 25# B 3 x 10 SL- nt  DL jumps on Shuttle one black cord at foot of shuttle x 30 sec  Step ups 6" forw x 20 ea  Step ups lateral 6" x 20 ea  Wall Squat with PB x 20  monster walks GTB 20 ft x 4- nt  alt steps trampoline x 2 min  Modified plank EOT 30" x 3  Dying bug 20 x 2- nt  Steam boats OTB over disc x 20 ea- nt  SLS disc 20" x 3 ea    Supine IT band str with strap 30" x 3 (reviewd for HEP)      Arnaldo received the following manual therapy techniques: Joint mobilizations were applied " to the: R knee for 00 minutes, including:    Patella joint mobilizations all planes 30' x 3  Tibiofemoral joint mobs AP  PA to tibfib jt    Home Exercises Provided and Patient Education Provided     Education provided:     Trigger point release L gutes with tennis ball    Written Home Exercises Provided: Patient instructed to continue with previous HEP  Exercises were reviewed and Arnaldo was able to demonstrate them prior to the end of the session.  Arnaldo demonstrated good  understanding of the education provided.     See EMR under Patient Instructions for exercises provided 6/2/2020.    Assessment     Pt with improved tolerance to closed chain strengthening this visit with minimal pain reporting R lateral knee. Trigger point to L gluteus medius following core exercises. Education in self STM and trigger point release with tennis ball    Arnaldo is progressing well towards his goals.   Pt prognosis is Good.     Pt will continue to benefit from skilled outpatient physical therapy to address the deficits listed in the problem list box on initial evaluation, provide pt/family education and to maximize pt's level of independence in the home and community environment.     Pt's spiritual, cultural and educational needs considered and pt agreeable to plan of care and goals.     Anticipated barriers to physical therapy: none    Goals:   Short Term Goals: 6 weeks   1. Patient to demonstrate increased flexibility hip flexors as noted by 10 degree increase in prone knee flexion for improved posture and gait- met  2. Patient to report <4/10 pain with walking 2 miles or greater -met  3. Patient to be able to ascend/ descend 1 flight of stairs with minimal to no difficulty -met     Long Term Goals: 12 weeks   1. Patient to be independent with home exercise program for improved self management of condition  - in progress, not met  2. Patient to have decreased subjective report of disability as noted by a score of 31% on the FOTO  questionnaire  - in progress, not met  3. Patient to increased strength in BLE's to 5/5 or greater for improved performance of ADL's  - in progress, not met  4. Patient to have improved balance as noted by SLS 10 sec or greater for return to running - in progress, not met    Plan     Continue per POC for neuromuscular re-education, gluteal strengthening, core stabilization, and manual therapy     Sheeba Salinas, PT

## 2020-08-20 ENCOUNTER — CLINICAL SUPPORT (OUTPATIENT)
Dept: REHABILITATION | Facility: OTHER | Age: 75
End: 2020-08-20
Payer: MEDICARE

## 2020-08-20 DIAGNOSIS — R29.898 WEAKNESS OF BOTH HIPS: ICD-10-CM

## 2020-08-20 DIAGNOSIS — M76.31 IT BAND SYNDROME, RIGHT: ICD-10-CM

## 2020-08-20 PROCEDURE — 97140 MANUAL THERAPY 1/> REGIONS: CPT | Mod: HCNC,PN | Performed by: PHYSICAL THERAPIST

## 2020-08-20 PROCEDURE — 97110 THERAPEUTIC EXERCISES: CPT | Mod: HCNC,PN | Performed by: PHYSICAL THERAPIST

## 2020-08-20 NOTE — PROGRESS NOTES
"  Physical Therapy Treatment Note     Name: [facility]nal[facility]  Clinic Number: [id number]    Therapy Diagnosis:   Encounter Diagnoses   Name Primary?    It band syndrome, right     Weakness of both hips      Physician: Joe Solis DO    Visit Date: 8/20/2020       Medical Diagnosis from Referral: M25.561,G89.29 (ICD-10-CM) - Chronic pain of right knee M76.31 (ICD-10-CM) - It band syndrome, right      Evaluation Date: 5/27/2020  Authorization Period Expiration: 6/21/2020  Plan of Care Expiration: 8/21/2020  Visit # / Visits authorized: 17 (16/20)    Time In: 1:45pm  Time Out 2:30pm  Total Billable Time: 40 minutes 1:1    Precautions: Standard    Subjective     Pt reports: When one problem gets better, he gets another. His R knee continues to improve but persistent L buttock pain. Pt inquiring about f/u with Dr. Solis.     He was compliant with home exercise program.  Response to previous treatment: increased L hip pain  Functional change: less knee pain with walking, stairs, and sit<>stand transfers    Pain: 0/10 at rest  Location: right lateral knee      Objective     8/20/2020:    R knee ROM: 0-0-130 deg     Special tests:    MARCIAL: negative  FADIR: negative  Scour's: negative      Arnaldo received therapeutic exercises to develop strength, endurance, ROM, posture and core stabilization for 25 minutes including:    Heel props x 3 min (6#)  Supine IT band str with strap 30" x 3  Supine piriformis str 30" x 3  SAQ 3# 10" x 10 (over full bolster, then 1/2 roll)  Standing gastroc str with wedge 30" x 3  Prone rectus femoris stretch  3 x 30"    Bridging with PB 4 5" x 20-nt  Shuttle 25# B 3 x 10 SL- nt  DL jumps on Shuttle one black cord at foot of shuttle x 30 sec- nt  Step ups 6" forw x 20 ea- nt  Step ups lateral 6" x 20 ea- nt  Wall Squat with PB x 20- nt  monster walks GTB 20 ft x 4- nt  alt steps trampoline x 2 min- nt  Modified plank EOT 30" x 3- nt  Dying bug 20 x 2- nt  Steam boats OTB over disc x 20 ea- " "nt  SLS disc 20" x 3 ea- nt      Arnaldo received the following manual therapy techniques: Joint mobilizations were applied to the: R knee for 20 minutes, including:    Patella joint mobilizations all planes 30' x 3  Tibiofemoral joint mobs AP  PA to tibfib jt  Rolling stick L posterior/lateral hip  LLD L 30" x 3    Home Exercises Provided and Patient Education Provided     Education provided:     -education on stretches, gentle ROM, and walking as tolerated. Hold hip strengthening exercises for 2 days. Recommended f/u with Dr Solis for L hip pain    Written Home Exercises Provided: Patient instructed to continue with previous HEP  Exercises were reviewed and Arnaldo was able to demonstrate them prior to the end of the session.  Arnaldo demonstrated good  understanding of the education provided.     See EMR under Patient Instructions for exercises provided 6/2/2020.    Assessment     Modified treatment this session with mat exercises targeting hip stretching, quad strengthening, and gentle ROM. TTP L greater trochanter, TFL, superior gluteals. Negative impingement and hip OA tests. Recommended f/u with Dr. Solis for persistent L hip pain.     Arnaldo is progressing well towards his goals.   Pt prognosis is Good.     Pt will continue to benefit from skilled outpatient physical therapy to address the deficits listed in the problem list box on initial evaluation, provide pt/family education and to maximize pt's level of independence in the home and community environment.     Pt's spiritual, cultural and educational needs considered and pt agreeable to plan of care and goals.     Anticipated barriers to physical therapy: none    Goals:   Short Term Goals: 6 weeks   1. Patient to demonstrate increased flexibility hip flexors as noted by 10 degree increase in prone knee flexion for improved posture and gait- met  2. Patient to report <4/10 pain with walking 2 miles or greater -met  3. Patient to be able to ascend/ descend 1 " flight of stairs with minimal to no difficulty -met     Long Term Goals: 12 weeks   1. Patient to be independent with home exercise program for improved self management of condition  - in progress, not met  2. Patient to have decreased subjective report of disability as noted by a score of 31% on the FOTO questionnaire  - in progress, not met  3. Patient to increased strength in BLE's to 5/5 or greater for improved performance of ADL's  - in progress, not met  4. Patient to have improved balance as noted by SLS 10 sec or greater for return to running - in progress, not met    Plan     Continue per POC for neuromuscular re-education, gluteal strengthening, core stabilization, and manual therapy     Sheeba Salinas, PT

## 2020-08-25 ENCOUNTER — CLINICAL SUPPORT (OUTPATIENT)
Dept: REHABILITATION | Facility: OTHER | Age: 75
End: 2020-08-25
Payer: MEDICARE

## 2020-08-25 DIAGNOSIS — M76.31 IT BAND SYNDROME, RIGHT: ICD-10-CM

## 2020-08-25 DIAGNOSIS — R29.898 WEAKNESS OF BOTH HIPS: ICD-10-CM

## 2020-08-25 PROCEDURE — 97110 THERAPEUTIC EXERCISES: CPT | Mod: HCNC,PN | Performed by: PHYSICAL THERAPIST

## 2020-08-25 PROCEDURE — 97140 MANUAL THERAPY 1/> REGIONS: CPT | Mod: HCNC,PN | Performed by: PHYSICAL THERAPIST

## 2020-08-25 NOTE — PROGRESS NOTES
"  Physical Therapy Treatment Note     Name: Arnaldo HOLLINGSWORTH [facility]  Clinic Number: [id number]    Therapy Diagnosis:   Encounter Diagnoses   Name Primary?    It band syndrome, right     Weakness of both hips      Physician: Joe Solis DO    Visit Date: 8/25/2020       Medical Diagnosis from Referral: M25.561,G89.29 (ICD-10-CM) - Chronic pain of right knee M76.31 (ICD-10-CM) - It band syndrome, right      Evaluation Date: 5/27/2020  Authorization Period Expiration: 6/21/2020  Plan of Care Expiration: 8/21/2020  Visit # / Visits authorized: 18 (17/20)    Time In: 1:45 pm  Time Out 2:30 pm  Total Billable Time: 40 minutes 1:1    Precautions: Standard    Subjective     Pt reports: His L hip pain has improved with rest and stretching. Pain worse with walking longer distances. R knee has been improving and would like to continue PT. He has appointment with Dr Solis on Thursday    He was compliant with home exercise program.  Response to previous treatment: increased L hip pain  Functional change: less knee pain with walking, stairs, and sit<>stand transfers    Pain: 0/10 at rest  Location: right lateral knee      Objective       Arnaldo received therapeutic exercises to develop strength, endurance, ROM, posture and core stabilization for 30 minutes including:    Heel props x 3 min (6#)- nt  Supine IT band str with strap 30" x 3  Supine piriformis str 30" x 3  SAQ 4# 10" x 10 (over full bolster, then 1/2 roll)  Standing gastroc str with wedge 30" x 3  Prone rectus femoris stretch  3 x 30"  Dying bug 20 x 2  Bridging with adductor ball 4 5" x 20  DL squats on shuttle concurrently with moist heat 50# x 5 min     Shuttle 25# B 3 x 10 SL- nt  DL jumps on Shuttle one black cord at foot of shuttle x 30 sec- nt  Step ups 6" forw x 20 ea- nt  Step ups lateral 6" x 20 ea- nt  Wall Squat with PB x 20- nt  monster walks GTB 20 ft x 4- nt  alt steps trampoline x 2 min- nt  Modified plank EOT 30" x 3- nt  Steam boats OTB over disc x " "20 ea- nt  SLS disc 20" x 3 ea- nt    Arnaldo received the following manual therapy techniques: Joint mobilizations were applied to the: R knee for 15 minutes, including:    Patella joint mobilizations all planes 30' x 3  Tibiofemoral joint mobs AP  PA to tibfib jt  Rolling stick L posterior/lateral hip  LLD L 30" x 3    Home Exercises Provided and Patient Education Provided     Education provided:     -education on stretches, gentle ROM, and walking as tolerated. Hold hip strengthening exercises for 2 days. Recommended f/u with Dr Solis for L hip pain    Written Home Exercises Provided: Patient instructed to continue with previous HEP  Exercises were reviewed and Arnaldo was able to demonstrate them prior to the end of the session.  Arnaldo demonstrated good  understanding of the education provided.     See EMR under Patient Instructions for exercises provided 6/2/2020.    Assessment     Pt presents to clinic with acute exacerbation L hip pain following performance of core strengthening exercises and increased distance ambulated. TTP L greater trochanter, TFL, superior gluteals. Pain exacerbated by active hip abduction. Negative FADIR, MARCIAL, and scours. Pt progressing since beginning skilled care with improved R knee extension, flexibility hamstrings and TFL, and improved pain with stairs and ambulating. Appointment with Dr. Solis Thursday 8/27/2020. Pt is at end of plan of care and will continue per recommendations.     Arnaldo is progressing well towards his goals.   Pt prognosis is Good.     Pt will continue to benefit from skilled outpatient physical therapy to address the deficits listed in the problem list box on initial evaluation, provide pt/family education and to maximize pt's level of independence in the home and community environment.     Pt's spiritual, cultural and educational needs considered and pt agreeable to plan of care and goals.     Anticipated barriers to physical therapy: none    Goals: "   Short Term Goals: 6 weeks   1. Patient to demonstrate increased flexibility hip flexors as noted by 10 degree increase in prone knee flexion for improved posture and gait- met  2. Patient to report <4/10 pain with walking 2 miles or greater -met  3. Patient to be able to ascend/ descend 1 flight of stairs with minimal to no difficulty -met     Long Term Goals: 12 weeks   1. Patient to be independent with home exercise program for improved self management of condition  - in progress, not met  2. Patient to have decreased subjective report of disability as noted by a score of 31% on the FOTO questionnaire  - in progress, not met  3. Patient to increased strength in BLE's to 5/5 or greater for improved performance of ADL's  - in progress, not met  4. Patient to have improved balance as noted by SLS 10 sec or greater for return to running - in progress, not met    Plan     Continue per POC for neuromuscular re-education, gluteal strengthening, core stabilization, and manual therapy     Sheeba Salinas, PT

## 2020-08-26 DIAGNOSIS — M25.552 LEFT HIP PAIN: Primary | ICD-10-CM

## 2020-08-27 ENCOUNTER — APPOINTMENT (OUTPATIENT)
Dept: RADIOLOGY | Facility: OTHER | Age: 75
End: 2020-08-27
Attending: ORTHOPAEDIC SURGERY
Payer: MEDICARE

## 2020-08-27 ENCOUNTER — CLINICAL SUPPORT (OUTPATIENT)
Dept: REHABILITATION | Facility: OTHER | Age: 75
End: 2020-08-27
Payer: MEDICARE

## 2020-08-27 ENCOUNTER — OFFICE VISIT (OUTPATIENT)
Dept: ORTHOPEDICS | Facility: CLINIC | Age: 75
End: 2020-08-27
Payer: MEDICARE

## 2020-08-27 VITALS
SYSTOLIC BLOOD PRESSURE: 124 MMHG | HEIGHT: 73 IN | WEIGHT: 228 LBS | BODY MASS INDEX: 30.22 KG/M2 | DIASTOLIC BLOOD PRESSURE: 80 MMHG

## 2020-08-27 DIAGNOSIS — M76.31 IT BAND SYNDROME, RIGHT: ICD-10-CM

## 2020-08-27 DIAGNOSIS — M79.645 PAIN OF LEFT THUMB: ICD-10-CM

## 2020-08-27 DIAGNOSIS — M25.552 LATERAL PAIN OF LEFT HIP: Primary | ICD-10-CM

## 2020-08-27 DIAGNOSIS — M25.552 LEFT HIP PAIN: ICD-10-CM

## 2020-08-27 DIAGNOSIS — M76.02 GLUTEAL TENDINITIS OF LEFT BUTTOCK: ICD-10-CM

## 2020-08-27 DIAGNOSIS — R29.898 WEAKNESS OF BOTH HIPS: ICD-10-CM

## 2020-08-27 DIAGNOSIS — G89.29 CHRONIC PAIN OF RIGHT KNEE: ICD-10-CM

## 2020-08-27 DIAGNOSIS — M25.561 CHRONIC PAIN OF RIGHT KNEE: ICD-10-CM

## 2020-08-27 PROCEDURE — 1125F AMNT PAIN NOTED PAIN PRSNT: CPT | Mod: HCNC,S$GLB,, | Performed by: ORTHOPAEDIC SURGERY

## 2020-08-27 PROCEDURE — 97110 THERAPEUTIC EXERCISES: CPT | Mod: HCNC,PN | Performed by: PHYSICAL THERAPIST

## 2020-08-27 PROCEDURE — 1159F PR MEDICATION LIST DOCUMENTED IN MEDICAL RECORD: ICD-10-PCS | Mod: HCNC,S$GLB,, | Performed by: ORTHOPAEDIC SURGERY

## 2020-08-27 PROCEDURE — 99999 PR PBB SHADOW E&M-EST. PATIENT-LVL III: CPT | Mod: PBBFAC,HCNC,, | Performed by: ORTHOPAEDIC SURGERY

## 2020-08-27 PROCEDURE — 97110 PR THERAPEUTIC EXERCISES: ICD-10-PCS | Mod: HCNC,GP,S$GLB, | Performed by: ORTHOPAEDIC SURGERY

## 2020-08-27 PROCEDURE — 1101F PT FALLS ASSESS-DOCD LE1/YR: CPT | Mod: HCNC,CPTII,S$GLB, | Performed by: ORTHOPAEDIC SURGERY

## 2020-08-27 PROCEDURE — 1125F PR PAIN SEVERITY QUANTIFIED, PAIN PRESENT: ICD-10-PCS | Mod: HCNC,S$GLB,, | Performed by: ORTHOPAEDIC SURGERY

## 2020-08-27 PROCEDURE — 1159F MED LIST DOCD IN RCRD: CPT | Mod: HCNC,S$GLB,, | Performed by: ORTHOPAEDIC SURGERY

## 2020-08-27 PROCEDURE — 3008F PR BODY MASS INDEX (BMI) DOCUMENTED: ICD-10-PCS | Mod: HCNC,CPTII,S$GLB, | Performed by: ORTHOPAEDIC SURGERY

## 2020-08-27 PROCEDURE — 99214 PR OFFICE/OUTPT VISIT, EST, LEVL IV, 30-39 MIN: ICD-10-PCS | Mod: HCNC,25,S$GLB, | Performed by: ORTHOPAEDIC SURGERY

## 2020-08-27 PROCEDURE — 3079F DIAST BP 80-89 MM HG: CPT | Mod: HCNC,CPTII,S$GLB, | Performed by: ORTHOPAEDIC SURGERY

## 2020-08-27 PROCEDURE — 73502 X-RAY EXAM HIP UNI 2-3 VIEWS: CPT | Mod: 26,HCNC,LT, | Performed by: RADIOLOGY

## 2020-08-27 PROCEDURE — 73502 X-RAY EXAM HIP UNI 2-3 VIEWS: CPT | Mod: TC,HCNC,LT

## 2020-08-27 PROCEDURE — 3008F BODY MASS INDEX DOCD: CPT | Mod: HCNC,CPTII,S$GLB, | Performed by: ORTHOPAEDIC SURGERY

## 2020-08-27 PROCEDURE — 99214 OFFICE O/P EST MOD 30 MIN: CPT | Mod: HCNC,25,S$GLB, | Performed by: ORTHOPAEDIC SURGERY

## 2020-08-27 PROCEDURE — 97110 THERAPEUTIC EXERCISES: CPT | Mod: HCNC,GP,S$GLB, | Performed by: ORTHOPAEDIC SURGERY

## 2020-08-27 PROCEDURE — 3074F PR MOST RECENT SYSTOLIC BLOOD PRESSURE < 130 MM HG: ICD-10-PCS | Mod: HCNC,CPTII,S$GLB, | Performed by: ORTHOPAEDIC SURGERY

## 2020-08-27 PROCEDURE — 3079F PR MOST RECENT DIASTOLIC BLOOD PRESSURE 80-89 MM HG: ICD-10-PCS | Mod: HCNC,CPTII,S$GLB, | Performed by: ORTHOPAEDIC SURGERY

## 2020-08-27 PROCEDURE — 73502 XR HIP 2 VIEW LEFT: ICD-10-PCS | Mod: 26,HCNC,LT, | Performed by: RADIOLOGY

## 2020-08-27 PROCEDURE — 3074F SYST BP LT 130 MM HG: CPT | Mod: HCNC,CPTII,S$GLB, | Performed by: ORTHOPAEDIC SURGERY

## 2020-08-27 PROCEDURE — 99999 PR PBB SHADOW E&M-EST. PATIENT-LVL III: ICD-10-PCS | Mod: PBBFAC,HCNC,, | Performed by: ORTHOPAEDIC SURGERY

## 2020-08-27 PROCEDURE — 1101F PR PT FALLS ASSESS DOC 0-1 FALLS W/OUT INJ PAST YR: ICD-10-PCS | Mod: HCNC,CPTII,S$GLB, | Performed by: ORTHOPAEDIC SURGERY

## 2020-08-27 RX ORDER — MELOXICAM 7.5 MG/1
7.5 TABLET ORAL DAILY
Qty: 30 TABLET | Refills: 0 | Status: SHIPPED | OUTPATIENT
Start: 2020-08-27 | End: 2020-09-23

## 2020-08-27 NOTE — PLAN OF CARE
"  Outpatient Therapy Updated Plan of Care     Visit Date: 8/27/2020  Name: Arnaldo Donovan  Clinic Number: [id number]    Therapy Diagnosis:   Encounter Diagnoses   Name Primary?    It band syndrome, right     Weakness of both hips      Physician: Joe Solis DO    Medical Diagnosis from Referral: M25.561,G89.29 (ICD-10-CM) - Chronic pain of right knee M76.31 (ICD-10-CM) - It band syndrome, right     Evaluation Date: 5/27/2020  Authorization Period Expiration: 6/21/2020  Plan of Care Expiration: 8/21/2020    Total Visits Received: 19  Cancelled Visits: 2  No Show Visits: 0    Current Certification Period:  5/27/2020 to 8/21/2020  Precautions:  standard  Visits from Evaluation Date:  18  Functional Level Prior to Evaluation:  independent with ADL's, running for exercise    Subjective     Update: R knee continues to improve. Notes that he has not been doing as much walking due to L hip pain. Seen by  this morning and recommended continued exercise, icing, and anti-inflammatory medication. Denies any current pain rated as a 0/10 R knee. Tolerable intermittent pain, "a catch" with stairs and squats.     Objective     Update:     Knee AROM:  R: 0-0-130 deg  L: 4-0-128 deg    Ely's:  R: 120 deg  L: 118 deg    Lower Extremity Strength  Right LE  Left LE    Knee extension: 5/5 Knee extension: 5/5   Knee flexion: 5/5 Knee flexion: 5/5   Hip flexion: 5/5 Hip flexion: 5/5   Hip extension:  4/5 Hip extension: 4-/5   Hip abduction: 4+/5 Hip abduction: 4-/5*   Hip adduction: 5/5 Hip adduction 5/5     Arnaldo received therapeutic exercises to develop strength, endurance, ROM, posture and core stabilization for 30 minutes including:    Supine IT band str with strap 30" x 3  Supine piriformis str 30" x 3  LAQ 4# 10" x 10   Standing gastroc str with wedge 30" x 3  Prone rectus femoris stretch  3 x 30"  Prone hip extension over pillow 2 x 10  Dying bug 20 x 2  Bridging with FNT pull down (pink) 4 5" x 20  Pelvic tilts with " "ball squeeze 3" x 20  DL squats on shuttle  50# 3 x 10       Home Exercises Provided and Patient Education Provided      Education provided:      Written Home Exercises Provided: Patient instructed to continue with previous HEP  Exercises were reviewed and Arnaldo was able to demonstrate them prior to the end of the session.  Arnaldo demonstrated good  understanding of the education provided.      See EMR under Patient Instructions for exercises provided 6/2/2020.      Assessment     Update: Pt presents to clinic for continued care. Pt making good progress with improved R knee extension and decreased pain with ambulation and stairs. Pt's tolerance to progression of functional exercise program and return to running limited 2/2 acute exacerbation L hip pain. Pt seen by Dr. Solis today for glute tendonitis. Pt to benefit from continued skilled care for neuromuscular re-education, gluteal strengthening, and return to PLOF.     Short Term Goals: 6 weeks   1. Patient to demonstrate increased flexibility hip flexors as noted by 10 degree increase in prone knee flexion for improved posture and gait- met  2. Patient to report <4/10 pain with walking 2 miles or greater -met  3. Patient to be able to ascend/ descend 1 flight of stairs with minimal to no difficulty -met     Long Term Goals: 12 weeks   1. Patient to be independent with home exercise program for improved self management of condition  - in progress, not met  2. Patient to have decreased subjective report of disability as noted by a score of 31% on the FOTO questionnaire  - in progress, not met  3. Patient to increased strength in BLE's to 5/5 or greater for improved performance of ADL's  - in progress, not met  4. Patient to have improved balance as noted by SLS 10 sec or greater for return to running - in progress, not met    Plan     Updated Certification Period: 8/21/2020 to 10/27/2020  Recommended Treatment Plan: 1-2 times per week for 6-8 more weeks: Gait " Training, Manual Therapy, Moist Heat/ Ice, Neuromuscular Re-ed, Patient Education, Therapeutic Activites, Therapeutic Exercise and dry needling    Sheeba Salinas, PT  8/27/2020

## 2020-08-27 NOTE — PROGRESS NOTES
CC: left hip pain    75 y.o. Male presents today for evaluation of his left hip pain. Patient admits to experiencing left hip pain for the past week. He states he was doing clam shells in addition to walking four miles on the day prior to developing left hip pain. When asked where he hurts he gestures to the lateral aspect of his left hip and describes the qualoty of his pain as sharp. He also admits to his pain radiating lateral down his leg and terminating at the knee joint. Patient admits to this problem waking him from his sleep.   How long: Patient admits to left hip pain for the past week.   What makes it better: Patient admits to improved pain with rest.   What makes it worse: Patient admits to increased pain with hip abduction.   Does it radiate: Patient admits to his pain radiating distally down the lateral aspect of his left leg and terminating at the knee.   Attempted treatments: Patient states has been modifying activity as needed. He also admits to the application of ice and heat while at physical therapy. He denies taking medications for this problem.   Pain score: 4/10  Any mechanical symptoms: Denies mechanical symptoms.   Feelings of instability: Denies feelings of instability.   Affecting ADLs: Denies this problem affecting his ability to perform ADLs.     He also admits to some left thumb pain that bothers him especially when playing guitar. He denies ever having this looked at before.    REVIEW OF SYSTEMS:   Constitution: Patient denies fever, chills, night sweats, and weight changes.  Eyes: Patient denies eye pain or vision changes.  HENT: Patient denies headache, ear pain, sore throat, or nasal discharge.  CVS: Patient denies chest pain.  Lungs: Patient denies shortness of breath or cough.  Abd: Patient denies stomach pain, nausea, or vomiting.  Skin: Patient denies skin rash or itching.    Hematologic/Lymphatic: Patient denies easy bruising.   Musculoskeletal: Patient denies recent falls. See  HPI.  Psych: Patient denies any current anxiety or nervousness.    PAST MEDICAL HISTORY:   Past Medical History:   Diagnosis Date    Allergy     seasonal    Gout, joint     Hyperlipidemia     Hypertension        PAST SURGICAL HISTORY:   Past Surgical History:   Procedure Laterality Date    VASECTOMY         FAMILY HISTORY:   Family History   Problem Relation Age of Onset    Hypertension Mother     Myasthenia gravis Mother         affects eye    Dementia Father     No Known Problems Sister     No Known Problems Brother     No Known Problems Daughter     Melanoma Neg Hx        SOCIAL HISTORY:   Social History     Socioeconomic History    Marital status:      Spouse name: Not on file    Number of children: Not on file    Years of education: Not on file    Highest education level: Not on file   Occupational History    Occupation:      Employer: [employer]   Social Needs    Financial resource strain: Not on file    Food insecurity     Worry: Not on file     Inability: Not on file    Transportation needs     Medical: Not on file     Non-medical: Not on file   Tobacco Use    Smoking status: Former Smoker     Packs/day: 0.50     Years: 15.00     Pack years: 7.50     Quit date: 10/19/1977     Years since quittin.8    Smokeless tobacco: Never Used   Substance and Sexual Activity    Alcohol use: Yes     Comment: 3-4 times weekly    Drug use: No    Sexual activity: Yes     Partners: Female     Comment:  with 3 children   Lifestyle    Physical activity     Days per week: Not on file     Minutes per session: Not on file    Stress: Not on file   Relationships    Social connections     Talks on phone: Not on file     Gets together: Not on file     Attends Jainism service: Not on file     Active member of club or organization: Not on file     Attends meetings of clubs or organizations: Not on file     Relationship status: Not on file   Other Topics Concern    Not on  "file   Social History Narrative    Not on file       MEDICATIONS:     Current Outpatient Medications:     allopurinoL (ZYLOPRIM) 300 MG tablet, Take 1 tablet (300 mg total) by mouth once daily., Disp: 90 tablet, Rfl: 1    ivermectin (SOOLANTRA) 1 % Crea, AAA on face qhs and wash off in morning, Disp: 60 g, Rfl: 3    meloxicam (MOBIC) 7.5 MG tablet, Take 1 tablet (7.5 mg total) by mouth once daily., Disp: 30 tablet, Rfl: 0    polyethylene glycol (GLYCOLAX) 17 gram PwPk, Take 17 g by mouth once daily. 17 g dissolved in 8 oz of water once daily and titrate up or down (max 34 g daily) to effect., Disp: 30 each, Rfl: 11    pravastatin (PRAVACHOL) 20 MG tablet, Take 1 tablet (20 mg total) by mouth once daily., Disp: 30 tablet, Rfl: 11    tamsulosin (FLOMAX) 0.4 mg Cap, TAKE 1 CAPSULE BY MOUTH EVERY DAY, Disp: 90 capsule, Rfl: 1    valsartan (DIOVAN) 160 MG tablet, TAKE 1 TABLET BY MOUTH EVERY DAY, Disp: 90 tablet, Rfl: 0    ALLERGIES:   Review of patient's allergies indicates:   Allergen Reactions    Doxycycline      HEADACHE (PRESSURE)    Statins-hmg-coa reductase inhibitors         PHYSICAL EXAMINATION:  /80   Ht 6' 1" (1.854 m)   Wt 103.4 kg (228 lb)   BMI 30.08 kg/m²   Vitals signs and nursing note have been reviewed.  General: In no acute distress, well developed, well nourished, no diaphoresis  Eyes: EOM full and smooth, no eye redness or discharge  HENT: normocephalic and atraumatic, neck supple, trachea midline, no nasal discharge, no external ear redness or discharge  Cardiovascular: no LE edema  Lungs: respirations non-labored, no conversational dyspnea   Abd: non-distended, no rigidity  MSK: no amputation or deformity, no swelling of extremities  Neuro: AAOx3, CN2-12 grossly intact  Skin: No rashes, warm and dry  Psychiatric: cooperative, pleasant, mood and affect appropriate for age    HIP: LEFT  The affected hip is compared to the contralateral hip.    Observation:    There is no edema, " erythema, or ecchymosis in the lumbosacral region.   There is no Trendelenburg sign on either side  No obvious pelvic obliquity while standing.    No thoracolumbar scoliosis observed.    No midline skin abnormalities.  No atrophy noted in the lower limbs.    ROM:   Passive hip flexion to 120° on left and 120° on right.    Passive hip internal rotation to 45° on left and 45° on right.    Passive hip external rotation to 45° on left and 45° on right.    Passive hip abduction to 45° on left and 45° on right.    All motions above are without pain.    Tenderness To Palpation:  No tenderness at the ASIS, AIIS, PSIS, PIIS, iliac crest, pubic bones, ischial tuberosity.  No tenderness throughout the lumbar spine, iliolumbar region, or posterior pelvis.  No tenderness throughout the sacrum or piriformis  No tenderness over the greater trochanteric bursa or greater/lesser trochanters.  + tenderness at the glut attachments on the greater trochanter  No tenderness over proximal IT band or hip flexor musculature.    Strength Testing:  Hip flexion - 5/5 on left and 5/5 on right  Hip extension - 5/5 on left and 5/5 on right  Hip adduction - 5/5 on left and 5/5 on right  Hip abduction - 5/5 on left and 5/5 on right  Knee flexion - 5/5 on left and 5/5 on right  Knee extension - 5/5 on left and 5/5 on right    Special Tests:  Standing Trendelenburg test - negative    Seated straight leg raise - negative  Supine straight leg raise - negative  Hamstring flexibility symmetric    Log roll - negative  MARCIAL - negative  FADIR - negative  Scour test - negative  No pain with posterior hip capsule compression    ASIS compression test - negative  SI drawer test - negative   Thigh thrust test - negative     Thumb exam:  Tenderness to palpation at the MCP joint on the left   negative Finkelstein's test   5/5 strength with thumb strength testing   no thumb range of motion deficits    Neurovascular Exam:  Normal gait without Trendelenburg.  2+  femoral, DP, and PT pulses BL.  No skin changes, no abnormal hair distribution.  Sensation intact to light touch throughout the obturator and medial/lateral/posterior femoral cutaneous nerves.  Capillary refill intact to <2 seconds in all lower limb digits.      IMAGIN. X-ray ordered due to left hip pain   2. X-ray images were reviewed personally by me and then directly with patient.  3. FINDINGS: X-ray images obtained demonstrate the bones are intact. There is no evidence for acute fracture or bone destruction. Hip joints appear symmetric and unremarkable. Sacroiliac joints are unremarkable. Surgical clips project over the inguinal regions bilaterally.   4. IMPRESSION: No evidence for acute fracture, bone destruction, or dislocation.    1. X-ray ordered due to left thumb pain   2. X-ray images were reviewed personally by me and then directly with patient.  3. FINDINGS: X-ray images obtained demonstrate mild DJD. There is a small calcification noted adjacent to the 1st interphalangeal joint. No acute fracture or dislocation. No bone destruction identified  4. IMPRESSION: As above.       ASSESSMENT:      ICD-10-CM ICD-9-CM   1. Lateral pain of left hip  M25.552 719.45   2. Gluteal tendinitis of left buttock  M76.02 726.5   3. Chronic pain of right knee  M25.561 719.46    G89.29 338.29   4. Pain of left thumb  M79.645 729.5         PLAN:  1-2.  Left lateral hip pain/ glute tendinitis - new complaint     - Arnaldo admits to left lateral hip pain for the past week which he attributes to increased difficulty of exercises during physical therapy. He indicates his pain is primarily lateral and is the most severe with hip abduction.  He has noticed that symptoms are improving, but he wanted to get it looked at to make sure that nothing worse was going on.    - XRs ordered in the office today and images were personally reviewed with the patient. See above for further detail.    - HEP for glute max/med retraining  prescribed today. Handout provided, explained, and exercises were demonstrated as needed. Encouraged to do daily.  08911 HOME EXERCISE PROGRAM (HEP):  The patient was taught a homegoing physical therapy regimen as described above. The patient demonstrated understanding of the exercises and proper technique of their execution. This interaction took 15 minutes.     -  Mobic 7.5 mg daily for 2 weeks followed by as needed.  Also advised icing to the lateral hip for 15 min once to twice daily.      3. Chronic right knee pain -  Improved    - Symptoms are improved with physical therapy.  He is pleased with how he is doing.      4.  Left thumb pain - new complaint    - XRs ordered in the office today and images were personally reviewed with the patient. See above for further detail.    - Home exercise program for hand putty exercises prescribed.  Handout provided and explained.      Future planning includes -   GTB injection under ultrasound guidance on the left if not improved, MCP injection if symptoms worsen    All questions were answered to the best of my ability and all concerns were addressed at this time.    Follow up as needed.       This note is dictated using the M*Modal Fluency Direct word recognition program. There are word recognition mistakes that are occasionally missed on review.

## 2020-09-01 ENCOUNTER — OFFICE VISIT (OUTPATIENT)
Dept: PRIMARY CARE CLINIC | Facility: CLINIC | Age: 75
End: 2020-09-01
Attending: FAMILY MEDICINE
Payer: MEDICARE

## 2020-09-01 VITALS
SYSTOLIC BLOOD PRESSURE: 138 MMHG | OXYGEN SATURATION: 98 % | WEIGHT: 230.63 LBS | HEART RATE: 72 BPM | HEIGHT: 73 IN | DIASTOLIC BLOOD PRESSURE: 70 MMHG | BODY MASS INDEX: 30.57 KG/M2

## 2020-09-01 DIAGNOSIS — Z78.9 STATIN INTOLERANCE: ICD-10-CM

## 2020-09-01 DIAGNOSIS — I83.93 VARICOSE VEINS OF BOTH LOWER EXTREMITIES WITHOUT ULCER OR INFLAMMATION: Primary | ICD-10-CM

## 2020-09-01 DIAGNOSIS — I10 ESSENTIAL HYPERTENSION: ICD-10-CM

## 2020-09-01 DIAGNOSIS — E78.01 FAMILIAL HYPERCHOLESTEROLEMIA: ICD-10-CM

## 2020-09-01 DIAGNOSIS — J30.2 SEASONAL ALLERGIES: ICD-10-CM

## 2020-09-01 PROCEDURE — 1125F AMNT PAIN NOTED PAIN PRSNT: CPT | Mod: HCNC,S$GLB,, | Performed by: FAMILY MEDICINE

## 2020-09-01 PROCEDURE — 99499 UNLISTED E&M SERVICE: CPT | Mod: HCNC,S$GLB,, | Performed by: FAMILY MEDICINE

## 2020-09-01 PROCEDURE — 3078F DIAST BP <80 MM HG: CPT | Mod: HCNC,CPTII,S$GLB, | Performed by: FAMILY MEDICINE

## 2020-09-01 PROCEDURE — 99214 PR OFFICE/OUTPT VISIT, EST, LEVL IV, 30-39 MIN: ICD-10-PCS | Mod: HCNC,S$GLB,, | Performed by: FAMILY MEDICINE

## 2020-09-01 PROCEDURE — 99214 OFFICE O/P EST MOD 30 MIN: CPT | Mod: HCNC,S$GLB,, | Performed by: FAMILY MEDICINE

## 2020-09-01 PROCEDURE — 3078F PR MOST RECENT DIASTOLIC BLOOD PRESSURE < 80 MM HG: ICD-10-PCS | Mod: HCNC,CPTII,S$GLB, | Performed by: FAMILY MEDICINE

## 2020-09-01 PROCEDURE — 1101F PR PT FALLS ASSESS DOC 0-1 FALLS W/OUT INJ PAST YR: ICD-10-PCS | Mod: HCNC,CPTII,S$GLB, | Performed by: FAMILY MEDICINE

## 2020-09-01 PROCEDURE — 3008F PR BODY MASS INDEX (BMI) DOCUMENTED: ICD-10-PCS | Mod: HCNC,CPTII,S$GLB, | Performed by: FAMILY MEDICINE

## 2020-09-01 PROCEDURE — 1101F PT FALLS ASSESS-DOCD LE1/YR: CPT | Mod: HCNC,CPTII,S$GLB, | Performed by: FAMILY MEDICINE

## 2020-09-01 PROCEDURE — 99499 RISK ADDL DX/OHS AUDIT: ICD-10-PCS | Mod: HCNC,S$GLB,, | Performed by: FAMILY MEDICINE

## 2020-09-01 PROCEDURE — 1159F PR MEDICATION LIST DOCUMENTED IN MEDICAL RECORD: ICD-10-PCS | Mod: HCNC,S$GLB,, | Performed by: FAMILY MEDICINE

## 2020-09-01 PROCEDURE — 3008F BODY MASS INDEX DOCD: CPT | Mod: HCNC,CPTII,S$GLB, | Performed by: FAMILY MEDICINE

## 2020-09-01 PROCEDURE — 3075F SYST BP GE 130 - 139MM HG: CPT | Mod: HCNC,CPTII,S$GLB, | Performed by: FAMILY MEDICINE

## 2020-09-01 PROCEDURE — 1159F MED LIST DOCD IN RCRD: CPT | Mod: HCNC,S$GLB,, | Performed by: FAMILY MEDICINE

## 2020-09-01 PROCEDURE — 99999 PR PBB SHADOW E&M-EST. PATIENT-LVL III: ICD-10-PCS | Mod: PBBFAC,HCNC,, | Performed by: FAMILY MEDICINE

## 2020-09-01 PROCEDURE — 3075F PR MOST RECENT SYSTOLIC BLOOD PRESS GE 130-139MM HG: ICD-10-PCS | Mod: HCNC,CPTII,S$GLB, | Performed by: FAMILY MEDICINE

## 2020-09-01 PROCEDURE — 1125F PR PAIN SEVERITY QUANTIFIED, PAIN PRESENT: ICD-10-PCS | Mod: HCNC,S$GLB,, | Performed by: FAMILY MEDICINE

## 2020-09-01 PROCEDURE — 99999 PR PBB SHADOW E&M-EST. PATIENT-LVL III: CPT | Mod: PBBFAC,HCNC,, | Performed by: FAMILY MEDICINE

## 2020-09-01 NOTE — PROGRESS NOTES
Subjective:       Patient ID: Arnaldo Donovan is a 75 y.o. male.    Chief Complaint: Leg Pain and Hyperlipidemia    Pt presents today with 3 concerns:  1. Snoring at night. Is using nasal strips OTC and states that this has been helping. Wants to know what else he can do for this.   2. Pt with leg cramping intermittently, harper noted when he is walking extended periods of time. Does think it coincides with statin use which he just resumed 3 mos ago  3. Varicosity: wants to know if there is anything that he can do for this    Review of Systems   Constitutional: Negative for activity change, appetite change, chills, diaphoresis, fatigue and fever.   HENT: Negative.    Eyes: Negative for photophobia and visual disturbance.   Respiratory: Negative for chest tightness and shortness of breath.    Cardiovascular: Negative for chest pain, palpitations and leg swelling.   Musculoskeletal: Positive for arthralgias and leg pain. Negative for back pain, gait problem, joint swelling, myalgias, neck pain, neck stiffness and joint deformity.   Neurological: Negative for dizziness, weakness, light-headedness, numbness and headaches.   All other systems reviewed and are negative.        Objective:      Physical Exam  Constitutional:       Appearance: He is well-developed.   HENT:      Head: Normocephalic and atraumatic.   Eyes:      General: No scleral icterus.        Right eye: No discharge.         Left eye: No discharge.      Conjunctiva/sclera: Conjunctivae normal.      Pupils: Pupils are equal, round, and reactive to light.   Neck:      Musculoskeletal: Normal range of motion and neck supple.      Vascular: No JVD.   Cardiovascular:      Rate and Rhythm: Normal rate and regular rhythm.      Heart sounds: Normal heart sounds.   Pulmonary:      Effort: Pulmonary effort is normal.      Breath sounds: Normal breath sounds.   Musculoskeletal: Normal range of motion.         General: No swelling, tenderness, deformity or signs of  injury.      Right lower leg: No edema.      Left lower leg: No edema.      Comments: Pos varicose veins b/l LE's. Pos DTRs, pos DP pulses. Neg crepitus b/l knees and FROm of b/l LE  Neg ssx of DVT noted on left calf       Skin:     General: Skin is warm and dry.   Neurological:      Mental Status: He is alert and oriented to person, place, and time.      Cranial Nerves: No cranial nerve deficit.      Motor: No abnormal muscle tone.      Coordination: Coordination normal.      Deep Tendon Reflexes: Reflexes are normal and symmetric.   Psychiatric:         Behavior: Behavior normal.         Thought Content: Thought content normal.         Judgment: Judgment normal.         Assessment:       Varicose veins  Snoring  Leg pain b/l    Plan:       Leg pain since pt experienced this in past with statins and timing coincides with onset suspect that this is the case. Pt advised to take statin every other day. If this still occurs needs to stop and contact cards  Snoring: needs to lose weight. Can use flonase and allergy meds if related to ENT issues and seasonal allergies. Decrease alcohol intake. Pt can see sleep clinic for assessment. At this time pt declines and but will call if interested  Varicose veins: can refer to vasc but pt declines at this time    Greater than 45 mins spent with pt; 50 % face to face    RTC prn symptoms

## 2020-09-04 ENCOUNTER — TELEPHONE (OUTPATIENT)
Dept: CARDIOLOGY | Facility: CLINIC | Age: 75
End: 2020-09-04

## 2020-09-04 DIAGNOSIS — E78.01 FAMILIAL HYPERCHOLESTEROLEMIA: Primary | ICD-10-CM

## 2020-09-04 RX ORDER — EZETIMIBE 10 MG/1
10 TABLET ORAL DAILY
Qty: 90 TABLET | Refills: 3 | Status: SHIPPED | OUTPATIENT
Start: 2020-09-04 | End: 2021-04-28

## 2020-09-04 NOTE — TELEPHONE ENCOUNTER
He developed myalgias with pravastatin.  Will start Zetia 10 mg a day.  Please schedule fasting lipid profile and LFTs in 6 weeks.

## 2020-09-24 DIAGNOSIS — M76.02 GLUTEAL TENDINITIS OF LEFT BUTTOCK: ICD-10-CM

## 2020-09-24 DIAGNOSIS — M79.645 PAIN OF LEFT THUMB: ICD-10-CM

## 2020-09-24 RX ORDER — MELOXICAM 7.5 MG/1
7.5 TABLET ORAL DAILY
Qty: 30 TABLET | Refills: 0 | Status: SHIPPED | OUTPATIENT
Start: 2020-09-24 | End: 2021-04-22 | Stop reason: SDUPTHER

## 2020-09-29 ENCOUNTER — PATIENT MESSAGE (OUTPATIENT)
Dept: OTHER | Facility: OTHER | Age: 75
End: 2020-09-29

## 2020-10-04 ENCOUNTER — PATIENT MESSAGE (OUTPATIENT)
Dept: PRIMARY CARE CLINIC | Facility: CLINIC | Age: 75
End: 2020-10-04

## 2020-10-04 DIAGNOSIS — I10 ESSENTIAL HYPERTENSION: ICD-10-CM

## 2020-10-04 DIAGNOSIS — E78.5 HYPERLIPIDEMIA, UNSPECIFIED HYPERLIPIDEMIA TYPE: ICD-10-CM

## 2020-10-04 DIAGNOSIS — I70.0 ATHEROSCLEROSIS OF AORTA: ICD-10-CM

## 2020-10-04 DIAGNOSIS — M1A.00X0 IDIOPATHIC CHRONIC GOUT WITHOUT TOPHUS, UNSPECIFIED SITE: ICD-10-CM

## 2020-10-04 DIAGNOSIS — L71.9 ROSACEA: ICD-10-CM

## 2020-10-05 RX ORDER — VALSARTAN 160 MG/1
160 TABLET ORAL DAILY
Qty: 90 TABLET | Refills: 1 | Status: SHIPPED | OUTPATIENT
Start: 2020-10-05 | End: 2021-03-25

## 2020-12-07 ENCOUNTER — TELEPHONE (OUTPATIENT)
Dept: ORTHOPEDICS | Facility: CLINIC | Age: 75
End: 2020-12-07

## 2020-12-07 ENCOUNTER — PATIENT MESSAGE (OUTPATIENT)
Dept: ORTHOPEDICS | Facility: CLINIC | Age: 75
End: 2020-12-07

## 2020-12-07 DIAGNOSIS — M25.552 LATERAL PAIN OF LEFT HIP: Primary | ICD-10-CM

## 2020-12-07 DIAGNOSIS — M25.561 CHRONIC PAIN OF RIGHT KNEE: ICD-10-CM

## 2020-12-07 DIAGNOSIS — G89.29 CHRONIC PAIN OF RIGHT KNEE: ICD-10-CM

## 2020-12-07 DIAGNOSIS — M76.31 IT BAND SYNDROME, RIGHT: ICD-10-CM

## 2020-12-07 NOTE — TELEPHONE ENCOUNTER
Patient did well with PT in the past.  He is interested in returning to physical therapy after the new year to continue to treat his hip pain.

## 2020-12-08 ENCOUNTER — PES CALL (OUTPATIENT)
Dept: ADMINISTRATIVE | Facility: CLINIC | Age: 75
End: 2020-12-08

## 2020-12-09 ENCOUNTER — CLINICAL SUPPORT (OUTPATIENT)
Dept: REHABILITATION | Facility: OTHER | Age: 75
End: 2020-12-09
Payer: MEDICARE

## 2020-12-09 DIAGNOSIS — M25.552 LATERAL PAIN OF LEFT HIP: ICD-10-CM

## 2020-12-09 DIAGNOSIS — R29.898 WEAKNESS OF BOTH HIPS: ICD-10-CM

## 2020-12-09 DIAGNOSIS — M76.31 IT BAND SYNDROME, RIGHT: ICD-10-CM

## 2020-12-09 PROCEDURE — 97164 PT RE-EVAL EST PLAN CARE: CPT | Mod: PN | Performed by: PHYSICAL THERAPIST

## 2020-12-09 PROCEDURE — 97161 PT EVAL LOW COMPLEX 20 MIN: CPT | Mod: PN | Performed by: PHYSICAL THERAPIST

## 2020-12-09 PROCEDURE — 97140 MANUAL THERAPY 1/> REGIONS: CPT | Mod: PN | Performed by: PHYSICAL THERAPIST

## 2020-12-10 NOTE — PLAN OF CARE
Outpatient Therapy Updated Plan of Care     Visit Date: 12/9/2020  Name: Arnaldo Donovan  Clinic Number: [id number]    Therapy Diagnosis:   Encounter Diagnoses   Name Primary?    Lateral pain of left hip     It band syndrome, right     Weakness of both hips      Physician: Joe Solis,     Medical Diagnosis from Referral: M25.561,G89.29 (ICD-10-CM) - Chronic pain of right knee M76.31 (ICD-10-CM) - It band syndrome, right     Evaluation Date: 5/27/2020  Authorization Period Expiration: 6/21/2020  Plan of Care Expiration: 8/21/2020    Total Visits Received: 19  Cancelled Visits: 4  No Show Visits: 0    Current Certification Period: 8/21/2020 to 10/27/2020  Precautions:  standard  Visits from Evaluation Date:  18  Functional Level Prior to Evaluation:  marathon runner, 4-6 miles recreational just prior to R knee pain    Subjective     Update: Patient reports R knee pain worsening over last few months recently exacerbated by walking 4-5 days per week. He sent request in to his Dr for more therapy. Pain is not as much in his hip or IT band. Pain lateral knee and sometimes underneath the knee cap. Continuing his daily stretching and strengthening routine to include (HSS on stairs, calf str, clams, planks, bridges). Goal is to be able to return to running and his knee pain is preventing him from this.     Objective     Update:     Special Tests:    Lateral tilt test: positive R  SL squat: positive for concordant pain   Thessaly's: negative  jt line tenderness: negative    Function:    - SLS R: <10 sec with increased postural sway  - SLS L: <10 sec with trendelenburg   - Squat: painful dysfunctional    - Sit <--> Stand:indep   - Bed Mobility: indep      Joint Mobility: global stiffness compared to L side, mostly restricted with superior and medial patellar glides   hypomobility AP tibia glides     Palpation: TTP lateral retinaculum, infrapatellar region    Flexibility:    Ely's test: R = 90 degrees ; L = 95  degrees (increased anterior pelvic tilt)   SLR: <60 degrees B   Lisandro's test: R = + ; L = +    Shaw test: R = + ; L = + (positive for single and multiple jt tightness)     Knee AROM:  R: 0-5-120 deg  L: 4-0-128 deg    Lower Extremity Strength  Right LE  Left LE    Knee extension: 5/5 Knee extension: 5/5   Knee flexion: 5/5 Knee flexion: 5/5   Hip flexion: 5/5 Hip flexion: 5/5   Hip extension:  4-/5 Hip extension: 4-/5   Hip abduction: 4+/5 Hip abduction: 4-/5   Hip adduction: 5/5 Hip adduction 5/5         Limitation/Restriction for FOTO hip Survey    Therapist reviewed FOTO scores for Arnaldo Donovan on 12/9/2020.   FOTO documents entered into [email or web address] - see Media section.    Limitation Score: 29%         Arnaldo received the following manual therapy techniques: Joint mobilizations were applied to the: R knee for 10 minutes, including:     Patella joint mobilizations all planes 30' x 3  Tibiofemoral joint mobs AP  PA to tibfib jt      Home Exercises Provided and Patient Education Provided      Education provided: pt may benefit from patellar strap for pain relief during walking and running activities      Written Home Exercises Provided: Patient issued updated HEP  Exercises were reviewed and Arnaldo was able to demonstrate them prior to the end of the session.  Arnaldo demonstrated good  understanding of the education provided.      See EMR under Patient Instructions for exercises provided 12/9/2020      Assessment     Update: Patient presents to clinic with new orders for medical diagnosis IT band syndrome and R anterior-lateral knee pain. Pain limited performance of squatting, stairs, walking, and running. Pt demonstrating global restrictions in LE flexibility and joint mobility with decreased ROM R knee in both flexion and extension. Gluteal weakness L>R with trendelenburg gait. Pt to benefit from skilled PT to address above deficits and return to recreational activities.       Pt prognosis is Good.   Pt will  benefit from skilled outpatient Physical Therapy to address the deficits stated above and in the chart below, provide pt/family education, and to maximize pt's level of independence.     Plan of care discussed with patient: Yes  Pt's spiritual, cultural and educational needs considered and patient is agreeable to the plan of care and goals as stated below:     Anticipated Barriers for therapy: none    Medical Necessity is demonstrated by the following  History  Co-morbidities and personal factors that may impact the plan of care Co-morbidities:   none    Personal Factors:   no deficits     low   Examination  Body Structures and Functions, activity limitations and participation restrictions that may impact the plan of care Body Regions:   back  lower extremities    Body Systems:    ROM  strength  gait  motor control    Participation Restrictions:   Running, walking, stairs    Activity limitations:   Learning and applying knowledge  no deficits    General Tasks and Commands  no deficits    Communication  no deficits    Mobility  walking    Self care  no deficits    Domestic Life  no deficits    Interactions/Relationships  no deficits    Life Areas  no deficits    Community and Social Life  recreation and leisure         high   Clinical Presentation stable and uncomplicated low   Decision Making/ Complexity Score: low       Previous Short Term Goals Status:       Short Term Goals: 6 weeks   1. Patient to demonstrate increased flexibility hip flexors as noted by 10 degree increase in prone knee flexion for improved posture and gait- (previsouly met, not today)  2. Patient to report <4/10 pain with walking 2 miles or greater -(previously met, not today)  3. Patient to be able to ascend/ descend 1 flight of stairs with minimal to no difficulty -met     Long Term Goals: 12 weeks   1. Patient to be independent with home exercise program for improved self management of condition  - in progress, not met  2. Patient to have  decreased subjective report of disability as noted by a score of 31% on the FOTO questionnaire  -met  3. Patient to increased strength in BLE's to 5/5 or greater for improved performance of ADL's  - in progress, not met  4. Patient to have improved balance as noted by SLS 10 sec or greater for return to running - in progress, not met    New Short Term Goals Status:       1. Patient to have 0 degrees R knee extension for improved posture and gait (added 12/10/2020)  2. Patient to have 125 degrees R knee flexion for improved performance of adl's and squatting (12/10/2020)  3. Patient to be able to briskly walk (4.5mph) for 1 mile or greater <4/10 pain (added 12/10/2020)    Long Term Goal Status:   continue per initial plan of care.  Reasons for Recertification of Therapy:   Recent exacerbation of symptoms precipitated by increased frequency of walking and overall mileage. Pt to benefit from skilled PT for improved ROM, pain modulation, and return to run/ walk program     Plan     Updated Certification Period: 12/9/2020 to 3/4/2021  Recommended Treatment Plan: 1 times per week for 12 weeks: Gait Training, Iontophoresis (with iontophoresis), Manual Therapy, Moist Heat/ Ice, Neuromuscular Re-ed, Patient Education, Therapeutic Activites, Therapeutic Exercise and dry needling  Other Recommendations: Pt to f/u with Dr. Solis if symptoms fail to improve or worsen     Sheeba Salinas, PT  12/9/2020

## 2020-12-11 ENCOUNTER — PATIENT MESSAGE (OUTPATIENT)
Dept: OTHER | Facility: OTHER | Age: 75
End: 2020-12-11

## 2021-01-06 ENCOUNTER — IMMUNIZATION (OUTPATIENT)
Dept: INTERNAL MEDICINE | Facility: CLINIC | Age: 76
End: 2021-01-06
Payer: MEDICARE

## 2021-01-06 ENCOUNTER — CLINICAL SUPPORT (OUTPATIENT)
Dept: REHABILITATION | Facility: OTHER | Age: 76
End: 2021-01-06
Payer: MEDICARE

## 2021-01-06 DIAGNOSIS — M76.31 IT BAND SYNDROME, RIGHT: ICD-10-CM

## 2021-01-06 DIAGNOSIS — Z23 NEED FOR VACCINATION: ICD-10-CM

## 2021-01-06 DIAGNOSIS — R29.898 WEAKNESS OF BOTH HIPS: ICD-10-CM

## 2021-01-06 PROCEDURE — 97110 THERAPEUTIC EXERCISES: CPT | Mod: HCNC,PN | Performed by: PHYSICAL THERAPIST

## 2021-01-06 PROCEDURE — 91300 COVID-19, MRNA, LNP-S, PF, 30 MCG/0.3 ML DOSE VACCINE: CPT | Mod: PBBFAC | Performed by: INTERNAL MEDICINE

## 2021-01-06 PROCEDURE — 97140 MANUAL THERAPY 1/> REGIONS: CPT | Mod: HCNC,PN | Performed by: PHYSICAL THERAPIST

## 2021-01-08 ENCOUNTER — CLINICAL SUPPORT (OUTPATIENT)
Dept: REHABILITATION | Facility: OTHER | Age: 76
End: 2021-01-08
Payer: MEDICARE

## 2021-01-08 DIAGNOSIS — R29.898 WEAKNESS OF BOTH HIPS: ICD-10-CM

## 2021-01-08 DIAGNOSIS — M76.31 IT BAND SYNDROME, RIGHT: ICD-10-CM

## 2021-01-08 PROCEDURE — 97140 MANUAL THERAPY 1/> REGIONS: CPT | Mod: HCNC,PN | Performed by: PHYSICAL THERAPIST

## 2021-01-08 PROCEDURE — 97110 THERAPEUTIC EXERCISES: CPT | Mod: HCNC,PN | Performed by: PHYSICAL THERAPIST

## 2021-01-12 ENCOUNTER — CLINICAL SUPPORT (OUTPATIENT)
Dept: REHABILITATION | Facility: OTHER | Age: 76
End: 2021-01-12
Payer: MEDICARE

## 2021-01-12 DIAGNOSIS — R29.898 WEAKNESS OF BOTH HIPS: ICD-10-CM

## 2021-01-12 DIAGNOSIS — M76.31 IT BAND SYNDROME, RIGHT: ICD-10-CM

## 2021-01-12 PROCEDURE — 97110 THERAPEUTIC EXERCISES: CPT | Mod: HCNC,PN | Performed by: PHYSICAL THERAPIST

## 2021-01-12 PROCEDURE — 97140 MANUAL THERAPY 1/> REGIONS: CPT | Mod: HCNC,PN | Performed by: PHYSICAL THERAPIST

## 2021-01-14 ENCOUNTER — CLINICAL SUPPORT (OUTPATIENT)
Dept: REHABILITATION | Facility: OTHER | Age: 76
End: 2021-01-14
Payer: MEDICARE

## 2021-01-14 DIAGNOSIS — M76.31 IT BAND SYNDROME, RIGHT: ICD-10-CM

## 2021-01-14 DIAGNOSIS — R29.898 WEAKNESS OF BOTH HIPS: ICD-10-CM

## 2021-01-14 PROCEDURE — 97110 THERAPEUTIC EXERCISES: CPT | Mod: HCNC,PN | Performed by: PHYSICAL THERAPIST

## 2021-01-14 PROCEDURE — 97140 MANUAL THERAPY 1/> REGIONS: CPT | Mod: HCNC,PN | Performed by: PHYSICAL THERAPIST

## 2021-01-20 ENCOUNTER — CLINICAL SUPPORT (OUTPATIENT)
Dept: REHABILITATION | Facility: OTHER | Age: 76
End: 2021-01-20
Payer: MEDICARE

## 2021-01-20 DIAGNOSIS — R29.898 WEAKNESS OF BOTH HIPS: ICD-10-CM

## 2021-01-20 DIAGNOSIS — M76.31 IT BAND SYNDROME, RIGHT: ICD-10-CM

## 2021-01-20 PROCEDURE — 97140 MANUAL THERAPY 1/> REGIONS: CPT | Mod: HCNC,PN | Performed by: PHYSICAL THERAPIST

## 2021-01-20 PROCEDURE — 97110 THERAPEUTIC EXERCISES: CPT | Mod: HCNC,PN | Performed by: PHYSICAL THERAPIST

## 2021-01-22 ENCOUNTER — CLINICAL SUPPORT (OUTPATIENT)
Dept: REHABILITATION | Facility: OTHER | Age: 76
End: 2021-01-22
Payer: MEDICARE

## 2021-01-22 DIAGNOSIS — R29.898 WEAKNESS OF BOTH HIPS: ICD-10-CM

## 2021-01-22 DIAGNOSIS — M76.31 IT BAND SYNDROME, RIGHT: ICD-10-CM

## 2021-01-22 PROCEDURE — 97110 THERAPEUTIC EXERCISES: CPT | Mod: HCNC,PN | Performed by: PHYSICAL THERAPIST

## 2021-01-22 PROCEDURE — 97140 MANUAL THERAPY 1/> REGIONS: CPT | Mod: HCNC,PN | Performed by: PHYSICAL THERAPIST

## 2021-01-26 ENCOUNTER — CLINICAL SUPPORT (OUTPATIENT)
Dept: REHABILITATION | Facility: OTHER | Age: 76
End: 2021-01-26
Payer: MEDICARE

## 2021-01-26 DIAGNOSIS — M76.31 IT BAND SYNDROME, RIGHT: ICD-10-CM

## 2021-01-26 DIAGNOSIS — R29.898 WEAKNESS OF BOTH HIPS: ICD-10-CM

## 2021-01-26 PROCEDURE — 97140 MANUAL THERAPY 1/> REGIONS: CPT | Mod: HCNC,PN | Performed by: PHYSICAL THERAPIST

## 2021-01-26 PROCEDURE — 97110 THERAPEUTIC EXERCISES: CPT | Mod: HCNC,PN | Performed by: PHYSICAL THERAPIST

## 2021-01-27 ENCOUNTER — IMMUNIZATION (OUTPATIENT)
Dept: INTERNAL MEDICINE | Facility: CLINIC | Age: 76
End: 2021-01-27
Payer: MEDICARE

## 2021-01-27 DIAGNOSIS — Z23 NEED FOR VACCINATION: Primary | ICD-10-CM

## 2021-01-27 PROCEDURE — 0002A COVID-19, MRNA, LNP-S, PF, 30 MCG/0.3 ML DOSE VACCINE: CPT | Mod: PBBFAC | Performed by: INTERNAL MEDICINE

## 2021-01-27 PROCEDURE — 91300 COVID-19, MRNA, LNP-S, PF, 30 MCG/0.3 ML DOSE VACCINE: CPT | Mod: PBBFAC | Performed by: INTERNAL MEDICINE

## 2021-02-17 ENCOUNTER — OFFICE VISIT (OUTPATIENT)
Dept: SPORTS MEDICINE | Facility: CLINIC | Age: 76
End: 2021-02-17
Payer: MEDICARE

## 2021-02-17 VITALS
HEIGHT: 73 IN | WEIGHT: 230 LBS | DIASTOLIC BLOOD PRESSURE: 82 MMHG | SYSTOLIC BLOOD PRESSURE: 124 MMHG | BODY MASS INDEX: 30.48 KG/M2

## 2021-02-17 DIAGNOSIS — M76.31 IT BAND SYNDROME, RIGHT: ICD-10-CM

## 2021-02-17 DIAGNOSIS — M25.561 RIGHT ANTERIOR KNEE PAIN: Primary | ICD-10-CM

## 2021-02-17 DIAGNOSIS — M22.8X1 PATELLAR TRACKING DISORDER, RIGHT: ICD-10-CM

## 2021-02-17 PROCEDURE — 99214 PR OFFICE/OUTPT VISIT, EST, LEVL IV, 30-39 MIN: ICD-10-PCS | Mod: S$GLB,,, | Performed by: ORTHOPAEDIC SURGERY

## 2021-02-17 PROCEDURE — 3074F PR MOST RECENT SYSTOLIC BLOOD PRESSURE < 130 MM HG: ICD-10-PCS | Mod: CPTII,S$GLB,, | Performed by: ORTHOPAEDIC SURGERY

## 2021-02-17 PROCEDURE — 3079F PR MOST RECENT DIASTOLIC BLOOD PRESSURE 80-89 MM HG: ICD-10-PCS | Mod: CPTII,S$GLB,, | Performed by: ORTHOPAEDIC SURGERY

## 2021-02-17 PROCEDURE — 1159F MED LIST DOCD IN RCRD: CPT | Mod: S$GLB,,, | Performed by: ORTHOPAEDIC SURGERY

## 2021-02-17 PROCEDURE — 97110 PR THERAPEUTIC EXERCISES: ICD-10-PCS | Mod: GP,S$GLB,, | Performed by: ORTHOPAEDIC SURGERY

## 2021-02-17 PROCEDURE — 3074F SYST BP LT 130 MM HG: CPT | Mod: CPTII,S$GLB,, | Performed by: ORTHOPAEDIC SURGERY

## 2021-02-17 PROCEDURE — 99214 OFFICE O/P EST MOD 30 MIN: CPT | Mod: S$GLB,,, | Performed by: ORTHOPAEDIC SURGERY

## 2021-02-17 PROCEDURE — 3079F DIAST BP 80-89 MM HG: CPT | Mod: CPTII,S$GLB,, | Performed by: ORTHOPAEDIC SURGERY

## 2021-02-17 PROCEDURE — 1159F PR MEDICATION LIST DOCUMENTED IN MEDICAL RECORD: ICD-10-PCS | Mod: S$GLB,,, | Performed by: ORTHOPAEDIC SURGERY

## 2021-02-17 PROCEDURE — 97110 THERAPEUTIC EXERCISES: CPT | Mod: GP,S$GLB,, | Performed by: ORTHOPAEDIC SURGERY

## 2021-02-17 PROCEDURE — 99999 PR PBB SHADOW E&M-EST. PATIENT-LVL III: CPT | Mod: PBBFAC,,, | Performed by: ORTHOPAEDIC SURGERY

## 2021-02-17 PROCEDURE — 99999 PR PBB SHADOW E&M-EST. PATIENT-LVL III: ICD-10-PCS | Mod: PBBFAC,,, | Performed by: ORTHOPAEDIC SURGERY

## 2021-02-17 PROCEDURE — 1125F PR PAIN SEVERITY QUANTIFIED, PAIN PRESENT: ICD-10-PCS | Mod: S$GLB,,, | Performed by: ORTHOPAEDIC SURGERY

## 2021-02-17 PROCEDURE — 3008F PR BODY MASS INDEX (BMI) DOCUMENTED: ICD-10-PCS | Mod: CPTII,S$GLB,, | Performed by: ORTHOPAEDIC SURGERY

## 2021-02-17 PROCEDURE — 3008F BODY MASS INDEX DOCD: CPT | Mod: CPTII,S$GLB,, | Performed by: ORTHOPAEDIC SURGERY

## 2021-02-17 PROCEDURE — 1125F AMNT PAIN NOTED PAIN PRSNT: CPT | Mod: S$GLB,,, | Performed by: ORTHOPAEDIC SURGERY

## 2021-03-16 ENCOUNTER — TELEPHONE (OUTPATIENT)
Dept: ORTHOPEDICS | Facility: CLINIC | Age: 76
End: 2021-03-16

## 2021-03-16 ENCOUNTER — HOSPITAL ENCOUNTER (EMERGENCY)
Facility: OTHER | Age: 76
Discharge: HOME | End: 2021-03-16
Attending: EMERGENCY MEDICINE
Payer: MEDICARE

## 2021-03-16 VITALS
HEIGHT: 73 IN | TEMPERATURE: 98 F | DIASTOLIC BLOOD PRESSURE: 83 MMHG | RESPIRATION RATE: 18 BRPM | BODY MASS INDEX: 29.16 KG/M2 | HEART RATE: 62 BPM | SYSTOLIC BLOOD PRESSURE: 163 MMHG | WEIGHT: 220 LBS | OXYGEN SATURATION: 99 %

## 2021-03-16 DIAGNOSIS — M79.669 CALF PAIN: ICD-10-CM

## 2021-03-16 LAB
ALBUMIN SERPL BCP-MCNC: 4 G/DL (ref 3.5–5.2)
ALP SERPL-CCNC: 70 U/L (ref 55–135)
ALT SERPL W/O P-5'-P-CCNC: 15 U/L (ref 10–44)
ANION GAP SERPL CALC-SCNC: 9 MMOL/L (ref 8–16)
AST SERPL-CCNC: 18 U/L (ref 10–40)
BASOPHILS # BLD AUTO: 0.04 K/UL (ref 0–0.2)
BASOPHILS NFR BLD: 0.6 % (ref 0–1.9)
BILIRUB SERPL-MCNC: 0.6 MG/DL (ref 0.1–1)
BUN SERPL-MCNC: 18 MG/DL (ref 8–23)
CALCIUM SERPL-MCNC: 9.3 MG/DL (ref 8.7–10.5)
CHLORIDE SERPL-SCNC: 102 MMOL/L (ref 95–110)
CK SERPL-CCNC: 98 U/L (ref 20–200)
CO2 SERPL-SCNC: 26 MMOL/L (ref 23–29)
CREAT SERPL-MCNC: 0.9 MG/DL (ref 0.5–1.4)
DIFFERENTIAL METHOD: ABNORMAL
EOSINOPHIL # BLD AUTO: 0.4 K/UL (ref 0–0.5)
EOSINOPHIL NFR BLD: 5.5 % (ref 0–8)
ERYTHROCYTE [DISTWIDTH] IN BLOOD BY AUTOMATED COUNT: 13.2 % (ref 11.5–14.5)
EST. GFR  (AFRICAN AMERICAN): >60 ML/MIN/1.73 M^2
EST. GFR  (NON AFRICAN AMERICAN): >60 ML/MIN/1.73 M^2
GLUCOSE SERPL-MCNC: 98 MG/DL (ref 70–110)
HCT VFR BLD AUTO: 44.6 % (ref 40–54)
HGB BLD-MCNC: 14.2 G/DL (ref 14–18)
IMM GRANULOCYTES # BLD AUTO: 0.01 K/UL (ref 0–0.04)
IMM GRANULOCYTES NFR BLD AUTO: 0.1 % (ref 0–0.5)
LYMPHOCYTES # BLD AUTO: 2.6 K/UL (ref 1–4.8)
LYMPHOCYTES NFR BLD: 37.2 % (ref 18–48)
MCH RBC QN AUTO: 28.9 PG (ref 27–31)
MCHC RBC AUTO-ENTMCNC: 31.8 G/DL (ref 32–36)
MCV RBC AUTO: 91 FL (ref 82–98)
MONOCYTES # BLD AUTO: 0.7 K/UL (ref 0.3–1)
MONOCYTES NFR BLD: 9.6 % (ref 4–15)
NEUTROPHILS # BLD AUTO: 3.3 K/UL (ref 1.8–7.7)
NEUTROPHILS NFR BLD: 47 % (ref 38–73)
NRBC BLD-RTO: 0 /100 WBC
PLATELET # BLD AUTO: 213 K/UL (ref 150–350)
PMV BLD AUTO: 10.4 FL (ref 9.2–12.9)
POTASSIUM SERPL-SCNC: 4.7 MMOL/L (ref 3.5–5.1)
PROT SERPL-MCNC: 7.3 G/DL (ref 6–8.4)
RBC # BLD AUTO: 4.92 M/UL (ref 4.6–6.2)
SODIUM SERPL-SCNC: 137 MMOL/L (ref 136–145)
WBC # BLD AUTO: 7.07 K/UL (ref 3.9–12.7)

## 2021-03-16 PROCEDURE — 99284 EMERGENCY DEPT VISIT MOD MDM: CPT | Mod: 25

## 2021-03-16 PROCEDURE — 82550 ASSAY OF CK (CPK): CPT | Performed by: EMERGENCY MEDICINE

## 2021-03-16 PROCEDURE — 85025 COMPLETE CBC W/AUTO DIFF WBC: CPT | Performed by: EMERGENCY MEDICINE

## 2021-03-16 PROCEDURE — 80053 COMPREHEN METABOLIC PANEL: CPT | Performed by: EMERGENCY MEDICINE

## 2021-03-18 ENCOUNTER — PATIENT MESSAGE (OUTPATIENT)
Dept: RESEARCH | Facility: HOSPITAL | Age: 76
End: 2021-03-18

## 2021-03-26 ENCOUNTER — PATIENT MESSAGE (OUTPATIENT)
Dept: RESEARCH | Facility: HOSPITAL | Age: 76
End: 2021-03-26

## 2021-04-19 DIAGNOSIS — M25.561 RIGHT ANTERIOR KNEE PAIN: Primary | ICD-10-CM

## 2021-04-22 ENCOUNTER — OFFICE VISIT (OUTPATIENT)
Dept: SPORTS MEDICINE | Facility: CLINIC | Age: 76
End: 2021-04-22
Payer: MEDICARE

## 2021-04-22 ENCOUNTER — APPOINTMENT (OUTPATIENT)
Dept: RADIOLOGY | Facility: OTHER | Age: 76
End: 2021-04-22
Attending: ORTHOPAEDIC SURGERY
Payer: MEDICARE

## 2021-04-22 VITALS
WEIGHT: 220 LBS | SYSTOLIC BLOOD PRESSURE: 134 MMHG | BODY MASS INDEX: 29.16 KG/M2 | HEIGHT: 73 IN | DIASTOLIC BLOOD PRESSURE: 78 MMHG

## 2021-04-22 DIAGNOSIS — M25.561 RIGHT ANTERIOR KNEE PAIN: ICD-10-CM

## 2021-04-22 DIAGNOSIS — M25.561 RIGHT ANTERIOR KNEE PAIN: Primary | ICD-10-CM

## 2021-04-22 PROCEDURE — 99999 PR PBB SHADOW E&M-EST. PATIENT-LVL III: ICD-10-PCS | Mod: PBBFAC,,, | Performed by: ORTHOPAEDIC SURGERY

## 2021-04-22 PROCEDURE — 1159F MED LIST DOCD IN RCRD: CPT | Mod: S$GLB,,, | Performed by: ORTHOPAEDIC SURGERY

## 2021-04-22 PROCEDURE — 1125F AMNT PAIN NOTED PAIN PRSNT: CPT | Mod: S$GLB,,, | Performed by: ORTHOPAEDIC SURGERY

## 2021-04-22 PROCEDURE — 99214 OFFICE O/P EST MOD 30 MIN: CPT | Mod: S$GLB,,, | Performed by: ORTHOPAEDIC SURGERY

## 2021-04-22 PROCEDURE — 99999 PR PBB SHADOW E&M-EST. PATIENT-LVL III: CPT | Mod: PBBFAC,,, | Performed by: ORTHOPAEDIC SURGERY

## 2021-04-22 PROCEDURE — 99214 PR OFFICE/OUTPT VISIT, EST, LEVL IV, 30-39 MIN: ICD-10-PCS | Mod: S$GLB,,, | Performed by: ORTHOPAEDIC SURGERY

## 2021-04-22 PROCEDURE — 73564 X-RAY EXAM KNEE 4 OR MORE: CPT | Mod: TC,50

## 2021-04-22 PROCEDURE — 1125F PR PAIN SEVERITY QUANTIFIED, PAIN PRESENT: ICD-10-PCS | Mod: S$GLB,,, | Performed by: ORTHOPAEDIC SURGERY

## 2021-04-22 PROCEDURE — 73564 XR KNEE ORTHO BILAT WITH FLEXION: ICD-10-PCS | Mod: 26,50,, | Performed by: RADIOLOGY

## 2021-04-22 PROCEDURE — 73564 X-RAY EXAM KNEE 4 OR MORE: CPT | Mod: 26,50,, | Performed by: RADIOLOGY

## 2021-04-22 PROCEDURE — 1159F PR MEDICATION LIST DOCUMENTED IN MEDICAL RECORD: ICD-10-PCS | Mod: S$GLB,,, | Performed by: ORTHOPAEDIC SURGERY

## 2021-04-22 RX ORDER — MELOXICAM 7.5 MG/1
7.5 TABLET ORAL DAILY
Qty: 30 TABLET | Refills: 0 | Status: SHIPPED | OUTPATIENT
Start: 2021-04-22 | End: 2021-10-20 | Stop reason: SDUPTHER

## 2021-04-27 ENCOUNTER — PES CALL (OUTPATIENT)
Dept: ADMINISTRATIVE | Facility: CLINIC | Age: 76
End: 2021-04-27

## 2021-04-28 ENCOUNTER — OFFICE VISIT (OUTPATIENT)
Dept: PRIMARY CARE CLINIC | Facility: CLINIC | Age: 76
End: 2021-04-28
Attending: FAMILY MEDICINE
Payer: MEDICARE

## 2021-04-28 VITALS
WEIGHT: 233 LBS | SYSTOLIC BLOOD PRESSURE: 138 MMHG | BODY MASS INDEX: 30.88 KG/M2 | HEART RATE: 80 BPM | HEIGHT: 73 IN | DIASTOLIC BLOOD PRESSURE: 70 MMHG | OXYGEN SATURATION: 99 %

## 2021-04-28 DIAGNOSIS — Z01.00 ENCOUNTER FOR VISION SCREENING: ICD-10-CM

## 2021-04-28 DIAGNOSIS — I10 ESSENTIAL HYPERTENSION: ICD-10-CM

## 2021-04-28 DIAGNOSIS — R35.0 BENIGN PROSTATIC HYPERPLASIA WITH URINARY FREQUENCY: ICD-10-CM

## 2021-04-28 DIAGNOSIS — R68.84 JAW PAIN: Primary | ICD-10-CM

## 2021-04-28 DIAGNOSIS — Z78.9 STATIN INTOLERANCE: ICD-10-CM

## 2021-04-28 DIAGNOSIS — R79.9 ABNORMAL FINDING OF BLOOD CHEMISTRY, UNSPECIFIED: ICD-10-CM

## 2021-04-28 DIAGNOSIS — Z00.00 ANNUAL PHYSICAL EXAM: ICD-10-CM

## 2021-04-28 DIAGNOSIS — Z12.5 ENCOUNTER FOR SCREENING FOR MALIGNANT NEOPLASM OF PROSTATE: ICD-10-CM

## 2021-04-28 DIAGNOSIS — N40.1 BENIGN PROSTATIC HYPERPLASIA WITH URINARY FREQUENCY: ICD-10-CM

## 2021-04-28 DIAGNOSIS — I83.93 VARICOSE VEINS OF BOTH LOWER EXTREMITIES WITHOUT ULCER OR INFLAMMATION: ICD-10-CM

## 2021-04-28 PROCEDURE — 3078F PR MOST RECENT DIASTOLIC BLOOD PRESSURE < 80 MM HG: ICD-10-PCS | Mod: CPTII,S$GLB,, | Performed by: FAMILY MEDICINE

## 2021-04-28 PROCEDURE — 1125F AMNT PAIN NOTED PAIN PRSNT: CPT | Mod: S$GLB,,, | Performed by: FAMILY MEDICINE

## 2021-04-28 PROCEDURE — 1101F PR PT FALLS ASSESS DOC 0-1 FALLS W/OUT INJ PAST YR: ICD-10-PCS | Mod: CPTII,S$GLB,, | Performed by: FAMILY MEDICINE

## 2021-04-28 PROCEDURE — 3288F FALL RISK ASSESSMENT DOCD: CPT | Mod: CPTII,S$GLB,, | Performed by: FAMILY MEDICINE

## 2021-04-28 PROCEDURE — 1159F MED LIST DOCD IN RCRD: CPT | Mod: S$GLB,,, | Performed by: FAMILY MEDICINE

## 2021-04-28 PROCEDURE — 99214 OFFICE O/P EST MOD 30 MIN: CPT | Mod: S$GLB,,, | Performed by: FAMILY MEDICINE

## 2021-04-28 PROCEDURE — 1159F PR MEDICATION LIST DOCUMENTED IN MEDICAL RECORD: ICD-10-PCS | Mod: S$GLB,,, | Performed by: FAMILY MEDICINE

## 2021-04-28 PROCEDURE — 1101F PT FALLS ASSESS-DOCD LE1/YR: CPT | Mod: CPTII,S$GLB,, | Performed by: FAMILY MEDICINE

## 2021-04-28 PROCEDURE — 3288F PR FALLS RISK ASSESSMENT DOCUMENTED: ICD-10-PCS | Mod: CPTII,S$GLB,, | Performed by: FAMILY MEDICINE

## 2021-04-28 PROCEDURE — 1125F PR PAIN SEVERITY QUANTIFIED, PAIN PRESENT: ICD-10-PCS | Mod: S$GLB,,, | Performed by: FAMILY MEDICINE

## 2021-04-28 PROCEDURE — 99999 PR PBB SHADOW E&M-EST. PATIENT-LVL III: ICD-10-PCS | Mod: PBBFAC,,, | Performed by: FAMILY MEDICINE

## 2021-04-28 PROCEDURE — 3078F DIAST BP <80 MM HG: CPT | Mod: CPTII,S$GLB,, | Performed by: FAMILY MEDICINE

## 2021-04-28 PROCEDURE — 99214 PR OFFICE/OUTPT VISIT, EST, LEVL IV, 30-39 MIN: ICD-10-PCS | Mod: S$GLB,,, | Performed by: FAMILY MEDICINE

## 2021-04-28 PROCEDURE — 99999 PR PBB SHADOW E&M-EST. PATIENT-LVL III: CPT | Mod: PBBFAC,,, | Performed by: FAMILY MEDICINE

## 2021-04-28 PROCEDURE — 3075F PR MOST RECENT SYSTOLIC BLOOD PRESS GE 130-139MM HG: ICD-10-PCS | Mod: CPTII,S$GLB,, | Performed by: FAMILY MEDICINE

## 2021-04-28 PROCEDURE — 3075F SYST BP GE 130 - 139MM HG: CPT | Mod: CPTII,S$GLB,, | Performed by: FAMILY MEDICINE

## 2021-04-28 RX ORDER — AMITRIPTYLINE HYDROCHLORIDE 25 MG/1
TABLET, FILM COATED ORAL
Qty: 14 TABLET | Refills: 0 | Status: SHIPPED | OUTPATIENT
Start: 2021-04-28 | End: 2021-09-28

## 2021-04-29 ENCOUNTER — LAB VISIT (OUTPATIENT)
Dept: LAB | Facility: HOSPITAL | Age: 76
End: 2021-04-29
Attending: INTERNAL MEDICINE
Payer: MEDICARE

## 2021-04-29 DIAGNOSIS — R79.9 ABNORMAL FINDING OF BLOOD CHEMISTRY, UNSPECIFIED: ICD-10-CM

## 2021-04-29 DIAGNOSIS — I83.93 VARICOSE VEINS OF BOTH LOWER EXTREMITIES WITHOUT ULCER OR INFLAMMATION: ICD-10-CM

## 2021-04-29 DIAGNOSIS — I10 ESSENTIAL HYPERTENSION: ICD-10-CM

## 2021-04-29 DIAGNOSIS — N40.1 BENIGN PROSTATIC HYPERPLASIA WITH URINARY FREQUENCY: ICD-10-CM

## 2021-04-29 DIAGNOSIS — R35.0 BENIGN PROSTATIC HYPERPLASIA WITH URINARY FREQUENCY: ICD-10-CM

## 2021-04-29 DIAGNOSIS — Z12.5 ENCOUNTER FOR SCREENING FOR MALIGNANT NEOPLASM OF PROSTATE: ICD-10-CM

## 2021-04-29 DIAGNOSIS — E78.01 FAMILIAL HYPERCHOLESTEROLEMIA: ICD-10-CM

## 2021-04-29 LAB
ALBUMIN SERPL BCP-MCNC: 3.5 G/DL (ref 3.5–5.2)
ALBUMIN SERPL BCP-MCNC: 3.5 G/DL (ref 3.5–5.2)
ALP SERPL-CCNC: 74 U/L (ref 55–135)
ALP SERPL-CCNC: 74 U/L (ref 55–135)
ALT SERPL W/O P-5'-P-CCNC: 19 U/L (ref 10–44)
ALT SERPL W/O P-5'-P-CCNC: 19 U/L (ref 10–44)
ANION GAP SERPL CALC-SCNC: 7 MMOL/L (ref 8–16)
AST SERPL-CCNC: 29 U/L (ref 10–40)
AST SERPL-CCNC: 29 U/L (ref 10–40)
BASOPHILS # BLD AUTO: 0.05 K/UL (ref 0–0.2)
BASOPHILS NFR BLD: 0.6 % (ref 0–1.9)
BILIRUB DIRECT SERPL-MCNC: 0.2 MG/DL (ref 0.1–0.3)
BILIRUB SERPL-MCNC: 0.5 MG/DL (ref 0.1–1)
BILIRUB SERPL-MCNC: 0.5 MG/DL (ref 0.1–1)
BUN SERPL-MCNC: 18 MG/DL (ref 8–23)
CALCIUM SERPL-MCNC: 8.8 MG/DL (ref 8.7–10.5)
CHLORIDE SERPL-SCNC: 105 MMOL/L (ref 95–110)
CHOLEST SERPL-MCNC: 200 MG/DL (ref 120–199)
CHOLEST/HDLC SERPL: 3.7 {RATIO} (ref 2–5)
CO2 SERPL-SCNC: 26 MMOL/L (ref 23–29)
COMPLEXED PSA SERPL-MCNC: 0.44 NG/ML (ref 0–4)
CREAT SERPL-MCNC: 0.9 MG/DL (ref 0.5–1.4)
DIFFERENTIAL METHOD: ABNORMAL
EOSINOPHIL # BLD AUTO: 0.4 K/UL (ref 0–0.5)
EOSINOPHIL NFR BLD: 5.2 % (ref 0–8)
ERYTHROCYTE [DISTWIDTH] IN BLOOD BY AUTOMATED COUNT: 13 % (ref 11.5–14.5)
EST. GFR  (AFRICAN AMERICAN): >60 ML/MIN/1.73 M^2
EST. GFR  (NON AFRICAN AMERICAN): >60 ML/MIN/1.73 M^2
ESTIMATED AVG GLUCOSE: 114 MG/DL (ref 68–131)
GLUCOSE SERPL-MCNC: 109 MG/DL (ref 70–110)
HBA1C MFR BLD: 5.6 % (ref 4–5.6)
HCT VFR BLD AUTO: 41.9 % (ref 40–54)
HDLC SERPL-MCNC: 54 MG/DL (ref 40–75)
HDLC SERPL: 27 % (ref 20–50)
HGB BLD-MCNC: 13.7 G/DL (ref 14–18)
IMM GRANULOCYTES # BLD AUTO: 0.03 K/UL (ref 0–0.04)
IMM GRANULOCYTES NFR BLD AUTO: 0.4 % (ref 0–0.5)
LDLC SERPL CALC-MCNC: 130.2 MG/DL (ref 63–159)
LYMPHOCYTES # BLD AUTO: 2.2 K/UL (ref 1–4.8)
LYMPHOCYTES NFR BLD: 28.4 % (ref 18–48)
MCH RBC QN AUTO: 29.6 PG (ref 27–31)
MCHC RBC AUTO-ENTMCNC: 32.7 G/DL (ref 32–36)
MCV RBC AUTO: 91 FL (ref 82–98)
MONOCYTES # BLD AUTO: 0.7 K/UL (ref 0.3–1)
MONOCYTES NFR BLD: 9.4 % (ref 4–15)
NEUTROPHILS # BLD AUTO: 4.4 K/UL (ref 1.8–7.7)
NEUTROPHILS NFR BLD: 56 % (ref 38–73)
NONHDLC SERPL-MCNC: 146 MG/DL
NRBC BLD-RTO: 0 /100 WBC
PLATELET # BLD AUTO: 252 K/UL (ref 150–450)
PMV BLD AUTO: 11 FL (ref 9.2–12.9)
POTASSIUM SERPL-SCNC: 4.6 MMOL/L (ref 3.5–5.1)
PROT SERPL-MCNC: 6.9 G/DL (ref 6–8.4)
PROT SERPL-MCNC: 6.9 G/DL (ref 6–8.4)
RBC # BLD AUTO: 4.63 M/UL (ref 4.6–6.2)
SODIUM SERPL-SCNC: 138 MMOL/L (ref 136–145)
TRIGL SERPL-MCNC: 79 MG/DL (ref 30–150)
TSH SERPL DL<=0.005 MIU/L-ACNC: 1.39 UIU/ML (ref 0.4–4)
WBC # BLD AUTO: 7.84 K/UL (ref 3.9–12.7)

## 2021-04-29 PROCEDURE — 80053 COMPREHEN METABOLIC PANEL: CPT | Performed by: FAMILY MEDICINE

## 2021-04-29 PROCEDURE — 85025 COMPLETE CBC W/AUTO DIFF WBC: CPT | Performed by: FAMILY MEDICINE

## 2021-04-29 PROCEDURE — 84153 ASSAY OF PSA TOTAL: CPT | Performed by: FAMILY MEDICINE

## 2021-04-29 PROCEDURE — 84443 ASSAY THYROID STIM HORMONE: CPT | Performed by: FAMILY MEDICINE

## 2021-04-29 PROCEDURE — 83036 HEMOGLOBIN GLYCOSYLATED A1C: CPT | Performed by: FAMILY MEDICINE

## 2021-04-29 PROCEDURE — 80076 HEPATIC FUNCTION PANEL: CPT | Performed by: INTERNAL MEDICINE

## 2021-04-29 PROCEDURE — 80061 LIPID PANEL: CPT | Performed by: FAMILY MEDICINE

## 2021-04-29 PROCEDURE — 36415 COLL VENOUS BLD VENIPUNCTURE: CPT | Mod: PN | Performed by: FAMILY MEDICINE

## 2021-04-29 PROCEDURE — 82248 BILIRUBIN DIRECT: CPT | Performed by: INTERNAL MEDICINE

## 2021-05-05 ENCOUNTER — PATIENT MESSAGE (OUTPATIENT)
Dept: PRIMARY CARE CLINIC | Facility: CLINIC | Age: 76
End: 2021-05-05

## 2021-05-05 DIAGNOSIS — R68.84 JAW PAIN: Primary | ICD-10-CM

## 2021-05-06 ENCOUNTER — HOSPITAL ENCOUNTER (OUTPATIENT)
Dept: CARDIOLOGY | Facility: OTHER | Age: 76
Discharge: HOME | End: 2021-05-06
Attending: FAMILY MEDICINE
Payer: MEDICARE

## 2021-05-06 DIAGNOSIS — R68.84 JAW PAIN: ICD-10-CM

## 2021-05-06 PROCEDURE — 93010 ELECTROCARDIOGRAM REPORT: CPT | Mod: ,,, | Performed by: INTERNAL MEDICINE

## 2021-05-06 PROCEDURE — 93010 EKG 12-LEAD: ICD-10-PCS | Mod: ,,, | Performed by: INTERNAL MEDICINE

## 2021-05-06 PROCEDURE — 93005 ELECTROCARDIOGRAM TRACING: CPT

## 2021-06-09 ENCOUNTER — OFFICE VISIT (OUTPATIENT)
Dept: SPORTS MEDICINE | Facility: CLINIC | Age: 76
End: 2021-06-09
Payer: MEDICARE

## 2021-06-09 VITALS
DIASTOLIC BLOOD PRESSURE: 76 MMHG | BODY MASS INDEX: 30.88 KG/M2 | HEIGHT: 73 IN | SYSTOLIC BLOOD PRESSURE: 120 MMHG | WEIGHT: 233 LBS

## 2021-06-09 DIAGNOSIS — M76.31 IT BAND SYNDROME, RIGHT: ICD-10-CM

## 2021-06-09 DIAGNOSIS — M79.662 PAIN OF LEFT CALF: ICD-10-CM

## 2021-06-09 DIAGNOSIS — M25.561 LATERAL KNEE PAIN, RIGHT: Primary | ICD-10-CM

## 2021-06-09 PROCEDURE — 99999 PR PBB SHADOW E&M-EST. PATIENT-LVL III: ICD-10-PCS | Mod: PBBFAC,,, | Performed by: ORTHOPAEDIC SURGERY

## 2021-06-09 PROCEDURE — 1159F PR MEDICATION LIST DOCUMENTED IN MEDICAL RECORD: ICD-10-PCS | Mod: S$GLB,,, | Performed by: ORTHOPAEDIC SURGERY

## 2021-06-09 PROCEDURE — 1159F MED LIST DOCD IN RCRD: CPT | Mod: S$GLB,,, | Performed by: ORTHOPAEDIC SURGERY

## 2021-06-09 PROCEDURE — 99214 PR OFFICE/OUTPT VISIT, EST, LEVL IV, 30-39 MIN: ICD-10-PCS | Mod: S$GLB,,, | Performed by: ORTHOPAEDIC SURGERY

## 2021-06-09 PROCEDURE — 1126F AMNT PAIN NOTED NONE PRSNT: CPT | Mod: S$GLB,,, | Performed by: ORTHOPAEDIC SURGERY

## 2021-06-09 PROCEDURE — 99214 OFFICE O/P EST MOD 30 MIN: CPT | Mod: S$GLB,,, | Performed by: ORTHOPAEDIC SURGERY

## 2021-06-09 PROCEDURE — 1126F PR PAIN SEVERITY QUANTIFIED, NO PAIN PRESENT: ICD-10-PCS | Mod: S$GLB,,, | Performed by: ORTHOPAEDIC SURGERY

## 2021-06-09 PROCEDURE — 99999 PR PBB SHADOW E&M-EST. PATIENT-LVL III: CPT | Mod: PBBFAC,,, | Performed by: ORTHOPAEDIC SURGERY

## 2021-06-16 ENCOUNTER — CLINICAL SUPPORT (OUTPATIENT)
Dept: SPORTS MEDICINE | Facility: CLINIC | Age: 76
End: 2021-06-16
Payer: MEDICARE

## 2021-06-16 VITALS
WEIGHT: 233 LBS | HEIGHT: 73 IN | DIASTOLIC BLOOD PRESSURE: 84 MMHG | SYSTOLIC BLOOD PRESSURE: 126 MMHG | BODY MASS INDEX: 30.88 KG/M2

## 2021-06-16 DIAGNOSIS — M25.561 LATERAL KNEE PAIN, RIGHT: Primary | ICD-10-CM

## 2021-06-16 DIAGNOSIS — G57.31 ENTRAPMENT NEUROPATHY OF RIGHT COMMON PERONEAL NERVE: ICD-10-CM

## 2021-06-16 DIAGNOSIS — G57.91 MONONEURITIS LEG, RIGHT: ICD-10-CM

## 2021-06-16 PROCEDURE — 64450 NJX AA&/STRD OTHER PN/BRANCH: CPT | Mod: RT,S$GLB,, | Performed by: ORTHOPAEDIC SURGERY

## 2021-06-16 PROCEDURE — 64450 PR NERVE BLOCK INJ, ANES/STEROID, OTHER PERIPHERAL: ICD-10-PCS | Mod: RT,S$GLB,, | Performed by: ORTHOPAEDIC SURGERY

## 2021-06-16 PROCEDURE — 99499 UNLISTED E&M SERVICE: CPT | Mod: S$GLB,,, | Performed by: ORTHOPAEDIC SURGERY

## 2021-06-16 PROCEDURE — 99999 PR PBB SHADOW E&M-EST. PATIENT-LVL II: CPT | Mod: PBBFAC,,, | Performed by: ORTHOPAEDIC SURGERY

## 2021-06-16 PROCEDURE — 99499 NO LOS: ICD-10-PCS | Mod: S$GLB,,, | Performed by: ORTHOPAEDIC SURGERY

## 2021-06-16 PROCEDURE — 76942 PR U/S GUIDANCE FOR NEEDLE GUIDANCE: ICD-10-PCS | Mod: S$GLB,,, | Performed by: ORTHOPAEDIC SURGERY

## 2021-06-16 PROCEDURE — 99999 PR PBB SHADOW E&M-EST. PATIENT-LVL II: ICD-10-PCS | Mod: PBBFAC,,, | Performed by: ORTHOPAEDIC SURGERY

## 2021-06-16 PROCEDURE — 76942 ECHO GUIDE FOR BIOPSY: CPT | Mod: S$GLB,,, | Performed by: ORTHOPAEDIC SURGERY

## 2021-06-16 RX ORDER — TRIAMCINOLONE ACETONIDE 40 MG/ML
12 INJECTION, SUSPENSION INTRA-ARTICULAR; INTRAMUSCULAR
Status: DISCONTINUED | OUTPATIENT
Start: 2021-06-16 | End: 2022-04-12

## 2021-06-24 ENCOUNTER — OFFICE VISIT (OUTPATIENT)
Dept: OPTOMETRY | Facility: CLINIC | Age: 76
End: 2021-06-24
Attending: FAMILY MEDICINE
Payer: MEDICARE

## 2021-06-24 DIAGNOSIS — H25.13 NUCLEAR SCLEROSIS OF BOTH EYES: Primary | ICD-10-CM

## 2021-06-24 DIAGNOSIS — H52.4 PRESBYOPIA: ICD-10-CM

## 2021-06-24 DIAGNOSIS — I10 ESSENTIAL HYPERTENSION: ICD-10-CM

## 2021-06-24 PROCEDURE — 99999 PR PBB SHADOW E&M-EST. PATIENT-LVL III: ICD-10-PCS | Mod: PBBFAC,,, | Performed by: OPTOMETRIST

## 2021-06-24 PROCEDURE — 1101F PT FALLS ASSESS-DOCD LE1/YR: CPT | Mod: CPTII,S$GLB,, | Performed by: OPTOMETRIST

## 2021-06-24 PROCEDURE — 3288F FALL RISK ASSESSMENT DOCD: CPT | Mod: CPTII,S$GLB,, | Performed by: OPTOMETRIST

## 2021-06-24 PROCEDURE — 3288F PR FALLS RISK ASSESSMENT DOCUMENTED: ICD-10-PCS | Mod: CPTII,S$GLB,, | Performed by: OPTOMETRIST

## 2021-06-24 PROCEDURE — 99999 PR PBB SHADOW E&M-EST. PATIENT-LVL III: CPT | Mod: PBBFAC,,, | Performed by: OPTOMETRIST

## 2021-06-24 PROCEDURE — 1126F PR PAIN SEVERITY QUANTIFIED, NO PAIN PRESENT: ICD-10-PCS | Mod: S$GLB,,, | Performed by: OPTOMETRIST

## 2021-06-24 PROCEDURE — 1126F AMNT PAIN NOTED NONE PRSNT: CPT | Mod: S$GLB,,, | Performed by: OPTOMETRIST

## 2021-06-24 PROCEDURE — 92004 PR EYE EXAM, NEW PATIENT,COMPREHESV: ICD-10-PCS | Mod: S$GLB,,, | Performed by: OPTOMETRIST

## 2021-06-24 PROCEDURE — 92004 COMPRE OPH EXAM NEW PT 1/>: CPT | Mod: S$GLB,,, | Performed by: OPTOMETRIST

## 2021-06-24 PROCEDURE — 1101F PR PT FALLS ASSESS DOC 0-1 FALLS W/OUT INJ PAST YR: ICD-10-PCS | Mod: CPTII,S$GLB,, | Performed by: OPTOMETRIST

## 2021-06-30 ENCOUNTER — OFFICE VISIT (OUTPATIENT)
Dept: SPORTS MEDICINE | Facility: CLINIC | Age: 76
End: 2021-06-30
Payer: MEDICARE

## 2021-06-30 VITALS
BODY MASS INDEX: 30.88 KG/M2 | HEIGHT: 73 IN | WEIGHT: 233 LBS | SYSTOLIC BLOOD PRESSURE: 118 MMHG | DIASTOLIC BLOOD PRESSURE: 70 MMHG

## 2021-06-30 DIAGNOSIS — G57.31 ENTRAPMENT NEUROPATHY OF RIGHT COMMON PERONEAL NERVE: ICD-10-CM

## 2021-06-30 DIAGNOSIS — M25.561 LATERAL KNEE PAIN, RIGHT: Primary | ICD-10-CM

## 2021-06-30 DIAGNOSIS — G57.91 MONONEURITIS LEG, RIGHT: ICD-10-CM

## 2021-06-30 PROCEDURE — 99999 PR PBB SHADOW E&M-EST. PATIENT-LVL III: CPT | Mod: PBBFAC,,, | Performed by: ORTHOPAEDIC SURGERY

## 2021-06-30 PROCEDURE — 1126F AMNT PAIN NOTED NONE PRSNT: CPT | Mod: S$GLB,,, | Performed by: ORTHOPAEDIC SURGERY

## 2021-06-30 PROCEDURE — 99999 PR PBB SHADOW E&M-EST. PATIENT-LVL III: ICD-10-PCS | Mod: PBBFAC,,, | Performed by: ORTHOPAEDIC SURGERY

## 2021-06-30 PROCEDURE — 99213 PR OFFICE/OUTPT VISIT, EST, LEVL III, 20-29 MIN: ICD-10-PCS | Mod: S$GLB,,, | Performed by: ORTHOPAEDIC SURGERY

## 2021-06-30 PROCEDURE — 1126F PR PAIN SEVERITY QUANTIFIED, NO PAIN PRESENT: ICD-10-PCS | Mod: S$GLB,,, | Performed by: ORTHOPAEDIC SURGERY

## 2021-06-30 PROCEDURE — 99213 OFFICE O/P EST LOW 20 MIN: CPT | Mod: S$GLB,,, | Performed by: ORTHOPAEDIC SURGERY

## 2021-06-30 PROCEDURE — 1159F MED LIST DOCD IN RCRD: CPT | Mod: S$GLB,,, | Performed by: ORTHOPAEDIC SURGERY

## 2021-06-30 PROCEDURE — 1159F PR MEDICATION LIST DOCUMENTED IN MEDICAL RECORD: ICD-10-PCS | Mod: S$GLB,,, | Performed by: ORTHOPAEDIC SURGERY

## 2021-07-16 DIAGNOSIS — M10.9 GOUT, UNSPECIFIED CAUSE, UNSPECIFIED CHRONICITY, UNSPECIFIED SITE: ICD-10-CM

## 2021-07-16 RX ORDER — ALLOPURINOL 300 MG/1
300 TABLET ORAL DAILY
Qty: 90 TABLET | Refills: 1 | Status: CANCELLED | OUTPATIENT
Start: 2021-07-16

## 2021-08-09 ENCOUNTER — PATIENT MESSAGE (OUTPATIENT)
Dept: DERMATOLOGY | Facility: CLINIC | Age: 76
End: 2021-08-09

## 2021-08-09 ENCOUNTER — PATIENT OUTREACH (OUTPATIENT)
Dept: ADMINISTRATIVE | Facility: OTHER | Age: 76
End: 2021-08-09

## 2021-08-10 ENCOUNTER — OFFICE VISIT (OUTPATIENT)
Dept: DERMATOLOGY | Facility: CLINIC | Age: 76
End: 2021-08-10
Payer: MEDICARE

## 2021-08-10 DIAGNOSIS — L72.3 INFLAMED EPIDERMOID CYST OF SKIN: Primary | ICD-10-CM

## 2021-08-10 DIAGNOSIS — L71.9 ROSACEA: ICD-10-CM

## 2021-08-10 PROCEDURE — 1160F PR REVIEW ALL MEDS BY PRESCRIBER/CLIN PHARMACIST DOCUMENTED: ICD-10-PCS | Mod: CPTII,S$GLB,, | Performed by: DERMATOLOGY

## 2021-08-10 PROCEDURE — 1160F RVW MEDS BY RX/DR IN RCRD: CPT | Mod: CPTII,S$GLB,, | Performed by: DERMATOLOGY

## 2021-08-10 PROCEDURE — 99213 OFFICE O/P EST LOW 20 MIN: CPT | Mod: 25,S$GLB,, | Performed by: DERMATOLOGY

## 2021-08-10 PROCEDURE — 3044F HG A1C LEVEL LT 7.0%: CPT | Mod: CPTII,S$GLB,, | Performed by: DERMATOLOGY

## 2021-08-10 PROCEDURE — 1101F PR PT FALLS ASSESS DOC 0-1 FALLS W/OUT INJ PAST YR: ICD-10-PCS | Mod: CPTII,S$GLB,, | Performed by: DERMATOLOGY

## 2021-08-10 PROCEDURE — 11900 INJECT SKIN LESIONS </W 7: CPT | Mod: S$GLB,,, | Performed by: DERMATOLOGY

## 2021-08-10 PROCEDURE — 3288F FALL RISK ASSESSMENT DOCD: CPT | Mod: CPTII,S$GLB,, | Performed by: DERMATOLOGY

## 2021-08-10 PROCEDURE — 11900 PR INJECTION INTO SKIN LESIONS, UP TO 7: ICD-10-PCS | Mod: S$GLB,,, | Performed by: DERMATOLOGY

## 2021-08-10 PROCEDURE — 1101F PT FALLS ASSESS-DOCD LE1/YR: CPT | Mod: CPTII,S$GLB,, | Performed by: DERMATOLOGY

## 2021-08-10 PROCEDURE — 99213 PR OFFICE/OUTPT VISIT, EST, LEVL III, 20-29 MIN: ICD-10-PCS | Mod: 25,S$GLB,, | Performed by: DERMATOLOGY

## 2021-08-10 PROCEDURE — 3044F PR MOST RECENT HEMOGLOBIN A1C LEVEL <7.0%: ICD-10-PCS | Mod: CPTII,S$GLB,, | Performed by: DERMATOLOGY

## 2021-08-10 PROCEDURE — 1126F AMNT PAIN NOTED NONE PRSNT: CPT | Mod: CPTII,S$GLB,, | Performed by: DERMATOLOGY

## 2021-08-10 PROCEDURE — 1159F PR MEDICATION LIST DOCUMENTED IN MEDICAL RECORD: ICD-10-PCS | Mod: CPTII,S$GLB,, | Performed by: DERMATOLOGY

## 2021-08-10 PROCEDURE — 1126F PR PAIN SEVERITY QUANTIFIED, NO PAIN PRESENT: ICD-10-PCS | Mod: CPTII,S$GLB,, | Performed by: DERMATOLOGY

## 2021-08-10 PROCEDURE — 1159F MED LIST DOCD IN RCRD: CPT | Mod: CPTII,S$GLB,, | Performed by: DERMATOLOGY

## 2021-08-10 PROCEDURE — 3288F PR FALLS RISK ASSESSMENT DOCUMENTED: ICD-10-PCS | Mod: CPTII,S$GLB,, | Performed by: DERMATOLOGY

## 2021-08-10 RX ORDER — ZOSTER VACCINE RECOMBINANT, ADJUVANTED 50 MCG/0.5
KIT INTRAMUSCULAR
COMMUNITY
Start: 2021-02-15 | End: 2022-04-12

## 2021-08-19 ENCOUNTER — PATIENT MESSAGE (OUTPATIENT)
Dept: PRIMARY CARE CLINIC | Facility: CLINIC | Age: 76
End: 2021-08-19

## 2021-08-19 DIAGNOSIS — R79.89 LOW TESTOSTERONE IN MALE: Primary | ICD-10-CM

## 2021-08-23 ENCOUNTER — TELEPHONE (OUTPATIENT)
Dept: PRIMARY CARE CLINIC | Facility: CLINIC | Age: 76
End: 2021-08-23

## 2021-08-23 ENCOUNTER — PATIENT MESSAGE (OUTPATIENT)
Dept: DERMATOLOGY | Facility: CLINIC | Age: 76
End: 2021-08-23

## 2021-08-27 ENCOUNTER — PATIENT MESSAGE (OUTPATIENT)
Dept: FAMILY MEDICINE | Facility: CLINIC | Age: 76
End: 2021-08-27

## 2021-09-07 ENCOUNTER — PATIENT MESSAGE (OUTPATIENT)
Dept: DERMATOLOGY | Facility: CLINIC | Age: 76
End: 2021-09-07

## 2021-09-08 ENCOUNTER — TELEPHONE (OUTPATIENT)
Dept: INTERNAL MEDICINE | Facility: CLINIC | Age: 76
End: 2021-09-08

## 2021-09-08 ENCOUNTER — TELEPHONE (OUTPATIENT)
Dept: UROLOGY | Facility: CLINIC | Age: 76
End: 2021-09-08

## 2021-09-08 ENCOUNTER — PATIENT MESSAGE (OUTPATIENT)
Dept: PRIMARY CARE CLINIC | Facility: CLINIC | Age: 76
End: 2021-09-08

## 2021-09-08 DIAGNOSIS — R79.89 LOW TESTOSTERONE IN MALE: Primary | ICD-10-CM

## 2021-09-08 DIAGNOSIS — N40.1 BENIGN PROSTATIC HYPERPLASIA WITH URINARY FREQUENCY: Primary | ICD-10-CM

## 2021-09-08 DIAGNOSIS — R35.0 BENIGN PROSTATIC HYPERPLASIA WITH URINARY FREQUENCY: Primary | ICD-10-CM

## 2021-09-09 ENCOUNTER — LAB VISIT (OUTPATIENT)
Dept: LAB | Facility: OTHER | Age: 76
End: 2021-09-09
Attending: FAMILY MEDICINE
Payer: MEDICARE

## 2021-09-09 ENCOUNTER — OFFICE VISIT (OUTPATIENT)
Dept: DERMATOLOGY | Facility: CLINIC | Age: 76
End: 2021-09-09
Payer: MEDICARE

## 2021-09-09 ENCOUNTER — TELEPHONE (OUTPATIENT)
Dept: DERMATOLOGY | Facility: CLINIC | Age: 76
End: 2021-09-09

## 2021-09-09 ENCOUNTER — TELEPHONE (OUTPATIENT)
Dept: UROLOGY | Facility: CLINIC | Age: 76
End: 2021-09-09

## 2021-09-09 DIAGNOSIS — L71.9 ROSACEA: Primary | ICD-10-CM

## 2021-09-09 DIAGNOSIS — L72.3 INFLAMED EPIDERMOID CYST OF SKIN: ICD-10-CM

## 2021-09-09 DIAGNOSIS — N40.1 BENIGN PROSTATIC HYPERPLASIA WITH URINARY FREQUENCY: ICD-10-CM

## 2021-09-09 DIAGNOSIS — R35.0 BENIGN PROSTATIC HYPERPLASIA WITH URINARY FREQUENCY: ICD-10-CM

## 2021-09-09 LAB — TESTOST SERPL-MCNC: 195 NG/DL (ref 304–1227)

## 2021-09-09 PROCEDURE — 1101F PT FALLS ASSESS-DOCD LE1/YR: CPT | Mod: CPTII,S$GLB,, | Performed by: DERMATOLOGY

## 2021-09-09 PROCEDURE — 1160F PR REVIEW ALL MEDS BY PRESCRIBER/CLIN PHARMACIST DOCUMENTED: ICD-10-PCS | Mod: CPTII,S$GLB,, | Performed by: DERMATOLOGY

## 2021-09-09 PROCEDURE — 1126F PR PAIN SEVERITY QUANTIFIED, NO PAIN PRESENT: ICD-10-PCS | Mod: CPTII,S$GLB,, | Performed by: DERMATOLOGY

## 2021-09-09 PROCEDURE — 4010F PR ACE/ARB THEARPY RXD/TAKEN: ICD-10-PCS | Mod: CPTII,S$GLB,, | Performed by: DERMATOLOGY

## 2021-09-09 PROCEDURE — 1159F PR MEDICATION LIST DOCUMENTED IN MEDICAL RECORD: ICD-10-PCS | Mod: CPTII,S$GLB,, | Performed by: DERMATOLOGY

## 2021-09-09 PROCEDURE — 1126F AMNT PAIN NOTED NONE PRSNT: CPT | Mod: CPTII,S$GLB,, | Performed by: DERMATOLOGY

## 2021-09-09 PROCEDURE — 3288F PR FALLS RISK ASSESSMENT DOCUMENTED: ICD-10-PCS | Mod: CPTII,S$GLB,, | Performed by: DERMATOLOGY

## 2021-09-09 PROCEDURE — 1160F RVW MEDS BY RX/DR IN RCRD: CPT | Mod: CPTII,S$GLB,, | Performed by: DERMATOLOGY

## 2021-09-09 PROCEDURE — 3044F PR MOST RECENT HEMOGLOBIN A1C LEVEL <7.0%: ICD-10-PCS | Mod: CPTII,S$GLB,, | Performed by: DERMATOLOGY

## 2021-09-09 PROCEDURE — 3288F FALL RISK ASSESSMENT DOCD: CPT | Mod: CPTII,S$GLB,, | Performed by: DERMATOLOGY

## 2021-09-09 PROCEDURE — 1101F PR PT FALLS ASSESS DOC 0-1 FALLS W/OUT INJ PAST YR: ICD-10-PCS | Mod: CPTII,S$GLB,, | Performed by: DERMATOLOGY

## 2021-09-09 PROCEDURE — 84403 ASSAY OF TOTAL TESTOSTERONE: CPT | Performed by: FAMILY MEDICINE

## 2021-09-09 PROCEDURE — 99214 PR OFFICE/OUTPT VISIT, EST, LEVL IV, 30-39 MIN: ICD-10-PCS | Mod: S$GLB,,, | Performed by: DERMATOLOGY

## 2021-09-09 PROCEDURE — 1159F MED LIST DOCD IN RCRD: CPT | Mod: CPTII,S$GLB,, | Performed by: DERMATOLOGY

## 2021-09-09 PROCEDURE — 3044F HG A1C LEVEL LT 7.0%: CPT | Mod: CPTII,S$GLB,, | Performed by: DERMATOLOGY

## 2021-09-09 PROCEDURE — 36415 COLL VENOUS BLD VENIPUNCTURE: CPT | Performed by: FAMILY MEDICINE

## 2021-09-09 PROCEDURE — 99214 OFFICE O/P EST MOD 30 MIN: CPT | Mod: S$GLB,,, | Performed by: DERMATOLOGY

## 2021-09-09 PROCEDURE — 4010F ACE/ARB THERAPY RXD/TAKEN: CPT | Mod: CPTII,S$GLB,, | Performed by: DERMATOLOGY

## 2021-09-14 ENCOUNTER — OFFICE VISIT (OUTPATIENT)
Dept: UROLOGY | Facility: CLINIC | Age: 76
End: 2021-09-14
Payer: MEDICARE

## 2021-09-14 DIAGNOSIS — R79.89 LOW TESTOSTERONE: Primary | ICD-10-CM

## 2021-09-14 DIAGNOSIS — R68.82 LOW LIBIDO: ICD-10-CM

## 2021-09-14 PROCEDURE — 1159F MED LIST DOCD IN RCRD: CPT | Mod: CPTII,95,, | Performed by: NURSE PRACTITIONER

## 2021-09-14 PROCEDURE — 3044F HG A1C LEVEL LT 7.0%: CPT | Mod: CPTII,95,, | Performed by: NURSE PRACTITIONER

## 2021-09-14 PROCEDURE — 99203 PR OFFICE/OUTPT VISIT, NEW, LEVL III, 30-44 MIN: ICD-10-PCS | Mod: 95,,, | Performed by: NURSE PRACTITIONER

## 2021-09-14 PROCEDURE — 1160F PR REVIEW ALL MEDS BY PRESCRIBER/CLIN PHARMACIST DOCUMENTED: ICD-10-PCS | Mod: CPTII,95,, | Performed by: NURSE PRACTITIONER

## 2021-09-14 PROCEDURE — 3044F PR MOST RECENT HEMOGLOBIN A1C LEVEL <7.0%: ICD-10-PCS | Mod: CPTII,95,, | Performed by: NURSE PRACTITIONER

## 2021-09-14 PROCEDURE — 1159F PR MEDICATION LIST DOCUMENTED IN MEDICAL RECORD: ICD-10-PCS | Mod: CPTII,95,, | Performed by: NURSE PRACTITIONER

## 2021-09-14 PROCEDURE — 1160F RVW MEDS BY RX/DR IN RCRD: CPT | Mod: CPTII,95,, | Performed by: NURSE PRACTITIONER

## 2021-09-14 PROCEDURE — 99203 OFFICE O/P NEW LOW 30 MIN: CPT | Mod: 95,,, | Performed by: NURSE PRACTITIONER

## 2021-09-14 PROCEDURE — 4010F PR ACE/ARB THEARPY RXD/TAKEN: ICD-10-PCS | Mod: CPTII,95,, | Performed by: NURSE PRACTITIONER

## 2021-09-14 PROCEDURE — 4010F ACE/ARB THERAPY RXD/TAKEN: CPT | Mod: CPTII,95,, | Performed by: NURSE PRACTITIONER

## 2021-09-16 ENCOUNTER — LAB VISIT (OUTPATIENT)
Dept: LAB | Facility: OTHER | Age: 76
End: 2021-09-16
Attending: NURSE PRACTITIONER
Payer: MEDICARE

## 2021-09-16 DIAGNOSIS — R79.89 LOW TESTOSTERONE: ICD-10-CM

## 2021-09-16 LAB
LH SERPL-ACNC: 22.4 MIU/ML (ref 0.6–12.1)
TESTOST SERPL-MCNC: 168 NG/DL (ref 304–1227)

## 2021-09-16 PROCEDURE — 84403 ASSAY OF TOTAL TESTOSTERONE: CPT | Performed by: NURSE PRACTITIONER

## 2021-09-16 PROCEDURE — 36415 COLL VENOUS BLD VENIPUNCTURE: CPT | Performed by: NURSE PRACTITIONER

## 2021-09-16 PROCEDURE — 83002 ASSAY OF GONADOTROPIN (LH): CPT | Performed by: NURSE PRACTITIONER

## 2021-09-17 ENCOUNTER — PATIENT MESSAGE (OUTPATIENT)
Dept: UROLOGY | Facility: CLINIC | Age: 76
End: 2021-09-17

## 2021-09-22 ENCOUNTER — OFFICE VISIT (OUTPATIENT)
Dept: UROLOGY | Facility: CLINIC | Age: 76
End: 2021-09-22
Payer: MEDICARE

## 2021-09-22 ENCOUNTER — PATIENT MESSAGE (OUTPATIENT)
Dept: UROLOGY | Facility: CLINIC | Age: 76
End: 2021-09-22

## 2021-09-22 DIAGNOSIS — E29.1 HYPOGONADISM IN MALE: Primary | ICD-10-CM

## 2021-09-22 DIAGNOSIS — R68.82 DECREASED LIBIDO: ICD-10-CM

## 2021-09-22 DIAGNOSIS — E29.1 HYPOGONADISM MALE: Primary | ICD-10-CM

## 2021-09-22 PROCEDURE — 3044F HG A1C LEVEL LT 7.0%: CPT | Mod: CPTII,95,, | Performed by: NURSE PRACTITIONER

## 2021-09-22 PROCEDURE — 4010F PR ACE/ARB THEARPY RXD/TAKEN: ICD-10-PCS | Mod: CPTII,95,, | Performed by: NURSE PRACTITIONER

## 2021-09-22 PROCEDURE — 3044F PR MOST RECENT HEMOGLOBIN A1C LEVEL <7.0%: ICD-10-PCS | Mod: CPTII,95,, | Performed by: NURSE PRACTITIONER

## 2021-09-22 PROCEDURE — 99213 PR OFFICE/OUTPT VISIT, EST, LEVL III, 20-29 MIN: ICD-10-PCS | Mod: 95,,, | Performed by: NURSE PRACTITIONER

## 2021-09-22 PROCEDURE — 4010F ACE/ARB THERAPY RXD/TAKEN: CPT | Mod: CPTII,95,, | Performed by: NURSE PRACTITIONER

## 2021-09-22 PROCEDURE — 99213 OFFICE O/P EST LOW 20 MIN: CPT | Mod: 95,,, | Performed by: NURSE PRACTITIONER

## 2021-09-23 ENCOUNTER — PATIENT MESSAGE (OUTPATIENT)
Dept: UROLOGY | Facility: CLINIC | Age: 76
End: 2021-09-23

## 2021-09-23 RX ORDER — TESTOSTERONE CYPIONATE 200 MG/ML
200 INJECTION, SOLUTION INTRAMUSCULAR
Status: SHIPPED | OUTPATIENT
Start: 2021-09-28 | End: 2021-12-21

## 2021-09-28 ENCOUNTER — CLINICAL SUPPORT (OUTPATIENT)
Dept: UROLOGY | Facility: CLINIC | Age: 76
End: 2021-09-28
Payer: MEDICARE

## 2021-09-28 ENCOUNTER — IMMUNIZATION (OUTPATIENT)
Dept: INTERNAL MEDICINE | Facility: CLINIC | Age: 76
End: 2021-09-28
Payer: MEDICARE

## 2021-09-28 DIAGNOSIS — Z23 NEED FOR VACCINATION: Primary | ICD-10-CM

## 2021-09-28 DIAGNOSIS — E29.1 HYPOGONADISM IN MALE: Primary | ICD-10-CM

## 2021-09-28 PROCEDURE — 91300 COVID-19, MRNA, LNP-S, PF, 30 MCG/0.3 ML DOSE VACCINE: CPT | Mod: HCNC,PBBFAC | Performed by: INTERNAL MEDICINE

## 2021-09-28 PROCEDURE — 96372 THER/PROPH/DIAG INJ SC/IM: CPT | Mod: HCNC,S$GLB,, | Performed by: NURSE PRACTITIONER

## 2021-09-28 PROCEDURE — 96372 PR INJECTION,THERAP/PROPH/DIAG2ST, IM OR SUBCUT: ICD-10-PCS | Mod: HCNC,S$GLB,, | Performed by: NURSE PRACTITIONER

## 2021-09-28 PROCEDURE — 0003A COVID-19, MRNA, LNP-S, PF, 30 MCG/0.3 ML DOSE VACCINE: CPT | Mod: HCNC,PBBFAC | Performed by: INTERNAL MEDICINE

## 2021-09-28 RX ORDER — TESTOSTERONE CYPIONATE 200 MG/ML
200 INJECTION, SOLUTION INTRAMUSCULAR
Status: COMPLETED | OUTPATIENT
Start: 2021-09-28 | End: 2021-12-07

## 2021-09-28 RX ADMIN — TESTOSTERONE CYPIONATE 200 MG: 200 INJECTION, SOLUTION INTRAMUSCULAR at 10:09

## 2021-10-12 ENCOUNTER — CLINICAL SUPPORT (OUTPATIENT)
Dept: UROLOGY | Facility: CLINIC | Age: 76
End: 2021-10-12
Payer: MEDICARE

## 2021-10-12 PROCEDURE — 96372 PR INJECTION,THERAP/PROPH/DIAG2ST, IM OR SUBCUT: ICD-10-PCS | Mod: HCNC,S$GLB,, | Performed by: NURSE PRACTITIONER

## 2021-10-12 PROCEDURE — 96372 THER/PROPH/DIAG INJ SC/IM: CPT | Mod: HCNC,S$GLB,, | Performed by: NURSE PRACTITIONER

## 2021-10-12 RX ADMIN — TESTOSTERONE CYPIONATE 200 MG: 200 INJECTION, SOLUTION INTRAMUSCULAR at 08:10

## 2021-10-20 ENCOUNTER — OFFICE VISIT (OUTPATIENT)
Dept: SPORTS MEDICINE | Facility: CLINIC | Age: 76
End: 2021-10-20
Payer: MEDICARE

## 2021-10-20 VITALS
BODY MASS INDEX: 30.88 KG/M2 | SYSTOLIC BLOOD PRESSURE: 120 MMHG | DIASTOLIC BLOOD PRESSURE: 76 MMHG | WEIGHT: 233 LBS | HEIGHT: 73 IN

## 2021-10-20 DIAGNOSIS — M76.31 IT BAND SYNDROME, RIGHT: ICD-10-CM

## 2021-10-20 DIAGNOSIS — M25.561 LATERAL KNEE PAIN, RIGHT: ICD-10-CM

## 2021-10-20 DIAGNOSIS — M25.561 RIGHT ANTERIOR KNEE PAIN: Primary | ICD-10-CM

## 2021-10-20 DIAGNOSIS — G57.31 ENTRAPMENT NEUROPATHY OF RIGHT COMMON PERONEAL NERVE: ICD-10-CM

## 2021-10-20 PROCEDURE — 4010F ACE/ARB THERAPY RXD/TAKEN: CPT | Mod: HCNC,CPTII,S$GLB, | Performed by: ORTHOPAEDIC SURGERY

## 2021-10-20 PROCEDURE — 1159F PR MEDICATION LIST DOCUMENTED IN MEDICAL RECORD: ICD-10-PCS | Mod: HCNC,CPTII,S$GLB, | Performed by: ORTHOPAEDIC SURGERY

## 2021-10-20 PROCEDURE — 3074F PR MOST RECENT SYSTOLIC BLOOD PRESSURE < 130 MM HG: ICD-10-PCS | Mod: HCNC,CPTII,S$GLB, | Performed by: ORTHOPAEDIC SURGERY

## 2021-10-20 PROCEDURE — 99214 OFFICE O/P EST MOD 30 MIN: CPT | Mod: HCNC,S$GLB,, | Performed by: ORTHOPAEDIC SURGERY

## 2021-10-20 PROCEDURE — 1159F MED LIST DOCD IN RCRD: CPT | Mod: HCNC,CPTII,S$GLB, | Performed by: ORTHOPAEDIC SURGERY

## 2021-10-20 PROCEDURE — 1126F PR PAIN SEVERITY QUANTIFIED, NO PAIN PRESENT: ICD-10-PCS | Mod: HCNC,CPTII,S$GLB, | Performed by: ORTHOPAEDIC SURGERY

## 2021-10-20 PROCEDURE — 3074F SYST BP LT 130 MM HG: CPT | Mod: HCNC,CPTII,S$GLB, | Performed by: ORTHOPAEDIC SURGERY

## 2021-10-20 PROCEDURE — 3078F DIAST BP <80 MM HG: CPT | Mod: HCNC,CPTII,S$GLB, | Performed by: ORTHOPAEDIC SURGERY

## 2021-10-20 PROCEDURE — 99214 PR OFFICE/OUTPT VISIT, EST, LEVL IV, 30-39 MIN: ICD-10-PCS | Mod: HCNC,S$GLB,, | Performed by: ORTHOPAEDIC SURGERY

## 2021-10-20 PROCEDURE — 1126F AMNT PAIN NOTED NONE PRSNT: CPT | Mod: HCNC,CPTII,S$GLB, | Performed by: ORTHOPAEDIC SURGERY

## 2021-10-20 PROCEDURE — 1160F RVW MEDS BY RX/DR IN RCRD: CPT | Mod: HCNC,CPTII,S$GLB, | Performed by: ORTHOPAEDIC SURGERY

## 2021-10-20 PROCEDURE — 99999 PR PBB SHADOW E&M-EST. PATIENT-LVL III: CPT | Mod: PBBFAC,HCNC,, | Performed by: ORTHOPAEDIC SURGERY

## 2021-10-20 PROCEDURE — 3078F PR MOST RECENT DIASTOLIC BLOOD PRESSURE < 80 MM HG: ICD-10-PCS | Mod: HCNC,CPTII,S$GLB, | Performed by: ORTHOPAEDIC SURGERY

## 2021-10-20 PROCEDURE — 1160F PR REVIEW ALL MEDS BY PRESCRIBER/CLIN PHARMACIST DOCUMENTED: ICD-10-PCS | Mod: HCNC,CPTII,S$GLB, | Performed by: ORTHOPAEDIC SURGERY

## 2021-10-20 PROCEDURE — 99999 PR PBB SHADOW E&M-EST. PATIENT-LVL III: ICD-10-PCS | Mod: PBBFAC,HCNC,, | Performed by: ORTHOPAEDIC SURGERY

## 2021-10-20 PROCEDURE — 4010F PR ACE/ARB THEARPY RXD/TAKEN: ICD-10-PCS | Mod: HCNC,CPTII,S$GLB, | Performed by: ORTHOPAEDIC SURGERY

## 2021-10-20 PROCEDURE — 3044F PR MOST RECENT HEMOGLOBIN A1C LEVEL <7.0%: ICD-10-PCS | Mod: HCNC,CPTII,S$GLB, | Performed by: ORTHOPAEDIC SURGERY

## 2021-10-20 PROCEDURE — 3044F HG A1C LEVEL LT 7.0%: CPT | Mod: HCNC,CPTII,S$GLB, | Performed by: ORTHOPAEDIC SURGERY

## 2021-10-20 RX ORDER — MELOXICAM 7.5 MG/1
7.5 TABLET ORAL DAILY
Qty: 30 TABLET | Refills: 0 | Status: SHIPPED | OUTPATIENT
Start: 2021-10-20 | End: 2022-04-12

## 2021-10-26 ENCOUNTER — CLINICAL SUPPORT (OUTPATIENT)
Dept: UROLOGY | Facility: CLINIC | Age: 76
End: 2021-10-26
Payer: MEDICARE

## 2021-10-26 DIAGNOSIS — E29.1 HYPOGONADISM IN MALE: Primary | ICD-10-CM

## 2021-10-26 PROCEDURE — 96372 THER/PROPH/DIAG INJ SC/IM: CPT | Mod: HCNC,S$GLB,, | Performed by: NURSE PRACTITIONER

## 2021-10-26 PROCEDURE — 96372 PR INJECTION,THERAP/PROPH/DIAG2ST, IM OR SUBCUT: ICD-10-PCS | Mod: HCNC,S$GLB,, | Performed by: NURSE PRACTITIONER

## 2021-10-26 RX ADMIN — TESTOSTERONE CYPIONATE 200 MG: 200 INJECTION, SOLUTION INTRAMUSCULAR at 08:10

## 2021-11-09 ENCOUNTER — CLINICAL SUPPORT (OUTPATIENT)
Dept: UROLOGY | Facility: CLINIC | Age: 76
End: 2021-11-09
Payer: MEDICARE

## 2021-11-09 DIAGNOSIS — E29.1 HYPOGONADISM IN MALE: Primary | ICD-10-CM

## 2021-11-09 PROCEDURE — 96372 THER/PROPH/DIAG INJ SC/IM: CPT | Mod: HCNC,S$GLB,, | Performed by: NURSE PRACTITIONER

## 2021-11-09 PROCEDURE — 96372 PR INJECTION,THERAP/PROPH/DIAG2ST, IM OR SUBCUT: ICD-10-PCS | Mod: HCNC,S$GLB,, | Performed by: NURSE PRACTITIONER

## 2021-11-09 RX ADMIN — TESTOSTERONE CYPIONATE 200 MG: 200 INJECTION, SOLUTION INTRAMUSCULAR at 08:11

## 2021-11-15 ENCOUNTER — HOSPITAL ENCOUNTER (EMERGENCY)
Facility: OTHER | Age: 76
Discharge: HOME | End: 2021-11-15
Attending: EMERGENCY MEDICINE
Payer: MEDICARE

## 2021-11-15 VITALS
HEART RATE: 67 BPM | RESPIRATION RATE: 16 BRPM | BODY MASS INDEX: 30.48 KG/M2 | SYSTOLIC BLOOD PRESSURE: 150 MMHG | OXYGEN SATURATION: 100 % | WEIGHT: 230 LBS | HEIGHT: 73 IN | DIASTOLIC BLOOD PRESSURE: 77 MMHG | TEMPERATURE: 98 F

## 2021-11-15 DIAGNOSIS — T07.XXXA MULTIPLE ABRASIONS: ICD-10-CM

## 2021-11-15 DIAGNOSIS — S09.90XA INJURY OF HEAD, INITIAL ENCOUNTER: Primary | ICD-10-CM

## 2021-11-15 DIAGNOSIS — S20.211A CONTUSION OF RIB ON RIGHT SIDE, INITIAL ENCOUNTER: ICD-10-CM

## 2021-11-15 DIAGNOSIS — S00.83XA CONTUSION OF FACE, INITIAL ENCOUNTER: ICD-10-CM

## 2021-11-15 DIAGNOSIS — S60.221A CONTUSION OF RIGHT HAND, INITIAL ENCOUNTER: ICD-10-CM

## 2021-11-15 PROCEDURE — 99900035 HC TECH TIME PER 15 MIN (STAT): Mod: HCNC

## 2021-11-15 PROCEDURE — 94799 UNLISTED PULMONARY SVC/PX: CPT | Mod: HCNC

## 2021-11-15 PROCEDURE — 99284 EMERGENCY DEPT VISIT MOD MDM: CPT | Mod: 25,HCNC

## 2021-11-15 PROCEDURE — 25000003 PHARM REV CODE 250: Mod: HCNC | Performed by: PHYSICIAN ASSISTANT

## 2021-11-15 RX ORDER — ACETAMINOPHEN 500 MG
1000 TABLET ORAL
Status: COMPLETED | OUTPATIENT
Start: 2021-11-15 | End: 2021-11-15

## 2021-11-15 RX ORDER — IBUPROFEN 600 MG/1
600 TABLET ORAL
Status: COMPLETED | OUTPATIENT
Start: 2021-11-15 | End: 2021-11-15

## 2021-11-15 RX ORDER — HYDROCODONE BITARTRATE AND ACETAMINOPHEN 5; 325 MG/1; MG/1
1 TABLET ORAL EVERY 6 HOURS PRN
Qty: 15 TABLET | Refills: 0 | Status: SHIPPED | OUTPATIENT
Start: 2021-11-15 | End: 2022-04-12

## 2021-11-15 RX ORDER — VALSARTAN 80 MG/1
160 TABLET ORAL
Status: COMPLETED | OUTPATIENT
Start: 2021-11-15 | End: 2021-11-15

## 2021-11-15 RX ADMIN — ACETAMINOPHEN 1000 MG: 500 TABLET ORAL at 08:11

## 2021-11-15 RX ADMIN — BACITRACIN ZINC, NEOMYCIN, POLYMYXIN B SULFAT 1 EACH: 5000; 3.5; 4 OINTMENT TOPICAL at 08:11

## 2021-11-15 RX ADMIN — VALSARTAN 160 MG: 80 TABLET, FILM COATED ORAL at 08:11

## 2021-11-15 RX ADMIN — IBUPROFEN 600 MG: 600 TABLET, FILM COATED ORAL at 08:11

## 2021-11-19 ENCOUNTER — NURSE TRIAGE (OUTPATIENT)
Dept: ADMINISTRATIVE | Facility: CLINIC | Age: 76
End: 2021-11-19
Payer: MEDICARE

## 2021-11-19 ENCOUNTER — PATIENT MESSAGE (OUTPATIENT)
Dept: PRIMARY CARE CLINIC | Facility: CLINIC | Age: 76
End: 2021-11-19
Payer: MEDICARE

## 2021-11-19 DIAGNOSIS — R07.89 OTHER CHEST PAIN: Primary | ICD-10-CM
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
## 2021-11-23 ENCOUNTER — CLINICAL SUPPORT (OUTPATIENT)
Dept: UROLOGY | Facility: CLINIC | Age: 76
End: 2021-11-23
Payer: MEDICARE

## 2021-11-23 DIAGNOSIS — E29.1 HYPOGONADISM IN MALE: Primary | ICD-10-CM

## 2021-11-23 PROCEDURE — 96372 THER/PROPH/DIAG INJ SC/IM: CPT | Mod: HCNC,S$GLB,, | Performed by: NURSE PRACTITIONER

## 2021-11-23 PROCEDURE — 96372 PR INJECTION,THERAP/PROPH/DIAG2ST, IM OR SUBCUT: ICD-10-PCS | Mod: HCNC,S$GLB,, | Performed by: NURSE PRACTITIONER

## 2021-11-23 RX ADMIN — TESTOSTERONE CYPIONATE 200 MG: 200 INJECTION, SOLUTION INTRAMUSCULAR at 08:11

## 2021-12-07 ENCOUNTER — CLINICAL SUPPORT (OUTPATIENT)
Dept: UROLOGY | Facility: CLINIC | Age: 76
End: 2021-12-07
Payer: MEDICARE

## 2021-12-07 DIAGNOSIS — E29.1 HYPOGONADISM IN MALE: Primary | ICD-10-CM

## 2021-12-07 DIAGNOSIS — E29.1 HYPOGONADISM MALE: ICD-10-CM

## 2021-12-07 PROCEDURE — 96372 THER/PROPH/DIAG INJ SC/IM: CPT | Mod: S$GLB,,, | Performed by: NURSE PRACTITIONER

## 2021-12-07 PROCEDURE — 96372 PR INJECTION,THERAP/PROPH/DIAG2ST, IM OR SUBCUT: ICD-10-PCS | Mod: S$GLB,,, | Performed by: NURSE PRACTITIONER

## 2021-12-07 RX ADMIN — TESTOSTERONE CYPIONATE 200 MG: 200 INJECTION, SOLUTION INTRAMUSCULAR at 08:12

## 2021-12-17 ENCOUNTER — CLINICAL SUPPORT (OUTPATIENT)
Dept: URGENT CARE | Facility: CLINIC | Age: 76
End: 2021-12-17
Payer: MEDICARE

## 2021-12-17 DIAGNOSIS — Z20.822 ENCOUNTER FOR LABORATORY TESTING FOR COVID-19 VIRUS: Primary | ICD-10-CM

## 2021-12-17 LAB
CTP QC/QA: YES
SARS-COV-2 RDRP RESP QL NAA+PROBE: NEGATIVE

## 2021-12-17 PROCEDURE — U0002: ICD-10-PCS | Mod: QW,S$GLB,, | Performed by: PHYSICIAN ASSISTANT

## 2021-12-17 PROCEDURE — U0002 COVID-19 LAB TEST NON-CDC: HCPCS | Mod: QW,S$GLB,, | Performed by: PHYSICIAN ASSISTANT

## 2021-12-21 ENCOUNTER — CLINICAL SUPPORT (OUTPATIENT)
Dept: UROLOGY | Facility: CLINIC | Age: 76
End: 2021-12-21
Payer: MEDICARE

## 2021-12-21 DIAGNOSIS — E29.1 HYPOGONADISM IN MALE: Primary | ICD-10-CM

## 2021-12-21 PROCEDURE — 96372 THER/PROPH/DIAG INJ SC/IM: CPT | Mod: HCNC,S$GLB,, | Performed by: NURSE PRACTITIONER

## 2021-12-21 PROCEDURE — 96372 PR INJECTION,THERAP/PROPH/DIAG2ST, IM OR SUBCUT: ICD-10-PCS | Mod: HCNC,S$GLB,, | Performed by: NURSE PRACTITIONER

## 2021-12-21 RX ADMIN — TESTOSTERONE CYPIONATE 200 MG: 200 INJECTION, SOLUTION INTRAMUSCULAR at 08:12

## 2022-01-04 ENCOUNTER — CLINICAL SUPPORT (OUTPATIENT)
Dept: UROLOGY | Facility: CLINIC | Age: 77
End: 2022-01-04
Payer: MEDICARE

## 2022-01-04 DIAGNOSIS — E29.1 HYPOGONADISM MALE: Primary | ICD-10-CM

## 2022-01-04 PROCEDURE — 96372 THER/PROPH/DIAG INJ SC/IM: CPT | Mod: HCNC,S$GLB,, | Performed by: UROLOGY

## 2022-01-04 PROCEDURE — 96372 PR INJECTION,THERAP/PROPH/DIAG2ST, IM OR SUBCUT: ICD-10-PCS | Mod: HCNC,S$GLB,, | Performed by: UROLOGY

## 2022-01-04 RX ORDER — TESTOSTERONE CYPIONATE 200 MG/ML
50 INJECTION, SOLUTION INTRAMUSCULAR
Status: COMPLETED | OUTPATIENT
Start: 2022-01-04 | End: 2022-01-04

## 2022-01-04 RX ADMIN — TESTOSTERONE CYPIONATE 50 MG: 200 INJECTION, SOLUTION INTRAMUSCULAR at 11:01

## 2022-01-04 NOTE — PROGRESS NOTES
Hormone therapy injection administered tolerated well secured with bandaide.   Denies any pain/discomfort at injection site. Patient to return to clinic as scheduled.      Site: LT  Testosterone : 200mg

## 2022-01-18 ENCOUNTER — CLINICAL SUPPORT (OUTPATIENT)
Dept: UROLOGY | Facility: CLINIC | Age: 77
End: 2022-01-18
Payer: MEDICARE

## 2022-01-18 DIAGNOSIS — E29.1 HYPOGONADISM MALE: Primary | ICD-10-CM

## 2022-01-18 PROCEDURE — 96372 PR INJECTION,THERAP/PROPH/DIAG2ST, IM OR SUBCUT: ICD-10-PCS | Mod: HCNC,S$GLB,, | Performed by: UROLOGY

## 2022-01-18 PROCEDURE — 96372 THER/PROPH/DIAG INJ SC/IM: CPT | Mod: HCNC,S$GLB,, | Performed by: UROLOGY

## 2022-01-18 RX ORDER — TESTOSTERONE CYPIONATE 200 MG/ML
50 INJECTION, SOLUTION INTRAMUSCULAR
Status: COMPLETED | OUTPATIENT
Start: 2022-01-18 | End: 2022-01-18

## 2022-01-18 RX ADMIN — TESTOSTERONE CYPIONATE 50 MG: 200 INJECTION, SOLUTION INTRAMUSCULAR at 08:01

## 2022-01-18 NOTE — PROGRESS NOTES
Hormone therapy injection administered tolerated well secured with bandaide.   Denies any pain/discomfort at injection site. Patient to return to clinic as scheduled.      Site: RT  Testosterone : 200mg

## 2022-02-01 ENCOUNTER — TELEPHONE (OUTPATIENT)
Dept: UROLOGY | Facility: CLINIC | Age: 77
End: 2022-02-01

## 2022-02-01 ENCOUNTER — CLINICAL SUPPORT (OUTPATIENT)
Dept: UROLOGY | Facility: CLINIC | Age: 77
End: 2022-02-01
Payer: MEDICARE

## 2022-02-01 DIAGNOSIS — Z12.5 PROSTATE CANCER SCREENING: ICD-10-CM

## 2022-02-01 DIAGNOSIS — R79.89 LOW TESTOSTERONE: Primary | ICD-10-CM

## 2022-02-01 DIAGNOSIS — E29.1 HYPOGONADISM MALE: Primary | ICD-10-CM

## 2022-02-01 PROCEDURE — 96372 THER/PROPH/DIAG INJ SC/IM: CPT | Mod: HCNC,S$GLB,, | Performed by: UROLOGY

## 2022-02-01 PROCEDURE — 96372 PR INJECTION,THERAP/PROPH/DIAG2ST, IM OR SUBCUT: ICD-10-PCS | Mod: HCNC,S$GLB,, | Performed by: UROLOGY

## 2022-02-01 RX ORDER — TESTOSTERONE CYPIONATE 200 MG/ML
50 INJECTION, SOLUTION INTRAMUSCULAR
Status: COMPLETED | OUTPATIENT
Start: 2022-02-01 | End: 2022-02-01

## 2022-02-01 RX ADMIN — TESTOSTERONE CYPIONATE 50 MG: 200 INJECTION, SOLUTION INTRAMUSCULAR at 08:02

## 2022-02-15 ENCOUNTER — CLINICAL SUPPORT (OUTPATIENT)
Dept: UROLOGY | Facility: CLINIC | Age: 77
End: 2022-02-15
Payer: MEDICARE

## 2022-02-15 DIAGNOSIS — E29.1 HYPOGONADISM MALE: Primary | ICD-10-CM

## 2022-02-15 PROCEDURE — 96372 THER/PROPH/DIAG INJ SC/IM: CPT | Mod: HCNC,S$GLB,, | Performed by: NURSE PRACTITIONER

## 2022-02-15 PROCEDURE — 96372 PR INJECTION,THERAP/PROPH/DIAG2ST, IM OR SUBCUT: ICD-10-PCS | Mod: HCNC,S$GLB,, | Performed by: NURSE PRACTITIONER

## 2022-02-15 RX ORDER — TESTOSTERONE CYPIONATE 200 MG/ML
200 INJECTION, SOLUTION INTRAMUSCULAR ONCE
Status: COMPLETED | OUTPATIENT
Start: 2022-02-15 | End: 2022-02-15

## 2022-02-15 RX ADMIN — TESTOSTERONE CYPIONATE 200 MG: 200 INJECTION, SOLUTION INTRAMUSCULAR at 09:02

## 2022-02-15 NOTE — PROGRESS NOTES
Hormone therapy injection administered, tolerated well, and secured with bandaid.   Denies any pain/discomfort at injection site. Patient to return to clinic as scheduled.      Site: RT  Testosterone : 200mg

## 2022-03-03 ENCOUNTER — CLINICAL SUPPORT (OUTPATIENT)
Dept: UROLOGY | Facility: CLINIC | Age: 77
End: 2022-03-03
Payer: MEDICARE

## 2022-03-03 DIAGNOSIS — E34.9 TESTOSTERONE DEFICIENCY: Primary | ICD-10-CM

## 2022-03-03 PROCEDURE — 96372 THER/PROPH/DIAG INJ SC/IM: CPT | Mod: S$GLB,,, | Performed by: UROLOGY

## 2022-03-03 PROCEDURE — 96372 PR INJECTION,THERAP/PROPH/DIAG2ST, IM OR SUBCUT: ICD-10-PCS | Mod: S$GLB,,, | Performed by: UROLOGY

## 2022-03-03 RX ORDER — TESTOSTERONE CYPIONATE 200 MG/ML
200 INJECTION, SOLUTION INTRAMUSCULAR
Status: COMPLETED | OUTPATIENT
Start: 2022-03-03 | End: 2022-03-03

## 2022-03-03 RX ADMIN — TESTOSTERONE CYPIONATE 200 MG: 200 INJECTION, SOLUTION INTRAMUSCULAR at 08:03

## 2022-03-03 NOTE — PROGRESS NOTES
Hormone therapy injection administered, tolerated well, and secured with bandaid.   Denies any pain/discomfort at injection site. Patient to return to clinic as scheduled.      Site: LT  Testosterone : 200mg

## 2022-03-15 ENCOUNTER — CLINICAL SUPPORT (OUTPATIENT)
Dept: UROLOGY | Facility: CLINIC | Age: 77
End: 2022-03-15
Payer: MEDICARE

## 2022-03-15 DIAGNOSIS — E29.1 HYPOGONADISM MALE: Primary | ICD-10-CM

## 2022-03-15 PROCEDURE — 96372 PR INJECTION,THERAP/PROPH/DIAG2ST, IM OR SUBCUT: ICD-10-PCS | Mod: S$GLB,,, | Performed by: UROLOGY

## 2022-03-15 PROCEDURE — 96372 THER/PROPH/DIAG INJ SC/IM: CPT | Mod: S$GLB,,, | Performed by: UROLOGY

## 2022-03-15 RX ORDER — TESTOSTERONE CYPIONATE 200 MG/ML
50 INJECTION, SOLUTION INTRAMUSCULAR
Status: COMPLETED | OUTPATIENT
Start: 2022-03-15 | End: 2022-03-15

## 2022-03-15 RX ADMIN — TESTOSTERONE CYPIONATE 50 MG: 200 INJECTION, SOLUTION INTRAMUSCULAR at 08:03

## 2022-03-28 DIAGNOSIS — I70.0 ATHEROSCLEROSIS OF AORTA: ICD-10-CM

## 2022-03-28 DIAGNOSIS — I10 ESSENTIAL HYPERTENSION: ICD-10-CM

## 2022-03-28 NOTE — TELEPHONE ENCOUNTER
Care Due:                  Date            Visit Type   Department     Provider  --------------------------------------------------------------------------------                                MYCHART                              ANNUAL                              CHECKUP/PHY  [facility] PR[facility]  Last Visit: 04-      S            CARE           Xiaolorri Turner                              MYCJerome                              ANNUAL                              CHECKUP/PHY  [facility] PRIMARY  Next Visit: 04-      [facility]                                                            Last  Test          Frequency    Reason                     Performed    Due Date  --------------------------------------------------------------------------------    CMP.........  12 months..  allopurinoL, valsartan...  04- 04-    Uric Acid...  12 months..  allopurinoL..............  Not Found    Overdue    Powered by [employer]. Reference number: [id number].   3/28/2022 12:14:30 AM CDT

## 2022-03-29 RX ORDER — VALSARTAN 160 MG/1
160 TABLET ORAL DAILY
Qty: 90 TABLET | Refills: 1 | Status: SHIPPED | OUTPATIENT
Start: 2022-03-29 | End: 2022-04-12 | Stop reason: SDUPTHER

## 2022-03-29 NOTE — TELEPHONE ENCOUNTER
This Rx Request does not qualify for assessment with the OR   Please review protocol details and the Care Due Message for extra clinical information    Reasons Rx Request may be deferred:  Labs/Vitals abnormal  Patient has been seen in the ED/Hospital since the last PCP visit    Note composed:7:24 AM 03/29/2022

## 2022-04-05 ENCOUNTER — CLINICAL SUPPORT (OUTPATIENT)
Dept: UROLOGY | Facility: CLINIC | Age: 77
End: 2022-04-05
Payer: MEDICARE

## 2022-04-05 DIAGNOSIS — E29.1 HYPOGONADISM MALE: Primary | ICD-10-CM

## 2022-04-05 PROCEDURE — 96372 THER/PROPH/DIAG INJ SC/IM: CPT | Mod: S$GLB,,, | Performed by: NURSE PRACTITIONER

## 2022-04-05 PROCEDURE — 96372 PR INJECTION,THERAP/PROPH/DIAG2ST, IM OR SUBCUT: ICD-10-PCS | Mod: S$GLB,,, | Performed by: NURSE PRACTITIONER

## 2022-04-05 RX ORDER — TESTOSTERONE CYPIONATE 200 MG/ML
200 INJECTION, SOLUTION INTRAMUSCULAR
Status: COMPLETED | OUTPATIENT
Start: 2022-04-05 | End: 2022-04-05

## 2022-04-05 RX ADMIN — TESTOSTERONE CYPIONATE 200 MG: 200 INJECTION, SOLUTION INTRAMUSCULAR at 08:04

## 2022-04-07 ENCOUNTER — OFFICE VISIT (OUTPATIENT)
Dept: SPORTS MEDICINE | Facility: CLINIC | Age: 77
End: 2022-04-07
Payer: MEDICARE

## 2022-04-07 VITALS
DIASTOLIC BLOOD PRESSURE: 75 MMHG | HEIGHT: 73 IN | SYSTOLIC BLOOD PRESSURE: 131 MMHG | BODY MASS INDEX: 30.48 KG/M2 | WEIGHT: 230 LBS

## 2022-04-07 DIAGNOSIS — M25.561 LATERAL KNEE PAIN, RIGHT: Primary | ICD-10-CM

## 2022-04-07 DIAGNOSIS — G57.31 ENTRAPMENT NEUROPATHY OF RIGHT COMMON PERONEAL NERVE: ICD-10-CM

## 2022-04-07 DIAGNOSIS — M76.31 IT BAND SYNDROME, RIGHT: ICD-10-CM

## 2022-04-07 PROCEDURE — 99999 PR PBB SHADOW E&M-EST. PATIENT-LVL III: CPT | Mod: PBBFAC,,, | Performed by: ORTHOPAEDIC SURGERY

## 2022-04-07 PROCEDURE — 1101F PR PT FALLS ASSESS DOC 0-1 FALLS W/OUT INJ PAST YR: ICD-10-PCS | Mod: CPTII,S$GLB,, | Performed by: ORTHOPAEDIC SURGERY

## 2022-04-07 PROCEDURE — 1160F PR REVIEW ALL MEDS BY PRESCRIBER/CLIN PHARMACIST DOCUMENTED: ICD-10-PCS | Mod: CPTII,S$GLB,, | Performed by: ORTHOPAEDIC SURGERY

## 2022-04-07 PROCEDURE — 1159F PR MEDICATION LIST DOCUMENTED IN MEDICAL RECORD: ICD-10-PCS | Mod: CPTII,S$GLB,, | Performed by: ORTHOPAEDIC SURGERY

## 2022-04-07 PROCEDURE — 3078F DIAST BP <80 MM HG: CPT | Mod: CPTII,S$GLB,, | Performed by: ORTHOPAEDIC SURGERY

## 2022-04-07 PROCEDURE — 3075F PR MOST RECENT SYSTOLIC BLOOD PRESS GE 130-139MM HG: ICD-10-PCS | Mod: CPTII,S$GLB,, | Performed by: ORTHOPAEDIC SURGERY

## 2022-04-07 PROCEDURE — 1126F AMNT PAIN NOTED NONE PRSNT: CPT | Mod: CPTII,S$GLB,, | Performed by: ORTHOPAEDIC SURGERY

## 2022-04-07 PROCEDURE — 1159F MED LIST DOCD IN RCRD: CPT | Mod: CPTII,S$GLB,, | Performed by: ORTHOPAEDIC SURGERY

## 2022-04-07 PROCEDURE — 3075F SYST BP GE 130 - 139MM HG: CPT | Mod: CPTII,S$GLB,, | Performed by: ORTHOPAEDIC SURGERY

## 2022-04-07 PROCEDURE — 3288F PR FALLS RISK ASSESSMENT DOCUMENTED: ICD-10-PCS | Mod: CPTII,S$GLB,, | Performed by: ORTHOPAEDIC SURGERY

## 2022-04-07 PROCEDURE — 99214 OFFICE O/P EST MOD 30 MIN: CPT | Mod: S$GLB,,, | Performed by: ORTHOPAEDIC SURGERY

## 2022-04-07 PROCEDURE — 3288F FALL RISK ASSESSMENT DOCD: CPT | Mod: CPTII,S$GLB,, | Performed by: ORTHOPAEDIC SURGERY

## 2022-04-07 PROCEDURE — 99999 PR PBB SHADOW E&M-EST. PATIENT-LVL III: ICD-10-PCS | Mod: PBBFAC,,, | Performed by: ORTHOPAEDIC SURGERY

## 2022-04-07 PROCEDURE — 1126F PR PAIN SEVERITY QUANTIFIED, NO PAIN PRESENT: ICD-10-PCS | Mod: CPTII,S$GLB,, | Performed by: ORTHOPAEDIC SURGERY

## 2022-04-07 PROCEDURE — 3078F PR MOST RECENT DIASTOLIC BLOOD PRESSURE < 80 MM HG: ICD-10-PCS | Mod: CPTII,S$GLB,, | Performed by: ORTHOPAEDIC SURGERY

## 2022-04-07 PROCEDURE — 1101F PT FALLS ASSESS-DOCD LE1/YR: CPT | Mod: CPTII,S$GLB,, | Performed by: ORTHOPAEDIC SURGERY

## 2022-04-07 PROCEDURE — 1160F RVW MEDS BY RX/DR IN RCRD: CPT | Mod: CPTII,S$GLB,, | Performed by: ORTHOPAEDIC SURGERY

## 2022-04-07 PROCEDURE — 99214 PR OFFICE/OUTPT VISIT, EST, LEVL IV, 30-39 MIN: ICD-10-PCS | Mod: S$GLB,,, | Performed by: ORTHOPAEDIC SURGERY

## 2022-04-07 NOTE — PROGRESS NOTES
CC: right knee pain, left calf pain    Arnaldo is here today for follow up evaluation of his right knee pain. Patient reports his pain is 0/10 today. Patient states he has continued to appreciate left lateral knee pain and is interested in discussing what other treatment options are available to him. He states he has been completing his HEP for the past one week and taking ibuprofen as needed. He denies new falls or trauma since his last visit.     Recall from visit on 10/20/2021  Arnaldo is here today for follow up evaluation of his right knee pain. Patient reports his pain is 0/10 today. he states he has been walking four miles three times per week and will appreciate increased posterolateral knee pain with this activity. He also notes increased pain when going from standing after being seated for a long period of time. He denies new falls or trauma since his last visit. He denies continued compliance with his HEP.    Recall from visit 06/30/201  Arnaldo is here today for follow up evaluation of his right knee pain and left calf pain. Patient had right knee hydrodissection procedure of the right common peroneal nerve at the lateral joint/fibular head 06/16/2021. Patient reports his pain is 0/10 today and he is feeling 85-90% improved overall. He states he walked 3 miles on Saturday and 4 miles on Sunday with minimal and only short lived discomfort in the beginning of the walk. He recalls experiencing pain at the start of his walk that improves as he walks. He admits to continues compliance with his HEP. He denies new falls or trauma since his last visit.     Recall from visit on 06/09/2021  Arnaldo is here today for a follow up evaluation of his right lateral knee pain. He reports a pain score of 0/10 today. He states he will experience pain when he walks long distances. He states his left calf has started to bother him for the past month. He states his calf will cramp while he walks. He stets this pain makes it  difficult for him to exercise. He admits to both knee and calf pain improvement with the prescribed Mobic. He admits to compliance with his HEP but denies much improvement from the exercises. He states he doesn't do his exercises as often as he should but he still does them.  He did go to physical therapy for pain and ITB syndrome which did help his pain but it did not completely resolve. His pain is located on the posterior aspect of his knee. He notes increased swelling on the posterior aspect of his knee. He denies any new falls or traumas since his last visit.    REVIEW OF SYSTEMS:   Constitution: Patient denies fever, chills, night sweats, and weight changes.  Eyes: Patient denies eye pain or vision changes.  HENT: Patient denies headache, ear pain, sore throat, or nasal discharge.  CVS: Patient denies chest pain.  Lungs: Patient denies shortness of breath or cough.  Abd: Patient denies stomach pain, nausea, or vomiting.  Skin: Patient denies skin rash or itching.    Hematologic/Lymphatic: Patient denies easy bruising.   Musculoskeletal: Patient denies recent falls. See HPI.  Psych: Patient denies any current anxiety or nervousness.    PAST MEDICAL HISTORY:   Past Medical History:   Diagnosis Date    Allergy     seasonal    Cataract     Gout, joint     Hyperlipidemia     Hypertension     Ocular rosacea     Vitreous floaters        PAST SURGICAL HISTORY:   Past Surgical History:   Procedure Laterality Date    VASECTOMY  1988       FAMILY HISTORY:   Family History   Problem Relation Age of Onset    Hypertension Mother     Myasthenia gravis Mother         affects eye    Dementia Father     No Known Problems Sister     No Known Problems Brother     No Known Problems Daughter     Melanoma Neg Hx     Cataracts Neg Hx     Glaucoma Neg Hx     Macular degeneration Neg Hx        SOCIAL HISTORY:   Social History     Socioeconomic History    Marital status:    Occupational History    Occupation:  "     Employer: [employer]   Tobacco Use    Smoking status: Former Smoker     Packs/day: 0.50     Years: 15.00     Pack years: 7.50     Quit date: 10/19/1977     Years since quittin.4    Smokeless tobacco: Never Used   Substance and Sexual Activity    Alcohol use: Yes     Comment: 3-4 times weekly    Drug use: No    Sexual activity: Yes     Partners: Female     Comment:  with 3 children       MEDICATIONS:     Current Outpatient Medications:     allopurinoL (ZYLOPRIM) 300 MG tablet, TAKE 1 TABLET BY MOUTH EVERY DAY, Disp: 90 tablet, Rfl: 0    HYDROcodone-acetaminophen (NORCO) 5-325 mg per tablet, Take 1 tablet by mouth every 6 (six) hours as needed for Pain., Disp: 15 tablet, Rfl: 0    ivermectin (SOOLANTRA) 1 % Crea, AAA on face qhs and wash off in morning, Disp: 60 g, Rfl: 3    meloxicam (MOBIC) 7.5 MG tablet, Take 1 tablet (7.5 mg total) by mouth once daily., Disp: 30 tablet, Rfl: 0    SHINGRIX, PF, 50 mcg/0.5 mL injection, , Disp: , Rfl:     tamsulosin (FLOMAX) 0.4 mg Cap, TAKE 1 CAPSULE BY MOUTH EVERY DAY, Disp: 90 capsule, Rfl: 1    valsartan (DIOVAN) 160 MG tablet, TAKE 1 TABLET (160 MG TOTAL) BY MOUTH ONCE DAILY., Disp: 90 tablet, Rfl: 1    Current Facility-Administered Medications:     triamcinolone acetonide injection 12 mg, 12 mg, Other, 1 time in Clinic/HOD, Joe Solis DO    ALLERGIES:   Review of patient's allergies indicates:   Allergen Reactions    Doxycycline      HEADACHE (PRESSURE)    Statins-hmg-coa reductase inhibitors         PHYSICAL EXAMINATION:  /75   Ht 6' 1" (1.854 m)   Wt 104.3 kg (230 lb)   BMI 30.34 kg/m²   Vitals signs and nursing note have been reviewed.  General: In no acute distress, well developed, well nourished, no diaphoresis  Eyes: EOM full and smooth, no eye redness or discharge  HENT: normocephalic and atraumatic, neck supple, trachea midline, no nasal discharge, no external ear redness or discharge  Cardiovascular: no LE " edema  Lungs: respirations non-labored, no conversational dyspnea   Abd: non-distended, no rigidity  MSK: no amputation or deformity, no swelling of extremities  Neuro: AAOx3, CN2-12 grossly intact  Skin: No rashes, warm and dry  Psychiatric: cooperative, pleasant, mood and affect appropriate for age    MUSCULOSKELETAL EXAM:    RIGHT KNEE EXAMINATION   Affected side is compared to contralateral knee     Observation:  No edema, erythema, ecchymosis noted.  Small right suprapatellar effusion  No muscle atrophy of the thighs and calves noted.  No obvious bony deformities noted.   No genu valgus/varum noted. No recurvatum noted.    No tibial internal/external torsion.      Tenderness:  Patella - present   Lateral joint line - present  Quad tendon - none   Medial joint line - none  Patellar tendon - none   Medial plica - none  Tibial tubercle - none   Lateral plica - none  Pes anserine - none   MCL prox - none  Distal ITB - mild    MCL distal - none  MFC - none    LCL prox - none  LFC - none    LCL distal - none  Tibia - none    Fibula - none    No obvious bursae, plicae, popliteal cysts, or tendon derangement palpated.  + tenderness at the posterolateral aspect of the knee over the lateral hamstrings          ROM:   Active extension to 0° bilaterally without hyperextension, lag, or patellar J sign.    Active flexion to 135° bilaterally.  Crepitus with extension on the right.    Strength: (bilaterally)  Knee Flexion - 5/5  Knee Extension - 5/5  Hip Flexion - 5/5  Hip Extension - 5/5  Ankle dorsiflexion - 5/5  Ankle Plantarflexion - 5/5    Patellofemoral Exam:  Patella position - Neutral   Patellar ballottement - negative  Bulge sign - negative  Patellar grind - positive  No patellar laxity with medial and lateral translation   No apprehension with medial and lateral patellar translation.     Meniscus Testing:     No pain with terminal extension and forced flexion  Melyssas test - negative   Bounce home test -  negative    Ligament Testing:  Lachman's test - negative  No laxity with varus testing at 0 and 30 degrees.  No laxity with valgus testing at 0 and 30 degrees.    Neurovascular Examination:   Normal gait without antalgia.  Sensation intact to light touch in the obturator, lateral/intermediate/medial/posterior femoral cutaneous, saphenous, and common peroneal nerves bilaterally.  Pulses intact at the DP and PT arteries bilaterally.    Capillary refill intact <2 seconds in all toes bilaterally.      IMAGIN. X-ray obtained 2021 due to bilateral knee pain  2. X-ray images were reviewed personally by me and then directly with patient.  3. FINDINGS: X-ray images obtained demonstrate the bones are intact.  There is no evidence for acute fracture or bone destruction.  There are degenerative changes with spurring of the tibial spines with small osteophytes at the femorotibial and patellofemoral joints.  There is minimal narrowing of the medial compartments of the femorotibial joints.  There is no plain film evidence for joint effusions.  Soft tissues are unremarkable.  Kellgren and Bubba grade 2.  4. IMPRESSION: As above.         ASSESSMENT:      ICD-10-CM ICD-9-CM   1. Lateral knee pain, right  M25.561 719.46   2. It band syndrome, right  M76.31 728.89   3. 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  Lateral knee pain/entrapment neuropathy/IT band syndrome/anterior knee pain - improved, then deteriorated    - Recall that patient admits to right knee pain bothering him every time he walks. Most of his knee pain is located at the lateral and posterior aspect of the knee and he has recently started to experience anterior knee pain.      - He had a right knee hydrodissection/nerve block procedure directed at the right common peroneal nerve at the lateral joint/fibular head 2021 and he appreciated 85-90% improvement in his pain following this procedure.  He feels as  though benefit last for several weeks, and his pain has continued to worsen.    - Continue with previously prescribed HEP for patellar tracking.      - MRI of the right knee obtained due to persistent symptoms and only short-term improvement from other treatment options.  This will help us to better plan for next steps, specifically addressing intra-articular versus extra-articular findings.      Future planning includes - next steps pending MRI results, possible intra-articular CSI, possible hydrodissection    All questions were answered to the best of my ability and all concerns were addressed at this time.    Follow up after MRI for results and next steps.       This note is dictated using the M*Modal Fluency Direct word recognition program. There are word recognition mistakes that are occasionally missed on review.      Total time spent with patient: see X rosi on chart below.   More than half of the time was spent counseling or coordinating care including prognosis, differential diagnosis, risks and benefits of treatment, instructions, compliance risk reductions.    EST MINUTES X   [id number] 5    87700 10    26784 15    [id number] 25 X   [id number] 40    NEW     25698 10    83569 20    86707 30    38613 45    13639 60    PHONE      5-10    38584 11-20    14572 21-30

## 2022-04-12 ENCOUNTER — OFFICE VISIT (OUTPATIENT)
Dept: PRIMARY CARE CLINIC | Facility: CLINIC | Age: 77
End: 2022-04-12
Attending: FAMILY MEDICINE
Payer: MEDICARE

## 2022-04-12 ENCOUNTER — PATIENT MESSAGE (OUTPATIENT)
Dept: UROLOGY | Facility: CLINIC | Age: 77
End: 2022-04-12
Payer: MEDICARE

## 2022-04-12 VITALS
WEIGHT: 234.69 LBS | HEIGHT: 73 IN | HEART RATE: 80 BPM | BODY MASS INDEX: 31.1 KG/M2 | OXYGEN SATURATION: 98 % | DIASTOLIC BLOOD PRESSURE: 80 MMHG | SYSTOLIC BLOOD PRESSURE: 130 MMHG

## 2022-04-12 DIAGNOSIS — Z78.9 STATIN INTOLERANCE: ICD-10-CM

## 2022-04-12 DIAGNOSIS — M10.9 GOUT, UNSPECIFIED CAUSE, UNSPECIFIED CHRONICITY, UNSPECIFIED SITE: ICD-10-CM

## 2022-04-12 DIAGNOSIS — R35.0 BENIGN PROSTATIC HYPERPLASIA WITH URINARY FREQUENCY: ICD-10-CM

## 2022-04-12 DIAGNOSIS — N40.1 BENIGN PROSTATIC HYPERPLASIA WITH URINARY FREQUENCY: ICD-10-CM

## 2022-04-12 DIAGNOSIS — E55.9 VITAMIN D DEFICIENCY: ICD-10-CM

## 2022-04-12 DIAGNOSIS — I70.0 ATHEROSCLEROSIS OF AORTA: ICD-10-CM

## 2022-04-12 DIAGNOSIS — Z00.00 ANNUAL PHYSICAL EXAM: ICD-10-CM

## 2022-04-12 DIAGNOSIS — I10 ESSENTIAL HYPERTENSION: Primary | ICD-10-CM

## 2022-04-12 PROCEDURE — 99499 UNLISTED E&M SERVICE: CPT | Mod: HCNC,S$GLB,, | Performed by: FAMILY MEDICINE

## 2022-04-12 PROCEDURE — 3075F SYST BP GE 130 - 139MM HG: CPT | Mod: CPTII,S$GLB,, | Performed by: FAMILY MEDICINE

## 2022-04-12 PROCEDURE — 99214 OFFICE O/P EST MOD 30 MIN: CPT | Mod: S$GLB,,, | Performed by: FAMILY MEDICINE

## 2022-04-12 PROCEDURE — 3288F PR FALLS RISK ASSESSMENT DOCUMENTED: ICD-10-PCS | Mod: CPTII,S$GLB,, | Performed by: FAMILY MEDICINE

## 2022-04-12 PROCEDURE — 3079F DIAST BP 80-89 MM HG: CPT | Mod: CPTII,S$GLB,, | Performed by: FAMILY MEDICINE

## 2022-04-12 PROCEDURE — 99999 PR PBB SHADOW E&M-EST. PATIENT-LVL III: ICD-10-PCS | Mod: PBBFAC,,, | Performed by: FAMILY MEDICINE

## 2022-04-12 PROCEDURE — 3288F FALL RISK ASSESSMENT DOCD: CPT | Mod: CPTII,S$GLB,, | Performed by: FAMILY MEDICINE

## 2022-04-12 PROCEDURE — 1126F PR PAIN SEVERITY QUANTIFIED, NO PAIN PRESENT: ICD-10-PCS | Mod: CPTII,S$GLB,, | Performed by: FAMILY MEDICINE

## 2022-04-12 PROCEDURE — 3075F PR MOST RECENT SYSTOLIC BLOOD PRESS GE 130-139MM HG: ICD-10-PCS | Mod: CPTII,S$GLB,, | Performed by: FAMILY MEDICINE

## 2022-04-12 PROCEDURE — 1126F AMNT PAIN NOTED NONE PRSNT: CPT | Mod: CPTII,S$GLB,, | Performed by: FAMILY MEDICINE

## 2022-04-12 PROCEDURE — 1101F PT FALLS ASSESS-DOCD LE1/YR: CPT | Mod: CPTII,S$GLB,, | Performed by: FAMILY MEDICINE

## 2022-04-12 PROCEDURE — 1101F PR PT FALLS ASSESS DOC 0-1 FALLS W/OUT INJ PAST YR: ICD-10-PCS | Mod: CPTII,S$GLB,, | Performed by: FAMILY MEDICINE

## 2022-04-12 PROCEDURE — 99214 PR OFFICE/OUTPT VISIT, EST, LEVL IV, 30-39 MIN: ICD-10-PCS | Mod: S$GLB,,, | Performed by: FAMILY MEDICINE

## 2022-04-12 PROCEDURE — 3079F PR MOST RECENT DIASTOLIC BLOOD PRESSURE 80-89 MM HG: ICD-10-PCS | Mod: CPTII,S$GLB,, | Performed by: FAMILY MEDICINE

## 2022-04-12 PROCEDURE — 99499 RISK ADDL DX/OHS AUDIT: ICD-10-PCS | Mod: HCNC,S$GLB,, | Performed by: FAMILY MEDICINE

## 2022-04-12 PROCEDURE — 99999 PR PBB SHADOW E&M-EST. PATIENT-LVL III: CPT | Mod: PBBFAC,,, | Performed by: FAMILY MEDICINE

## 2022-04-12 RX ORDER — VALSARTAN 160 MG/1
160 TABLET ORAL DAILY
Qty: 90 TABLET | Refills: 1 | Status: SHIPPED | OUTPATIENT
Start: 2022-04-12 | End: 2022-12-13

## 2022-04-12 RX ORDER — ALLOPURINOL 300 MG/1
300 TABLET ORAL DAILY
Qty: 90 TABLET | Refills: 1 | Status: SHIPPED | OUTPATIENT
Start: 2022-04-12 | End: 2022-10-21

## 2022-04-12 RX ORDER — TESTOSTERONE CYPIONATE 200 MG/ML
INJECTION, SOLUTION INTRAMUSCULAR
COMMUNITY
Start: 2022-02-01 | End: 2023-09-05

## 2022-04-12 RX ORDER — TAMSULOSIN HYDROCHLORIDE 0.4 MG/1
1 CAPSULE ORAL DAILY
Qty: 90 CAPSULE | Refills: 1 | Status: SHIPPED | OUTPATIENT
Start: 2022-04-12 | End: 2023-06-12 | Stop reason: SDUPTHER

## 2022-04-14 ENCOUNTER — HOSPITAL ENCOUNTER (OUTPATIENT)
Dept: RADIOLOGY | Facility: HOSPITAL | Age: 77
Discharge: HOME | End: 2022-04-14
Attending: ORTHOPAEDIC SURGERY
Payer: MEDICARE

## 2022-04-14 DIAGNOSIS — M25.561 LATERAL KNEE PAIN, RIGHT: ICD-10-CM

## 2022-04-14 DIAGNOSIS — E29.1 HYPOGONADISM MALE: Primary | ICD-10-CM

## 2022-04-14 DIAGNOSIS — M76.31 IT BAND SYNDROME, RIGHT: ICD-10-CM

## 2022-04-14 PROCEDURE — 73721 MRI JNT OF LWR EXTRE W/O DYE: CPT | Mod: 26,RT,, | Performed by: RADIOLOGY

## 2022-04-14 PROCEDURE — 73721 MRI KNEE WITHOUT CONTRAST RIGHT: ICD-10-PCS | Mod: 26,RT,, | Performed by: RADIOLOGY

## 2022-04-14 PROCEDURE — 73721 MRI JNT OF LWR EXTRE W/O DYE: CPT | Mod: TC,RT

## 2022-04-14 RX ORDER — TESTOSTERONE CYPIONATE 200 MG/ML
200 INJECTION, SOLUTION INTRAMUSCULAR
Status: SHIPPED | OUTPATIENT
Start: 2022-04-19 | End: 2022-10-18

## 2022-04-19 ENCOUNTER — CLINICAL SUPPORT (OUTPATIENT)
Dept: UROLOGY | Facility: CLINIC | Age: 77
End: 2022-04-19
Payer: MEDICARE

## 2022-04-19 DIAGNOSIS — E29.1 HYPOGONADISM IN MALE: Primary | ICD-10-CM

## 2022-04-19 PROCEDURE — 96372 THER/PROPH/DIAG INJ SC/IM: CPT | Mod: S$GLB,,, | Performed by: NURSE PRACTITIONER

## 2022-04-19 PROCEDURE — 96372 PR INJECTION,THERAP/PROPH/DIAG2ST, IM OR SUBCUT: ICD-10-PCS | Mod: S$GLB,,, | Performed by: NURSE PRACTITIONER

## 2022-04-19 RX ADMIN — TESTOSTERONE CYPIONATE 200 MG: 200 INJECTION, SOLUTION INTRAMUSCULAR at 08:04

## 2022-04-19 NOTE — PROGRESS NOTES
Subjective:       Patient ID: Arnaldo Donovan is a 77 y.o. male.    Chief Complaint: Annual Exam    Pt presents today for annual exam as well as follow up for HTN follow up as well. Took his med this AM and with repeat reading was in 120's. otw feels well     Is walking daily  Admits he does enjoy pastries      Hypertension  Pertinent negatives include no shortness of breath.     Review of Systems   Constitutional: Negative.  Negative for activity change, appetite change and fatigue.   HENT: Negative.    Eyes: Negative.    Respiratory: Negative.  Negative for chest tightness and shortness of breath.    Cardiovascular: Negative.    Gastrointestinal: Negative.  Negative for abdominal pain.   Endocrine: Negative.    Genitourinary: Negative.  Negative for difficulty urinating, dysuria, frequency and urgency.   Musculoskeletal: Negative.  Negative for arthralgias, gait problem and joint swelling.   Skin: Negative.  Negative for color change, pallor and rash.   Allergic/Immunologic: Negative.    Neurological: Negative for dizziness, weakness, light-headedness and numbness.   Hematological: Negative.    Psychiatric/Behavioral: Negative.  Negative for decreased concentration, dysphoric mood, self-injury, sleep disturbance and suicidal ideas. The patient is not nervous/anxious.    All other systems reviewed and are negative.      Objective:      Physical Exam  Vitals reviewed.   Constitutional:       General: He is not in acute distress.     Appearance: Normal appearance. He is well-developed and normal weight. He is not ill-appearing or diaphoretic.   HENT:      Head: Normocephalic and atraumatic.      Right Ear: Tympanic membrane, ear canal and external ear normal. There is no impacted cerumen.      Left Ear: Tympanic membrane, ear canal and external ear normal. There is no impacted cerumen.      Nose: Nose normal. No congestion or rhinorrhea.      Mouth/Throat:      Pharynx: No oropharyngeal exudate or posterior  oropharyngeal erythema.   Eyes:      General:         Right eye: No discharge.         Left eye: No discharge.      Extraocular Movements: Extraocular movements intact.      Conjunctiva/sclera: Conjunctivae normal.      Pupils: Pupils are equal, round, and reactive to light.   Neck:      Thyroid: No thyromegaly.   Cardiovascular:      Rate and Rhythm: Normal rate and regular rhythm.      Heart sounds: Normal heart sounds. No murmur heard.  Pulmonary:      Effort: Pulmonary effort is normal. No respiratory distress.      Breath sounds: Normal breath sounds. No wheezing or rales.   Chest:      Chest wall: No tenderness.   Abdominal:      General: Bowel sounds are normal. There is no distension.      Palpations: Abdomen is soft. There is no mass.      Tenderness: There is no abdominal tenderness. There is no guarding or rebound.   Musculoskeletal:         General: No tenderness. Normal range of motion.      Cervical back: Normal range of motion and neck supple.      Right lower leg: No edema.      Left lower leg: No edema.   Lymphadenopathy:      Cervical: No cervical adenopathy.   Skin:     General: Skin is warm and dry.      Coloration: Skin is not pale.      Findings: No erythema.          Neurological:      Mental Status: He is alert and oriented to person, place, and time.      Cranial Nerves: No cranial nerve deficit.      Motor: No abnormal muscle tone.      Coordination: Coordination normal.      Deep Tendon Reflexes: Reflexes are normal and symmetric. Reflexes normal.   Psychiatric:         Mood and Affect: Mood normal.         Behavior: Behavior normal.         Thought Content: Thought content normal.         Judgment: Judgment normal.         Assessment:       1. Essential hypertension    2. Gout, unspecified cause, unspecified chronicity, unspecified site    3. Atherosclerosis of aorta    4. Benign prostatic hyperplasia with urinary frequency    5. Annual physical exam    6. Vitamin D deficiency         Plan:       Annual PE wnl.  Labs pending.    BPH: PSA pending. refilled flomax. SE's of med d/w pt. Re referred to uro  Moles: refer to derm for skin screen  HTN: repeat is controlled.      ED prompts d/w pt  RTC prn symptoms  Essential hypertension  -     valsartan (DIOVAN) 160 MG tablet; Take 1 tablet (160 mg total) by mouth once daily.  Dispense: 90 tablet; Refill: 1  -     Cancel: CBC Auto Differential; Future; Expected date: 04/12/2022  -     Comprehensive Metabolic Panel; Future; Expected date: 04/12/2022  -     Lipid Panel; Future; Expected date: 04/12/2022  -     Hemoglobin A1C; Future; Expected date: 04/12/2022  -     TSH; Future; Expected date: 04/12/2022    Gout, unspecified cause, unspecified chronicity, unspecified site  -     allopurinoL (ZYLOPRIM) 300 MG tablet; Take 1 tablet (300 mg total) by mouth once daily.  Dispense: 90 tablet; Refill: 1  -     Cancel: CBC Auto Differential; Future; Expected date: 04/12/2022  -     Comprehensive Metabolic Panel; Future; Expected date: 04/12/2022  -     Lipid Panel; Future; Expected date: 04/12/2022  -     Hemoglobin A1C; Future; Expected date: 04/12/2022  -     TSH; Future; Expected date: 04/12/2022    Atherosclerosis of aorta  -     valsartan (DIOVAN) 160 MG tablet; Take 1 tablet (160 mg total) by mouth once daily.  Dispense: 90 tablet; Refill: 1  -     Cancel: CBC Auto Differential; Future; Expected date: 04/12/2022  -     Comprehensive Metabolic Panel; Future; Expected date: 04/12/2022  -     Lipid Panel; Future; Expected date: 04/12/2022  -     Hemoglobin A1C; Future; Expected date: 04/12/2022  -     TSH; Future; Expected date: 04/12/2022    Benign prostatic hyperplasia with urinary frequency  -     tamsulosin (FLOMAX) 0.4 mg Cap; Take 1 capsule (0.4 mg total) by mouth once daily.  Dispense: 90 capsule; Refill: 1  -     Cancel: CBC Auto Differential; Future; Expected date: 04/12/2022  -     Comprehensive Metabolic Panel; Future; Expected date: 04/12/2022  -      Lipid Panel; Future; Expected date: 04/12/2022  -     Hemoglobin A1C; Future; Expected date: 04/12/2022  -     TSH; Future; Expected date: 04/12/2022    Annual physical exam    Vitamin D deficiency  -     Vitamin D; Future; Expected date: 04/12/2022      Labs pending  JAMES farooq  Lifestyle mods d/w pt

## 2022-04-19 NOTE — PROGRESS NOTES
Hormone therapy injection administered, tolerated well, and secured with bandaid.   Denies any pain/discomfort at injection site.      Site: Left  Testosterone : 200mg

## 2022-04-26 ENCOUNTER — LAB VISIT (OUTPATIENT)
Dept: LAB | Facility: OTHER | Age: 77
End: 2022-04-26
Attending: FAMILY MEDICINE
Payer: MEDICARE

## 2022-04-26 DIAGNOSIS — M10.9 GOUT, UNSPECIFIED CAUSE, UNSPECIFIED CHRONICITY, UNSPECIFIED SITE: ICD-10-CM

## 2022-04-26 DIAGNOSIS — N40.1 BENIGN PROSTATIC HYPERPLASIA WITH URINARY FREQUENCY: ICD-10-CM

## 2022-04-26 DIAGNOSIS — E29.1 HYPOGONADISM MALE: ICD-10-CM

## 2022-04-26 DIAGNOSIS — E55.9 VITAMIN D DEFICIENCY: ICD-10-CM

## 2022-04-26 DIAGNOSIS — Z12.5 PROSTATE CANCER SCREENING: ICD-10-CM

## 2022-04-26 DIAGNOSIS — I70.0 ATHEROSCLEROSIS OF AORTA: ICD-10-CM

## 2022-04-26 DIAGNOSIS — R35.0 BENIGN PROSTATIC HYPERPLASIA WITH URINARY FREQUENCY: ICD-10-CM

## 2022-04-26 DIAGNOSIS — I10 ESSENTIAL HYPERTENSION: ICD-10-CM

## 2022-04-26 LAB
25(OH)D3+25(OH)D2 SERPL-MCNC: 33 NG/ML (ref 30–96)
ALBUMIN SERPL BCP-MCNC: 3.5 G/DL (ref 3.5–5.2)
ALP SERPL-CCNC: 60 U/L (ref 55–135)
ALT SERPL W/O P-5'-P-CCNC: 12 U/L (ref 10–44)
ANION GAP SERPL CALC-SCNC: 8 MMOL/L (ref 8–16)
AST SERPL-CCNC: 19 U/L (ref 10–40)
BASOPHILS # BLD AUTO: 0.04 K/UL (ref 0–0.2)
BASOPHILS NFR BLD: 0.5 % (ref 0–1.9)
BILIRUB SERPL-MCNC: 0.6 MG/DL (ref 0.1–1)
BUN SERPL-MCNC: 22 MG/DL (ref 8–23)
CALCIUM SERPL-MCNC: 8.8 MG/DL (ref 8.7–10.5)
CHLORIDE SERPL-SCNC: 105 MMOL/L (ref 95–110)
CHOLEST SERPL-MCNC: 202 MG/DL (ref 120–199)
CHOLEST/HDLC SERPL: 4 {RATIO} (ref 2–5)
CO2 SERPL-SCNC: 24 MMOL/L (ref 23–29)
COMPLEXED PSA SERPL-MCNC: 0.94 NG/ML (ref 0–4)
CREAT SERPL-MCNC: 1.1 MG/DL (ref 0.5–1.4)
DIFFERENTIAL METHOD: ABNORMAL
EOSINOPHIL # BLD AUTO: 0.4 K/UL (ref 0–0.5)
EOSINOPHIL NFR BLD: 5 % (ref 0–8)
ERYTHROCYTE [DISTWIDTH] IN BLOOD BY AUTOMATED COUNT: 15.9 % (ref 11.5–14.5)
EST. GFR  (AFRICAN AMERICAN): >60 ML/MIN/1.73 M^2
EST. GFR  (NON AFRICAN AMERICAN): >60 ML/MIN/1.73 M^2
ESTIMATED AVG GLUCOSE: 120 MG/DL (ref 68–131)
GLUCOSE SERPL-MCNC: 120 MG/DL (ref 70–110)
HBA1C MFR BLD: 5.8 % (ref 4–5.6)
HCT VFR BLD AUTO: 48.9 % (ref 40–54)
HDLC SERPL-MCNC: 50 MG/DL (ref 40–75)
HDLC SERPL: 24.8 % (ref 20–50)
HGB BLD-MCNC: 16.1 G/DL (ref 14–18)
IMM GRANULOCYTES # BLD AUTO: 0.03 K/UL (ref 0–0.04)
IMM GRANULOCYTES NFR BLD AUTO: 0.4 % (ref 0–0.5)
LDLC SERPL CALC-MCNC: 133.4 MG/DL (ref 63–159)
LYMPHOCYTES # BLD AUTO: 2.4 K/UL (ref 1–4.8)
LYMPHOCYTES NFR BLD: 32.2 % (ref 18–48)
MCH RBC QN AUTO: 28.2 PG (ref 27–31)
MCHC RBC AUTO-ENTMCNC: 32.9 G/DL (ref 32–36)
MCV RBC AUTO: 86 FL (ref 82–98)
MONOCYTES # BLD AUTO: 0.8 K/UL (ref 0.3–1)
MONOCYTES NFR BLD: 10.4 % (ref 4–15)
NEUTROPHILS # BLD AUTO: 3.9 K/UL (ref 1.8–7.7)
NEUTROPHILS NFR BLD: 51.5 % (ref 38–73)
NONHDLC SERPL-MCNC: 152 MG/DL
NRBC BLD-RTO: 0 /100 WBC
PLATELET # BLD AUTO: 217 K/UL (ref 150–450)
PMV BLD AUTO: 10.7 FL (ref 9.2–12.9)
POTASSIUM SERPL-SCNC: 4.3 MMOL/L (ref 3.5–5.1)
PROT SERPL-MCNC: 6.4 G/DL (ref 6–8.4)
RBC # BLD AUTO: 5.71 M/UL (ref 4.6–6.2)
SODIUM SERPL-SCNC: 137 MMOL/L (ref 136–145)
TESTOST SERPL-MCNC: 939 NG/DL (ref 304–1227)
TRIGL SERPL-MCNC: 93 MG/DL (ref 30–150)
TSH SERPL DL<=0.005 MIU/L-ACNC: 2.6 UIU/ML (ref 0.4–4)
WBC # BLD AUTO: 7.58 K/UL (ref 3.9–12.7)

## 2022-04-26 PROCEDURE — 80061 LIPID PANEL: CPT | Performed by: FAMILY MEDICINE

## 2022-04-26 PROCEDURE — 84443 ASSAY THYROID STIM HORMONE: CPT | Performed by: FAMILY MEDICINE

## 2022-04-26 PROCEDURE — 82306 VITAMIN D 25 HYDROXY: CPT | Performed by: FAMILY MEDICINE

## 2022-04-26 PROCEDURE — 84153 ASSAY OF PSA TOTAL: CPT | Performed by: NURSE PRACTITIONER

## 2022-04-26 PROCEDURE — 84403 ASSAY OF TOTAL TESTOSTERONE: CPT | Performed by: NURSE PRACTITIONER

## 2022-04-26 PROCEDURE — 85025 COMPLETE CBC W/AUTO DIFF WBC: CPT | Performed by: NURSE PRACTITIONER

## 2022-04-26 PROCEDURE — 80053 COMPREHEN METABOLIC PANEL: CPT | Performed by: FAMILY MEDICINE

## 2022-04-26 PROCEDURE — 83036 HEMOGLOBIN GLYCOSYLATED A1C: CPT | Performed by: FAMILY MEDICINE

## 2022-04-26 PROCEDURE — 36415 COLL VENOUS BLD VENIPUNCTURE: CPT | Performed by: NURSE PRACTITIONER

## 2022-04-27 ENCOUNTER — OFFICE VISIT (OUTPATIENT)
Dept: SPORTS MEDICINE | Facility: CLINIC | Age: 77
End: 2022-04-27
Payer: MEDICARE

## 2022-04-27 VITALS
DIASTOLIC BLOOD PRESSURE: 61 MMHG | WEIGHT: 234 LBS | SYSTOLIC BLOOD PRESSURE: 124 MMHG | HEIGHT: 73 IN | BODY MASS INDEX: 31.01 KG/M2

## 2022-04-27 DIAGNOSIS — M25.561 LATERAL KNEE PAIN, RIGHT: Primary | ICD-10-CM

## 2022-04-27 DIAGNOSIS — S83.281S TEAR OF LATERAL MENISCUS OF RIGHT KNEE, UNSPECIFIED TEAR TYPE, UNSPECIFIED WHETHER OLD OR CURRENT TEAR, SEQUELA: ICD-10-CM

## 2022-04-27 PROCEDURE — 99999 PR PBB SHADOW E&M-EST. PATIENT-LVL III: ICD-10-PCS | Mod: PBBFAC,,, | Performed by: ORTHOPAEDIC SURGERY

## 2022-04-27 PROCEDURE — 99999 PR PBB SHADOW E&M-EST. PATIENT-LVL III: CPT | Mod: PBBFAC,,, | Performed by: ORTHOPAEDIC SURGERY

## 2022-04-27 PROCEDURE — 99214 OFFICE O/P EST MOD 30 MIN: CPT | Mod: S$GLB,,, | Performed by: ORTHOPAEDIC SURGERY

## 2022-04-27 PROCEDURE — 3078F DIAST BP <80 MM HG: CPT | Mod: CPTII,S$GLB,, | Performed by: ORTHOPAEDIC SURGERY

## 2022-04-27 PROCEDURE — 1159F PR MEDICATION LIST DOCUMENTED IN MEDICAL RECORD: ICD-10-PCS | Mod: CPTII,S$GLB,, | Performed by: ORTHOPAEDIC SURGERY

## 2022-04-27 PROCEDURE — 1126F PR PAIN SEVERITY QUANTIFIED, NO PAIN PRESENT: ICD-10-PCS | Mod: CPTII,S$GLB,, | Performed by: ORTHOPAEDIC SURGERY

## 2022-04-27 PROCEDURE — 3074F PR MOST RECENT SYSTOLIC BLOOD PRESSURE < 130 MM HG: ICD-10-PCS | Mod: CPTII,S$GLB,, | Performed by: ORTHOPAEDIC SURGERY

## 2022-04-27 PROCEDURE — 1101F PR PT FALLS ASSESS DOC 0-1 FALLS W/OUT INJ PAST YR: ICD-10-PCS | Mod: CPTII,S$GLB,, | Performed by: ORTHOPAEDIC SURGERY

## 2022-04-27 PROCEDURE — 3288F PR FALLS RISK ASSESSMENT DOCUMENTED: ICD-10-PCS | Mod: CPTII,S$GLB,, | Performed by: ORTHOPAEDIC SURGERY

## 2022-04-27 PROCEDURE — 1160F RVW MEDS BY RX/DR IN RCRD: CPT | Mod: CPTII,S$GLB,, | Performed by: ORTHOPAEDIC SURGERY

## 2022-04-27 PROCEDURE — 1126F AMNT PAIN NOTED NONE PRSNT: CPT | Mod: CPTII,S$GLB,, | Performed by: ORTHOPAEDIC SURGERY

## 2022-04-27 PROCEDURE — 1159F MED LIST DOCD IN RCRD: CPT | Mod: CPTII,S$GLB,, | Performed by: ORTHOPAEDIC SURGERY

## 2022-04-27 PROCEDURE — 1160F PR REVIEW ALL MEDS BY PRESCRIBER/CLIN PHARMACIST DOCUMENTED: ICD-10-PCS | Mod: CPTII,S$GLB,, | Performed by: ORTHOPAEDIC SURGERY

## 2022-04-27 PROCEDURE — 3074F SYST BP LT 130 MM HG: CPT | Mod: CPTII,S$GLB,, | Performed by: ORTHOPAEDIC SURGERY

## 2022-04-27 PROCEDURE — 1101F PT FALLS ASSESS-DOCD LE1/YR: CPT | Mod: CPTII,S$GLB,, | Performed by: ORTHOPAEDIC SURGERY

## 2022-04-27 PROCEDURE — 3288F FALL RISK ASSESSMENT DOCD: CPT | Mod: CPTII,S$GLB,, | Performed by: ORTHOPAEDIC SURGERY

## 2022-04-27 PROCEDURE — 99214 PR OFFICE/OUTPT VISIT, EST, LEVL IV, 30-39 MIN: ICD-10-PCS | Mod: S$GLB,,, | Performed by: ORTHOPAEDIC SURGERY

## 2022-04-27 PROCEDURE — 3078F PR MOST RECENT DIASTOLIC BLOOD PRESSURE < 80 MM HG: ICD-10-PCS | Mod: CPTII,S$GLB,, | Performed by: ORTHOPAEDIC SURGERY

## 2022-04-27 NOTE — PROGRESS NOTES
CC: right knee pain, left calf pain    Arnaldo is here today for follow up evaluation of her right knee and right calf pain and to discuss his MRI results. Patient reports his pain is 0/10 today. He has been compliant with his HEP every other day and feels as though his pain is increasing during activity. He denies new falls or trauma since his last visit.     Recall from visit on 04/07/2022  Arnaldo is here today for follow up evaluation of his right knee pain. Patient reports his pain is 0/10 today. Patient states he has continued to appreciate left lateral knee pain and is interested in discussing what other treatment options are available to him. He states he has been completing his HEP for the past one week and taking ibuprofen as needed. He denies new falls or trauma since his last visit.       REVIEW OF SYSTEMS:   Constitution: Patient denies fever, chills, night sweats, and weight changes.  Eyes: Patient denies eye pain or vision changes.  HENT: Patient denies headache, ear pain, sore throat, or nasal discharge.  CVS: Patient denies chest pain.  Lungs: Patient denies shortness of breath or cough.  Abd: Patient denies stomach pain, nausea, or vomiting.  Skin: Patient denies skin rash or itching.    Hematologic/Lymphatic: Patient denies easy bruising.   Musculoskeletal: Patient denies recent falls. See HPI.  Psych: Patient denies any current anxiety or nervousness.    PAST MEDICAL HISTORY:   Past Medical History:   Diagnosis Date    Allergy     seasonal    Cataract     Gout, joint     Hyperlipidemia     Hypertension     Ocular rosacea     Vitreous floaters        PAST SURGICAL HISTORY:   Past Surgical History:   Procedure Laterality Date    VASECTOMY  1988       FAMILY HISTORY:   Family History   Problem Relation Age of Onset    Hypertension Mother     Myasthenia gravis Mother         affects eye    Dementia Father     No Known Problems Sister     No Known Problems Brother     No Known Problems  "Daughter     Melanoma Neg Hx     Cataracts Neg Hx     Glaucoma Neg Hx     Macular degeneration Neg Hx        SOCIAL HISTORY:   Social History     Socioeconomic History    Marital status:    Occupational History    Occupation:      Employer: [employer]   Tobacco Use    Smoking status: Former Smoker     Packs/day: 0.50     Years: 15.00     Pack years: 7.50     Quit date: 10/19/1977     Years since quittin.5    Smokeless tobacco: Never Used   Substance and Sexual Activity    Alcohol use: Yes     Comment: 3-4 times weekly    Drug use: No    Sexual activity: Yes     Partners: Female     Comment:  with 3 children       MEDICATIONS:     Current Outpatient Medications:     allopurinoL (ZYLOPRIM) 300 MG tablet, Take 1 tablet (300 mg total) by mouth once daily., Disp: 90 tablet, Rfl: 1    ivermectin (SOOLANTRA) 1 % Crea, AAA on face qhs and wash off in morning, Disp: 60 g, Rfl: 3    tamsulosin (FLOMAX) 0.4 mg Cap, Take 1 capsule (0.4 mg total) by mouth once daily., Disp: 90 capsule, Rfl: 1    testosterone cypionate (DEPOTESTOTERONE CYPIONATE) 200 mg/mL injection, , Disp: , Rfl:     valsartan (DIOVAN) 160 MG tablet, Take 1 tablet (160 mg total) by mouth once daily., Disp: 90 tablet, Rfl: 1    Current Facility-Administered Medications:     testosterone cypionate injection 200 mg, 200 mg, Intramuscular, Q14 Days, Michell Pinto NP, 200 mg at 22 0830    ALLERGIES:   Review of patient's allergies indicates:   Allergen Reactions    Doxycycline      HEADACHE (PRESSURE)    Statins-hmg-coa reductase inhibitors         PHYSICAL EXAMINATION:  /61   Ht 6' 1" (1.854 m)   Wt 106.1 kg (234 lb)   BMI 30.87 kg/m²   Vitals signs and nursing note have been reviewed.  General: In no acute distress, well developed, well nourished, no diaphoresis  Eyes: EOM full and smooth, no eye redness or discharge  HENT: normocephalic and atraumatic, neck supple, trachea midline, no nasal " discharge, no external ear redness or discharge  Cardiovascular: no LE edema  Lungs: respirations non-labored, no conversational dyspnea   Abd: non-distended, no rigidity  MSK: no amputation or deformity, no swelling of extremities  Neuro: AAOx3, CN2-12 grossly intact  Skin: No rashes, warm and dry  Psychiatric: cooperative, pleasant, mood and affect appropriate for age    MUSCULOSKELETAL EXAM:    RIGHT KNEE EXAMINATION   Affected side is compared to contralateral knee     Observation:  No edema, erythema, ecchymosis noted.  Small right suprapatellar effusion  No muscle atrophy of the thighs and calves noted.  No obvious bony deformities noted.   No genu valgus/varum noted. No recurvatum noted.    No tibial internal/external torsion.      Tenderness:  Patella - present   Lateral joint line - present  Quad tendon - none   Medial joint line - none  Patellar tendon - none   Medial plica - none  Tibial tubercle - none   Lateral plica - none  Pes anserine - none   MCL prox - none  Distal ITB - mild    MCL distal - none  MFC - none    LCL prox - none  LFC - none    LCL distal - none  Tibia - none    Fibula - none    No obvious bursae, plicae, popliteal cysts, or tendon derangement palpated.  + tenderness at the posterolateral aspect of the knee over the lateral hamstrings          ROM:   Active extension to 0° bilaterally without hyperextension, lag, or patellar J sign.    Active flexion to 135° bilaterally.  Crepitus with extension on the right.    Strength: (bilaterally)  Knee Flexion - 5/5  Knee Extension - 5/5  Hip Flexion - 5/5  Hip Extension - 5/5  Ankle dorsiflexion - 5/5  Ankle Plantarflexion - 5/5    Patellofemoral Exam:  Patella position - Neutral   Patellar ballottement - negative  Bulge sign - negative  Patellar grind - positive  No patellar laxity with medial and lateral translation   No apprehension with medial and lateral patellar translation.     Meniscus Testing:     No pain with terminal extension and  forced flexion  Melyssas test - negative   Bounce home test - negative    Ligament Testing:  Lachman's test - negative  No laxity with varus testing at 0 and 30 degrees.  No laxity with valgus testing at 0 and 30 degrees.    Neurovascular Examination:   Normal gait without antalgia.  Sensation intact to light touch in the obturator, lateral/intermediate/medial/posterior femoral cutaneous, saphenous, and common peroneal nerves bilaterally.  Pulses intact at the DP and PT arteries bilaterally.    Capillary refill intact <2 seconds in all toes bilaterally.      IMAGIN. X-ray obtained 2021 due to bilateral knee pain  2. X-ray images were reviewed personally by me and then directly with patient.  3. FINDINGS: X-ray images obtained demonstrate the bones are intact.  There is no evidence for acute fracture or bone destruction.  There are degenerative changes with spurring of the tibial spines with small osteophytes at the femorotibial and patellofemoral joints.  There is minimal narrowing of the medial compartments of the femorotibial joints.  There is no plain film evidence for joint effusions.  Soft tissues are unremarkable.  Kellgren and Bubba grade 2.  4. IMPRESSION: As above.     1. MRI obtained 2022 due to right knee pain   2. MRI images were reviewed personally by me and then directly with patient.  3. FINDINGS: MRI images obtained demonstrate horizontal degenerative tear of the lateral meniscus with fissuring about the cartilage mostly affecting the lateral compartment.   4. IMPRESSION: As above.       ASSESSMENT:      ICD-10-CM ICD-9-CM   1. Lateral knee pain, right  M25.561 719.46   2. Tear of lateral meniscus of right knee, unspecified tear type, unspecified whether old or current tear, sequela  S83.281S 905.7         PLAN:  1-2.  Lateral knee pain/lateral meniscus tear - worsening    - Recall that patient admits to right knee pain bothering him every time he walks. Most of his knee pain is  located at the lateral and posterior aspect of the knee and he has recently started to experience anterior knee pain.      - He had a right knee hydrodissection/nerve block procedure directed at the right common peroneal nerve at the lateral joint/fibular head 06/16/2021 and he appreciated 85-90% improvement in his pain following this procedure.  He feels as though benefit lasted for several weeks, and his pain has continued to worsen.  His pain is now primarily at the lateral joint line.    - MRI obtained 04/14/2022 and images were personally reviewed with the patient. See above for further detail.    - EDUCATION FOR PRP: Education provided on the benefits, adverse effects, likely course of treatment and improvement, and post-injection expectations from PRP. Handout given to patient. Reviewed that it is a non-covered cash service. It generally takes 1-3 treatments to have long standing resolution. The PRP injection includes tenotomy, and this was explained to the patient. We reviewed that we will initially after treatment, pain may become worse and this is an expected response. We instructed that improvement may be experienced within the first two weeks and that most of her improvement will come between weeks 6 and 12. If there is no improvement, we would not repeat. If less than 90% improvement is experienced at 12 weeks, we would likely repeat.    - Continue with previously prescribed HEP for patellar tracking.      - Prior authorization for Orthovisc started today      Future planning includes - right knee Orthovisc injection series, possible intra-articular PRP    All questions were answered to the best of my ability and all concerns were addressed at this time.    Follow up in 2-3 weeks for right knee Orthovisc series.       This note is dictated using the M*Modal Fluency Direct word recognition program. There are word recognition mistakes that are occasionally missed on review.      Total time spent with  patient: see X rosi on chart below.   More than half of the time was spent counseling or coordinating care including prognosis, differential diagnosis, risks and benefits of treatment, instructions, compliance risk reductions.    EST MINUTES X   64065 5    98139 10    32040 15    96896 25 x   99215 40    NEW     96863 10    86135 20    75221 30    68634 45    70611 60    PHONE      5-10    85232 11-20    65725 21-30

## 2022-05-03 ENCOUNTER — CLINICAL SUPPORT (OUTPATIENT)
Dept: UROLOGY | Facility: CLINIC | Age: 77
End: 2022-05-03
Payer: MEDICARE

## 2022-05-03 DIAGNOSIS — E29.1 HYPOGONADISM IN MALE: Primary | ICD-10-CM

## 2022-05-03 PROCEDURE — 96372 THER/PROPH/DIAG INJ SC/IM: CPT | Mod: S$GLB,,, | Performed by: NURSE PRACTITIONER

## 2022-05-03 PROCEDURE — 96372 PR INJECTION,THERAP/PROPH/DIAG2ST, IM OR SUBCUT: ICD-10-PCS | Mod: S$GLB,,, | Performed by: NURSE PRACTITIONER

## 2022-05-03 RX ORDER — TESTOSTERONE CYPIONATE 200 MG/ML
50 INJECTION, SOLUTION INTRAMUSCULAR
Status: DISCONTINUED | OUTPATIENT
Start: 2022-05-03 | End: 2022-05-03

## 2022-05-03 RX ADMIN — TESTOSTERONE CYPIONATE 200 MG: 200 INJECTION, SOLUTION INTRAMUSCULAR at 08:05

## 2022-05-03 NOTE — PROGRESS NOTES
Hormone therapy injection administered, tolerated well, and secured with bandaid.   Denies any pain/discomfort at injection site.      Site: Right  Testosterone : 200mg

## 2022-05-11 ENCOUNTER — OFFICE VISIT (OUTPATIENT)
Dept: SPORTS MEDICINE | Facility: CLINIC | Age: 77
End: 2022-05-11
Payer: MEDICARE

## 2022-05-11 VITALS
SYSTOLIC BLOOD PRESSURE: 138 MMHG | HEIGHT: 73 IN | BODY MASS INDEX: 31.01 KG/M2 | WEIGHT: 234 LBS | DIASTOLIC BLOOD PRESSURE: 78 MMHG

## 2022-05-11 DIAGNOSIS — M25.561 LATERAL KNEE PAIN, RIGHT: Primary | ICD-10-CM

## 2022-05-11 DIAGNOSIS — M17.11 LOCALIZED OSTEOARTHRITIS OF RIGHT KNEE: ICD-10-CM

## 2022-05-11 PROCEDURE — 1160F PR REVIEW ALL MEDS BY PRESCRIBER/CLIN PHARMACIST DOCUMENTED: ICD-10-PCS | Mod: CPTII,S$GLB,, | Performed by: ORTHOPAEDIC SURGERY

## 2022-05-11 PROCEDURE — 99999 PR PBB SHADOW E&M-EST. PATIENT-LVL III: CPT | Mod: PBBFAC,,, | Performed by: ORTHOPAEDIC SURGERY

## 2022-05-11 PROCEDURE — 3075F SYST BP GE 130 - 139MM HG: CPT | Mod: CPTII,S$GLB,, | Performed by: ORTHOPAEDIC SURGERY

## 2022-05-11 PROCEDURE — 1125F AMNT PAIN NOTED PAIN PRSNT: CPT | Mod: CPTII,S$GLB,, | Performed by: ORTHOPAEDIC SURGERY

## 2022-05-11 PROCEDURE — 1125F PR PAIN SEVERITY QUANTIFIED, PAIN PRESENT: ICD-10-PCS | Mod: CPTII,S$GLB,, | Performed by: ORTHOPAEDIC SURGERY

## 2022-05-11 PROCEDURE — 99499 UNLISTED E&M SERVICE: CPT | Mod: S$GLB,,, | Performed by: ORTHOPAEDIC SURGERY

## 2022-05-11 PROCEDURE — 1160F RVW MEDS BY RX/DR IN RCRD: CPT | Mod: CPTII,S$GLB,, | Performed by: ORTHOPAEDIC SURGERY

## 2022-05-11 PROCEDURE — 3075F PR MOST RECENT SYSTOLIC BLOOD PRESS GE 130-139MM HG: ICD-10-PCS | Mod: CPTII,S$GLB,, | Performed by: ORTHOPAEDIC SURGERY

## 2022-05-11 PROCEDURE — 3078F DIAST BP <80 MM HG: CPT | Mod: CPTII,S$GLB,, | Performed by: ORTHOPAEDIC SURGERY

## 2022-05-11 PROCEDURE — 20611 PR DRAIN/ASP/INJECT MAJOR JOINT/BURSA W/US GUIDANCE: ICD-10-PCS | Mod: RT,S$GLB,, | Performed by: ORTHOPAEDIC SURGERY

## 2022-05-11 PROCEDURE — 3078F PR MOST RECENT DIASTOLIC BLOOD PRESSURE < 80 MM HG: ICD-10-PCS | Mod: CPTII,S$GLB,, | Performed by: ORTHOPAEDIC SURGERY

## 2022-05-11 PROCEDURE — 20611 DRAIN/INJ JOINT/BURSA W/US: CPT | Mod: RT,S$GLB,, | Performed by: ORTHOPAEDIC SURGERY

## 2022-05-11 PROCEDURE — 99999 PR PBB SHADOW E&M-EST. PATIENT-LVL III: ICD-10-PCS | Mod: PBBFAC,,, | Performed by: ORTHOPAEDIC SURGERY

## 2022-05-11 PROCEDURE — 1159F PR MEDICATION LIST DOCUMENTED IN MEDICAL RECORD: ICD-10-PCS | Mod: CPTII,S$GLB,, | Performed by: ORTHOPAEDIC SURGERY

## 2022-05-11 PROCEDURE — 1159F MED LIST DOCD IN RCRD: CPT | Mod: CPTII,S$GLB,, | Performed by: ORTHOPAEDIC SURGERY

## 2022-05-11 PROCEDURE — 99499 NO LOS: ICD-10-PCS | Mod: S$GLB,,, | Performed by: ORTHOPAEDIC SURGERY

## 2022-05-11 NOTE — PROGRESS NOTES
"Subjective:     CC: right knee pain/osteoarthritis    Arnaldo is a 77 y.o. male coming in today for their 1st Orthovisc injection to the right knee. Patient reports his pain is 1/10 today. He denies new injury or trauma since his last visit.     Objective:     VITAL SIGNS: /78   Ht 6' 1" (1.854 m)   Wt 106.1 kg (234 lb)   BMI 30.87 kg/m²      Orthovisc Injection Procedure #1     A time out was performed, including verification of patient ID, procedure, site and side, availability of information and equipment, review of safety issues, and agreement with consent, the procedure site was marked.    Location: Knee joint, right     Procedure: The patient was prepped aseptically with alcohol and chlorhexidine. Ethyl Chloride spray was used prior to skin puncture to help numb the superficial skin. After cold spray was applied, 2-4 cc's of 0.2% ropivacaine was injected into the skin and superficial tissue at the injection site using a 25G, 1.5" needle to form an anesthetic tunnel and ensure proper needle placement into the knee joint space. Cold spray was applied again, and an 18G, 1.5" needle was used to the joint space and 4 cc of clear synovial fluid was aspirated. Using a hemostat, the syringe was exchanged with the Orthovisc syringe, and 2cc of Orthovisc was injected into the knee joint. The patient was in the supine position during the duration of this procedure and the injection approach was from the superolateral aspect.     Ultrasound guidance was used for needle localization with SonAsantaete Edge 2, 9-L MHz linear probe(s). Images were saved and stored for documentation. The knee joint was well visualized. Dynamic visualization of the 25G x 1.5 needle and 18G, 1.5" needle was continuous throughout the procedure and maintained good position and correct needle placement.        Patient tolerance: The patient tolerated the procedure well with no immediate complications. There were no adverse reactions to the " medication. Patient was instructed to apply ice to the joint for up to 20 minutes at a time and avoid strenuous activities for 24-36 hours following the injection. Following that time, the patient can resume activities as prior to the injection.     The patient was reminded to call the clinic immediately for any adverse side effects as explained in clinic today.     Orthovisc:  NDC: [address]-85  Product Code: [id number]  Lot: [id number]  Exp: 2024-02-29      Assessment:      Encounter Diagnoses   Name Primary?    Lateral knee pain, right Yes    Localized osteoarthritis of right knee           Plan:     1. Orthovisc injection of right knee received today (see procedure note above)  2. Follow-up in 1 week for 2nd injection of 3 injection series  3. Patient agreeable to today's plan and all questions were answered      This note is dictated using the M*Modal Fluency Direct word recognition program. There are word recognition mistakes that are occasionally missed on review.

## 2022-05-17 ENCOUNTER — TELEPHONE (OUTPATIENT)
Dept: SPORTS MEDICINE | Facility: CLINIC | Age: 77
End: 2022-05-17
Payer: MEDICARE

## 2022-05-17 NOTE — TELEPHONE ENCOUNTER
Spoke to the Pt about his message.  After asking him a few questions in regards to what happened and what has been done so far, the Pt stated that he cannot see if it is swollen, when laying down with leg straight it hurts more but when sitting up it is not as severe.  I informed him of just taking it easy today.  As well as possibly applying some ice and the compression sleeve he stated helped.  I also asked him if he is ok to take and aleve. Pt stated yes and has been taking that.  Agreed with his measures he has already taken and emphasized that he should just take it easy and allow it to relax for a little.  Asked the Pt to call or message back towards the end or beginning of next wk to keep us posted.  Pt expressed thanks and agreed to do that.

## 2022-05-17 NOTE — TELEPHONE ENCOUNTER
----- Message from Rae Ledezma sent at 5/17/2022  8:03 AM CDT -----  Contact: [phone]  Type:  Needs Medical Advice    Who Called: pt called   Would the patient rather a call back or a response via [facility]? Call back  Best Call Back Number:[phone]  Additional Information: Pt is on vacation and when he stood up from a chair he heard a popping noise and is now in a lot of pain. Please call pt to advice

## 2022-05-24 ENCOUNTER — CLINICAL SUPPORT (OUTPATIENT)
Dept: UROLOGY | Facility: CLINIC | Age: 77
End: 2022-05-24
Payer: MEDICARE

## 2022-05-24 DIAGNOSIS — E29.1 HYPOGONADISM IN MALE: Primary | ICD-10-CM

## 2022-05-24 PROCEDURE — 96372 THER/PROPH/DIAG INJ SC/IM: CPT | Mod: S$GLB,,, | Performed by: UROLOGY

## 2022-05-24 PROCEDURE — 96372 PR INJECTION,THERAP/PROPH/DIAG2ST, IM OR SUBCUT: ICD-10-PCS | Mod: S$GLB,,, | Performed by: UROLOGY

## 2022-05-24 RX ORDER — TESTOSTERONE CYPIONATE 200 MG/ML
200 INJECTION, SOLUTION INTRAMUSCULAR
Status: COMPLETED | OUTPATIENT
Start: 2022-05-24 | End: 2022-05-24

## 2022-05-24 RX ADMIN — TESTOSTERONE CYPIONATE 200 MG: 200 INJECTION, SOLUTION INTRAMUSCULAR at 08:05

## 2022-05-24 NOTE — PROGRESS NOTES
Hormone therapy injection administered, tolerated well, and secured with bandaid.   Denies any pain/discomfort at injection site.      Site: Left Dorsalgluteal  Testosterone : 200mg

## 2022-05-25 ENCOUNTER — OFFICE VISIT (OUTPATIENT)
Dept: SPORTS MEDICINE | Facility: CLINIC | Age: 77
End: 2022-05-25
Payer: MEDICARE

## 2022-05-25 VITALS
SYSTOLIC BLOOD PRESSURE: 119 MMHG | BODY MASS INDEX: 31.01 KG/M2 | HEIGHT: 73 IN | DIASTOLIC BLOOD PRESSURE: 65 MMHG | WEIGHT: 234 LBS

## 2022-05-25 DIAGNOSIS — M17.11 LOCALIZED OSTEOARTHRITIS OF RIGHT KNEE: ICD-10-CM

## 2022-05-25 DIAGNOSIS — M25.561 LATERAL KNEE PAIN, RIGHT: Primary | ICD-10-CM

## 2022-05-25 PROCEDURE — 99499 UNLISTED E&M SERVICE: CPT | Mod: S$GLB,,, | Performed by: ORTHOPAEDIC SURGERY

## 2022-05-25 PROCEDURE — 99999 PR PBB SHADOW E&M-EST. PATIENT-LVL III: CPT | Mod: PBBFAC,,, | Performed by: ORTHOPAEDIC SURGERY

## 2022-05-25 PROCEDURE — 20611 PR DRAIN/ASP/INJECT MAJOR JOINT/BURSA W/US GUIDANCE: ICD-10-PCS | Mod: RT,S$GLB,, | Performed by: ORTHOPAEDIC SURGERY

## 2022-05-25 PROCEDURE — 1160F RVW MEDS BY RX/DR IN RCRD: CPT | Mod: CPTII,S$GLB,, | Performed by: ORTHOPAEDIC SURGERY

## 2022-05-25 PROCEDURE — 3078F PR MOST RECENT DIASTOLIC BLOOD PRESSURE < 80 MM HG: ICD-10-PCS | Mod: CPTII,S$GLB,, | Performed by: ORTHOPAEDIC SURGERY

## 2022-05-25 PROCEDURE — 20611 DRAIN/INJ JOINT/BURSA W/US: CPT | Mod: RT,S$GLB,, | Performed by: ORTHOPAEDIC SURGERY

## 2022-05-25 PROCEDURE — 99499 NO LOS: ICD-10-PCS | Mod: S$GLB,,, | Performed by: ORTHOPAEDIC SURGERY

## 2022-05-25 PROCEDURE — 1160F PR REVIEW ALL MEDS BY PRESCRIBER/CLIN PHARMACIST DOCUMENTED: ICD-10-PCS | Mod: CPTII,S$GLB,, | Performed by: ORTHOPAEDIC SURGERY

## 2022-05-25 PROCEDURE — 1101F PT FALLS ASSESS-DOCD LE1/YR: CPT | Mod: CPTII,S$GLB,, | Performed by: ORTHOPAEDIC SURGERY

## 2022-05-25 PROCEDURE — 3288F FALL RISK ASSESSMENT DOCD: CPT | Mod: CPTII,S$GLB,, | Performed by: ORTHOPAEDIC SURGERY

## 2022-05-25 PROCEDURE — 1101F PR PT FALLS ASSESS DOC 0-1 FALLS W/OUT INJ PAST YR: ICD-10-PCS | Mod: CPTII,S$GLB,, | Performed by: ORTHOPAEDIC SURGERY

## 2022-05-25 PROCEDURE — 1126F AMNT PAIN NOTED NONE PRSNT: CPT | Mod: CPTII,S$GLB,, | Performed by: ORTHOPAEDIC SURGERY

## 2022-05-25 PROCEDURE — 3288F PR FALLS RISK ASSESSMENT DOCUMENTED: ICD-10-PCS | Mod: CPTII,S$GLB,, | Performed by: ORTHOPAEDIC SURGERY

## 2022-05-25 PROCEDURE — 3078F DIAST BP <80 MM HG: CPT | Mod: CPTII,S$GLB,, | Performed by: ORTHOPAEDIC SURGERY

## 2022-05-25 PROCEDURE — 1159F MED LIST DOCD IN RCRD: CPT | Mod: CPTII,S$GLB,, | Performed by: ORTHOPAEDIC SURGERY

## 2022-05-25 PROCEDURE — 1126F PR PAIN SEVERITY QUANTIFIED, NO PAIN PRESENT: ICD-10-PCS | Mod: CPTII,S$GLB,, | Performed by: ORTHOPAEDIC SURGERY

## 2022-05-25 PROCEDURE — 1159F PR MEDICATION LIST DOCUMENTED IN MEDICAL RECORD: ICD-10-PCS | Mod: CPTII,S$GLB,, | Performed by: ORTHOPAEDIC SURGERY

## 2022-05-25 PROCEDURE — 3074F PR MOST RECENT SYSTOLIC BLOOD PRESSURE < 130 MM HG: ICD-10-PCS | Mod: CPTII,S$GLB,, | Performed by: ORTHOPAEDIC SURGERY

## 2022-05-25 PROCEDURE — 3074F SYST BP LT 130 MM HG: CPT | Mod: CPTII,S$GLB,, | Performed by: ORTHOPAEDIC SURGERY

## 2022-05-25 PROCEDURE — 99999 PR PBB SHADOW E&M-EST. PATIENT-LVL III: ICD-10-PCS | Mod: PBBFAC,,, | Performed by: ORTHOPAEDIC SURGERY

## 2022-05-25 RX ORDER — METHYLPREDNISOLONE 4 MG/1
TABLET ORAL
Qty: 1 EACH | Refills: 0 | Status: SHIPPED | OUTPATIENT
Start: 2022-05-25 | End: 2023-04-18

## 2022-05-25 NOTE — PROGRESS NOTES
"Subjective:     CC: right knee pain/osteoarthritis    Arnaldo is a 77 y.o. male coming in today for their 2nd Orthovisc injection to the right knee. Patient reports his knee pain is 0/10 today. He states he has been appreciating increased pain when he went from seated to standing last week that is located at the distal lateral knee around the area of his distal IT band.     Objective:     VITAL SIGNS: /65   Ht 6' 1" (1.854 m)   Wt 106.1 kg (234 lb)   BMI 30.87 kg/m²      Orthovisc Injection Procedure #2     A time out was performed, including verification of patient ID, procedure, site and side, availability of information and equipment, review of safety issues, and agreement with consent, the procedure site was marked.    Location: Knee joint, right     Procedure: The patient was prepped aseptically with alcohol and chlorhexidine. Ethyl Chloride spray was used prior to skin puncture to help numb the superficial skin. After cold spray was applied, a 22G, 1.5" needle was used to enter the joint space and 2cc of Orthovisc was injected into the knee joint. The patient was in the supine position during the duration of this procedure and the injection approach was from the superolateral aspect.     Ultrasound guidance was used for needle localization with SonSmartStartte Edge 2, 9-L MHz linear probe(s). Images were saved and stored for documentation. The knee joint was well visualized. Dynamic visualization of the 22G x 1.5 needles was continuous throughout the procedure and maintained good position and correct needle placement.        Patient tolerance: The patient tolerated the procedure well with no immediate complications. There were no adverse reactions to the medication. Patient was instructed to apply ice to the joint for up to 20 minutes at a time and avoid strenuous activities for 24-36 hours following the injection. Following that time, the patient can resume activities as prior to the injection.     The " patient was reminded to call the clinic immediately for any adverse side effects as explained in clinic today.     Orthovisc:  NDC: [address]-18  Product Code: [id number]  Lot: [id number]  Exp: 2024-06-30      Assessment:      Encounter Diagnoses   Name Primary?    Lateral knee pain, right Yes    Localized osteoarthritis of right knee           Plan:     1. Orthovisc injection of right knee received today (see procedure note above)  2. Follow-up in 1 week for 3rd injection of 3 injection series  3. Patient agreeable to today's plan and all questions were answered  4. It seems as though there is an acute flare his distal IT bursitis.  Medrol Dosepak prescribed.  Also recommend topical Voltaren to the area.      This note is dictated using the M*Modal Fluency Direct word recognition program. There are word recognition mistakes that are occasionally missed on review.

## 2022-06-01 ENCOUNTER — OFFICE VISIT (OUTPATIENT)
Dept: SPORTS MEDICINE | Facility: CLINIC | Age: 77
End: 2022-06-01
Payer: MEDICARE

## 2022-06-01 VITALS
DIASTOLIC BLOOD PRESSURE: 62 MMHG | BODY MASS INDEX: 31.01 KG/M2 | WEIGHT: 234 LBS | HEIGHT: 73 IN | SYSTOLIC BLOOD PRESSURE: 123 MMHG

## 2022-06-01 DIAGNOSIS — M25.561 LATERAL KNEE PAIN, RIGHT: Primary | ICD-10-CM

## 2022-06-01 DIAGNOSIS — M17.11 LOCALIZED OSTEOARTHRITIS OF RIGHT KNEE: ICD-10-CM

## 2022-06-01 PROCEDURE — 3288F PR FALLS RISK ASSESSMENT DOCUMENTED: ICD-10-PCS | Mod: CPTII,S$GLB,, | Performed by: ORTHOPAEDIC SURGERY

## 2022-06-01 PROCEDURE — 3074F SYST BP LT 130 MM HG: CPT | Mod: CPTII,S$GLB,, | Performed by: ORTHOPAEDIC SURGERY

## 2022-06-01 PROCEDURE — 1159F MED LIST DOCD IN RCRD: CPT | Mod: CPTII,S$GLB,, | Performed by: ORTHOPAEDIC SURGERY

## 2022-06-01 PROCEDURE — 1159F PR MEDICATION LIST DOCUMENTED IN MEDICAL RECORD: ICD-10-PCS | Mod: CPTII,S$GLB,, | Performed by: ORTHOPAEDIC SURGERY

## 2022-06-01 PROCEDURE — 99999 PR PBB SHADOW E&M-EST. PATIENT-LVL III: ICD-10-PCS | Mod: PBBFAC,,, | Performed by: ORTHOPAEDIC SURGERY

## 2022-06-01 PROCEDURE — 1160F PR REVIEW ALL MEDS BY PRESCRIBER/CLIN PHARMACIST DOCUMENTED: ICD-10-PCS | Mod: CPTII,S$GLB,, | Performed by: ORTHOPAEDIC SURGERY

## 2022-06-01 PROCEDURE — 20611 PR DRAIN/ASP/INJECT MAJOR JOINT/BURSA W/US GUIDANCE: ICD-10-PCS | Mod: RT,S$GLB,, | Performed by: ORTHOPAEDIC SURGERY

## 2022-06-01 PROCEDURE — 1101F PR PT FALLS ASSESS DOC 0-1 FALLS W/OUT INJ PAST YR: ICD-10-PCS | Mod: CPTII,S$GLB,, | Performed by: ORTHOPAEDIC SURGERY

## 2022-06-01 PROCEDURE — 3074F PR MOST RECENT SYSTOLIC BLOOD PRESSURE < 130 MM HG: ICD-10-PCS | Mod: CPTII,S$GLB,, | Performed by: ORTHOPAEDIC SURGERY

## 2022-06-01 PROCEDURE — 99499 NO LOS: ICD-10-PCS | Mod: S$GLB,,, | Performed by: ORTHOPAEDIC SURGERY

## 2022-06-01 PROCEDURE — 1101F PT FALLS ASSESS-DOCD LE1/YR: CPT | Mod: CPTII,S$GLB,, | Performed by: ORTHOPAEDIC SURGERY

## 2022-06-01 PROCEDURE — 1126F AMNT PAIN NOTED NONE PRSNT: CPT | Mod: CPTII,S$GLB,, | Performed by: ORTHOPAEDIC SURGERY

## 2022-06-01 PROCEDURE — 20611 DRAIN/INJ JOINT/BURSA W/US: CPT | Mod: RT,S$GLB,, | Performed by: ORTHOPAEDIC SURGERY

## 2022-06-01 PROCEDURE — 99499 UNLISTED E&M SERVICE: CPT | Mod: S$GLB,,, | Performed by: ORTHOPAEDIC SURGERY

## 2022-06-01 PROCEDURE — 3078F PR MOST RECENT DIASTOLIC BLOOD PRESSURE < 80 MM HG: ICD-10-PCS | Mod: CPTII,S$GLB,, | Performed by: ORTHOPAEDIC SURGERY

## 2022-06-01 PROCEDURE — 1160F RVW MEDS BY RX/DR IN RCRD: CPT | Mod: CPTII,S$GLB,, | Performed by: ORTHOPAEDIC SURGERY

## 2022-06-01 PROCEDURE — 1126F PR PAIN SEVERITY QUANTIFIED, NO PAIN PRESENT: ICD-10-PCS | Mod: CPTII,S$GLB,, | Performed by: ORTHOPAEDIC SURGERY

## 2022-06-01 PROCEDURE — 3078F DIAST BP <80 MM HG: CPT | Mod: CPTII,S$GLB,, | Performed by: ORTHOPAEDIC SURGERY

## 2022-06-01 PROCEDURE — 3288F FALL RISK ASSESSMENT DOCD: CPT | Mod: CPTII,S$GLB,, | Performed by: ORTHOPAEDIC SURGERY

## 2022-06-01 PROCEDURE — 99999 PR PBB SHADOW E&M-EST. PATIENT-LVL III: CPT | Mod: PBBFAC,,, | Performed by: ORTHOPAEDIC SURGERY

## 2022-06-01 NOTE — PROGRESS NOTES
"Subjective:     CC: right knee pain/osteoarthritis    Arnaldo is a 77 y.o. male coming in today for their 3rd Orthovisc injection to the right knee. Patient reports his pain is 0/10 today and he is feeling 90% improved following his first two injections. He denies new falls or trauma since his last visit.     Objective:     VITAL SIGNS: /62   Ht 6' 1" (1.854 m)   Wt 106.1 kg (234 lb)   BMI 30.87 kg/m²      Orthovisc Injection Procedure #3     A time out was performed, including verification of patient ID, procedure, site and side, availability of information and equipment, review of safety issues, and agreement with consent, the procedure site was marked.    Location: Knee joint, right     Procedure: The patient was prepped aseptically with alcohol and chlorhexidine. Ethyl Chloride spray was used prior to skin puncture to help numb the superficial skin. After cold spray was applied, 2-4 cc's of 0.2% ropivacaine was injected into the skin and superficial tissue at the injection site using a 25G, 1.5" needle to form an anesthetic tunnel and ensure proper needle placement into the knee joint space. Cold spray was applied again, and an 18G, 1.5" needle was used to enter the joint space and 20 cc of clear synovial fluid was aspirated. Using a hemostat, the syringe was exchanged with the Orthovisc syringe, and 2cc of Orthovisc was injected into the knee joint. The patient was in the supine position during the duration of this procedure and the injection approach was from the superolateral aspect.     Ultrasound guidance was used for needle localization with SonEnuygun.comte Edge 2, 9-L MHz linear probe(s). Images were saved and stored for documentation. The knee joint was well visualized. Dynamic visualization of the 25G x 1.5 and 18G 1.5" needles was continuous throughout the procedure and maintained good position and correct needle placement.        Patient tolerance: The patient tolerated the procedure well with no " immediate complications. There were no adverse reactions to the medication. Patient was instructed to apply ice to the joint for up to 20 minutes at a time and avoid strenuous activities for 24-36 hours following the injection. Following that time, the patient can resume activities as prior to the injection.     The patient was reminded to call the clinic immediately for any adverse side effects as explained in clinic today.     Orthovisc:  NDC: [address]-12  Product Code: [id number]  Lot: [id number]  Exp: 2024-06-30      Assessment:      Encounter Diagnoses   Name Primary?    Lateral knee pain, right Yes    Localized osteoarthritis of right knee           Plan:     1. Orthovisc injection of right knee received today (see procedure note above)  2. Follow-up as needed.   3. Patient agreeable to today's plan and all questions were answered      This note is dictated using the M*Modal Fluency Direct word recognition program. There are word recognition mistakes that are occasionally missed on review.

## 2022-06-07 ENCOUNTER — CLINICAL SUPPORT (OUTPATIENT)
Dept: UROLOGY | Facility: CLINIC | Age: 77
End: 2022-06-07
Payer: MEDICARE

## 2022-06-07 DIAGNOSIS — E29.1 HYPOGONADISM IN MALE: Primary | ICD-10-CM

## 2022-06-07 PROCEDURE — 96372 PR INJECTION,THERAP/PROPH/DIAG2ST, IM OR SUBCUT: ICD-10-PCS | Mod: S$GLB,,, | Performed by: UROLOGY

## 2022-06-07 PROCEDURE — 96372 THER/PROPH/DIAG INJ SC/IM: CPT | Mod: S$GLB,,, | Performed by: UROLOGY

## 2022-06-07 RX ORDER — TESTOSTERONE CYPIONATE 200 MG/ML
50 INJECTION, SOLUTION INTRAMUSCULAR
Status: COMPLETED | OUTPATIENT
Start: 2022-06-07 | End: 2022-06-07

## 2022-06-07 RX ADMIN — TESTOSTERONE CYPIONATE 50 MG: 200 INJECTION, SOLUTION INTRAMUSCULAR at 08:06

## 2022-06-07 NOTE — PROGRESS NOTES
Hormone therapy injection administered, tolerated well, and secured with bandaid.   Denies any pain/discomfort at injection site.      Site:Right  Testosterone : 200mg

## 2022-06-14 ENCOUNTER — CLINICAL SUPPORT (OUTPATIENT)
Dept: REHABILITATION | Facility: OTHER | Age: 77
End: 2022-06-14
Payer: MEDICARE

## 2022-06-14 DIAGNOSIS — M17.11 LOCALIZED OSTEOARTHRITIS OF RIGHT KNEE: ICD-10-CM

## 2022-06-14 DIAGNOSIS — M25.661 DECREASED RANGE OF MOTION (ROM) OF RIGHT KNEE: ICD-10-CM

## 2022-06-14 DIAGNOSIS — M25.561 LATERAL KNEE PAIN, RIGHT: ICD-10-CM

## 2022-06-14 PROCEDURE — 97162 PT EVAL MOD COMPLEX 30 MIN: CPT | Mod: PN | Performed by: PHYSICAL THERAPIST

## 2022-06-14 PROCEDURE — 97110 THERAPEUTIC EXERCISES: CPT | Mod: PN | Performed by: PHYSICAL THERAPIST

## 2022-06-14 NOTE — PLAN OF CARE
[facility]  Physical Therapy Initial Evaluation    Name: Arnaldo Donovan  Clinic Number: [id number]     Therapy Diagnosis:   Encounter Diagnoses   Name Primary?    Lateral knee pain, right     Localized osteoarthritis of right knee     Decreased range of motion (ROM) of right knee      Physician: Joe Solis,     Physician Orders: PT Eval and Treat: Neuromuscular re-education, eccentric core, lower extremity, glute strengthening and stabilizing, gait retraining, establish home exercise program, taping/bracing as indicated, other modalities as seen fit   Medical Diagnosis from Referral: M25.561 (ICD-10-CM) - Lateral knee pain, right M17.11 (ICD-10-CM) - Localized osteoarthritis of right knee   Evaluation Date: 6/14/2022  Authorization Period Expiration: 6/1/2023 (auth pending)  Plan of Care Expiration: 9/9/2022  Progress Note Due: 7/15/2022  Visit # / Visits authorized: 1/ 1 (auth pending)   FOTO: 1/ 3: 6/14/2022     Precautions: Standard    Time In: 1335  Time Out: 1415  Total Appointment Time (timed & untimed codes): 40 minutes    Subjective   Date of onset: 2 years  History of current condition - Arnaldo reports: some improvement to knee pain since getting gel injections, but still having pain to lateral R knee. He is able to walk about 3 miles with some pain at the end, and then often has some discomfort at night       Past Medical History:   Diagnosis Date    Allergy     seasonal    Cataract     Gout, joint     Hyperlipidemia     Hypertension     Ocular rosacea     Vitreous floaters      Arnaldo Donovan  has a past surgical history that includes Vasectomy (1988).    Arnaldo has a current medication list which includes the following prescription(s): allopurinol, ivermectin, methylprednisolone, tamsulosin, testosterone cypionate, and valsartan, and the following Facility-Administered Medications: sodium hyaluronate (orthovisc), sodium hyaluronate (orthovisc), sodium  hyaluronate (orthovisc), and testosterone cypionate.    Review of patient's allergies indicates:   Allergen Reactions    Doxycycline      HEADACHE (PRESSURE)    Statins-hmg-coa reductase inhibitors         Imaging, MRI studies: Impression:     Horizontal degenerative-type tear, lateral meniscus with small 3 mm parameniscal cysts.     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  No subchondral edema.      Prior Therapy: 6 months for lateral knee pain at this facility   Social History: Pt lives with their spouse  Occupation: sedentary desk work, working from home   Prior Level of Function: marathon runner, 4-6 miles recreational just prior to R knee pain  Current Level of Function: walking regularly for exercise, averages 3 miles/day but with some pain towards end of walk. Pain with sleeping at night and/or the following day after walking. Doesn't do stairs often, but pain worse descending than ascending when he does.     Pain:  Current 1/10, worst 6/10, best 1/10   Location: right lateral knee   Description: Dull  Aggravating Factors: following a longer walk - hurts at night. Pain with some rotational motions  Easing Factors: Aleve     Pts goals: relieving pain, be able to walk without pain    Objective     Observation: Pt is alert and oriented, good historian.     Gait: no significant deficits noted on level surface in clinic.       Range of Motion:   Knee Left active Left Passive Right Active R passive   Flexion 132 137 115 123 (tight)   Extension 0 +5 -5 -3 (pain)           Lower Extremity Strength  Right LE  Left LE    Ankle dorsiflexion: 5/5 Ankle dorsiflexion: 5/5   Ankle plantarflexion: 2/5 Ankle plantarflexion: 3-/5   Knee extension: 5/5 Knee extension: 5/5   Knee flexion: 5/5 Knee flexion: 5/5   Hip flexion: 5/5 Hip flexion: 5/5   Hip external rotation 5/5 Hip external rotation 5/5   Hip internal rotation 5/5 Hip internal rotation 5/5   Hip abduction:   "4+/5 Hip abduction: 5/5   Hip adduction: 5/5 Hip adduction: 5/5   Hip extension: 4+/5 Hip extension 4+/5       Flexibility:    Hamstring: R = mod; L = mod   Quad: R = mod; L = mild   Piriformis: R: mild; L slight   Lisandro's test: R = mild ; L = slight       Special Tests:   Left Right   Valgus Stress Test - -   Varus Stress test - -   Thessaly's Test - +         Function:    - SLS R: fair  - SLS L: good  - Sit <--> Stand: I without UE use   - Bed Mobility: I        Joint Mobility: slight hypomobility R patella compared to L with m/l glides      Palpation: mild tenderness to R lateral joint line, ITB            CMS Impairment/Limitation/Restriction for FOTO Knee Survey    Therapist reviewed FOTO scores for Arnaldo Donovan on 6/14/2022.   FOTO documents entered into [employer] - see Media section.    Limitation Score: 29%    Goal: 25%         TREATMENT   Treatment Time In: 1405  Treatment Time Out: 1415  Total Treatment time separate from Evaluation: 10 minutes    Arnaldo received therapeutic exercises to develop strength, ROM and flexibility for 10 minutes including:  Reviewed and demonstrated for HEP  Supine knee extension hang x 5 min  Quad sets 5" x 20  gastroc stretch 3 x 30"   HS stretch 3 x 30"      Home Exercises and Patient Education Provided    Education provided:   - therapy rationale and plan of care    Written Home Exercises Provided: yes.  Exercises were reviewed and Arnaldo was able to demonstrate them prior to the end of the session.  Arnaldo demonstrated good  understanding of the education provided.     See EMR under Patient Instructions for exercises provided 6/14/2022.    Assessment   Arnaldo is a 77 y.o. male referred to outpatient Physical Therapy with a medical diagnosis of M25.561 (ICD-10-CM) - Lateral knee pain, right M17.11 (ICD-10-CM) - Localized osteoarthritis of right knee. Pt presents with s/s consistent with referring diagnosis and imaging findings of meniscal tear. Overall strength with MMT is " functional, but marked weakness to B gastroc with unilateral heel raise. Decreased ROM in flexion and extension to R knee compared to L, with pain at end range.     Pt prognosis is Good.   Pt will benefit from skilled outpatient Physical Therapy to address the deficits stated above and in the chart below, provide pt/family education, and to maximize pt's level of independence.     Plan of care discussed with patient: Yes  Pt's spiritual, cultural and educational needs considered and patient is agreeable to the plan of care and goals as stated below:     Anticipated Barriers for therapy: standard    Medical Necessity is demonstrated by the following  History  Co-morbidities and personal factors that may impact the plan of care Co-morbidities:   HTN    Personal Factors:   coping style  lifestyle     moderate   Examination  Body Structures and Functions, activity limitations and participation restrictions that may impact the plan of care Body Regions:   lower extremities    Body Systems:    gross symmetry  ROM  strength  balance  gait  transfers  transitions  motor control  motor learning    Participation Restrictions:   Walking, sleeping, running, knee ROM    Activity limitations:   Learning and applying knowledge  no deficits    General Tasks and Commands  no deficits    Communication  no deficits    Mobility  walking    Self care  no deficits    Domestic Life  doing house work (cleaning house, washing dishes, laundry)    Interactions/Relationships  no deficits    Life Areas  no deficits    Community and Social Life  community life  recreation and leisure         moderate   Clinical Presentation evolving clinical presentation with changing clinical characteristics moderate   Decision Making/ Complexity Score: moderate     Goals:  Short Term Goals (4 weeks)  1. Pt will demonstrate improvements in R knee ROM to 0-120-125 for ease with ambulation  2.  Pt will report that he is able to ambulate 3 miles without the  presence of antalgic gait pattern and with pain </= 4/10 following.  4. Pt will report <4/10 pain at worst within the R knee for ease with ADL's    Long Term Goals (12 weeks)  1. Pt will demonstrate improvements in R knee ROM to 0-130-135 for ease with ambulation  2. Pt will demonstrate 4+/5 strength within B gastroc for ease with community ambulation and climbing stairs  3. Pt will report being independent with his/her HEP for maintenance of improvements gained during therapy sessions  4. Pt will report <2/10 pain at worst within the R knee for ease with recreation.   5. Pt will demonstrate improved function with FOTO limitation score decreased to 25%        Plan   Plan of care Certification: 6/14/2022 to 9/9/2022.    Outpatient Physical Therapy 2 times weekly for 12 weeks to include the following interventions: Gait Training, Iontophoresis (with dexamethasone), Manual Therapy, Moist Heat/ Ice, Neuromuscular Re-ed, Patient Education, Self Care, Therapeutic Activities, Therapeutic Exercise and Dry Needling.     Chaparrita Delong, PT

## 2022-06-15 PROBLEM — M25.561 LATERAL KNEE PAIN, RIGHT: Status: ACTIVE | Noted: 2022-06-15

## 2022-06-15 PROBLEM — M25.661 DECREASED RANGE OF MOTION (ROM) OF RIGHT KNEE: Status: ACTIVE | Noted: 2022-06-15

## 2022-06-21 ENCOUNTER — CLINICAL SUPPORT (OUTPATIENT)
Dept: UROLOGY | Facility: CLINIC | Age: 77
End: 2022-06-21
Payer: MEDICARE

## 2022-06-21 DIAGNOSIS — E29.1 HYPOGONADISM IN MALE: Primary | ICD-10-CM

## 2022-06-21 PROCEDURE — 96372 PR INJECTION,THERAP/PROPH/DIAG2ST, IM OR SUBCUT: ICD-10-PCS | Mod: S$GLB,,, | Performed by: NURSE PRACTITIONER

## 2022-06-21 PROCEDURE — 96372 THER/PROPH/DIAG INJ SC/IM: CPT | Mod: S$GLB,,, | Performed by: NURSE PRACTITIONER

## 2022-06-21 RX ADMIN — TESTOSTERONE CYPIONATE 200 MG: 200 INJECTION, SOLUTION INTRAMUSCULAR at 08:06

## 2022-06-21 NOTE — PROGRESS NOTES
Reason for visit: Hormone therapy injection. Injection administered as ordered, tolerated well, secured with band aid, and denies any pain/discomfort to site     Site:LT  Testosterone: 200mg

## 2022-06-22 ENCOUNTER — CLINICAL SUPPORT (OUTPATIENT)
Dept: REHABILITATION | Facility: OTHER | Age: 77
End: 2022-06-22
Payer: MEDICARE

## 2022-06-22 DIAGNOSIS — M25.561 LATERAL KNEE PAIN, RIGHT: ICD-10-CM

## 2022-06-22 DIAGNOSIS — M25.661 DECREASED RANGE OF MOTION (ROM) OF RIGHT KNEE: Primary | ICD-10-CM

## 2022-06-22 PROCEDURE — 97140 MANUAL THERAPY 1/> REGIONS: CPT | Mod: PN | Performed by: PHYSICAL THERAPIST

## 2022-06-22 PROCEDURE — 97110 THERAPEUTIC EXERCISES: CPT | Mod: PN | Performed by: PHYSICAL THERAPIST

## 2022-06-22 NOTE — PROGRESS NOTES
"[facility]   Physical Therapy Treatment Note     [facility]  [facility] Number: [id number]    Therapy Diagnosis:   Encounter Diagnoses   Name Primary?    Decreased range of motion (ROM) of right knee Yes    Lateral knee pain, right      Physician: Joe Solis DO    Visit Date: 6/22/2022    Physician Orders: PT Eval and Treat: Neuromuscular re-education, eccentric core, lower extremity, glute strengthening and stabilizing, gait retraining, establish home exercise program, taping/bracing as indicated, other modalities as seen fit   Medical Diagnosis from Referral: M25.561 (ICD-10-CM) - Lateral knee pain, right M17.11 (ICD-10-CM) - Localized osteoarthritis of right knee   Evaluation Date: 6/14/2022  Authorization Period Expiration: 12/31/2022  Plan of Care Expiration: 9/9/2022  Progress Note Due: 7/15/2022  Visit # / Visits authorized: 2/ 21    FOTO: 1/ 3: 6/14/2022      Precautions: Standard    PTA Visit #: 0/5     Time In: 0915  Time Out: 1000  Total Billable Time: 45 minutes    SUBJECTIVE     Pt reports: no significant changes overall. He walked 3 miles day Monday and felt some soreness yesterday. Today pain is mild, but has tenderness with pressure to lateral knee.   He was compliant with home exercise program.  Response to previous treatment: no change  Functional change: no change    Pain: 1/10 at rest, 4/5 with palpation  Location: right lateral knee      OBJECTIVE     Objective Measures updated at progress report unless specified.     Treatment     Arnaldo received the treatments listed below:      therapeutic exercises to develop strength, endurance, ROM, flexibility, posture and core stabilization for 30 minutes including:  Quad sets with heel prop 5" x 20  Supine knee hang x 5 min with 3# above and below knee  Prone TKE 5" x 20  Seated gastroc/HS stretch with strap and quad set, 4 x 20"    TKE with purple power band 20x R  Hesitation march 2 x 40ft  Ankle flip 2 x " "40ft   SB 3 x 30"    manual therapy techniques: Joint mobilizations and taping were applied to the: R knee for 15 minutes, including:  Patellar glides, patellar tendon mobilization    Preparation and application of Ktape to R knee. Y strip patellar tracking, I strip to lateral knee for decompression.         Patient Education and Home Exercises     Home Exercises Provided and Patient Education Provided     Education provided:   - therex rationale. Patient received education regarding appropriate care and removal of Kinesiotape. Patient instructed in proper removal techniques if skin irritation occurs.    Written Home Exercises Provided: yes. Exercises were reviewed and Arnaldo was able to demonstrate them prior to the end of the session.  Arnaldo demonstrated good  understanding of the education provided. See EMR under Patient Instructions for exercises provided during therapy sessions    ASSESSMENT     Good tolerance to initial treatment session. Pt continues with limited R knee extension noted both actively and passively. Verbal and visual cuing for technique with therex with good training effect noted.     Arnaldo Is progressing well towards his goals.   Pt prognosis is Good.     Pt will continue to benefit from skilled outpatient physical therapy to address the deficits listed in the problem list box on initial evaluation, provide pt/family education and to maximize pt's level of independence in the home and community environment.     Pt's spiritual, cultural and educational needs considered and pt agreeable to plan of care and goals.     Anticipated barriers to physical therapy: standard       Goals: IE 6/14/2022  Short Term Goals (4 weeks)  1. Pt will demonstrate improvements in R knee ROM to 0-120-125 for ease with ambulation. Progressing, not met    2.  Pt will report that he is able to ambulate 3 miles without the presence of antalgic gait pattern and with pain </= 4/10 following. Progressing, not met    4. " Pt will report <4/10 pain at worst within the R knee for ease with ADL's. Progressing, not met       Long Term Goals (12 weeks)  1. Pt will demonstrate improvements in R knee ROM to 0-130-135 for ease with ambulation. Progressing, not met    2. Pt will demonstrate 4+/5 strength within B gastroc for ease with community ambulation and climbing stairs. Progressing, not met    3. Pt will report being independent with his/her HEP for maintenance of improvements gained during therapy sessions. Progressing, not met    4. Pt will report <2/10 pain at worst within the R knee for ease with recreation. Progressing, not met    5. Pt will demonstrate improved function with FOTO limitation score decreased to 25%. Progressing, not met        PLAN   Plan of care Certification: 6/14/2022 to 9/9/2022.     Continue per POC with focus on R knee ROM and B LE strengthening to improve tolerance to functional mobility in community.     Chaparrita Delong, PT

## 2022-06-24 ENCOUNTER — CLINICAL SUPPORT (OUTPATIENT)
Dept: REHABILITATION | Facility: OTHER | Age: 77
End: 2022-06-24
Payer: MEDICARE

## 2022-06-24 DIAGNOSIS — M25.661 DECREASED RANGE OF MOTION (ROM) OF RIGHT KNEE: Primary | ICD-10-CM

## 2022-06-24 DIAGNOSIS — M25.561 LATERAL KNEE PAIN, RIGHT: ICD-10-CM

## 2022-06-24 PROBLEM — M76.31 IT BAND SYNDROME, RIGHT: Status: RESOLVED | Noted: 2020-05-27 | Resolved: 2022-06-24

## 2022-06-24 PROBLEM — R29.898 WEAKNESS OF BOTH HIPS: Status: RESOLVED | Noted: 2020-05-27 | Resolved: 2022-06-24

## 2022-06-24 PROCEDURE — 97110 THERAPEUTIC EXERCISES: CPT | Mod: PN

## 2022-06-24 PROCEDURE — 97140 MANUAL THERAPY 1/> REGIONS: CPT | Mod: PN

## 2022-06-24 NOTE — PROGRESS NOTES
"[facility]   Physical Therapy Treatment Note     [facility]  [facility] Number[facility] [id number]    Therapy Diagnosis:   Encounter Diagnoses   Name Primary?    Decreased range of motion (ROM) of right knee Yes    Lateral knee pain, right      Physician: Joe Solis DO    Visit Date: 6/24/2022    Physician Orders: PT Eval and Treat: Neuromuscular re-education, eccentric core, lower extremity, glute strengthening and stabilizing, gait retraining, establish home exercise program, taping/bracing as indicated, other modalities as seen fit   Medical Diagnosis from Referral: M25.561 (ICD-10-CM) - Lateral knee pain, right M17.11 (ICD-10-CM) - Localized osteoarthritis of right knee   Evaluation Date: 6/14/2022  Authorization Period Expiration: 12/31/2022  Plan of Care Expiration: 9/9/2022  Progress Note Due: 7/15/2022  Visit # / Visits authorized: 2/ 21    FOTO: 1/ 3: 6/14/2022      Precautions: Standard    PTA Visit #: 0/5     Time In: 0915  Time Out: 1000  Total Billable Time: 45 minutes    SUBJECTIVE     Pt reports: no significant changes overall. He walked 3 miles day Monday and felt some soreness yesterday. Today pain is mild, but has tenderness with pressure to lateral knee.   He was compliant with home exercise program.  Response to previous treatment: no change  Functional change: no change    Pain: 1/10 at rest, 4/5 with palpation  Location: right lateral knee      OBJECTIVE     Objective Measures updated at progress report unless specified.     Treatment     Arnaldo received the treatments listed below:      therapeutic exercises to develop strength, endurance, ROM, flexibility, posture and core stabilization for 20 minutes including:    **Exercises in BOLD performed today**    Quad sets with heel prop 5" x 20  Supine knee hang x 5 min with 3# above and below knee  Prone TKE 5" x 20  Seated gastroc/HS stretch with strap and quad set, 4 x 20"    TKE with purple power band 20x " "R  Hesitation march 2 x 40ft  Ankle flip 2 x 40ft  GS SB 3 x 30"    manual therapy techniques: Joint mobilizations and taping were applied to the: R knee for 25 minutes, including:  Patellar glides, patellar tendon mobilization  8 min x instrument assisted STM using Hawks  to quads/ ITB/ Hamstrings  10 min x vacuum/cupping STM with manual therapy techniques was performed to R quads, ITB, patellar tendon, hamstrings (med/lat)  to decrease muscle tightness, increase circulation and promote healing process. The pt's skin was monitored for redness adjusting pressure as needed. The pt was instructed in possible side effects of bruising and/or soreness. Pt verbalized understanding.    Preparation and application of Ktape to R knee. Y strip patellar tracking, I strip to lateral knee for decompression.  -- NP        Patient Education and Home Exercises     Home Exercises Provided and Patient Education Provided     Education provided:   - therex rationale. Patient received education regarding appropriate care and removal of Kinesiotape. Patient instructed in proper removal techniques if skin irritation occurs.    Written Home Exercises Provided: yes. Exercises were reviewed and Arnaldo was able to demonstrate them prior to the end of the session.  Arnaldo demonstrated good  understanding of the education provided. See EMR under Patient Instructions for exercises provided during therapy sessions    ASSESSMENT     Progressed manual therapy to include soft tissue mobilizations and myofascial release with both instrument assisted ASTYM using Hawks  as well as cupping. Pt presents with increased myofascial restrictions to medial hamstring as well as  Distal quads and quad tendon at patellar base attachment. Good response to manual therapy today with improvement in soft tissue quality, but pt denies much improvement in pain modulation or ROM after session.      Arnaldo Is progressing well towards his goals.   Pt prognosis " is Good.     Pt will continue to benefit from skilled outpatient physical therapy to address the deficits listed in the problem list box on initial evaluation, provide pt/family education and to maximize pt's level of independence in the home and community environment.     Pt's spiritual, cultural and educational needs considered and pt agreeable to plan of care and goals.     Anticipated barriers to physical therapy: standard       Goals: IE 6/14/2022  Short Term Goals (4 weeks)  1. Pt will demonstrate improvements in R knee ROM to 0-120-125 for ease with ambulation. Progressing, not met    2.  Pt will report that he is able to ambulate 3 miles without the presence of antalgic gait pattern and with pain </= 4/10 following. Progressing, not met    4. Pt will report <4/10 pain at worst within the R knee for ease with ADL's. Progressing, not met       Long Term Goals (12 weeks)  1. Pt will demonstrate improvements in R knee ROM to 0-130-135 for ease with ambulation. Progressing, not met    2. Pt will demonstrate 4+/5 strength within B gastroc for ease with community ambulation and climbing stairs. Progressing, not met    3. Pt will report being independent with his/her HEP for maintenance of improvements gained during therapy sessions. Progressing, not met    4. Pt will report <2/10 pain at worst within the R knee for ease with recreation. Progressing, not met    5. Pt will demonstrate improved function with FOTO limitation score decreased to 25%. Progressing, not met        PLAN   Plan of care Certification: 6/14/2022 to 9/9/2022.     Continue per POC with focus on R knee ROM and B LE strengthening to improve tolerance to functional mobility in community.     Belen Varma, PT

## 2022-06-28 ENCOUNTER — CLINICAL SUPPORT (OUTPATIENT)
Dept: REHABILITATION | Facility: OTHER | Age: 77
End: 2022-06-28
Payer: MEDICARE

## 2022-06-28 DIAGNOSIS — M25.561 LATERAL KNEE PAIN, RIGHT: ICD-10-CM

## 2022-06-28 DIAGNOSIS — M25.661 DECREASED RANGE OF MOTION (ROM) OF RIGHT KNEE: Primary | ICD-10-CM

## 2022-06-28 PROCEDURE — 97110 THERAPEUTIC EXERCISES: CPT | Mod: PN

## 2022-06-28 PROCEDURE — 97140 MANUAL THERAPY 1/> REGIONS: CPT | Mod: PN

## 2022-06-28 NOTE — PROGRESS NOTES
"[facility]   Physical Therapy Treatment Note     [facility]  [facility] Number: [id number]    Therapy Diagnosis:   Encounter Diagnoses   Name Primary?    Decreased range of motion (ROM) of right knee Yes    Lateral knee pain, right      Physician: Joe Solis DO    Visit Date: 6/28/2022    Physician Orders: PT Eval and Treat: Neuromuscular re-education, eccentric core, lower extremity, glute strengthening and stabilizing, gait retraining, establish home exercise program, taping/bracing as indicated, other modalities as seen fit   Medical Diagnosis from Referral: M25.561 (ICD-10-CM) - Lateral knee pain, right M17.11 (ICD-10-CM) - Localized osteoarthritis of right knee   Evaluation Date: 6/14/2022  Authorization Period Expiration: 12/31/2022  Plan of Care Expiration: 9/9/2022  Progress Note Due: 7/15/2022  Visit # / Visits authorized: 2/ 21    FOTO: 1/ 3: 6/14/2022      Precautions: Standard    PTA Visit #: 0/5     Time In: 12:00  Time Out: 12:52  Total Billable Time: 52 minutes    SUBJECTIVE     Pt reports: some improvement in knee pain following previous visit. "I learned after last visit that I need to stretch my hamstrings."      He was compliant with home exercise program.  Response to previous treatment: no change  Functional change: no change    Pain: 1/10 at rest, 4/5 with palpation  Location: right lateral knee      OBJECTIVE     Objective Measures updated at progress report unless specified.     Treatment     Arnaldo received the treatments listed below:      therapeutic exercises to develop strength, endurance, ROM, flexibility, posture and core stabilization for 27 minutes including:    **Exercises in BOLD performed today**    Quad sets with heel prop 5" x 20  Supine knee hang x 5 min with 3# above and below knee  Prone TKE 5" x 20  Seated gastroc/HS stretch with strap and quad set, 4 x 20" - standing today    TKE with purple power band (R) 20x with feet //, x 20 " "with staggered stance (RLE in ext)  Hesitation march 2 x 40ft  Ankle flip 2 x 40ft  GS SB 3 x 30"    manual therapy techniques: Joint mobilizations and taping were applied to the: R knee for 25 minutes, including:  Patellar glides, patellar tendon mobilization  8 min x instrument assisted STM using Hawks  to quads/ ITB/ Hamstrings  10 min x vacuum/cupping STM with manual therapy techniques was performed to R quads, ITB, patellar tendon, hamstrings (med/lat)  to decrease muscle tightness, increase circulation and promote healing process. The pt's skin was monitored for redness adjusting pressure as needed. The pt was instructed in possible side effects of bruising and/or soreness. Pt verbalized understanding.    Preparation and application of Ktape to R knee. Y strip patellar tracking, I strip to lateral knee for decompression.  -- NP        Patient Education and Home Exercises     Home Exercises Provided and Patient Education Provided     Education provided:   - therex rationale. Patient received education regarding appropriate care and removal of Kinesiotape. Patient instructed in proper removal techniques if skin irritation occurs.    Written Home Exercises Provided: yes. Exercises were reviewed and Arnaldo was able to demonstrate them prior to the end of the session.  Arnaldo demonstrated good  understanding of the education provided. See EMR under Patient Instructions for exercises provided during therapy sessions    ASSESSMENT     Pt presents with improved quad flexibility and patellar mobility following previous visit. However, pt continues to present with significant myofascial restrictions in the R hamstring which may facilitate continued knee flexion position and poor TKE.      Arnaldo Is progressing well towards his goals.   Pt prognosis is Good.     Pt will continue to benefit from skilled outpatient physical therapy to address the deficits listed in the problem list box on initial evaluation, provide " pt/family education and to maximize pt's level of independence in the home and community environment.     Pt's spiritual, cultural and educational needs considered and pt agreeable to plan of care and goals.     Anticipated barriers to physical therapy: standard       Goals: IE 6/14/2022  Short Term Goals (4 weeks)  1. Pt will demonstrate improvements in R knee ROM to 0-120-125 for ease with ambulation. Progressing, not met    2.  Pt will report that he is able to ambulate 3 miles without the presence of antalgic gait pattern and with pain </= 4/10 following. Progressing, not met    4. Pt will report <4/10 pain at worst within the R knee for ease with ADL's. Progressing, not met       Long Term Goals (12 weeks)  1. Pt will demonstrate improvements in R knee ROM to 0-130-135 for ease with ambulation. Progressing, not met    2. Pt will demonstrate 4+/5 strength within B gastroc for ease with community ambulation and climbing stairs. Progressing, not met    3. Pt will report being independent with his/her HEP for maintenance of improvements gained during therapy sessions. Progressing, not met    4. Pt will report <2/10 pain at worst within the R knee for ease with recreation. Progressing, not met    5. Pt will demonstrate improved function with FOTO limitation score decreased to 25%. Progressing, not met        PLAN   Plan of care Certification: 6/14/2022 to 9/9/2022.     Continue per POC with focus on R knee ROM and B LE strengthening to improve tolerance to functional mobility in community.     Belen Varma, PT

## 2022-07-05 ENCOUNTER — CLINICAL SUPPORT (OUTPATIENT)
Dept: UROLOGY | Facility: CLINIC | Age: 77
End: 2022-07-05
Payer: MEDICARE

## 2022-07-05 DIAGNOSIS — E29.1 HYPOGONADISM MALE: Primary | ICD-10-CM

## 2022-07-05 PROCEDURE — 96372 THER/PROPH/DIAG INJ SC/IM: CPT | Mod: S$GLB,,, | Performed by: NURSE PRACTITIONER

## 2022-07-05 PROCEDURE — 96372 PR INJECTION,THERAP/PROPH/DIAG2ST, IM OR SUBCUT: ICD-10-PCS | Mod: S$GLB,,, | Performed by: NURSE PRACTITIONER

## 2022-07-05 RX ADMIN — TESTOSTERONE CYPIONATE 200 MG: 200 INJECTION, SOLUTION INTRAMUSCULAR at 08:07

## 2022-07-05 NOTE — PROGRESS NOTES
Reason for visit: Hormone therapy injection. Injection administered as ordered, tolerated well, secured with band aid, and denies any pain/discomfort to site     Site:Right DG  Testosterone Cypionate: 200mg

## 2022-07-11 ENCOUNTER — CLINICAL SUPPORT (OUTPATIENT)
Dept: REHABILITATION | Facility: OTHER | Age: 77
End: 2022-07-11
Payer: MEDICARE

## 2022-07-11 DIAGNOSIS — M25.561 LATERAL KNEE PAIN, RIGHT: ICD-10-CM

## 2022-07-11 DIAGNOSIS — M25.661 DECREASED RANGE OF MOTION (ROM) OF RIGHT KNEE: Primary | ICD-10-CM

## 2022-07-11 PROCEDURE — 97140 MANUAL THERAPY 1/> REGIONS: CPT | Mod: PN

## 2022-07-11 PROCEDURE — 97110 THERAPEUTIC EXERCISES: CPT | Mod: PN

## 2022-07-11 NOTE — PROGRESS NOTES
"[facility]   Physical Therapy Treatment Note     [facility]  Cl[facility] Number: [id number]    Therapy Diagnosis:   Encounter Diagnoses   Name Primary?    Decreased range of motion (ROM) of right knee Yes    Lateral knee pain, right      Physician: Joe Solis DO    Visit Date: 7/11/2022    Physician Orders: PT Eval and Treat: Neuromuscular re-education, eccentric core, lower extremity, glute strengthening and stabilizing, gait retraining, establish home exercise program, taping/bracing as indicated, other modalities as seen fit   Medical Diagnosis from Referral: M25.561 (ICD-10-CM) - Lateral knee pain, right M17.11 (ICD-10-CM) - Localized osteoarthritis of right knee   Evaluation Date: 6/14/2022  Authorization Period Expiration: 12/31/2022  Plan of Care Expiration: 9/9/2022  Progress Note Due: 7/15/2022  Visit # / Visits authorized: 5/ 21    FOTO: 1/ 3: 6/14/2022      Precautions: Standard    PTA Visit #: 0/5     Time In: 8:30  Time Out: 9:20  Total Billable Time: 50 minutes    SUBJECTIVE     Pt reports: feeling slightly better since previous visit. Knee continues to have pain above the knee cap [indicates distal quads and ITB].      He was compliant with home exercise program.  Response to previous treatment: no change  Functional change: no change    Pain: 1/10 at rest, 4/5 with palpation  Location: right lateral knee      OBJECTIVE     Objective Measures updated at progress report unless specified.     Treatment     Arnaldo received the treatments listed below:      therapeutic exercises to develop strength, endurance, ROM, flexibility, posture and core stabilization for 32 minutes including:    **Exercises in BOLD performed today**    Quad sets with heel prop 5" x 20  Supine knee hang x 5 min with 4# above and below knee  Prone TKE 10" x 10  Seated gastroc/HS stretch with strap and quad set, 4 x 20" - standing today    TKE with purple power band (R) 20x with feet //, x " "20 with staggered stance (RLE in ext)  +mini squat with purple band behind R knee: 2 x 10  Hesitation march 2 x 40ft  Ankle flip 2 x 40ft  GS SB 3 x 30"    manual therapy techniques: Joint mobilizations and taping were applied to the: R knee for 18 minutes, including:  Patellar glides, patellar tendon mobilization  8 min x instrument assisted STM using Hawks  to quads/ ITB/ Hamstrings  10 min x vacuum/cupping STM with manual therapy techniques was performed to R quads, ITB, patellar tendon, hamstrings (med/lat)  to decrease muscle tightness, increase circulation and promote healing process. The pt's skin was monitored for redness adjusting pressure as needed. The pt was instructed in possible side effects of bruising and/or soreness. Pt verbalized understanding.    Preparation and application of Ktape to R knee. Y strip patellar tracking, I strip to lateral knee for decompression.  -- NP        Patient Education and Home Exercises     Home Exercises Provided and Patient Education Provided     Education provided:   - therex rationale. Patient received education regarding appropriate care and removal of Kinesiotape. Patient instructed in proper removal techniques if skin irritation occurs.    Written Home Exercises Provided: yes. Exercises were reviewed and Arnaldo was able to demonstrate them prior to the end of the session.  Arnaldo demonstrated good  understanding of the education provided. See EMR under Patient Instructions for exercises provided during therapy sessions    ASSESSMENT     Continues to present with decreased myofascial mobility of middle hamstring muscle belly with decreased hamstring flexibility. Progressed strengthening with mini squats using resistance band behind the right knee to improve quad and glute activation with coming up from a squat.      Arnaldo Is progressing well towards his goals.   Pt prognosis is Good.     Pt will continue to benefit from skilled outpatient physical therapy to " address the deficits listed in the problem list box on initial evaluation, provide pt/family education and to maximize pt's level of independence in the home and community environment.     Pt's spiritual, cultural and educational needs considered and pt agreeable to plan of care and goals.     Anticipated barriers to physical therapy: standard       Goals: IE 6/14/2022  Short Term Goals (4 weeks)  1. Pt will demonstrate improvements in R knee ROM to 0-120-125 for ease with ambulation. Progressing, not met    2.  Pt will report that he is able to ambulate 3 miles without the presence of antalgic gait pattern and with pain </= 4/10 following. Progressing, not met    4. Pt will report <4/10 pain at worst within the R knee for ease with ADL's. Progressing, not met       Long Term Goals (12 weeks)  1. Pt will demonstrate improvements in R knee ROM to 0-130-135 for ease with ambulation. Progressing, not met    2. Pt will demonstrate 4+/5 strength within B gastroc for ease with community ambulation and climbing stairs. Progressing, not met    3. Pt will report being independent with his/her HEP for maintenance of improvements gained during therapy sessions. Progressing, not met    4. Pt will report <2/10 pain at worst within the R knee for ease with recreation. Progressing, not met    5. Pt will demonstrate improved function with FOTO limitation score decreased to 25%. Progressing, not met        PLAN   Plan of care Certification: 6/14/2022 to 9/9/2022.     Continue per POC with focus on R knee ROM and B LE strengthening to improve tolerance to functional mobility in community.     Belen Varma, PT

## 2022-07-18 ENCOUNTER — CLINICAL SUPPORT (OUTPATIENT)
Dept: REHABILITATION | Facility: OTHER | Age: 77
End: 2022-07-18
Payer: MEDICARE

## 2022-07-18 ENCOUNTER — TELEPHONE (OUTPATIENT)
Dept: UROLOGY | Facility: CLINIC | Age: 77
End: 2022-07-18
Payer: MEDICARE

## 2022-07-18 DIAGNOSIS — M25.661 DECREASED RANGE OF MOTION (ROM) OF RIGHT KNEE: Primary | ICD-10-CM

## 2022-07-18 DIAGNOSIS — M25.561 LATERAL KNEE PAIN, RIGHT: ICD-10-CM

## 2022-07-18 PROCEDURE — 97110 THERAPEUTIC EXERCISES: CPT | Mod: PN | Performed by: PHYSICAL THERAPIST

## 2022-07-18 PROCEDURE — 97140 MANUAL THERAPY 1/> REGIONS: CPT | Mod: PN | Performed by: PHYSICAL THERAPIST

## 2022-07-18 NOTE — PROGRESS NOTES
"[facility]   Physical Therapy Treatment Note     [facility]  [facility] [id number]    Therapy Diagnosis:   Encounter Diagnoses   Name Primary?    Decreased range of motion (ROM) of right knee Yes    Lateral knee pain, right      Physician: Joe Solis DO    Visit Date: 7/18/2022    Physician Orders: PT Eval and Treat: Neuromuscular re-education, eccentric core, lower extremity, glute strengthening and stabilizing, gait retraining, establish home exercise program, taping/bracing as indicated, other modalities as seen fit   Medical Diagnosis from Referral: M25.561 (ICD-10-CM) - Lateral knee pain, right M17.11 (ICD-10-CM) - Localized osteoarthritis of right knee   Evaluation Date: 6/14/2022  Authorization Period Expiration: 12/31/2022  Plan of Care Expiration: 9/9/2022  Progress Note Due: 7/15/2022  Visit # / Visits authorized: 6/ 21    FOTO: 1/ 3: 6/14/2022      Precautions: Standard    PTA Visit #: 0/5     Time In: 0840  Time Out: 0925  Total Billable Time: 45 minutes    SUBJECTIVE     Pt reports: feels like he's making some improvement with therapy.       He was compliant with home exercise program.  Response to previous treatment: no change  Functional change: no change    Pain: 1/10 at rest, 4/5 with palpation  Location: right lateral knee      OBJECTIVE     Objective Measures updated at progress report unless specified.     Treatment     Arnaldo received the treatments listed below:      therapeutic exercises to develop strength, endurance, ROM, flexibility, posture and core stabilization for 30 minutes including:    **Exercises in BOLD performed today**    Quad sets with heel prop 5" x 20  Supine knee hang x 5 min with 4# above and below knee  Prone TKE 10" x 10  Seated gastroc/HS stretch with strap and quad set, 4 x 20" - standing today    TKE with purple power band (R) 20x with feet //, x 20 with staggered stance (RLE in ext)  +mini squat with purple band " "behind R knee: 2 x 10  Hesitation march 2 x 40ft  Ankle flip 2 x 40ft  GS SB 3 x 30"    manual therapy techniques: Joint mobilizations and taping were applied to the: R knee for 15 minutes, including:  Patellar glides, patellar tendon mobilization  5 min x instrument assisted STM using Hawks  to quads/ ITB/ Hamstrings  10 min x vacuum/cupping STM with manual therapy techniques was performed to R quads, ITB, patellar tendon, hamstrings (med/lat)  to decrease muscle tightness, increase circulation and promote healing process. The pt's skin was monitored for redness adjusting pressure as needed. The pt was instructed in possible side effects of bruising and/or soreness. Pt verbalized understanding.    Preparation and application of Ktape to R knee. Y strip patellar tracking, I strip to lateral knee for decompression.  -- NP        Patient Education and Home Exercises     Home Exercises Provided and Patient Education Provided     Education provided:   - therex rationale. Patient received education regarding appropriate care and removal of Kinesiotape. Patient instructed in proper removal techniques if skin irritation occurs.    Written Home Exercises Provided: yes. Exercises were reviewed and Arnaldo was able to demonstrate them prior to the end of the session.  Arnaldo demonstrated good  understanding of the education provided. See EMR under Patient Instructions for exercises provided during therapy sessions    ASSESSMENT     Overall good tolerance to treatment today. Continues to lack full extension of R knee passively and actively. Heavy verbal cuing with squat for technique.       Arnaldo Is progressing well towards his goals.   Pt prognosis is Good.     Pt will continue to benefit from skilled outpatient physical therapy to address the deficits listed in the problem list box on initial evaluation, provide pt/family education and to maximize pt's level of independence in the home and community environment. "     Pt's spiritual, cultural and educational needs considered and pt agreeable to plan of care and goals.     Anticipated barriers to physical therapy: standard       Goals: IE 6/14/2022  Short Term Goals (4 weeks)  1. Pt will demonstrate improvements in R knee ROM to 0-120-125 for ease with ambulation. Progressing, not met    2.  Pt will report that he is able to ambulate 3 miles without the presence of antalgic gait pattern and with pain </= 4/10 following. Progressing, not met    4. Pt will report <4/10 pain at worst within the R knee for ease with ADL's. Progressing, not met       Long Term Goals (12 weeks)  1. Pt will demonstrate improvements in R knee ROM to 0-130-135 for ease with ambulation. Progressing, not met    2. Pt will demonstrate 4+/5 strength within B gastroc for ease with community ambulation and climbing stairs. Progressing, not met    3. Pt will report being independent with his/her HEP for maintenance of improvements gained during therapy sessions. Progressing, not met    4. Pt will report <2/10 pain at worst within the R knee for ease with recreation. Progressing, not met    5. Pt will demonstrate improved function with FOTO limitation score decreased to 25%. Progressing, not met        PLAN   Plan of care Certification: 6/14/2022 to 9/9/2022.     Continue per POC with focus on R knee ROM and B LE strengthening to improve tolerance to functional mobility in community.     Chaparrita Delong, PT

## 2022-07-19 ENCOUNTER — CLINICAL SUPPORT (OUTPATIENT)
Dept: UROLOGY | Facility: CLINIC | Age: 77
End: 2022-07-19
Payer: MEDICARE

## 2022-07-19 DIAGNOSIS — E29.1 HYPOGONADISM MALE: Primary | ICD-10-CM

## 2022-07-19 PROCEDURE — 96372 PR INJECTION,THERAP/PROPH/DIAG2ST, IM OR SUBCUT: ICD-10-PCS | Mod: S$GLB,,, | Performed by: NURSE PRACTITIONER

## 2022-07-19 PROCEDURE — 96372 THER/PROPH/DIAG INJ SC/IM: CPT | Mod: S$GLB,,, | Performed by: NURSE PRACTITIONER

## 2022-07-19 RX ADMIN — TESTOSTERONE CYPIONATE 200 MG: 200 INJECTION, SOLUTION INTRAMUSCULAR at 08:07

## 2022-07-19 NOTE — PROGRESS NOTES
Reason for visit: Hormone therapy injection. Injection administered as ordered, tolerated well, secured with band aid, and denies any pain/discomfort to site     Site:Right Dorsogluteal  Testosterone Cypionate: 200mg

## 2022-07-20 ENCOUNTER — CLINICAL SUPPORT (OUTPATIENT)
Dept: REHABILITATION | Facility: OTHER | Age: 77
End: 2022-07-20
Payer: MEDICARE

## 2022-07-20 DIAGNOSIS — M25.561 LATERAL KNEE PAIN, RIGHT: ICD-10-CM

## 2022-07-20 DIAGNOSIS — M25.661 DECREASED RANGE OF MOTION (ROM) OF RIGHT KNEE: Primary | ICD-10-CM

## 2022-07-20 PROCEDURE — 97140 MANUAL THERAPY 1/> REGIONS: CPT | Mod: PN

## 2022-07-20 PROCEDURE — 97110 THERAPEUTIC EXERCISES: CPT | Mod: PN

## 2022-07-20 NOTE — PROGRESS NOTES
"[facility]   Physical Therapy Treatment Note     [facility]  [id number]    Therapy Diagnosis:   Encounter Diagnoses   Name Primary?    Decreased range of motion (ROM) of right knee Yes    Lateral knee pain, right      Physician: Joe Solis DO    Visit Date: 7/20/2022    Physician Orders: PT Eval and Treat: Neuromuscular re-education, eccentric core, lower extremity, glute strengthening and stabilizing, gait retraining, establish home exercise program, taping/bracing as indicated, other modalities as seen fit   Medical Diagnosis from Referral: M25.561 (ICD-10-CM) - Lateral knee pain, right M17.11 (ICD-10-CM) - Localized osteoarthritis of right knee   Evaluation Date: 6/14/2022  Authorization Period Expiration: 12/31/2022  Plan of Care Expiration: 9/9/2022  Progress Note Due: 7/15/2022  Visit # / Visits authorized: 7/ 21    FOTO: 1/ 3: 6/14/2022      Precautions: Standard    PTA Visit #: 0/5     Time In: 0830  Time Out: 0915  Total Billable Time: 45 minutes    SUBJECTIVE     Pt reports: feels tight on the outside of the knee.      He was compliant with home exercise program.  Response to previous treatment: no change  Functional change: no change    Pain: 1/10 at rest, 4/5 with palpation  Location: right lateral knee      OBJECTIVE     Objective Measures updated at progress report unless specified.     Treatment     Arnaldo received the treatments listed below:      therapeutic exercises to develop strength, endurance, ROM, flexibility, posture and core stabilization for 20 minutes including:    **Exercises in BOLD performed today**    Quad sets with heel prop 5" x 20  Supine knee hang x 5 min with 4# above and below knee  Prone TKE 10" x 10  Seated gastroc/HS stretch with strap and quad set, 4 x 20" - standing today    TKE with purple power band (R) 20x with feet //, x 20 with staggered stance (RLE in ext)  mini squat with purple band behind R knee: 2 x " "10  Hesitation march 2 x 40ft  Ankle flip 2 x 40ft  GS SB 3 x 30"    manual therapy techniques: Joint mobilizations and taping were applied to the: R knee for 25 minutes, including:  Patellar glides, patellar tendon mobilization  5 min x instrument assisted STM using Hawks  to quads/ ITB/ Hamstrings  20 min x vacuum/cupping STM with manual therapy techniques was performed to R and L quads, ITB, patellar tendon, hamstrings (med/lat)  to decrease muscle tightness, increase circulation and promote healing process. The pt's skin was monitored for redness adjusting pressure as needed. The pt was instructed in possible side effects of bruising and/or soreness. Pt verbalized understanding.    Preparation and application of Ktape to R knee. Y strip patellar tracking, I strip to lateral knee for decompression.  -- NP        Patient Education and Home Exercises     Home Exercises Provided and Patient Education Provided     Education provided:   - therex rationale. Patient received education regarding appropriate care and removal of Kinesiotape. Patient instructed in proper removal techniques if skin irritation occurs.    Written Home Exercises Provided: yes. Exercises were reviewed and Arnaldo was able to demonstrate them prior to the end of the session.  Arnaldo demonstrated good  understanding of the education provided. See EMR under Patient Instructions for exercises provided during therapy sessions    ASSESSMENT     Palpable swelling noted in posterior-lateral compartment on knee with marked myofascial restrictions of distal ITB and lateral knee fascia. Good tolerance with therex program despite lack of full knee extension, which continues to facilitate lateral knee restrictions.      Arnaldo Is progressing well towards his goals.   Pt prognosis is Good.     Pt will continue to benefit from skilled outpatient physical therapy to address the deficits listed in the problem list box on initial evaluation, provide pt/family " education and to maximize pt's level of independence in the home and community environment.     Pt's spiritual, cultural and educational needs considered and pt agreeable to plan of care and goals.     Anticipated barriers to physical therapy: standard       Goals: IE 6/14/2022  Short Term Goals (4 weeks)  1. Pt will demonstrate improvements in R knee ROM to 0-120-125 for ease with ambulation. Progressing, not met    2.  Pt will report that he is able to ambulate 3 miles without the presence of antalgic gait pattern and with pain </= 4/10 following. Progressing, not met    4. Pt will report <4/10 pain at worst within the R knee for ease with ADL's. Progressing, not met       Long Term Goals (12 weeks)  1. Pt will demonstrate improvements in R knee ROM to 0-130-135 for ease with ambulation. Progressing, not met    2. Pt will demonstrate 4+/5 strength within B gastroc for ease with community ambulation and climbing stairs. Progressing, not met    3. Pt will report being independent with his/her HEP for maintenance of improvements gained during therapy sessions. Progressing, not met    4. Pt will report <2/10 pain at worst within the R knee for ease with recreation. Progressing, not met    5. Pt will demonstrate improved function with FOTO limitation score decreased to 25%. Progressing, not met        PLAN   Plan of care Certification: 6/14/2022 to 9/9/2022.     Continue per POC with focus on R knee ROM and B LE strengthening to improve tolerance to functional mobility in community.     Belen Varma, PT

## 2022-07-27 ENCOUNTER — CLINICAL SUPPORT (OUTPATIENT)
Dept: REHABILITATION | Facility: OTHER | Age: 77
End: 2022-07-27
Payer: MEDICARE

## 2022-07-27 DIAGNOSIS — M25.661 DECREASED RANGE OF MOTION (ROM) OF RIGHT KNEE: Primary | ICD-10-CM

## 2022-07-27 DIAGNOSIS — M25.561 LATERAL KNEE PAIN, RIGHT: ICD-10-CM

## 2022-07-27 PROCEDURE — 97110 THERAPEUTIC EXERCISES: CPT | Mod: PN

## 2022-07-27 PROCEDURE — 97140 MANUAL THERAPY 1/> REGIONS: CPT | Mod: PN

## 2022-07-27 NOTE — PROGRESS NOTES
"[facility]   Physical Therapy Treatment Note     [facility]  Clinic Number: [id number]    Therapy Diagnosis:   Encounter Diagnoses   Name Primary?    Decreased range of motion (ROM) of right knee Yes    Lateral knee pain, right      Physician: Joe Solis DO    Visit Date: 7/27/2022    Physician Orders: PT Eval and Treat: Neuromuscular re-education, eccentric core, lower extremity, glute strengthening and stabilizing, gait retraining, establish home exercise program, taping/bracing as indicated, other modalities as seen fit   Medical Diagnosis from Referral: M25.561 (ICD-10-CM) - Lateral knee pain, right M17.11 (ICD-10-CM) - Localized osteoarthritis of right knee   Evaluation Date: 6/14/2022  Authorization Period Expiration: 12/31/2022  Plan of Care Expiration: 9/9/2022  Progress Note Due: 7/15/2022  Visit # / Visits authorized: 8/ 21    FOTO: 1/ 3: 6/14/2022      Precautions: Standard    PTA Visit #: 0/5     Time In: 12:15 pm  Time Out: 1:00 pm  Total Billable Time: 45 minutes    SUBJECTIVE     Pt reports: lateral knee pain is reduced, able to straighten his knee more, but says that he is having more pain around the patella now. Feels that the original injection from Dr. Solis made his knee worse, "it felt more stiff afterwards."    He was compliant with home exercise program.  Response to previous treatment: no change  Functional change: bending the knee is better, occasional crepitus    Pain: 1/10 at rest, 4/5 with palpation  Location: right lateral knee      OBJECTIVE     Objective Measures updated at progress report unless specified.     Treatment     Arnaldo received the treatments listed below:      therapeutic exercises to develop strength, endurance, ROM, flexibility, posture and core stabilization for 20 minutes including:    **Exercises in BOLD performed today**    Quad sets with heel prop 5" x 20  Supine knee hang x 5 min with 4# above and below " "knee  Prone TKE 10" x 10  Seated gastroc/HS stretch with strap and quad set, 4 x 20" - standing today    TKE with purple power band (R) 20x with feet //, x 20 with staggered stance (RLE in ext)  mini squat with purple band behind R knee: 2 x 10  Hesitation march 2 x 40ft  Ankle flip 2 x 40ft  GS SB 3 x 30"    manual therapy techniques: Joint mobilizations and taping were applied to the: R knee for 25 minutes, including:  Patellar glides, patellar tendon mobilization  5 min x instrument assisted STM using Hawks  to quads/ ITB/ Hamstrings  20 min x vacuum/cupping STM with manual therapy techniques was performed to R and L quads, ITB, patellar tendon, hamstrings (med/lat)  to decrease muscle tightness, increase circulation and promote healing process. The pt's skin was monitored for redness adjusting pressure as needed. The pt was instructed in possible side effects of bruising and/or soreness. Pt verbalized understanding.    Preparation and application of Ktape to R knee. Y strip patellar tracking, I strip to lateral knee for decompression.  -- NP        Patient Education and Home Exercises     Home Exercises Provided and Patient Education Provided     Education provided:   - therex rationale. Patient received education regarding appropriate care and removal of Kinesiotape. Patient instructed in proper removal techniques if skin irritation occurs.    Written Home Exercises Provided: yes. Exercises were reviewed and Arnaldo was able to demonstrate them prior to the end of the session.  Arnaldo demonstrated good  understanding of the education provided. See EMR under Patient Instructions for exercises provided during therapy sessions    ASSESSMENT     Educated on possible benefits of dry needling; pt self reported min improvement after last year's episode. As symptoms seem more arthritic in nature with patello-femoral pain related to inferior patellofemoral fat pad impingement due to limited quad activation/strength " and poor TKE. Min improvement following manual therapy today; consider dry needling next visit with use of knee OA protocol.      Arnaldo Is progressing well towards his goals.   Pt prognosis is Good.     Pt will continue to benefit from skilled outpatient physical therapy to address the deficits listed in the problem list box on initial evaluation, provide pt/family education and to maximize pt's level of independence in the home and community environment.     Pt's spiritual, cultural and educational needs considered and pt agreeable to plan of care and goals.     Anticipated barriers to physical therapy: standard       Goals: IE 6/14/2022  Short Term Goals (4 weeks)  1. Pt will demonstrate improvements in R knee ROM to 0-120-125 for ease with ambulation. Progressing, not met    2.  Pt will report that he is able to ambulate 3 miles without the presence of antalgic gait pattern and with pain </= 4/10 following. Progressing, not met    4. Pt will report <4/10 pain at worst within the R knee for ease with ADL's. Progressing, not met       Long Term Goals (12 weeks)  1. Pt will demonstrate improvements in R knee ROM to 0-130-135 for ease with ambulation. Progressing, not met    2. Pt will demonstrate 4+/5 strength within B gastroc for ease with community ambulation and climbing stairs. Progressing, not met    3. Pt will report being independent with his/her HEP for maintenance of improvements gained during therapy sessions. Progressing, not met    4. Pt will report <2/10 pain at worst within the R knee for ease with recreation. Progressing, not met    5. Pt will demonstrate improved function with FOTO limitation score decreased to 25%. Progressing, not met        PLAN   Plan of care Certification: 6/14/2022 to 9/9/2022.     Continue per POC with focus on R knee ROM and B LE strengthening to improve tolerance to functional mobility in community.     Belen Varma, PT

## 2022-07-28 ENCOUNTER — CLINICAL SUPPORT (OUTPATIENT)
Dept: REHABILITATION | Facility: OTHER | Age: 77
End: 2022-07-28
Payer: MEDICARE

## 2022-07-28 DIAGNOSIS — M25.561 LATERAL KNEE PAIN, RIGHT: ICD-10-CM

## 2022-07-28 DIAGNOSIS — M25.661 DECREASED RANGE OF MOTION (ROM) OF RIGHT KNEE: Primary | ICD-10-CM

## 2022-07-28 PROCEDURE — 97140 MANUAL THERAPY 1/> REGIONS: CPT | Mod: PN | Performed by: PHYSICAL THERAPIST

## 2022-07-28 PROCEDURE — 97110 THERAPEUTIC EXERCISES: CPT | Mod: PN | Performed by: PHYSICAL THERAPIST

## 2022-07-28 NOTE — PROGRESS NOTES
"[facility]   Physical Therapy Treatment Note     [facility]  [facility] Number: [id number]    Therapy Diagnosis:   Encounter Diagnoses   Name Primary?    Decreased range of motion (ROM) of right knee Yes    Lateral knee pain, right      Physician: Joe Solis DO    Visit Date: 7/28/2022    Physician Orders: PT Eval and Treat: Neuromuscular re-education, eccentric core, lower extremity, glute strengthening and stabilizing, gait retraining, establish home exercise program, taping/bracing as indicated, other modalities as seen fit   Medical Diagnosis from Referral: M25.561 (ICD-10-CM) - Lateral knee pain, right M17.11 (ICD-10-CM) - Localized osteoarthritis of right knee   Evaluation Date: 6/14/2022  Authorization Period Expiration: 12/31/2022  Plan of Care Expiration: 9/9/2022  Progress Note Due: 7/15/2022  Visit # / Visits authorized: 9/ 21    FOTO: 1/ 3: 6/14/2022      Precautions: Standard    PTA Visit #: 0/5     Time In: 1208  Time Out: 1246  Total Billable Time: 38 minutes    SUBJECTIVE     Pt reports: no significant changes today, feeling a little sore since he was just here yesterday. Pt is open to trying dry needling today.     He was compliant with home exercise program.  Response to previous treatment: no change  Functional change: bending the knee is better, occasional crepitus    Pain: 1/10 at rest, 4/5 with palpation  Location: right lateral knee      OBJECTIVE     Objective Measures updated at progress report unless specified.     Treatment     Arnaldo received the treatments listed below:      therapeutic exercises to develop strength, endurance, ROM, flexibility, posture and core stabilization for 13 minutes including:    **Exercises in BOLD performed today**    Pt education and consent prior to dry needling  Quad sets with heel prop 5" x 20  Supine knee hang x 5 min with 4# above and below knee  Prone TKE 10" x 10  Seated gastroc/HS stretch with strap and " "quad set, 4 x 20" - standing today    TKE with purple power band (R) 20x with feet //, x 20 with staggered stance (RLE in ext)  mini squat with purple band behind R knee: 2 x 10  Hesitation march 2 x 40ft  Ankle flip 2 x 40ft  GS SB 3 x 30"    manual therapy techniques: Joint mobilizations and taping were applied to the: R knee for 25 minutes, including:  Patellar glides, patellar tendon mobilization  0 min x instrument assisted STM using Hawks  to quads/ ITB/ Hamstrings  0 min x vacuum/cupping STM with manual therapy techniques was performed to R and L quads, ITB, patellar tendon, hamstrings (med/lat)  to decrease muscle tightness, increase circulation and promote healing process. The pt's skin was monitored for redness adjusting pressure as needed. The pt was instructed in possible side effects of bruising and/or soreness. Pt verbalized understanding.    Preparation and application of Ktape to R knee. Y strip patellar tracking, I strip to lateral knee for decompression.  -- NP    25 min x Application of TDN: Pt educated on benefits and potential side effects of dry needling. Educated pt on benefits, precautions, side effects following TDN. Educated pt to use heat following treatment sessions if pt is experiencing pain or soreness. Pt verbalized good understanding of education.  Pt signed written consent to dry needling. Pt gave verbal consent for DN    Pt received dry needling to the below listed muscles using 40 mm needles.  9 pt knee OA protocol.   E-stim applied through 3 channels at 2 Hz and 2-3 mA to tolerance.         Patient Education and Home Exercises     Home Exercises Provided and Patient Education Provided     Education provided:   - therex rationale. Patient received education regarding appropriate care and removal of Kinesiotape. Patient instructed in proper removal techniques if skin irritation occurs.    Written Home Exercises Provided: yes. Exercises were reviewed and Arnaldo was able to " demonstrate them prior to the end of the session.  Arnaldo demonstrated good  understanding of the education provided. See EMR under Patient Instructions for exercises provided during therapy sessions    ASSESSMENT     Initiated trial of dry needling today with pt's written and verbal consent. Good soft tissue response to dry needling evident by increased grasp with unilateral winding at all insertion points. E-stim applied through 3 channels at 2 Hz and 2-3 mA to tolerance. No adverse effects following treatment.      Arnaldo Is progressing well towards his goals.   Pt prognosis is Good.     Pt will continue to benefit from skilled outpatient physical therapy to address the deficits listed in the problem list box on initial evaluation, provide pt/family education and to maximize pt's level of independence in the home and community environment.     Pt's spiritual, cultural and educational needs considered and pt agreeable to plan of care and goals.     Anticipated barriers to physical therapy: standard       Goals: IE 6/14/2022  Short Term Goals (4 weeks)  1. Pt will demonstrate improvements in R knee ROM to 0-120-125 for ease with ambulation. Progressing, not met    2.  Pt will report that he is able to ambulate 3 miles without the presence of antalgic gait pattern and with pain </= 4/10 following. Progressing, not met    4. Pt will report <4/10 pain at worst within the R knee for ease with ADL's. Progressing, not met       Long Term Goals (12 weeks)  1. Pt will demonstrate improvements in R knee ROM to 0-130-135 for ease with ambulation. Progressing, not met    2. Pt will demonstrate 4+/5 strength within B gastroc for ease with community ambulation and climbing stairs. Progressing, not met    3. Pt will report being independent with his/her HEP for maintenance of improvements gained during therapy sessions. Progressing, not met    4. Pt will report <2/10 pain at worst within the R knee for ease with recreation.  Progressing, not met    5. Pt will demonstrate improved function with FOTO limitation score decreased to 25%. Progressing, not met        PLAN   Plan of care Certification: 6/14/2022 to 9/9/2022.     Continue per POC with focus on R knee ROM and B LE strengthening to improve tolerance to functional mobility in community.     Chaparrita Delong, PT

## 2022-08-02 ENCOUNTER — CLINICAL SUPPORT (OUTPATIENT)
Dept: UROLOGY | Facility: CLINIC | Age: 77
End: 2022-08-02
Payer: MEDICARE

## 2022-08-02 DIAGNOSIS — E29.1 HYPOGONADISM MALE: Primary | ICD-10-CM

## 2022-08-02 PROCEDURE — 96372 THER/PROPH/DIAG INJ SC/IM: CPT | Mod: S$GLB,,, | Performed by: NURSE PRACTITIONER

## 2022-08-02 PROCEDURE — 96372 PR INJECTION,THERAP/PROPH/DIAG2ST, IM OR SUBCUT: ICD-10-PCS | Mod: S$GLB,,, | Performed by: NURSE PRACTITIONER

## 2022-08-02 RX ADMIN — TESTOSTERONE CYPIONATE 200 MG: 200 INJECTION, SOLUTION INTRAMUSCULAR at 08:08

## 2022-08-02 NOTE — PROGRESS NOTES
Reason for visit: Hormone therapy injection. Injection administered as ordered, tolerated well, secured with band aid, and denies any pain/discomfort to site     Site:Left Dorsogluteal  Testosterone Cypionate: 200mg

## 2022-08-03 ENCOUNTER — CLINICAL SUPPORT (OUTPATIENT)
Dept: REHABILITATION | Facility: OTHER | Age: 77
End: 2022-08-03
Payer: MEDICARE

## 2022-08-03 DIAGNOSIS — M25.561 LATERAL KNEE PAIN, RIGHT: ICD-10-CM

## 2022-08-03 DIAGNOSIS — M25.661 DECREASED RANGE OF MOTION (ROM) OF RIGHT KNEE: Primary | ICD-10-CM

## 2022-08-03 PROCEDURE — 97140 MANUAL THERAPY 1/> REGIONS: CPT | Mod: PN | Performed by: PHYSICAL THERAPIST

## 2022-08-03 NOTE — PROGRESS NOTES
"[facility]   Physical Therapy Treatment Note     Name: Arnaldo Donovan  Clinic Number: [id number]    Therapy Diagnosis:   Encounter Diagnoses   Name Primary?    Decreased range of motion (ROM) of right knee Yes    Lateral knee pain, right      Physician: Joe Solis DO    Visit Date: 8/3/2022    Physician Orders: PT Eval and Treat: Neuromuscular re-education, eccentric core, lower extremity, glute strengthening and stabilizing, gait retraining, establish home exercise program, taping/bracing as indicated, other modalities as seen fit   Medical Diagnosis from Referral: M25.561 (ICD-10-CM) - Lateral knee pain, right M17.11 (ICD-10-CM) - Localized osteoarthritis of right knee   Evaluation Date: 6/14/2022  Authorization Period Expiration: 12/31/2022  Plan of Care Expiration: 9/9/2022  Progress Note Due: 7/15/2022  Visit # / Visits authorized: 10/ 21    FOTO: 1/ 3: 6/14/2022, 8/3/2022     Precautions: Standard    PTA Visit #: 0/5     Time In: 1220  Time Out: 1305  Total Billable Time: 45 minutes    SUBJECTIVE     Pt reports: "I think the dry needling helped." He says he's been getting some intermittent muscle strain feeling to his calf right behind his knee.     He was compliant with home exercise program.  Response to previous treatment: no change  Functional change: bending the knee is better, occasional crepitus    Pain: 1/10 at rest, 4/5 with palpation  Location: right lateral knee      OBJECTIVE     Objective Measures updated at progress report unless specified.     8/3/2022    ROM: pt continues to lack full extension R compared to L in stance/gait    CMS Impairment/Limitation/Restriction for FOTO Knee Survey    Therapist reviewed FOTO scores for Arnaldo Donovan on 8/3/2022.   FOTO documents entered into [employer] - see Media section.    Evaluation: 29%    Current : 29%    Goal: 25%           Treatment     Arnaldo received the treatments listed below:      therapeutic exercises to " "develop strength, endurance, ROM, flexibility, posture and core stabilization for 5 minutes including:    **Exercises in BOLD performed today**    Assessment   Quad sets with heel prop 5" x 20  Supine knee hang x 5 min with 4# above and below knee  Prone TKE 10" x 10  Seated gastroc/HS stretch with strap and quad set, 4 x 20" - standing today    TKE with purple power band (R) 20x with feet //, x 20 with staggered stance (RLE in ext)  mini squat with purple band behind R knee: 2 x 10  Hesitation march 2 x 40ft  Ankle flip 2 x 40ft  GS SB 3 x 30"    manual therapy techniques: Joint mobilizations and taping were applied to the: R knee for 40 minutes, including:  Patellar glides, patellar tendon mobilization  0 min x instrument assisted STM using Hawks  to quads/ ITB/ Hamstrings  0 min x vacuum/cupping STM with manual therapy techniques was performed to R and L quads, ITB, patellar tendon, hamstrings (med/lat)  to decrease muscle tightness, increase circulation and promote healing process. The pt's skin was monitored for redness adjusting pressure as needed. The pt was instructed in possible side effects of bruising and/or soreness. Pt verbalized understanding.    Preparation and application of Ktape to R knee. Y strip patellar tracking, I strip to lateral knee for decompression.  -- NP    5 min x manual gastroc stretch and knee extension OP in prone    35 min x Application of TDN: Pt educated on benefits and potential side effects of dry needling. Educated pt on benefits, precautions, side effects following TDN. Educated pt to use heat following treatment sessions if pt is experiencing pain or soreness. Pt verbalized good understanding of education.  Pt signed written consent to dry needling. Pt gave verbal consent for DN    Pt received dry needling to the below listed muscles using 40 mm needles.  R knee: 9 pt knee OA protocol.   E-stim applied through 3 channels at 2 Hz and 2-3 mA to tolerance.     In prone:   R " medial and lateral gastroc head  Winding performed 2 times during treatment      Patient Education and Home Exercises     Home Exercises Provided and Patient Education Provided     Education provided:   - therex rationale. Patient received education regarding appropriate care and removal of Kinesiotape. Patient instructed in proper removal techniques if skin irritation occurs.    Written Home Exercises Provided: yes. Exercises were reviewed and Arnaldo was able to demonstrate them prior to the end of the session.  Arnaldo demonstrated good  understanding of the education provided. See EMR under Patient Instructions for exercises provided during therapy sessions    ASSESSMENT     Overall pt is demonstrating some progress with therapy. He is reporting some improvement with recent initiation of dry needling. He continues to lack terminal extension actively and passively which creates gait deviation. Dry needling continued today with good soft tissue response noted at all insertion points. Added points to address c/o tightness to gastroc. Increased soft tissue restrictions noted with unilateral winding at both points. Pt will benefit from continued intervention to progress ROM and functional mobility tolerance.       Arnaldo Is progressing well towards his goals.   Pt prognosis is Good.     Pt will continue to benefit from skilled outpatient physical therapy to address the deficits listed in the problem list box on initial evaluation, provide pt/family education and to maximize pt's level of independence in the home and community environment.     Pt's spiritual, cultural and educational needs considered and pt agreeable to plan of care and goals.     Anticipated barriers to physical therapy: standard       Goals: IE 6/14/2022  Short Term Goals (4 weeks)  1. Pt will demonstrate improvements in R knee ROM to 0-120-125 for ease with ambulation. Progressing, not met    2.  Pt will report that he is able to ambulate 3 miles  without the presence of antalgic gait pattern and with pain </= 4/10 following. Progressing, not met    4. Pt will report <4/10 pain at worst within the R knee for ease with ADL's. Met 8/3/2022       Long Term Goals (12 weeks)  1. Pt will demonstrate improvements in R knee ROM to 0-130-135 for ease with ambulation. Progressing, not met    2. Pt will demonstrate 4+/5 strength within B gastroc for ease with community ambulation and climbing stairs. Progressing, not met    3. Pt will report being independent with his/her HEP for maintenance of improvements gained during therapy sessions. Progressing, not met    4. Pt will report <2/10 pain at worst within the R knee for ease with recreation. Progressing, not met    5. Pt will demonstrate improved function with FOTO limitation score decreased to 25%. Progressing, not met        PLAN   Plan of care Certification: 6/14/2022 to 9/9/2022.     Continue per POC with focus on R knee ROM and B LE strengthening to improve tolerance to functional mobility in community.     Chaparrita Delong, PT

## 2022-08-15 ENCOUNTER — CLINICAL SUPPORT (OUTPATIENT)
Dept: REHABILITATION | Facility: OTHER | Age: 77
End: 2022-08-15
Payer: MEDICARE

## 2022-08-15 DIAGNOSIS — M25.561 LATERAL KNEE PAIN, RIGHT: ICD-10-CM

## 2022-08-15 DIAGNOSIS — M25.661 DECREASED RANGE OF MOTION (ROM) OF RIGHT KNEE: Primary | ICD-10-CM

## 2022-08-15 PROCEDURE — 97140 MANUAL THERAPY 1/> REGIONS: CPT | Mod: PN

## 2022-08-15 NOTE — PROGRESS NOTES
[facility]   Physical Therapy Treatment Note     Name: Arnaldo Donovan  Clinic Number: [id number]    Therapy Diagnosis:   Encounter Diagnoses   Name Primary?    Decreased range of motion (ROM) of right knee Yes    Lateral knee pain, right      Physician: Joe Solis DO    Visit Date: 8/15/2022    Physician Orders: PT Eval and Treat: Neuromuscular re-education, eccentric core, lower extremity, glute strengthening and stabilizing, gait retraining, establish home exercise program, taping/bracing as indicated, other modalities as seen fit   Medical Diagnosis from Referral: M25.561 (ICD-10-CM) - Lateral knee pain, right M17.11 (ICD-10-CM) - Localized osteoarthritis of right knee   Evaluation Date: 6/14/2022  Authorization Period Expiration: 12/31/2022  Plan of Care Expiration: 9/9/2022  Progress Note Due: 7/15/2022  Visit # / Visits authorized: 12/ 21    FOTO: 1/ 3: 6/14/2022, 8/3/2022     Precautions: Standard    PTA Visit #: 0/5     Time In:750  Time Out: 830  Total Billable Time: 40 minutes    SUBJECTIVE     Pt reports: being OOT in [place], [place], for the past few days. Says that while OOT he had a flare up of knee pain and swelling that made walking very difficult.      He was compliant with home exercise program.  Response to previous treatment: no change  Functional change: bending the knee is better, occasional crepitus    Pain: 1/10 at rest, 4/5 with palpation  Location: right lateral knee      OBJECTIVE     Objective Measures updated at progress report unless specified.     8/3/2022    ROM: pt continues to lack full extension R compared to L in stance/gait    CMS Impairment/Limitation/Restriction for FOTO Knee Survey    Therapist reviewed FOTO scores for Arnaldo Donovan on 8/15/2022.   FOTO documents entered into [employer] - see Media section.    Evaluation: 29%    Current : 29%    Goal: 25%           Treatment     Arnaldo received the treatments listed below:      therapeutic  "exercises to develop strength, endurance, ROM, flexibility, posture and core stabilization for 00 minutes including:    **Exercises in BOLD performed today**    Quad sets with heel prop 5" x 20  Supine knee hang x 5 min with 4# above and below knee  Prone TKE 10" x 10  Seated gastroc/HS stretch with strap and quad set, 4 x 20" - standing today    TKE with purple power band (R) 20x with feet //, x 20 with staggered stance (RLE in ext)  mini squat with purple band behind R knee: 2 x 10  Hesitation march 2 x 40ft  Ankle flip 2 x 40ft  GS SB 3 x 30"    manual therapy techniques: Joint mobilizations and taping were applied to the: R knee for 40 minutes, including:  Patellar glides, patellar tendon mobilization  0 min x instrument assisted STM using Hawks  to quads/ ITB/ Hamstrings  0 min x vacuum/cupping STM with manual therapy techniques was performed to R and L quads, ITB, patellar tendon, hamstrings (med/lat)  to decrease muscle tightness, increase circulation and promote healing process. The pt's skin was monitored for redness adjusting pressure as needed. The pt was instructed in possible side effects of bruising and/or soreness. Pt verbalized understanding.    Preparation and application of Ktape to R knee. Y strip patellar tracking, I strip to lateral knee for decompression.  -- NP    5 min fibular head PA glides, PA tibiofemoral glides  5 min x preparation and application of kinesiotape for Bakers cyst (star technique) and lateral gatroc head inhibition (1 x I strip). Patient received education regarding appropriate care and removal of Kinesiotape. Patient instructed in proper removal techniques if skin irritation occurs.     00 min x manual gastroc stretch and knee extension OP in prone    25 min x Application of TDN: Pt educated on benefits and potential side effects of dry needling. Educated pt on benefits, precautions, side effects following TDN. Educated pt to use heat following treatment sessions if pt " is experiencing pain or soreness. Pt verbalized good understanding of education.  Pt signed written consent to dry needling. Pt gave verbal consent for DN    Pt received dry needling to the below listed muscles using 40 mm needles.  R knee: 9 pt knee OA protocol.   E-stim applied through 3 channels at 2 Hz and 2-3 mA to tolerance.     In prone:   R medial and lateral gastroc head  R lateral hamstring  Winding performed 2 times during treatment      Patient Education and Home Exercises     Home Exercises Provided and Patient Education Provided     Education provided:   - therex rationale. Patient received education regarding appropriate care and removal of Kinesiotape. Patient instructed in proper removal techniques if skin irritation occurs.    Written Home Exercises Provided: yes. Exercises were reviewed and Arnaldo was able to demonstrate them prior to the end of the session.  Arnaldo demonstrated good  understanding of the education provided. See EMR under Patient Instructions for exercises provided during therapy sessions    ASSESSMENT     Pt presents with increased tone/tenderness to lateral gastroc head as well as distal lateral hamstring muscle belly. Added dry needling to these locations which resulted in improvement in muscular tone but only slight change in pain modulation and knee mobility. As pt presented with increased posterior knee swelling, kinesiotape added for baker's cyst and lateral gastroc head inhibition. Educated on PT POC, prognosis, and expectations at this point in his rehab process. Due to min improvement in sx and functional mobility advised pt to F/U with MD regarding further diagnostic care.      Arnaldo Is progressing well towards his goals.   Pt prognosis is Good.     Pt will continue to benefit from skilled outpatient physical therapy to address the deficits listed in the problem list box on initial evaluation, provide pt/family education and to maximize pt's level of independence in  the home and community environment.     Pt's spiritual, cultural and educational needs considered and pt agreeable to plan of care and goals.     Anticipated barriers to physical therapy: standard       Goals: IE 6/14/2022  Short Term Goals (4 weeks)  1. Pt will demonstrate improvements in R knee ROM to 0-120-125 for ease with ambulation. Progressing, not met    2.  Pt will report that he is able to ambulate 3 miles without the presence of antalgic gait pattern and with pain </= 4/10 following. Progressing, not met    4. Pt will report <4/10 pain at worst within the R knee for ease with ADL's. Met 8/3/2022       Long Term Goals (12 weeks)  1. Pt will demonstrate improvements in R knee ROM to 0-130-135 for ease with ambulation. Progressing, not met    2. Pt will demonstrate 4+/5 strength within B gastroc for ease with community ambulation and climbing stairs. Progressing, not met    3. Pt will report being independent with his/her HEP for maintenance of improvements gained during therapy sessions. Progressing, not met    4. Pt will report <2/10 pain at worst within the R knee for ease with recreation. Progressing, not met    5. Pt will demonstrate improved function with FOTO limitation score decreased to 25%. Progressing, not met        PLAN   Plan of care Certification: 6/14/2022 to 9/9/2022.     Continue per POC with focus on R knee ROM and B LE strengthening to improve tolerance to functional mobility in community.     Belen Varma, PT

## 2022-08-16 ENCOUNTER — CLINICAL SUPPORT (OUTPATIENT)
Dept: UROLOGY | Facility: CLINIC | Age: 77
End: 2022-08-16
Payer: MEDICARE

## 2022-08-16 DIAGNOSIS — E29.1 HYPOGONADISM IN MALE: Primary | ICD-10-CM

## 2022-08-16 PROCEDURE — 96372 THER/PROPH/DIAG INJ SC/IM: CPT | Mod: S$GLB,,, | Performed by: NURSE PRACTITIONER

## 2022-08-16 PROCEDURE — 96372 PR INJECTION,THERAP/PROPH/DIAG2ST, IM OR SUBCUT: ICD-10-PCS | Mod: S$GLB,,, | Performed by: NURSE PRACTITIONER

## 2022-08-16 RX ADMIN — TESTOSTERONE CYPIONATE 200 MG: 200 INJECTION, SOLUTION INTRAMUSCULAR at 08:08

## 2022-08-26 ENCOUNTER — OFFICE VISIT (OUTPATIENT)
Dept: SPORTS MEDICINE | Facility: CLINIC | Age: 77
End: 2022-08-26
Payer: MEDICARE

## 2022-08-26 VITALS
WEIGHT: 232 LBS | SYSTOLIC BLOOD PRESSURE: 133 MMHG | BODY MASS INDEX: 30.75 KG/M2 | DIASTOLIC BLOOD PRESSURE: 76 MMHG | HEIGHT: 73 IN

## 2022-08-26 DIAGNOSIS — M17.11 LOCALIZED OSTEOARTHRITIS OF RIGHT KNEE: ICD-10-CM

## 2022-08-26 DIAGNOSIS — M25.561 LATERAL KNEE PAIN, RIGHT: Primary | ICD-10-CM

## 2022-08-26 DIAGNOSIS — S83.281S TEAR OF LATERAL MENISCUS OF RIGHT KNEE, UNSPECIFIED TEAR TYPE, UNSPECIFIED WHETHER OLD OR CURRENT TEAR, SEQUELA: ICD-10-CM

## 2022-08-26 PROCEDURE — 3288F PR FALLS RISK ASSESSMENT DOCUMENTED: ICD-10-PCS | Mod: CPTII,S$GLB,, | Performed by: ORTHOPAEDIC SURGERY

## 2022-08-26 PROCEDURE — 1126F PR PAIN SEVERITY QUANTIFIED, NO PAIN PRESENT: ICD-10-PCS | Mod: CPTII,S$GLB,, | Performed by: ORTHOPAEDIC SURGERY

## 2022-08-26 PROCEDURE — 1159F PR MEDICATION LIST DOCUMENTED IN MEDICAL RECORD: ICD-10-PCS | Mod: CPTII,S$GLB,, | Performed by: ORTHOPAEDIC SURGERY

## 2022-08-26 PROCEDURE — 1160F PR REVIEW ALL MEDS BY PRESCRIBER/CLIN PHARMACIST DOCUMENTED: ICD-10-PCS | Mod: CPTII,S$GLB,, | Performed by: ORTHOPAEDIC SURGERY

## 2022-08-26 PROCEDURE — 1126F AMNT PAIN NOTED NONE PRSNT: CPT | Mod: CPTII,S$GLB,, | Performed by: ORTHOPAEDIC SURGERY

## 2022-08-26 PROCEDURE — 1101F PT FALLS ASSESS-DOCD LE1/YR: CPT | Mod: CPTII,S$GLB,, | Performed by: ORTHOPAEDIC SURGERY

## 2022-08-26 PROCEDURE — 3075F PR MOST RECENT SYSTOLIC BLOOD PRESS GE 130-139MM HG: ICD-10-PCS | Mod: CPTII,S$GLB,, | Performed by: ORTHOPAEDIC SURGERY

## 2022-08-26 PROCEDURE — 3078F PR MOST RECENT DIASTOLIC BLOOD PRESSURE < 80 MM HG: ICD-10-PCS | Mod: CPTII,S$GLB,, | Performed by: ORTHOPAEDIC SURGERY

## 2022-08-26 PROCEDURE — 3288F FALL RISK ASSESSMENT DOCD: CPT | Mod: CPTII,S$GLB,, | Performed by: ORTHOPAEDIC SURGERY

## 2022-08-26 PROCEDURE — 1101F PR PT FALLS ASSESS DOC 0-1 FALLS W/OUT INJ PAST YR: ICD-10-PCS | Mod: CPTII,S$GLB,, | Performed by: ORTHOPAEDIC SURGERY

## 2022-08-26 PROCEDURE — 3078F DIAST BP <80 MM HG: CPT | Mod: CPTII,S$GLB,, | Performed by: ORTHOPAEDIC SURGERY

## 2022-08-26 PROCEDURE — 99214 OFFICE O/P EST MOD 30 MIN: CPT | Mod: S$GLB,,, | Performed by: ORTHOPAEDIC SURGERY

## 2022-08-26 PROCEDURE — 99999 PR PBB SHADOW E&M-EST. PATIENT-LVL III: CPT | Mod: PBBFAC,,, | Performed by: ORTHOPAEDIC SURGERY

## 2022-08-26 PROCEDURE — 1160F RVW MEDS BY RX/DR IN RCRD: CPT | Mod: CPTII,S$GLB,, | Performed by: ORTHOPAEDIC SURGERY

## 2022-08-26 PROCEDURE — 3075F SYST BP GE 130 - 139MM HG: CPT | Mod: CPTII,S$GLB,, | Performed by: ORTHOPAEDIC SURGERY

## 2022-08-26 PROCEDURE — 99999 PR PBB SHADOW E&M-EST. PATIENT-LVL III: ICD-10-PCS | Mod: PBBFAC,,, | Performed by: ORTHOPAEDIC SURGERY

## 2022-08-26 PROCEDURE — 99214 PR OFFICE/OUTPT VISIT, EST, LEVL IV, 30-39 MIN: ICD-10-PCS | Mod: S$GLB,,, | Performed by: ORTHOPAEDIC SURGERY

## 2022-08-26 PROCEDURE — 1159F MED LIST DOCD IN RCRD: CPT | Mod: CPTII,S$GLB,, | Performed by: ORTHOPAEDIC SURGERY

## 2022-08-26 NOTE — PROGRESS NOTES
CC: right knee discomfort    Arnaldo is here today for follow up evaluation of his right knee and left calf pain. Patient reports his pain is 0/10 today. He completed right knee Orthovisc series 06/01/2022 and is unsure of any changes in his pain following. He states he is primarily concerned about his inability to straighten his right knee. He gestures to the lateral aspect of the right knee and proximal right calf when asked where he hurts. He denies new injury or trauma, but feels as though is symptoms increased following significant walking while on a trip in [place].     Recall from visit on 04/27/2022  Arnaldo is here today for follow up evaluation of her right knee and right calf pain and to discuss his MRI results. Patient reports his pain is 0/10 today. He has been compliant with his HEP every other day and feels as though his pain is increasing during activity. He denies new falls or trauma since his last visit.     Recall from visit on 04/07/2022  Arnaldo is here today for follow up evaluation of his right knee pain. Patient reports his pain is 0/10 today. Patient states he has continued to appreciate left lateral knee pain and is interested in discussing what other treatment options are available to him. He states he has been completing his HEP for the past one week and taking ibuprofen as needed. He denies new falls or trauma since his last visit.       PAST MEDICAL HISTORY:   Past Medical History:   Diagnosis Date    Allergy     seasonal    Cataract     Gout, joint     Hyperlipidemia     Hypertension     Ocular rosacea     Vitreous floaters        PAST SURGICAL HISTORY:   Past Surgical History:   Procedure Laterality Date    VASECTOMY  1988       FAMILY HISTORY:   Family History   Problem Relation Age of Onset    Hypertension Mother     Myasthenia gravis Mother         affects eye    Dementia Father     No Known Problems Sister     No Known Problems Brother     No Known Problems Daughter      Melanoma Neg Hx     Cataracts Neg Hx     Glaucoma Neg Hx     Macular degeneration Neg Hx        SOCIAL HISTORY:   Social History     Socioeconomic History    Marital status:    Occupational History    Occupation:      Employer: [employer]   Tobacco Use    Smoking status: Former Smoker     Packs/day: 0.50     Years: 15.00     Pack years: 7.50     Quit date: 10/19/1977     Years since quittin.8    Smokeless tobacco: Never Used   Substance and Sexual Activity    Alcohol use: Yes     Comment: 3-4 times weekly    Drug use: No    Sexual activity: Yes     Partners: Female     Comment:  with 3 children       MEDICATIONS:     Current Outpatient Medications:     allopurinoL (ZYLOPRIM) 300 MG tablet, Take 1 tablet (300 mg total) by mouth once daily., Disp: 90 tablet, Rfl: 1    ivermectin (SOOLANTRA) 1 % Crea, AAA on face qhs and wash off in morning, Disp: 60 g, Rfl: 3    methylPREDNISolone (MEDROL DOSEPACK) 4 mg tablet, use as directed, Disp: 1 each, Rfl: 0    tamsulosin (FLOMAX) 0.4 mg Cap, Take 1 capsule (0.4 mg total) by mouth once daily., Disp: 90 capsule, Rfl: 1    testosterone cypionate (DEPOTESTOTERONE CYPIONATE) 200 mg/mL injection, , Disp: , Rfl:     valsartan (DIOVAN) 160 MG tablet, Take 1 tablet (160 mg total) by mouth once daily., Disp: 90 tablet, Rfl: 1    Current Facility-Administered Medications:     sodium hyaluronate (orthovisc) 30 mg, 30 mg, Intra-articular, 1 time in Clinic/HOD, Joe Solis DO    sodium hyaluronate (orthovisc) 30 mg, 30 mg, Intra-articular, 1 time in Clinic/HOD, Joe Solis DO    sodium hyaluronate (orthovisc) 30 mg, 30 mg, Intra-articular, 1 time in Clinic/HOD, Joe Solis DO    testosterone cypionate injection 200 mg, 200 mg, Intramuscular, Q14 Days, Michell Pinto NP, 200 mg at 22 0818    ALLERGIES:   Review of patient's allergies indicates:   Allergen Reactions    Doxycycline      HEADACHE (PRESSURE)     "Statins-hmg-coa reductase inhibitors         PHYSICAL EXAMINATION:  /76   Ht 6' 1" (1.854 m)   Wt 105.2 kg (232 lb)   BMI 30.61 kg/m²   Vitals signs and nursing note have been reviewed.  General: In no acute distress, well developed, well nourished, no diaphoresis  Eyes: EOM full and smooth, no eye redness or discharge  HENT: normocephalic and atraumatic, neck supple, trachea midline, no nasal discharge, no external ear redness or discharge  Cardiovascular: no LE edema  Lungs: respirations non-labored, no conversational dyspnea   Abd: non-distended, no rigidity  MSK: no amputation or deformity, no swelling of extremities  Neuro: AAOx3, CN2-12 grossly intact  Skin: No rashes, warm and dry  Psychiatric: cooperative, pleasant, mood and affect appropriate for age    MUSCULOSKELETAL EXAM:    RIGHT KNEE EXAMINATION   Affected side is compared to contralateral knee     Observation:  No edema, erythema, ecchymosis noted.  Small right suprapatellar effusion  No muscle atrophy of the thighs and calves noted.  No obvious bony deformities noted.   No genu valgus/varum noted. No recurvatum noted.    No tibial internal/external torsion.      Tenderness:  Patella - present   Lateral joint line - none  Quad tendon - none   Medial joint line - none  Patellar tendon - none   Medial plica - none  Tibial tubercle - none   Lateral plica - none  Pes anserine - none   MCL prox - none  Distal ITB - mild    MCL distal - none  MFC - none    LCL prox - none  LFC - none    LCL distal - none  Tibia - none    Fibula - none    No obvious bursae, plicae, popliteal cysts, or tendon derangement palpated.  + tenderness at the posterolateral aspect of the knee over the lateral hamstrings          ROM:   Active extension to 0° bilaterally without hyperextension, lag, or patellar J sign.    Active flexion to 135° bilaterally.  Crepitus with extension on the right.    Strength: (bilaterally)  Knee Flexion - 5/5  Knee Extension - 5/5  Hip Flexion " - 5/5  Hip Extension - 5/5  Ankle dorsiflexion - 5/5  Ankle Plantarflexion - 5/5    Patellofemoral Exam:  Patella position - Neutral   Patellar ballottement - negative  Bulge sign - negative  Patellar grind - positive  No patellar laxity with medial and lateral translation   No apprehension with medial and lateral patellar translation.     Meniscus Testing:     No pain with terminal extension and forced flexion  Melyssas test - negative   Bounce home test - negative    Ligament Testing:  Lachman's test - negative  No laxity with varus testing at 0 and 30 degrees.  No laxity with valgus testing at 0 and 30 degrees.    Neurovascular Examination:   Normal gait without antalgia.  Sensation intact to light touch in the obturator, lateral/intermediate/medial/posterior femoral cutaneous, saphenous, and common peroneal nerves bilaterally.  Pulses intact at the DP and PT arteries bilaterally.    Capillary refill intact <2 seconds in all toes bilaterally.      IMAGIN. X-ray obtained 2021 due to bilateral knee pain  2. X-ray images were reviewed personally by me and then directly with patient.  3. FINDINGS: X-ray images obtained demonstrate the bones are intact.  There is no evidence for acute fracture or bone destruction.  There are degenerative changes with spurring of the tibial spines with small osteophytes at the femorotibial and patellofemoral joints.  There is minimal narrowing of the medial compartments of the femorotibial joints.  There is no plain film evidence for joint effusions.  Soft tissues are unremarkable.  Kellgren and Bubba grade 2.  4. IMPRESSION: As above.     1. MRI obtained 2022 due to right knee pain   2. MRI images were reviewed personally by me and then directly with patient.  3. FINDINGS: MRI images obtained demonstrate horizontal degenerative tear of the lateral meniscus with fissuring about the cartilage mostly affecting the lateral compartment.   4. IMPRESSION: As above.        ASSESSMENT:      ICD-10-CM ICD-9-CM   1. Lateral knee pain, right  M25.561 719.46   2. Tear of lateral meniscus of right knee, unspecified tear type, unspecified whether old or current tear, sequela  S83.281S 905.7   3. Localized osteoarthritis of right knee  M17.11 715.36         PLAN:  1-3.  Lateral knee pain/lateral meniscus tear/osteoarthritis - stable    - Recall that patient admits to right knee discomfort bothering him every time he walks.  His complaint today is the inability to achieve full extension of the knee.  His discomfort is experienced at the lateral knee.    - He had a right knee hydrodissection/nerve block procedure directed at the right common peroneal nerve at the lateral joint/fibular head 06/16/2021 and he appreciated 85-90% improvement in his pain following this procedure.  This pain relief has remained.  He completed right knee Orthovisc series 06/01/2022. His primary concern today is inability to straighten his right knee.     - PRP was discussed at a prior visit and he is still interested in pursuing this potentially in the near future.  I did advise that can help with swelling, inflammation, pain but will not likely improve range of motion.  Understanding was expressed.    - He is still in physical therapy where they are working on range of motion.  I advised that if the meniscus is prevent thing the full extension, he may not appreciate much more improvement to where he is at right now.  However, physical therapy is the right place to be for stabilization and working on range motion.    - Continue with previously prescribed HEP for patellar tracking.        Future planning includes - possible intra-articular PRP    All questions were answered to the best of my ability and all concerns were addressed at this time.    Follow up as needed.       This note is dictated using the M*Modal Fluency Direct word recognition program. There are word recognition mistakes that are occasionally  missed on review.      Total time spent face-to face with patient counseling or coordinating care including prognosis, differential diagnosis, risks and benefits of treatment, instructions, compliance risk reductions as well as non-face-to-face time personally spent reviewing medial record, medical documentation, and coordination of care.     EST MINUTES X   19634 10-19    86139 20-29    83697 30-39 X   99215 40-54    NEW     80251 15-29    07467 30-44    52490 45-59    16641 60-74    PHONE      5-10    35295 11-20    48333 21-30

## 2022-08-30 ENCOUNTER — CLINICAL SUPPORT (OUTPATIENT)
Dept: UROLOGY | Facility: CLINIC | Age: 77
End: 2022-08-30
Payer: MEDICARE

## 2022-08-30 DIAGNOSIS — E29.1 HYPOGONADISM IN MALE: Primary | ICD-10-CM

## 2022-08-30 PROCEDURE — 96372 THER/PROPH/DIAG INJ SC/IM: CPT | Mod: S$GLB,,, | Performed by: NURSE PRACTITIONER

## 2022-08-30 PROCEDURE — 96372 PR INJECTION,THERAP/PROPH/DIAG2ST, IM OR SUBCUT: ICD-10-PCS | Mod: S$GLB,,, | Performed by: NURSE PRACTITIONER

## 2022-08-30 RX ADMIN — TESTOSTERONE CYPIONATE 200 MG: 200 INJECTION, SOLUTION INTRAMUSCULAR at 08:08

## 2022-08-30 NOTE — PROGRESS NOTES
Reason for visit: Hormone therapy injection. Injection administered as ordered, tolerated well, secured with band aid, and denies any pain/discomfort to site     Site:Left DG  Testosterone Cypionate: 200mg

## 2022-09-13 ENCOUNTER — CLINICAL SUPPORT (OUTPATIENT)
Dept: UROLOGY | Facility: CLINIC | Age: 77
End: 2022-09-13
Payer: MEDICARE

## 2022-09-13 DIAGNOSIS — E29.1 HYPOGONADISM IN MALE: Primary | ICD-10-CM

## 2022-09-13 PROCEDURE — 96372 PR INJECTION,THERAP/PROPH/DIAG2ST, IM OR SUBCUT: ICD-10-PCS | Mod: S$GLB,,, | Performed by: NURSE PRACTITIONER

## 2022-09-13 PROCEDURE — 96372 THER/PROPH/DIAG INJ SC/IM: CPT | Mod: S$GLB,,, | Performed by: NURSE PRACTITIONER

## 2022-09-13 RX ADMIN — TESTOSTERONE CYPIONATE 200 MG: 200 INJECTION, SOLUTION INTRAMUSCULAR at 08:09

## 2022-09-22 ENCOUNTER — OFFICE VISIT (OUTPATIENT)
Dept: UROLOGY | Facility: CLINIC | Age: 77
End: 2022-09-22
Payer: MEDICARE

## 2022-09-22 VITALS
DIASTOLIC BLOOD PRESSURE: 69 MMHG | WEIGHT: 232 LBS | SYSTOLIC BLOOD PRESSURE: 136 MMHG | HEART RATE: 59 BPM | HEIGHT: 73 IN | BODY MASS INDEX: 30.75 KG/M2

## 2022-09-22 DIAGNOSIS — E29.1 HYPOGONADISM MALE: Primary | ICD-10-CM

## 2022-09-22 DIAGNOSIS — Z12.5 PROSTATE CANCER SCREENING: ICD-10-CM

## 2022-09-22 PROCEDURE — 3288F PR FALLS RISK ASSESSMENT DOCUMENTED: ICD-10-PCS | Mod: CPTII,S$GLB,, | Performed by: NURSE PRACTITIONER

## 2022-09-22 PROCEDURE — 1126F PR PAIN SEVERITY QUANTIFIED, NO PAIN PRESENT: ICD-10-PCS | Mod: CPTII,S$GLB,, | Performed by: NURSE PRACTITIONER

## 2022-09-22 PROCEDURE — 3075F SYST BP GE 130 - 139MM HG: CPT | Mod: CPTII,S$GLB,, | Performed by: NURSE PRACTITIONER

## 2022-09-22 PROCEDURE — 1159F MED LIST DOCD IN RCRD: CPT | Mod: CPTII,S$GLB,, | Performed by: NURSE PRACTITIONER

## 2022-09-22 PROCEDURE — 1159F PR MEDICATION LIST DOCUMENTED IN MEDICAL RECORD: ICD-10-PCS | Mod: CPTII,S$GLB,, | Performed by: NURSE PRACTITIONER

## 2022-09-22 PROCEDURE — 3075F PR MOST RECENT SYSTOLIC BLOOD PRESS GE 130-139MM HG: ICD-10-PCS | Mod: CPTII,S$GLB,, | Performed by: NURSE PRACTITIONER

## 2022-09-22 PROCEDURE — 3078F PR MOST RECENT DIASTOLIC BLOOD PRESSURE < 80 MM HG: ICD-10-PCS | Mod: CPTII,S$GLB,, | Performed by: NURSE PRACTITIONER

## 2022-09-22 PROCEDURE — 3078F DIAST BP <80 MM HG: CPT | Mod: CPTII,S$GLB,, | Performed by: NURSE PRACTITIONER

## 2022-09-22 PROCEDURE — 1101F PR PT FALLS ASSESS DOC 0-1 FALLS W/OUT INJ PAST YR: ICD-10-PCS | Mod: CPTII,S$GLB,, | Performed by: NURSE PRACTITIONER

## 2022-09-22 PROCEDURE — 3288F FALL RISK ASSESSMENT DOCD: CPT | Mod: CPTII,S$GLB,, | Performed by: NURSE PRACTITIONER

## 2022-09-22 PROCEDURE — 99213 OFFICE O/P EST LOW 20 MIN: CPT | Mod: S$GLB,,, | Performed by: NURSE PRACTITIONER

## 2022-09-22 PROCEDURE — 99213 PR OFFICE/OUTPT VISIT, EST, LEVL III, 20-29 MIN: ICD-10-PCS | Mod: S$GLB,,, | Performed by: NURSE PRACTITIONER

## 2022-09-22 PROCEDURE — 1126F AMNT PAIN NOTED NONE PRSNT: CPT | Mod: CPTII,S$GLB,, | Performed by: NURSE PRACTITIONER

## 2022-09-22 PROCEDURE — 1101F PT FALLS ASSESS-DOCD LE1/YR: CPT | Mod: CPTII,S$GLB,, | Performed by: NURSE PRACTITIONER

## 2022-09-22 NOTE — PROGRESS NOTES
"Subjective:      Arnaldo Donovan is a 77 y.o. male who returns today regarding his low testosterone.    The patient was diagnosed with low testosterone in 2021. He has been receiving testosterone cypionate injections (200 mg) in clinic every 2 weeks with much improvement in symptoms.     Component Testosterone, Total   Latest Ref Rng & Units 304 - 1227 ng/dL   4/26/2022 939   11/19/2021 1061   9/16/2021 168 (L)   9/9/2021 195 (L)     On flomax for LUTS- well controlled.     Possible family history of prostate cancer (father- 80s).     The following portions of the patient's history were reviewed and updated as appropriate: allergies, current medications, past family history, past medical history, past social history, past surgical history and problem list.    Review of Systems  Constitutional: no fever or chills  ENT: no nasal congestion or sore throat  Respiratory: no cough or shortness of breath  Cardiovascular: no chest pain or palpitations  Gastrointestinal: no nausea or vomiting, tolerating diet  Genitourinary: as per HPI  Hematologic/Lymphatic: no easy bruising or lymphadenopathy  Musculoskeletal: no arthralgias or myalgias  Neurological: no seizures or tremors  Behavioral/Psych: no auditory or visual hallucinations     Objective:   Vitals: /69   Pulse (!) 59   Ht 6' 1" (1.854 m)   Wt 105.2 kg (232 lb)   BMI 30.61 kg/m²     Physical Exam   General: alert and oriented, no acute distress  Head: normocephalic, atraumatic  Neck: supple, normal ROM  Respiratory: Symmetric expansion, non-labored breathing  Cardiovascular: regular rate and rhythm  Abdomen: soft, non tender, non distended  Genitourinary:   Prostate: normal for age, normal consistency, non tender, no specific nodules, size: 30 gm; seminal vesicles not palpated  Rectum: normal rectal tone, no rectal mass, normal perineum  Skin: normal coloration and turgor, no rashes, no suspicious skin lesions noted  Neuro: alert and oriented x3, no gross " deficits  Psych: normal judgment and insight, normal mood/affect, and non-anxious    Lab Review   Urinalysis demonstrates : no sample   Lab Results   Component Value Date    WBC 7.58 04/26/2022    HGB 16.1 04/26/2022    HCT 48.9 04/26/2022    MCV 86 04/26/2022     04/26/2022     Lab Results   Component Value Date    CREATININE 1.1 04/26/2022    BUN 22 04/26/2022     Lab Results   Component Value Date    PSA 0.94 04/26/2022     Imaging   None    Assessment:     1. Hypogonadism male    2. Prostate cancer screening      Plan:   Arnaldo was seen today for annual exam.    Diagnoses and all orders for this visit:    Hypogonadism male  -     Testosterone; Future  -     CBC Auto Differential; Future    Prostate cancer screening    Plan:  --Continue with testosterone cypionate injections -200 mg Q 14 days  --Labs- testosterone and CBC due in December   --Follow up annually for ERMA with CBC, testosterone and PSA

## 2022-09-28 ENCOUNTER — CLINICAL SUPPORT (OUTPATIENT)
Dept: UROLOGY | Facility: CLINIC | Age: 77
End: 2022-09-28
Payer: MEDICARE

## 2022-09-28 DIAGNOSIS — E29.1 HYPOGONADISM IN MALE: Primary | ICD-10-CM

## 2022-09-28 PROCEDURE — 96372 THER/PROPH/DIAG INJ SC/IM: CPT | Mod: S$GLB,,, | Performed by: NURSE PRACTITIONER

## 2022-09-28 PROCEDURE — 96372 PR INJECTION,THERAP/PROPH/DIAG2ST, IM OR SUBCUT: ICD-10-PCS | Mod: S$GLB,,, | Performed by: NURSE PRACTITIONER

## 2022-09-28 RX ADMIN — TESTOSTERONE CYPIONATE 200 MG: 200 INJECTION, SOLUTION INTRAMUSCULAR at 08:09

## 2022-10-11 ENCOUNTER — CLINICAL SUPPORT (OUTPATIENT)
Dept: UROLOGY | Facility: CLINIC | Age: 77
End: 2022-10-11
Payer: MEDICARE

## 2022-10-11 DIAGNOSIS — E29.1 HYPOGONADISM IN MALE: Primary | ICD-10-CM

## 2022-10-11 PROCEDURE — 96372 THER/PROPH/DIAG INJ SC/IM: CPT | Mod: S$GLB,,, | Performed by: NURSE PRACTITIONER

## 2022-10-11 PROCEDURE — 96372 PR INJECTION,THERAP/PROPH/DIAG2ST, IM OR SUBCUT: ICD-10-PCS | Mod: S$GLB,,, | Performed by: NURSE PRACTITIONER

## 2022-10-11 RX ADMIN — TESTOSTERONE CYPIONATE 200 MG: 200 INJECTION, SOLUTION INTRAMUSCULAR at 08:10

## 2022-10-20 DIAGNOSIS — M10.9 GOUT, UNSPECIFIED CAUSE, UNSPECIFIED CHRONICITY, UNSPECIFIED SITE: ICD-10-CM

## 2022-10-20 NOTE — TELEPHONE ENCOUNTER
Refill Routing Note   Medication(s) are not appropriate for processing by [facility] for the following reason(s):      - Required laboratory values are outdated    [facility] action(s):  Defer          Medication reconciliation completed: No     Appointments  past 12m or future 3m with PCP    Date Provider   Last Visit   4/12/2022 Xiao Turner MD   Next Visit   Visit date not found Xiao Turner MD   ED visits in past 90 days: 0        Note composed:6:59 AM 10/20/2022

## 2022-10-20 NOTE — TELEPHONE ENCOUNTER
Care Due:                  Date            Visit Type   Department     Provider  --------------------------------------------------------------------------------                                MYCHART                              ANNUAL                              CHECKUP/PHY  [facility] PRIMARY  Last Visit: 04-      [facility]           Xiao Turner  Next Visit: None Scheduled  None         None Found                                                            Last  Test          Frequency    Reason                     Performed    Due Date  --------------------------------------------------------------------------------    Uric Acid...  12 months..  allopurinoL..............  Not Found    Overdue    [employer] Embedded Care Gaps. Reference number: [id number]. 10/20/2022   12:31:07 AM CDT

## 2022-10-21 RX ORDER — ALLOPURINOL 300 MG/1
TABLET ORAL
Qty: 90 TABLET | Refills: 1 | Status: SHIPPED | OUTPATIENT
Start: 2022-10-21 | End: 2023-04-18 | Stop reason: SDUPTHER

## 2022-10-25 ENCOUNTER — CLINICAL SUPPORT (OUTPATIENT)
Dept: UROLOGY | Facility: CLINIC | Age: 77
End: 2022-10-25
Payer: MEDICARE

## 2022-10-25 DIAGNOSIS — E29.1 HYPOGONADISM IN MALE: Primary | ICD-10-CM

## 2022-10-25 PROCEDURE — 96372 THER/PROPH/DIAG INJ SC/IM: CPT | Mod: S$GLB,,, | Performed by: NURSE PRACTITIONER

## 2022-10-25 PROCEDURE — 96372 PR INJECTION,THERAP/PROPH/DIAG2ST, IM OR SUBCUT: ICD-10-PCS | Mod: S$GLB,,, | Performed by: NURSE PRACTITIONER

## 2022-10-25 RX ORDER — TESTOSTERONE CYPIONATE 200 MG/ML
200 INJECTION, SOLUTION INTRAMUSCULAR
Status: COMPLETED | OUTPATIENT
Start: 2022-10-25 | End: 2023-04-10

## 2022-10-25 RX ADMIN — TESTOSTERONE CYPIONATE 200 MG: 200 INJECTION, SOLUTION INTRAMUSCULAR at 08:10

## 2022-10-25 NOTE — PROGRESS NOTES
Reason for visit: Hormone therapy injection. Injection administered as ordered, tolerated well, secured with band aid, and denies any pain/discomfort to site     Site: Right DG  Testosterone: 200mg     See other message

## 2022-11-08 ENCOUNTER — CLINICAL SUPPORT (OUTPATIENT)
Dept: UROLOGY | Facility: CLINIC | Age: 77
End: 2022-11-08
Payer: MEDICARE

## 2022-11-08 DIAGNOSIS — E29.1 HYPOGONADISM IN MALE: Primary | ICD-10-CM

## 2022-11-08 PROCEDURE — 96372 PR INJECTION,THERAP/PROPH/DIAG2ST, IM OR SUBCUT: ICD-10-PCS | Mod: S$GLB,,, | Performed by: NURSE PRACTITIONER

## 2022-11-08 PROCEDURE — 96372 THER/PROPH/DIAG INJ SC/IM: CPT | Mod: S$GLB,,, | Performed by: NURSE PRACTITIONER

## 2022-11-08 RX ADMIN — TESTOSTERONE CYPIONATE 200 MG: 200 INJECTION, SOLUTION INTRAMUSCULAR at 08:11

## 2022-11-22 ENCOUNTER — CLINICAL SUPPORT (OUTPATIENT)
Dept: UROLOGY | Facility: CLINIC | Age: 77
End: 2022-11-22
Payer: MEDICARE

## 2022-11-22 DIAGNOSIS — E29.1 HYPOGONADISM IN MALE: Primary | ICD-10-CM

## 2022-11-22 PROCEDURE — 96372 PR INJECTION,THERAP/PROPH/DIAG2ST, IM OR SUBCUT: ICD-10-PCS | Mod: S$GLB,,, | Performed by: NURSE PRACTITIONER

## 2022-11-22 PROCEDURE — 96372 THER/PROPH/DIAG INJ SC/IM: CPT | Mod: S$GLB,,, | Performed by: NURSE PRACTITIONER

## 2022-11-22 RX ADMIN — TESTOSTERONE CYPIONATE 200 MG: 200 INJECTION, SOLUTION INTRAMUSCULAR at 08:11

## 2022-11-23 ENCOUNTER — OFFICE VISIT (OUTPATIENT)
Dept: SPORTS MEDICINE | Facility: CLINIC | Age: 77
End: 2022-11-23
Payer: MEDICARE

## 2022-11-23 VITALS
SYSTOLIC BLOOD PRESSURE: 125 MMHG | HEIGHT: 73 IN | WEIGHT: 232 LBS | DIASTOLIC BLOOD PRESSURE: 71 MMHG | BODY MASS INDEX: 30.75 KG/M2

## 2022-11-23 DIAGNOSIS — M25.562 ACUTE PAIN OF LEFT KNEE: Primary | ICD-10-CM

## 2022-11-23 DIAGNOSIS — M17.12 LOCALIZED OSTEOARTHRITIS OF LEFT KNEE: ICD-10-CM

## 2022-11-23 PROCEDURE — 1101F PR PT FALLS ASSESS DOC 0-1 FALLS W/OUT INJ PAST YR: ICD-10-PCS | Mod: CPTII,S$GLB,, | Performed by: ORTHOPAEDIC SURGERY

## 2022-11-23 PROCEDURE — 3288F FALL RISK ASSESSMENT DOCD: CPT | Mod: CPTII,S$GLB,, | Performed by: ORTHOPAEDIC SURGERY

## 2022-11-23 PROCEDURE — 3074F PR MOST RECENT SYSTOLIC BLOOD PRESSURE < 130 MM HG: ICD-10-PCS | Mod: CPTII,S$GLB,, | Performed by: ORTHOPAEDIC SURGERY

## 2022-11-23 PROCEDURE — 1159F PR MEDICATION LIST DOCUMENTED IN MEDICAL RECORD: ICD-10-PCS | Mod: CPTII,S$GLB,, | Performed by: ORTHOPAEDIC SURGERY

## 2022-11-23 PROCEDURE — 1160F RVW MEDS BY RX/DR IN RCRD: CPT | Mod: CPTII,S$GLB,, | Performed by: ORTHOPAEDIC SURGERY

## 2022-11-23 PROCEDURE — 99999 PR PBB SHADOW E&M-EST. PATIENT-LVL III: ICD-10-PCS | Mod: PBBFAC,,, | Performed by: ORTHOPAEDIC SURGERY

## 2022-11-23 PROCEDURE — 99999 PR PBB SHADOW E&M-EST. PATIENT-LVL III: CPT | Mod: PBBFAC,,, | Performed by: ORTHOPAEDIC SURGERY

## 2022-11-23 PROCEDURE — 1101F PT FALLS ASSESS-DOCD LE1/YR: CPT | Mod: CPTII,S$GLB,, | Performed by: ORTHOPAEDIC SURGERY

## 2022-11-23 PROCEDURE — 99214 PR OFFICE/OUTPT VISIT, EST, LEVL IV, 30-39 MIN: ICD-10-PCS | Mod: 25,S$GLB,, | Performed by: ORTHOPAEDIC SURGERY

## 2022-11-23 PROCEDURE — 3078F PR MOST RECENT DIASTOLIC BLOOD PRESSURE < 80 MM HG: ICD-10-PCS | Mod: CPTII,S$GLB,, | Performed by: ORTHOPAEDIC SURGERY

## 2022-11-23 PROCEDURE — 1126F PR PAIN SEVERITY QUANTIFIED, NO PAIN PRESENT: ICD-10-PCS | Mod: CPTII,S$GLB,, | Performed by: ORTHOPAEDIC SURGERY

## 2022-11-23 PROCEDURE — 3078F DIAST BP <80 MM HG: CPT | Mod: CPTII,S$GLB,, | Performed by: ORTHOPAEDIC SURGERY

## 2022-11-23 PROCEDURE — 3288F PR FALLS RISK ASSESSMENT DOCUMENTED: ICD-10-PCS | Mod: CPTII,S$GLB,, | Performed by: ORTHOPAEDIC SURGERY

## 2022-11-23 PROCEDURE — 1126F AMNT PAIN NOTED NONE PRSNT: CPT | Mod: CPTII,S$GLB,, | Performed by: ORTHOPAEDIC SURGERY

## 2022-11-23 PROCEDURE — 99214 OFFICE O/P EST MOD 30 MIN: CPT | Mod: 25,S$GLB,, | Performed by: ORTHOPAEDIC SURGERY

## 2022-11-23 PROCEDURE — 20611 DRAIN/INJ JOINT/BURSA W/US: CPT | Mod: LT,S$GLB,, | Performed by: ORTHOPAEDIC SURGERY

## 2022-11-23 PROCEDURE — 20611 PR DRAIN/ASP/INJECT MAJOR JOINT/BURSA W/US GUIDANCE: ICD-10-PCS | Mod: LT,S$GLB,, | Performed by: ORTHOPAEDIC SURGERY

## 2022-11-23 PROCEDURE — 3074F SYST BP LT 130 MM HG: CPT | Mod: CPTII,S$GLB,, | Performed by: ORTHOPAEDIC SURGERY

## 2022-11-23 PROCEDURE — 1159F MED LIST DOCD IN RCRD: CPT | Mod: CPTII,S$GLB,, | Performed by: ORTHOPAEDIC SURGERY

## 2022-11-23 PROCEDURE — 1160F PR REVIEW ALL MEDS BY PRESCRIBER/CLIN PHARMACIST DOCUMENTED: ICD-10-PCS | Mod: CPTII,S$GLB,, | Performed by: ORTHOPAEDIC SURGERY

## 2022-11-23 RX ORDER — TRIAMCINOLONE ACETONIDE 40 MG/ML
40 INJECTION, SUSPENSION INTRA-ARTICULAR; INTRAMUSCULAR
Status: COMPLETED | OUTPATIENT
Start: 2022-11-23 | End: 2022-11-23

## 2022-11-23 RX ADMIN — TRIAMCINOLONE ACETONIDE 40 MG: 40 INJECTION, SUSPENSION INTRA-ARTICULAR; INTRAMUSCULAR at 05:11

## 2022-11-23 NOTE — PROGRESS NOTES
CC: left knee pain    77 y.o. Male presents today for evaluation of his left knee pain. He admits to left knee pain for the past 1 month without known mechanism of injury. He gestures to the medial and posterior aspects of the left knee when asked where he hurts. He admits to feeling swollen and tight today.   How lon month  What makes it better: Ibuprofen   What makes it worse: Standing after being seated for a period of time  Does it radiate: Denies  Attempted treatments: Admits to taking ibuprofen as needed. Denies history of left knee surgery or injection.   Pain score: 0/10  Any mechanical symptoms: Denies  Feelings of instability: Denies  Affect on ADLs: Denies    PAST MEDICAL HISTORY:   Past Medical History:   Diagnosis Date    Allergy     seasonal    Cataract     Gout, joint     Hyperlipidemia     Hypertension     Ocular rosacea     Vitreous floaters        PAST SURGICAL HISTORY:   Past Surgical History:   Procedure Laterality Date    VASECTOMY  1988       FAMILY HISTORY:   Family History   Problem Relation Age of Onset    Hypertension Mother     Myasthenia gravis Mother         affects eye    Dementia Father     No Known Problems Sister     No Known Problems Brother     No Known Problems Daughter     Melanoma Neg Hx     Cataracts Neg Hx     Glaucoma Neg Hx     Macular degeneration Neg Hx        SOCIAL HISTORY:   Social History     Socioeconomic History    Marital status:    Occupational History    Occupation:      Employer: [employer]   Tobacco Use    Smoking status: Former     Packs/day: 0.50     Years: 15.00     Pack years: 7.50     Types: Cigarettes     Quit date: 10/19/1977     Years since quittin.1    Smokeless tobacco: Never   Substance and Sexual Activity    Alcohol use: Yes     Comment: 3-4 times weekly    Drug use: No    Sexual activity: Yes     Partners: Female     Comment:  with 3 children       MEDICATIONS:     Current Outpatient Medications:     allopurinoL  "(ZYLOPRIM) 300 MG tablet, TAKE 1 TABLET BY MOUTH EVERY DAY, Disp: 90 tablet, Rfl: 1    ivermectin (SOOLANTRA) 1 % Crea, AAA on face qhs and wash off in morning, Disp: 60 g, Rfl: 3    methylPREDNISolone (MEDROL DOSEPACK) 4 mg tablet, use as directed, Disp: 1 each, Rfl: 0    tamsulosin (FLOMAX) 0.4 mg Cap, Take 1 capsule (0.4 mg total) by mouth once daily., Disp: 90 capsule, Rfl: 1    testosterone cypionate (DEPOTESTOTERONE CYPIONATE) 200 mg/mL injection, , Disp: , Rfl:     valsartan (DIOVAN) 160 MG tablet, Take 1 tablet (160 mg total) by mouth once daily., Disp: 90 tablet, Rfl: 1    Current Facility-Administered Medications:     sodium hyaluronate (orthovisc) 30 mg, 30 mg, Intra-articular, 1 time in Clinic/HOD, Joe Solis DO    sodium hyaluronate (orthovisc) 30 mg, 30 mg, Intra-articular, 1 time in Clinic/HOD, Joe Solis DO    sodium hyaluronate (orthovisc) 30 mg, 30 mg, Intra-articular, 1 time in Clinic/HOD, Joe Solis DO    testosterone cypionate injection 200 mg, 200 mg, Intramuscular, Q14 Days, Michell Pinto NP, 200 mg at 11/22/22 0851    ALLERGIES:   Review of patient's allergies indicates:   Allergen Reactions    Doxycycline      HEADACHE (PRESSURE)    Statins-hmg-coa reductase inhibitors         PHYSICAL EXAMINATION:  /71   Ht 6' 1" (1.854 m)   Wt 105.2 kg (232 lb)   BMI 30.61 kg/m²   Vitals signs and nursing note have been reviewed.  General: In no acute distress, well developed, well nourished, no diaphoresis  Eyes: EOM full and smooth, no eye redness or discharge  HENT: normocephalic and atraumatic, neck supple, trachea midline, no nasal discharge, no external ear redness or discharge  Cardiovascular: 2+ and symmetric DP pulses bilaterally, no LE edema  Lungs: respirations non-labored, no conversational dyspnea   Abd: non-distended, no rigidity  MSK: no amputation or deformity, no swelling of extremities  Neuro: AAOx3, CN2-12 grossly intact  Skin: No rashes, warm and " dry  Psychiatric: cooperative, pleasant, mood and affect appropriate for age    MUSCULOSKELETAL EXAM:    LEFT KNEE EXAMINATION   Affected side is compared to contralateral knee     Observation:  No edema, erythema, ecchymosis noted.  Left suprapatellar effusion.  No muscle atrophy of the thighs and calves noted.  No obvious bony deformities noted.   No genu valgus/varum noted. No recurvatum noted.    No tibial internal/external torsion.      Tenderness:  Patella - none    Lateral joint line - present  Quad tendon - none   Medial joint line - present  Patellar tendon - none  Medial plica - none  Tibial tubercle - none   Lateral plica - none  Pes anserine - none   MCL prox - none  Distal ITB - none   MCL distal - none  MFC - none    LCL prox - none  LFC - none    LCL distal - none  Tibia - none    Fibula - none    No obvious bursae, plicae, popliteal cysts, or tendon derangement palpated.          ROM:   Active extension to 0° on left without hyperextension, lag, or patellar J sign.   Active extension to 0° on right without hyperextension, lag, or patellar J sign.   Crepitus present with extension bilaterally.  Active flexion to 135° on left and 135° on right.    Strength: (bilaterally)  Knee Flexion - 5/5 on left and 5/5 on right  Knee Extension - 5/5 on left and 5/5 on right  Hip Flexion - 5/5 on left and 5/5 on right  Hip Extension - 5/5 on left and 5/5 on right  Ankle dorsiflexion - 5/5 on left and 5/5 on right  Ankle Plantarflexion - 5/5 on left and 5/5 on right    Patellofemoral Exam:  Patellar ballottement - positive  Bulge sign - positive  Patellar grind - positive  No patellar laxity with medial and lateral translation   No apprehension with medial and lateral patellar translation.     Meniscus Testing:     No pain with terminal extension and flexion.  Melyssas test - negative   Bounce home test - negative    Ligament Testing:  Lachman's test - negative  No laxity with varus testing at 0 and 30 degrees.  No  "laxity with valgus testing at 0 and 30 degrees.    IT Band Testing:  Noble Compression test - negative    Neurovascular Examination:   Normal gait without antalgia.  Sensation intact to light touch in the obturator, lateral/intermediate/medial/posterior femoral cutaneous, saphenous, and common peroneal nerves bilaterally.  Pulses intact at the DP and PT arteries bilaterally.    Capillary refill intact <2 seconds in all toes bilaterally.      IMAGIN. X-ray obtained 2021 due to knee pain   2. X-ray images were reviewed personally by me and then directly with patient.  3. FINDINGS: X-ray images obtained demonstrate no fracture or dislocation, joint spaces maintained, degenerative changes bilaterally. Osteophytes at the femorotibial and patellofemoral joints. Narrowing of the medial compartments. Kellgren and Bubba grade 3.  4. IMPRESSION: As above.         PROCEDURE:  Large Joint Aspiration/Injection  Knee joint, left  Performed by: ISREAL TORRES  Authorized by: ISREAL TORRES  Consent Done?: Yes (Verbal and written)  Indications: Pain and joint effusion  Site marked: The procedure site was marked   Timeout: Prior to procedure the correct patient, procedure, and site was verified   Location: Knee joint, left  Prep: Patient was prepped with Chlorhexidine and alcohol.  Ultrasound Guidance for needle placement: yes  Procedure: Ethyl Chloride spray was used prior to skin puncture. After cold spray was applied, 2-4 cc's of 0.2% ropivacaine was injected into the skin and superficial tissue at the injection site using a 25G, 1.5" needle to form an anesthetic tunnel and ensure proper placement of the needle into the joint space. After local anesthetic was applied an 18G, 1.5 needle was used to enter the knee joint capsule under US guidance. 16 cc of clear synovial fluid was aspirated. Following aspiration, a 3 cc mixture of 1 cc of 40 mg/ml triamcinolone acetonide and 2 cc of 0.2% ropivacaine were injected " into the knee joint.   Approach: superiorlateral  Medications: 40 mg triamcinolone acetonide 40 mg/mL  Patient tolerance: Patient tolerated the procedure well with no immediate complications. Improvement noted after aspiration. Patient was wrapped following bandaging.    Ultrasound guidance was used for needle localization with [employer] Proteolabte Edge 2, 15-6 MHz (L)probe(s). Images were saved and stored for documentation. Short and long axis images of the anterior knee were taken prior to injection confirming presence of joint effusion. The knee joint well visualized. Dynamic visualization of the needles was continuous throughout the procedure and maintained good position and correct needle placement.    Triamcinolone:  NDC: [address]-5  LOT: [id number]  EXP: 08/2024      ASSESSMENT:      ICD-10-CM ICD-9-CM   1. Acute pain of left knee  M25.562 719.46   2. Localized osteoarthritis of left knee  M17.12 715.36         PLAN:  1-2.  Acute left knee pain/osteoarthritis - new issue to provider    - Arnaldo admits to left knee pain for the past 1 month without known mechanism of injury. He admits to feeling swollen posteriorly.     - XRs obtained 04/22/2021 and images were personally reviewed with the patient. See above for further detail.    - We reviewed the natural history of osteoarthritis and a multipronged treatment approach. We reviewed the importance of addressing three different aspects to best manage this condition. Controlling the intra-articular immune reaction through medications and/or injections, improving local stability through bracing and/or injection, and improving functional stability through hip, core, and ankle strength, stability and mobility which may benefit from formal physical therapy.    - After discussing options, he elects to proceed with ultrasound guided left intra-articular knee CSI. See above for procedure details.       Future planning includes - update imaging, possible repeat CSI in 3 months,  viscosupplementation    All questions were answered to the best of my ability and all concerns were addressed at this time.    Follow up in 12 weeks for above, or sooner if needed.      This note is dictated using the M*Modal Fluency Direct word recognition program. There are word recognition mistakes that are occasionally missed on review.      Total time spent face-to face with patient counseling or coordinating care including prognosis, differential diagnosis, risks and benefits of treatment, instructions, compliance risk reductions as well as non-face-to-face time personally spent reviewing medial record, medical documentation, and coordination of care.     EST MINUTES X   [id number] 10-19    28832 20-29    92404 30-39 X   99215 40-54    NEW     14407 15-29    38219 30-44    87930 45-59    [id number] 60-74    PHONE      5-10    [id number] 11-20    [id number] 21-30

## 2022-11-28 ENCOUNTER — PES CALL (OUTPATIENT)
Dept: ADMINISTRATIVE | Facility: CLINIC | Age: 77
End: 2022-11-28
Payer: MEDICARE

## 2022-11-29 ENCOUNTER — OFFICE VISIT (OUTPATIENT)
Dept: INTERNAL MEDICINE | Facility: CLINIC | Age: 77
End: 2022-11-29
Payer: MEDICARE

## 2022-11-29 DIAGNOSIS — U07.1 COVID: Primary | ICD-10-CM

## 2022-11-29 PROCEDURE — 99213 OFFICE O/P EST LOW 20 MIN: CPT | Mod: 95,,, | Performed by: STUDENT IN AN ORGANIZED HEALTH CARE EDUCATION/TRAINING PROGRAM

## 2022-11-29 PROCEDURE — 99213 PR OFFICE/OUTPT VISIT, EST, LEVL III, 20-29 MIN: ICD-10-PCS | Mod: 95,,, | Performed by: STUDENT IN AN ORGANIZED HEALTH CARE EDUCATION/TRAINING PROGRAM

## 2022-11-29 NOTE — PROGRESS NOTES
Subjective:      Chief Complaint: COVID    HPI  The patient location is:  patient's home  The chief complaint leading to consultation is:  COVID  Visit type: Virtual visit with synchronous audio and video  Total time spent with patient:  20 mins   Each patient to whom he or she provides medical services by telemedicine is:  (1) informed of the relationship between the physician and patient and the respective role of any other health care provider with respect to management of the patient; and (2) notified that he or she may decline to receive medical services by telemedicine and may withdraw from such care at any time.  Patient agreed to this telemedicine visit today in an effort to avoid face-to-face visits due to the current COVID 19 pandemic.    Mr. Arnaldo Donvoan is a 77-year-old man, establish with a colleague but new to me, with medical history most pertinent for aortic atherosclerosis, hypertension, and hyperlipidemia presenting via telemedicine to discuss recent diagnosis of COVID.    Covid:  - Flew back from [place] on the 21st  - Symptomatic on the 24th: Sneezing and runny nose  - Tested Positive last night   - Symptoms (now just sinus congestion) are near resolved       Review of Systems   Constitutional:  Negative for activity change and unexpected weight change.   HENT:  Positive for rhinorrhea. Negative for hearing loss and trouble swallowing.    Eyes:  Negative for discharge and visual disturbance.   Respiratory:  Negative for chest tightness and wheezing.    Cardiovascular:  Negative for chest pain and palpitations.   Gastrointestinal:  Negative for blood in stool, constipation, diarrhea and vomiting.   Endocrine: Negative for polydipsia and polyuria.   Genitourinary:  Negative for difficulty urinating, hematuria and urgency.   Musculoskeletal:  Negative for arthralgias, joint swelling and neck pain.   Neurological:  Negative for weakness and headaches.   Psychiatric/Behavioral:  Negative for  confusion and dysphoric mood.        Objective:      There were no vitals filed for this visit.   Physical Exam  Vitals reviewed: Unable to obtain given constraints of telemedicine.     Current Outpatient Medications on File Prior to Visit   Medication Sig Dispense Refill    allopurinoL (ZYLOPRIM) 300 MG tablet TAKE 1 TABLET BY MOUTH EVERY DAY 90 tablet 1    ivermectin (SOOLANTRA) 1 % Crea AAA on face qhs and wash off in morning 60 g 3    methylPREDNISolone (MEDROL DOSEPACK) 4 mg tablet use as directed 1 each 0    tamsulosin (FLOMAX) 0.4 mg Cap Take 1 capsule (0.4 mg total) by mouth once daily. 90 capsule 1    testosterone cypionate (DEPOTESTOTERONE CYPIONATE) 200 mg/mL injection       valsartan (DIOVAN) 160 MG tablet Take 1 tablet (160 mg total) by mouth once daily. 90 tablet 1     Current Facility-Administered Medications on File Prior to Visit   Medication Dose Route Frequency Provider Last Rate Last Admin    sodium hyaluronate (orthovisc) 30 mg  30 mg Intra-articular 1 time in Clinic/HOD Joe Solis DO        sodium hyaluronate (orthovisc) 30 mg  30 mg Intra-articular 1 time in Clinic/HOD Joe Solis DO        sodium hyaluronate (orthovisc) 30 mg  30 mg Intra-articular 1 time in Clinic/HOD Joe Solis DO        testosterone cypionate injection 200 mg  200 mg Intramuscular Q14 Days Michell Pinto, NP   200 mg at 11/22/22 0851         Assessment:       1. COVID        Plan:       COVID    - As symptoms are very mild and the Pt seems to be on day 5 of illness, will elect for conservative OTC therapy and aggressive self monitoring.   - RTC PRN

## 2022-11-30 ENCOUNTER — PATIENT MESSAGE (OUTPATIENT)
Dept: INTERNAL MEDICINE | Facility: CLINIC | Age: 77
End: 2022-11-30
Payer: MEDICARE

## 2022-11-30 NOTE — TELEPHONE ENCOUNTER
I spoke to pt and advised him to go to ED for eval.  Pt states he doesn't want to go to Ed and is feeling a little better.  Pt was advised if his symptoms worsen to contact his PCP for an appt or go to ED.  Pt verbalized understanding

## 2022-11-30 NOTE — TELEPHONE ENCOUNTER
I tried calling pt to discuss his [employer] message.  No answer at home or cell numbers.  I left VM on cell for pt to call the clinic and to check the portal for a more detailed message

## 2022-12-01 ENCOUNTER — HOSPITAL ENCOUNTER (EMERGENCY)
Facility: OTHER | Age: 77
Discharge: HOME | End: 2022-12-01
Attending: EMERGENCY MEDICINE
Payer: MEDICARE

## 2022-12-01 ENCOUNTER — PATIENT MESSAGE (OUTPATIENT)
Dept: PRIMARY CARE CLINIC | Facility: CLINIC | Age: 77
End: 2022-12-01
Payer: MEDICARE

## 2022-12-01 VITALS
HEART RATE: 68 BPM | OXYGEN SATURATION: 100 % | SYSTOLIC BLOOD PRESSURE: 185 MMHG | BODY MASS INDEX: 30.34 KG/M2 | TEMPERATURE: 98 F | WEIGHT: 230 LBS | DIASTOLIC BLOOD PRESSURE: 81 MMHG | RESPIRATION RATE: 15 BRPM

## 2022-12-01 DIAGNOSIS — R04.2 HEMOPTYSIS: Primary | ICD-10-CM

## 2022-12-01 DIAGNOSIS — U07.1 COVID-19: ICD-10-CM

## 2022-12-01 DIAGNOSIS — R07.89 CHEST DISCOMFORT: ICD-10-CM

## 2022-12-01 LAB
ANION GAP SERPL CALC-SCNC: 8 MMOL/L (ref 8–16)
BASOPHILS # BLD AUTO: 0.02 K/UL (ref 0–0.2)
BASOPHILS NFR BLD: 0.2 % (ref 0–1.9)
BUN SERPL-MCNC: 17 MG/DL (ref 8–23)
CALCIUM SERPL-MCNC: 9 MG/DL (ref 8.7–10.5)
CHLORIDE SERPL-SCNC: 106 MMOL/L (ref 95–110)
CO2 SERPL-SCNC: 25 MMOL/L (ref 23–29)
CREAT SERPL-MCNC: 1 MG/DL (ref 0.5–1.4)
CREAT SERPL-MCNC: 1 MG/DL (ref 0.5–1.4)
DIFFERENTIAL METHOD: ABNORMAL
EOSINOPHIL # BLD AUTO: 0.2 K/UL (ref 0–0.5)
EOSINOPHIL NFR BLD: 2.2 % (ref 0–8)
ERYTHROCYTE [DISTWIDTH] IN BLOOD BY AUTOMATED COUNT: 16.8 % (ref 11.5–14.5)
EST. GFR  (NO RACE VARIABLE): >60 ML/MIN/1.73 M^2
GLUCOSE SERPL-MCNC: 123 MG/DL (ref 70–110)
HCO3 UR-SCNC: 30.3 MMOL/L (ref 24–28)
HCT VFR BLD AUTO: 47.1 % (ref 40–54)
HCT VFR BLD CALC: 50 %PCV (ref 36–54)
HGB BLD-MCNC: 14.8 G/DL (ref 14–18)
HGB BLD-MCNC: 17 G/DL
IMM GRANULOCYTES # BLD AUTO: 0.03 K/UL (ref 0–0.04)
IMM GRANULOCYTES NFR BLD AUTO: 0.4 % (ref 0–0.5)
LYMPHOCYTES # BLD AUTO: 2.2 K/UL (ref 1–4.8)
LYMPHOCYTES NFR BLD: 25.9 % (ref 18–48)
MCH RBC QN AUTO: 27 PG (ref 27–31)
MCHC RBC AUTO-ENTMCNC: 31.4 G/DL (ref 32–36)
MCV RBC AUTO: 86 FL (ref 82–98)
MONOCYTES # BLD AUTO: 1 K/UL (ref 0.3–1)
MONOCYTES NFR BLD: 11.3 % (ref 4–15)
NEUTROPHILS # BLD AUTO: 5.1 K/UL (ref 1.8–7.7)
NEUTROPHILS NFR BLD: 60 % (ref 38–73)
NRBC BLD-RTO: 0 /100 WBC
PCO2 BLDA: 55.3 MMHG (ref 35–45)
PH SMN: 7.35 [PH] (ref 7.35–7.45)
PLATELET # BLD AUTO: 239 K/UL (ref 150–450)
PMV BLD AUTO: 10.5 FL (ref 9.2–12.9)
PO2 BLDA: 21 MMHG (ref 40–60)
POC BE: 5 MMOL/L
POC IONIZED CALCIUM: 1.28 MMOL/L (ref 1.06–1.42)
POC SATURATED O2: 30 % (ref 95–100)
POC TCO2: 32 MMOL/L (ref 24–29)
POTASSIUM BLD-SCNC: 4.5 MMOL/L (ref 3.5–5.1)
POTASSIUM SERPL-SCNC: 4.5 MMOL/L (ref 3.5–5.1)
PROCALCITONIN SERPL IA-MCNC: 0.09 NG/ML
RBC # BLD AUTO: 5.48 M/UL (ref 4.6–6.2)
SAMPLE: ABNORMAL
SAMPLE: NORMAL
SODIUM BLD-SCNC: 139 MMOL/L (ref 136–145)
SODIUM SERPL-SCNC: 139 MMOL/L (ref 136–145)
TROPONIN I SERPL DL<=0.01 NG/ML-MCNC: <0.006 NG/ML (ref 0–0.03)
WBC # BLD AUTO: 8.51 K/UL (ref 3.9–12.7)

## 2022-12-01 PROCEDURE — 84484 ASSAY OF TROPONIN QUANT: CPT | Performed by: PHYSICIAN ASSISTANT

## 2022-12-01 PROCEDURE — 99285 EMERGENCY DEPT VISIT HI MDM: CPT | Mod: 25

## 2022-12-01 PROCEDURE — 99900035 HC TECH TIME PER 15 MIN (STAT)

## 2022-12-01 PROCEDURE — 80048 BASIC METABOLIC PNL TOTAL CA: CPT | Performed by: PHYSICIAN ASSISTANT

## 2022-12-01 PROCEDURE — 93010 ELECTROCARDIOGRAM REPORT: CPT | Mod: ,,, | Performed by: INTERNAL MEDICINE

## 2022-12-01 PROCEDURE — 93010 EKG 12-LEAD: ICD-10-PCS | Mod: ,,, | Performed by: INTERNAL MEDICINE

## 2022-12-01 PROCEDURE — 82803 BLOOD GASES ANY COMBINATION: CPT

## 2022-12-01 PROCEDURE — 85025 COMPLETE CBC W/AUTO DIFF WBC: CPT | Performed by: PHYSICIAN ASSISTANT

## 2022-12-01 PROCEDURE — 93005 ELECTROCARDIOGRAM TRACING: CPT

## 2022-12-01 PROCEDURE — 82565 ASSAY OF CREATININE: CPT

## 2022-12-01 PROCEDURE — 25500020 PHARM REV CODE 255: Performed by: PHYSICIAN ASSISTANT

## 2022-12-01 PROCEDURE — 84145 PROCALCITONIN (PCT): CPT | Performed by: PHYSICIAN ASSISTANT

## 2022-12-01 RX ADMIN — IOHEXOL 100 ML: 350 INJECTION, SOLUTION INTRAVENOUS at 10:12

## 2022-12-02 RX ORDER — AZITHROMYCIN 250 MG/1
250 TABLET, FILM COATED ORAL DAILY
Qty: 6 TABLET | Refills: 0 | Status: SHIPPED | OUTPATIENT
Start: 2022-12-02 | End: 2023-04-18

## 2022-12-02 NOTE — ED TRIAGE NOTES
Pt presents to ED c/o cough, fever, chills, blood in sputum which began yesterday with symptoms worsening today. Pt reports HX of Covid as of 11/26/22. Pt is alert and oriented x 4, in no acute distress. Respirations are even and non labored.

## 2022-12-02 NOTE — ED PROVIDER NOTES
Source of History:  Patient     Chief complaint:  Hemoptysis (Blood noted in mucous about two days ago. /Pt denies any pain associated.)      HPI:  Arnaldo Donovan is a 77 y.o. male with hypertension and hyperlipidemia who is presenting to the emergency department with hemoptysis.  Patient had positive home COVID test on 11/26.  He originally had mild symptoms.  Beginning 11/29 he developed a cough that worsened overnight.  He had chills and a low-grade fever.  Yesterday morning he noticed trace amount of blood in his mucus.  Today blood has been more prominent and present with every sputum production.  He has not had a fever today.  He reports mild, midsternal chest discomfort.  He denies shortness of breath.  He does not take blood thinners  This is the extent to the patients complaints today here in the emergency department.    ROS: As per HPI and below:  General: + fever and chills improving. No fatigue.  Eyes: No visual changes.  ENT: No sore throat. No congestion.  Head: No headache.    Respiratory: No shortness of breath.  + cough with blood  Cardiovascular: + mild chest discomfort   Abdomen: No abdominal pain.  No nausea or vomiting.  Genito-Urinary: No abnormal urination.  Neurologic: No focal weakness.  No numbness.  MSK: no back pain.  Integument: No rashes or lesions.  Allergy/immunology:  Not immunocompromised.     Review of patient's allergies indicates:   Allergen Reactions    Doxycycline      HEADACHE (PRESSURE)    Statins-hmg-coa reductase inhibitors        PMH:  As per HPI and below:  Past Medical History:   Diagnosis Date    Allergy     seasonal    Cataract     Gout, joint     Hyperlipidemia     Hypertension     Ocular rosacea     Vitreous floaters      Past Surgical History:   Procedure Laterality Date    VASECTOMY  1988       Social History     Tobacco Use    Smoking status: Former     Packs/day: 0.50     Years: 15.00     Pack years: 7.50     Types: Cigarettes     Quit date: 10/19/1977      Years since quittin.1    Smokeless tobacco: Never   Substance Use Topics    Alcohol use: Yes     Comment: 3-4 times weekly    Drug use: No       Physical Exam:    BP (!) 173/79 (BP Location: Left arm, Patient Position: Sitting)   Pulse 85   Temp 98.2 °F (36.8 °C) (Oral)   Resp 17   Wt 104.3 kg (230 lb)   SpO2 97%   BMI 30.34 kg/m²   Nursing note and vital signs reviewed.  Appearance: No acute distress.  Nontoxic appearing.  Eyes: No conjunctival injection.  ENT: Oropharynx clear.    Chest/ Respiratory: Clear to auscultation bilaterally.  Good air movement.  No wheezes.  No rhonchi. No rales. No accessory muscle use.  Cardiovascular: Regular rate and rhythm.  No murmurs. No gallops. No rubs.  Musculoskeletal: Good range of motion all joints.  No deformities.  Neck supple.  No meningismus.  Skin: No rashes seen.  Good turgor.  No abrasions.  No ecchymoses.  Neurologic: Motor intact.  Sensation intact.   Mental Status:  Alert and oriented x 3.  Appropriate, conversant.   Labs that have been ordered have been independently reviewed and interpreted by myself.    I decided to obtain the patient's medical records.      MDM/ Differential Dx:    77 y.o. male presenting to emergency department with blood-tinged sputum.  Had recent COVID diagnosis.  He is afebrile, nontoxic appearing hemodynamically stable.  He is not hypoxic or tachypneic.  No reported shortness of breath.  Low suspicion for PE but will obtain CTA and blood work.    ED Course as of 22 1532   Thu Dec 01, 2022   2223 Blood work is reassuring.  Procalcitonin normal.  Troponin negative.    Patient did want to leave prior to CTA results.  He is reliable and I will call him with any abnormal findings [AG]   2225 EKG interpreted by myself  Normal sinus rhythm at a rate of 67 beats per minute.  No STEMI. Normal intervals  [AG]      ED Course User Index  [AG] Sunny Eli PA-C     - we were able to get in touch with patient regarding CTA  findings and left upper lobe focal patchy opacities and consolidative changes.  Called in [facility] to preferred pharmacy.        Diagnostic Impression:    1. Hemoptysis    2. Chest discomfort    3. COVID-19                   Sunny Eli PA-C  12/03/22 1830

## 2022-12-06 ENCOUNTER — OFFICE VISIT (OUTPATIENT)
Dept: PRIMARY CARE CLINIC | Facility: CLINIC | Age: 77
End: 2022-12-06
Attending: FAMILY MEDICINE
Payer: MEDICARE

## 2022-12-06 ENCOUNTER — CLINICAL SUPPORT (OUTPATIENT)
Dept: UROLOGY | Facility: CLINIC | Age: 77
End: 2022-12-06
Payer: MEDICARE

## 2022-12-06 ENCOUNTER — PATIENT MESSAGE (OUTPATIENT)
Dept: PRIMARY CARE CLINIC | Facility: CLINIC | Age: 77
End: 2022-12-06

## 2022-12-06 DIAGNOSIS — Z86.16 HISTORY OF COVID-19: ICD-10-CM

## 2022-12-06 DIAGNOSIS — E29.1 HYPOGONADISM IN MALE: Primary | ICD-10-CM

## 2022-12-06 DIAGNOSIS — I70.0 ATHEROSCLEROSIS OF AORTA: ICD-10-CM

## 2022-12-06 DIAGNOSIS — I10 ESSENTIAL HYPERTENSION: Primary | ICD-10-CM

## 2022-12-06 DIAGNOSIS — R79.89 ABNORMAL CBC: ICD-10-CM

## 2022-12-06 PROCEDURE — 96372 PR INJECTION,THERAP/PROPH/DIAG2ST, IM OR SUBCUT: ICD-10-PCS | Mod: S$GLB,,, | Performed by: NURSE PRACTITIONER

## 2022-12-06 PROCEDURE — 99442 PR PHYSICIAN TELEPHONE EVALUATION 11-20 MIN: CPT | Mod: HCNC,95,, | Performed by: FAMILY MEDICINE

## 2022-12-06 PROCEDURE — 99442 PR PHYSICIAN TELEPHONE EVALUATION 11-20 MIN: ICD-10-PCS | Mod: HCNC,95,, | Performed by: FAMILY MEDICINE

## 2022-12-06 PROCEDURE — 96372 THER/PROPH/DIAG INJ SC/IM: CPT | Mod: S$GLB,,, | Performed by: NURSE PRACTITIONER

## 2022-12-06 RX ADMIN — TESTOSTERONE CYPIONATE 200 MG: 200 INJECTION, SOLUTION INTRAMUSCULAR at 08:12

## 2022-12-07 ENCOUNTER — TELEPHONE (OUTPATIENT)
Dept: PRIMARY CARE CLINIC | Facility: CLINIC | Age: 77
End: 2022-12-07
Payer: MEDICARE

## 2022-12-07 NOTE — TELEPHONE ENCOUNTER
----- Message from Xiao Turner MD sent at 12/6/2022  1:44 PM CST -----  Jelani Solomon schedule CBC for patient in 2-3 weeks. ALOK Greco

## 2022-12-13 ENCOUNTER — PATIENT MESSAGE (OUTPATIENT)
Dept: PRIMARY CARE CLINIC | Facility: CLINIC | Age: 77
End: 2022-12-13

## 2022-12-13 ENCOUNTER — CLINICAL SUPPORT (OUTPATIENT)
Dept: PRIMARY CARE CLINIC | Facility: CLINIC | Age: 77
End: 2022-12-13
Payer: MEDICARE

## 2022-12-13 ENCOUNTER — LAB VISIT (OUTPATIENT)
Dept: LAB | Facility: OTHER | Age: 77
End: 2022-12-13
Attending: NURSE PRACTITIONER
Payer: MEDICARE

## 2022-12-13 ENCOUNTER — TELEPHONE (OUTPATIENT)
Dept: PRIMARY CARE CLINIC | Facility: CLINIC | Age: 77
End: 2022-12-13

## 2022-12-13 VITALS — SYSTOLIC BLOOD PRESSURE: 148 MMHG | HEART RATE: 68 BPM | DIASTOLIC BLOOD PRESSURE: 80 MMHG | OXYGEN SATURATION: 98 %

## 2022-12-13 DIAGNOSIS — I10 ESSENTIAL HYPERTENSION: Primary | ICD-10-CM

## 2022-12-13 DIAGNOSIS — E29.1 HYPOGONADISM MALE: ICD-10-CM

## 2022-12-13 DIAGNOSIS — R79.89 ABNORMAL CBC: Primary | ICD-10-CM

## 2022-12-13 DIAGNOSIS — I70.0 ATHEROSCLEROSIS OF AORTA: ICD-10-CM

## 2022-12-13 LAB
BASOPHILS # BLD AUTO: 0.04 K/UL (ref 0–0.2)
BASOPHILS NFR BLD: 0.6 % (ref 0–1.9)
DIFFERENTIAL METHOD: ABNORMAL
EOSINOPHIL # BLD AUTO: 0.3 K/UL (ref 0–0.5)
EOSINOPHIL NFR BLD: 4.8 % (ref 0–8)
ERYTHROCYTE [DISTWIDTH] IN BLOOD BY AUTOMATED COUNT: 15.9 % (ref 11.5–14.5)
HCT VFR BLD AUTO: 47.5 % (ref 40–54)
HGB BLD-MCNC: 15.3 G/DL (ref 14–18)
IMM GRANULOCYTES # BLD AUTO: 0.02 K/UL (ref 0–0.04)
IMM GRANULOCYTES NFR BLD AUTO: 0.3 % (ref 0–0.5)
LYMPHOCYTES # BLD AUTO: 2.1 K/UL (ref 1–4.8)
LYMPHOCYTES NFR BLD: 32.7 % (ref 18–48)
MCH RBC QN AUTO: 27.4 PG (ref 27–31)
MCHC RBC AUTO-ENTMCNC: 32.2 G/DL (ref 32–36)
MCV RBC AUTO: 85 FL (ref 82–98)
MONOCYTES # BLD AUTO: 0.6 K/UL (ref 0.3–1)
MONOCYTES NFR BLD: 8.4 % (ref 4–15)
NEUTROPHILS # BLD AUTO: 3.5 K/UL (ref 1.8–7.7)
NEUTROPHILS NFR BLD: 53.2 % (ref 38–73)
NRBC BLD-RTO: 0 /100 WBC
PLATELET # BLD AUTO: 272 K/UL (ref 150–450)
PMV BLD AUTO: 10.9 FL (ref 9.2–12.9)
RBC # BLD AUTO: 5.59 M/UL (ref 4.6–6.2)
TESTOST SERPL-MCNC: 1155 NG/DL (ref 304–1227)
WBC # BLD AUTO: 6.51 K/UL (ref 3.9–12.7)

## 2022-12-13 PROCEDURE — 36415 COLL VENOUS BLD VENIPUNCTURE: CPT | Performed by: NURSE PRACTITIONER

## 2022-12-13 PROCEDURE — 84403 ASSAY OF TOTAL TESTOSTERONE: CPT | Performed by: NURSE PRACTITIONER

## 2022-12-13 PROCEDURE — 85025 COMPLETE CBC W/AUTO DIFF WBC: CPT | Performed by: NURSE PRACTITIONER

## 2022-12-13 PROCEDURE — 99999 PR PBB SHADOW E&M-EST. PATIENT-LVL I: ICD-10-PCS | Mod: PBBFAC,,,

## 2022-12-13 PROCEDURE — 99999 PR PBB SHADOW E&M-EST. PATIENT-LVL I: CPT | Mod: PBBFAC,,,

## 2022-12-13 RX ORDER — VALSARTAN 320 MG/1
320 TABLET ORAL DAILY
Qty: 90 TABLET | Refills: 3 | Status: SHIPPED | OUTPATIENT
Start: 2022-12-13 | End: 2024-01-16

## 2022-12-13 NOTE — TELEPHONE ENCOUNTER
Adjusted med to 320 mg once daily of Valsartan. Repeat BP in 1 mos with nurse.Ses of med left on voicemail.

## 2022-12-13 NOTE — PROGRESS NOTES
Arnaldo Donovan 77 y.o. male is here for Blood Pressure check. in person    Manual Blood pressure reading was  148/80, Pulse 68. (Checked at the end of the visit)    If high, was it repeated after 15 minutes? no    Pt's Home blood pressure machine read in office 144/86, Pulse 65.     Diagnosed with Hypertension yes.    Patient took blood pressure medication today yes.  Last dose of blood pressure medication was taken at 10 PM. Patient took Valsartan 160 mg.    Patient states that if Dr. Turner adds another medication to he's regimen, he does not want to take Amlodipine ever again. Pt states that the Amlodipine cause his feet to swell and he valverde snot want to take this med.       All Medications and OTC medication updated yes    Does patient have record of home blood pressure readings / Blood Pressure Log yes.     Does the pt have any complaints today in regards to their blood pressure medication? no. Patient is asymptomatic.     Were you sitting still for 5-10 minutes prior to taking your Blood pressure? yes     Has your blood pressure monitor ever been checked? yes When was last time we checked your blood pressure monitor? today    Updated vitals yes    Pt does not have a follow up date no.     Dr. Turner notified.     Creatinine   Date Value Ref Range Status   12/01/2022 1.0 0.5 - 1.4 mg/dL Final     Sodium   Date Value Ref Range Status   12/01/2022 139 136 - 145 mmol/L Final     Potassium   Date Value Ref Range Status   12/01/2022 4.5 3.5 - 5.1 mmol/L Final

## 2022-12-13 NOTE — TELEPHONE ENCOUNTER
Arnaldo Donovan 77 y.o. male is here for Blood Pressure check. in person    Manual Blood pressure reading was  148/80, Pulse 68. (Checked at the end of the visit)    If high, was it repeated after 15 minutes? no    Pt's Home blood pressure machine read in office 144/86, Pulse 65.     Diagnosed with Hypertension yes.    Patient took blood pressure medication today yes.  Last dose of blood pressure medication was taken at 10 PM. Patient took Valsartan 160 mg.    Patient states that if Dr. Turner adds another medication to he's regimen, he does not want to take Amlodipine ever again. Pt states that the Amlodipine cause his feet to swell and he valverde snot want to take this med.       All Medications and OTC medication updated yes    Does patient have record of home blood pressure readings / Blood Pressure Log yes.     Does the pt have any complaints today in regards to their blood pressure medication? no. Patient is asymptomatic.     Were you sitting still for 5-10 minutes prior to taking your Blood pressure? yes     Has your blood pressure monitor ever been checked? yes When was last time we checked your blood pressure monitor? today    Updated vitals yes    Pt does not have a follow up date no.     Dr. Turner notified.

## 2022-12-13 NOTE — TELEPHONE ENCOUNTER
Informed pt that valsartan 320 mg was sent in and that he can take 2 of the valsartan 160 mg together until he runs out. Pt verbalized understanding. Pt is schedule for a nurse visit on 1/18/22. Pt verbalized understanding.

## 2022-12-20 ENCOUNTER — CLINICAL SUPPORT (OUTPATIENT)
Dept: UROLOGY | Facility: CLINIC | Age: 77
End: 2022-12-20
Payer: MEDICARE

## 2022-12-20 DIAGNOSIS — E29.1 HYPOGONADISM IN MALE: Primary | ICD-10-CM

## 2022-12-20 PROCEDURE — 96372 THER/PROPH/DIAG INJ SC/IM: CPT | Mod: S$GLB,,, | Performed by: NURSE PRACTITIONER

## 2022-12-20 PROCEDURE — 96372 PR INJECTION,THERAP/PROPH/DIAG2ST, IM OR SUBCUT: ICD-10-PCS | Mod: S$GLB,,, | Performed by: NURSE PRACTITIONER

## 2022-12-20 RX ADMIN — TESTOSTERONE CYPIONATE 200 MG: 200 INJECTION, SOLUTION INTRAMUSCULAR at 08:12

## 2022-12-21 ENCOUNTER — PATIENT MESSAGE (OUTPATIENT)
Dept: PRIMARY CARE CLINIC | Facility: CLINIC | Age: 77
End: 2022-12-21
Payer: MEDICARE

## 2022-12-22 ENCOUNTER — PATIENT MESSAGE (OUTPATIENT)
Dept: PRIMARY CARE CLINIC | Facility: CLINIC | Age: 77
End: 2022-12-22
Payer: MEDICARE

## 2023-01-02 DIAGNOSIS — E29.1 HYPOGONADISM IN MALE: Primary | ICD-10-CM

## 2023-01-03 ENCOUNTER — CLINICAL SUPPORT (OUTPATIENT)
Dept: UROLOGY | Facility: CLINIC | Age: 78
End: 2023-01-03
Payer: MEDICARE

## 2023-01-03 DIAGNOSIS — E29.1 HYPOGONADISM IN MALE: Primary | ICD-10-CM

## 2023-01-03 PROCEDURE — 96372 THER/PROPH/DIAG INJ SC/IM: CPT | Mod: HCNC,S$GLB,, | Performed by: NURSE PRACTITIONER

## 2023-01-03 PROCEDURE — 96372 PR INJECTION,THERAP/PROPH/DIAG2ST, IM OR SUBCUT: ICD-10-PCS | Mod: HCNC,S$GLB,, | Performed by: NURSE PRACTITIONER

## 2023-01-03 RX ADMIN — TESTOSTERONE CYPIONATE 200 MG: 200 INJECTION, SOLUTION INTRAMUSCULAR at 08:01

## 2023-01-11 ENCOUNTER — OFFICE VISIT (OUTPATIENT)
Dept: HEMATOLOGY/ONCOLOGY | Facility: CLINIC | Age: 78
End: 2023-01-11
Attending: FAMILY MEDICINE
Payer: MEDICARE

## 2023-01-11 VITALS
DIASTOLIC BLOOD PRESSURE: 81 MMHG | HEART RATE: 87 BPM | HEIGHT: 73 IN | WEIGHT: 223.13 LBS | TEMPERATURE: 98 F | BODY MASS INDEX: 29.57 KG/M2 | RESPIRATION RATE: 16 BRPM | SYSTOLIC BLOOD PRESSURE: 145 MMHG | OXYGEN SATURATION: 97 %

## 2023-01-11 DIAGNOSIS — R79.89 ABNORMAL CBC: ICD-10-CM

## 2023-01-11 PROCEDURE — 3077F PR MOST RECENT SYSTOLIC BLOOD PRESSURE >= 140 MM HG: ICD-10-PCS | Mod: HCNC,CPTII,S$GLB, | Performed by: INTERNAL MEDICINE

## 2023-01-11 PROCEDURE — 1101F PR PT FALLS ASSESS DOC 0-1 FALLS W/OUT INJ PAST YR: ICD-10-PCS | Mod: HCNC,CPTII,S$GLB, | Performed by: INTERNAL MEDICINE

## 2023-01-11 PROCEDURE — 1159F PR MEDICATION LIST DOCUMENTED IN MEDICAL RECORD: ICD-10-PCS | Mod: HCNC,CPTII,S$GLB, | Performed by: INTERNAL MEDICINE

## 2023-01-11 PROCEDURE — 99999 PR PBB SHADOW E&M-EST. PATIENT-LVL IV: CPT | Mod: PBBFAC,HCNC,, | Performed by: INTERNAL MEDICINE

## 2023-01-11 PROCEDURE — 3288F FALL RISK ASSESSMENT DOCD: CPT | Mod: HCNC,CPTII,S$GLB, | Performed by: INTERNAL MEDICINE

## 2023-01-11 PROCEDURE — 3079F PR MOST RECENT DIASTOLIC BLOOD PRESSURE 80-89 MM HG: ICD-10-PCS | Mod: HCNC,CPTII,S$GLB, | Performed by: INTERNAL MEDICINE

## 2023-01-11 PROCEDURE — 3079F DIAST BP 80-89 MM HG: CPT | Mod: HCNC,CPTII,S$GLB, | Performed by: INTERNAL MEDICINE

## 2023-01-11 PROCEDURE — 99203 PR OFFICE/OUTPT VISIT, NEW, LEVL III, 30-44 MIN: ICD-10-PCS | Mod: HCNC,S$GLB,, | Performed by: INTERNAL MEDICINE

## 2023-01-11 PROCEDURE — 1101F PT FALLS ASSESS-DOCD LE1/YR: CPT | Mod: HCNC,CPTII,S$GLB, | Performed by: INTERNAL MEDICINE

## 2023-01-11 PROCEDURE — 3288F PR FALLS RISK ASSESSMENT DOCUMENTED: ICD-10-PCS | Mod: HCNC,CPTII,S$GLB, | Performed by: INTERNAL MEDICINE

## 2023-01-11 PROCEDURE — 1159F MED LIST DOCD IN RCRD: CPT | Mod: HCNC,CPTII,S$GLB, | Performed by: INTERNAL MEDICINE

## 2023-01-11 PROCEDURE — 3077F SYST BP >= 140 MM HG: CPT | Mod: HCNC,CPTII,S$GLB, | Performed by: INTERNAL MEDICINE

## 2023-01-11 PROCEDURE — 99203 OFFICE O/P NEW LOW 30 MIN: CPT | Mod: HCNC,S$GLB,, | Performed by: INTERNAL MEDICINE

## 2023-01-11 PROCEDURE — 99999 PR PBB SHADOW E&M-EST. PATIENT-LVL IV: ICD-10-PCS | Mod: PBBFAC,HCNC,, | Performed by: INTERNAL MEDICINE

## 2023-01-11 PROCEDURE — 1126F AMNT PAIN NOTED NONE PRSNT: CPT | Mod: HCNC,CPTII,S$GLB, | Performed by: INTERNAL MEDICINE

## 2023-01-11 PROCEDURE — 1126F PR PAIN SEVERITY QUANTIFIED, NO PAIN PRESENT: ICD-10-PCS | Mod: HCNC,CPTII,S$GLB, | Performed by: INTERNAL MEDICINE

## 2023-01-11 NOTE — PROGRESS NOTES
[facility]         Chief Complaint:   Encounter Diagnosis   Name Primary?    Abnormal CBC        Cancer Staging   No matching staging information was found for the patient.    HPI:  Arnaldo Donovan is a 77 y.o. male who presents today for evaluation of elevated RDW.     Hematology history  Patient presented to hematology clinic in 2023 after concern for elevated RDW on CBC  Patient expressed concern over this elevated level and Dr Turner referred for hematologic interpretation  No family or personal history of anemia or thalassemias.   No history of heart disease, liver disease. Last colonoscopy  with recommendation for repeat in 5 years ()        Social History     Tobacco Use    Smoking status: Former     Packs/day: 0.50     Years: 15.00     Pack years: 7.50     Types: Cigarettes     Quit date: 10/19/1977     Years since quittin.2    Smokeless tobacco: Never   Substance Use Topics    Alcohol use: Yes     Comment: 3-4 times weekly    Drug use: No     Family History   Problem Relation Age of Onset    Hypertension Mother     Myasthenia gravis Mother         affects eye    Dementia Father     No Known Problems Sister     No Known Problems Brother     No Known Problems Daughter     Melanoma Neg Hx     Cataracts Neg Hx     Glaucoma Neg Hx     Macular degeneration Neg Hx      Past Medical History:   Diagnosis Date    Allergy     seasonal    Cataract     Gout, joint     Hyperlipidemia     Hypertension     Ocular rosacea     Vitreous floaters      Past Surgical History:   Procedure Laterality Date    VASECTOMY         Patient Active Problem List   Diagnosis    Rosacea    Gout, chronic    Hyperlipemia    Essential hypertension    Atherosclerosis of aorta    Ectatic aorta    Obesity (BMI 30.0-34.9)    Seasonal allergies    Statin intolerance    Benign prostatic hyperplasia with urinary frequency    Colon polyp    Varicose veins of both lower extremities without  "ulcer or inflammation    Nuclear sclerosis of both eyes    Decreased range of motion (ROM) of right knee    Lateral knee pain, right       Current Outpatient Medications   Medication Instructions    allopurinoL (ZYLOPRIM) 300 MG tablet TAKE 1 TABLET BY MOUTH EVERY DAY    azithromycin (Z-BARBIE) 250 mg, Oral, Daily, Take first 2 tablets together, then 1 every day until finished.    azithromycin (Z-BARBIE) 250 mg, Oral, Daily, Take first 2 tablets together, then 1 every day until finished.    ivermectin (SOOLANTRA) 1 % Crea AAA on face qhs and wash off in morning    methylPREDNISolone (MEDROL DOSEPACK) 4 mg tablet use as directed    tamsulosin (FLOMAX) 0.4 mg, Oral, Daily    testosterone cypionate (DEPOTESTOTERONE CYPIONATE) 200 mg/mL injection No dose, route, or frequency recorded.    valsartan (DIOVAN) 320 mg, Oral, Daily         Review of Systems:   Review of Systems   Constitutional: Negative.    HENT: Negative.     Respiratory: Negative.     Cardiovascular: Negative.    Gastrointestinal: Negative.    Musculoskeletal: Negative.    Neurological: Negative.    Endo/Heme/Allergies: Negative.    All other systems reviewed and are negative.    PHYSICAL EXAM:  BP (!) 145/81 (BP Location: Left arm, Patient Position: Sitting, BP Method: Medium (Automatic))   Pulse 87   Temp 98.4 °F (36.9 °C) (Oral)   Resp 16   Ht 6' 1" (1.854 m)   Wt 101.2 kg (223 lb 1.7 oz)   SpO2 97%   BMI 29.44 kg/m²     General Appearance:    Alert, cooperative, no distress, appears stated age   Lungs:     Clear to auscultation bilaterally, respirations unlabored    Heart:    Regular rate and rhythm, S1 and S2 normal   Abdomen:     Soft, non-tender, bowel sounds active, no masses, no organomegaly   Extremities:   Extremities normal, atraumatic, no cyanosis or edema   Skin:   Skin color, texture normal, no rashes or lesions   Lymph nodes:   Cervical, supraclavicular, and axillary nodes normal   Neurologic:   CNII-XII intact, normal strength "           Pertinent Labs & Imaging:  Recent Labs   Lab Result Units 12/01/22 2104 12/01/22  2114 12/13/22  0836   WBC K/uL 8.51  --  6.51   Hemoglobin g/dL 14.8  --  15.3   POC Hematocrit %PCV  --  50  --    Hematocrit % 47.1  --  47.5   Platelets K/uL 239  --  272     Recent Labs   Lab Result Units 12/01/22 2104   Creatinine mg/dL 1.0         Assessment & Plan:    1. Abnormal CBC  - Ambulatory referral/consult to Hematology / Oncology    Full chart review of past labs and imaging, referring providers' notes, and consultants' reports.   Patient with an elevated RDW without other abnormalities on CBC  Reviewed RDW with patient and interpretation as sole outlier on labs. At this time, no additional concerns or interventions  Patient up to date on health maintenance and age related cancer screenings. Colonoscopy scheduled for June 2023      Med Onc Chart Routing      Follow up with physician No follow up needed.   Follow up with MICHAEL    Infusion scheduling note    Injection scheduling note    Labs    Imaging    Pharmacy appointment    Other referrals          Total time of this visit, including time spent face to face with patient and/or via video/audio, and also in preparing for today's visit for MDM and documentation. (Medical Decision Making, including consideration of possible diagnoses, management options, complex medical record review, review of diagnostic tests and information, consideration and discussion of significant complications based on comorbidities, and discussion with providers involved with the care of the patient) 30 minutes. Greater than 50% was spent face to face with the patient counseling and coordinating care.      Dianelys Rausch MD  01/11/2023

## 2023-01-17 ENCOUNTER — CLINICAL SUPPORT (OUTPATIENT)
Dept: UROLOGY | Facility: CLINIC | Age: 78
End: 2023-01-17
Payer: MEDICARE

## 2023-01-17 DIAGNOSIS — E29.1 HYPOGONADISM IN MALE: Primary | ICD-10-CM

## 2023-01-17 PROCEDURE — 96372 THER/PROPH/DIAG INJ SC/IM: CPT | Mod: HCNC,S$GLB,, | Performed by: NURSE PRACTITIONER

## 2023-01-17 PROCEDURE — 96372 PR INJECTION,THERAP/PROPH/DIAG2ST, IM OR SUBCUT: ICD-10-PCS | Mod: HCNC,S$GLB,, | Performed by: NURSE PRACTITIONER

## 2023-01-17 RX ADMIN — TESTOSTERONE CYPIONATE 200 MG: 200 INJECTION, SOLUTION INTRAMUSCULAR at 08:01

## 2023-01-26 NOTE — PROGRESS NOTES
Established Patient - Audio Only Telehealth Visit     The patient location is: home   The chief complaint leading to consultation is: LAB REVIEW FROM ED  Visit type: Virtual visit with audio only (telephone)  Total time spent with patient: 15 MINS       The reason for the audio only service rather than synchronous audio and video virtual visit was related to technical difficulties or patient preference/necessity.     Each patient to whom I provide medical services by telemedicine is:  (1) informed of the relationship between the physician and patient and the respective role of any other health care provider with respect to management of the patient; and (2) notified that they may decline to receive medical services by telemedicine and may withdraw from such care at any time. Patient verbally consented to receive this service via voice-only telephone call.       HPI: pt went to ED for COVID symptoms and there had labs done. Wants me to review them with him     Assessment and plan:       All labs that were done in ED were reflective of present diagnosis of COVID and do not seem out of line to me. Pt was reassured   Pt is doing better. Still coughing but explained that this is normal  HTN controlled on meds  Atherosc: finding from image stable    F/u prn or 6 mos                   This service was not originating from a related E/M service provided within the previous 7 days nor will  to an E/M service or procedure within the next 24 hours or my soonest available appointment.  Prevailing standard of care was able to be met in this audio-only visit.

## 2023-01-31 ENCOUNTER — CLINICAL SUPPORT (OUTPATIENT)
Dept: UROLOGY | Facility: CLINIC | Age: 78
End: 2023-01-31
Payer: MEDICARE

## 2023-01-31 DIAGNOSIS — E29.1 HYPOGONADISM IN MALE: Primary | ICD-10-CM

## 2023-01-31 PROCEDURE — 96372 PR INJECTION,THERAP/PROPH/DIAG2ST, IM OR SUBCUT: ICD-10-PCS | Mod: HCNC,S$GLB,, | Performed by: NURSE PRACTITIONER

## 2023-01-31 PROCEDURE — 96372 THER/PROPH/DIAG INJ SC/IM: CPT | Mod: HCNC,S$GLB,, | Performed by: NURSE PRACTITIONER

## 2023-01-31 RX ADMIN — TESTOSTERONE CYPIONATE 200 MG: 200 INJECTION, SOLUTION INTRAMUSCULAR at 08:01

## 2023-02-07 DIAGNOSIS — Z00.00 ENCOUNTER FOR MEDICARE ANNUAL WELLNESS EXAM: ICD-10-CM

## 2023-02-09 DIAGNOSIS — Z00.00 ENCOUNTER FOR MEDICARE ANNUAL WELLNESS EXAM: ICD-10-CM

## 2023-02-14 ENCOUNTER — CLINICAL SUPPORT (OUTPATIENT)
Dept: UROLOGY | Facility: CLINIC | Age: 78
End: 2023-02-14
Payer: MEDICARE

## 2023-02-14 PROCEDURE — 96372 THER/PROPH/DIAG INJ SC/IM: CPT | Mod: HCNC,S$GLB,, | Performed by: NURSE PRACTITIONER

## 2023-02-14 PROCEDURE — 96372 PR INJECTION,THERAP/PROPH/DIAG2ST, IM OR SUBCUT: ICD-10-PCS | Mod: HCNC,S$GLB,, | Performed by: NURSE PRACTITIONER

## 2023-02-14 RX ADMIN — TESTOSTERONE CYPIONATE 200 MG: 200 INJECTION, SOLUTION INTRAMUSCULAR at 08:02

## 2023-03-01 ENCOUNTER — CLINICAL SUPPORT (OUTPATIENT)
Dept: UROLOGY | Facility: CLINIC | Age: 78
End: 2023-03-01
Payer: MEDICARE

## 2023-03-01 DIAGNOSIS — E29.1 HYPOGONADISM IN MALE: Primary | ICD-10-CM

## 2023-03-01 PROCEDURE — 96372 PR INJECTION,THERAP/PROPH/DIAG2ST, IM OR SUBCUT: ICD-10-PCS | Mod: HCNC,S$GLB,, | Performed by: NURSE PRACTITIONER

## 2023-03-01 PROCEDURE — 96372 THER/PROPH/DIAG INJ SC/IM: CPT | Mod: HCNC,S$GLB,, | Performed by: NURSE PRACTITIONER

## 2023-03-01 RX ADMIN — TESTOSTERONE CYPIONATE 200 MG: 200 INJECTION, SOLUTION INTRAMUSCULAR at 09:03

## 2023-03-01 NOTE — PROGRESS NOTES
Reason for visit: Hormone therapy injection. Injection administered as ordered, tolerated well, secured with band aid, and denies any pain/discomfort to site     Site:Left DG

## 2023-03-14 ENCOUNTER — CLINICAL SUPPORT (OUTPATIENT)
Dept: UROLOGY | Facility: CLINIC | Age: 78
End: 2023-03-14
Payer: MEDICARE

## 2023-03-14 DIAGNOSIS — E29.1 HYPOGONADISM IN MALE: Primary | ICD-10-CM

## 2023-03-14 PROCEDURE — 96372 THER/PROPH/DIAG INJ SC/IM: CPT | Mod: HCNC,S$GLB,, | Performed by: NURSE PRACTITIONER

## 2023-03-14 PROCEDURE — 96372 PR INJECTION,THERAP/PROPH/DIAG2ST, IM OR SUBCUT: ICD-10-PCS | Mod: HCNC,S$GLB,, | Performed by: NURSE PRACTITIONER

## 2023-03-14 RX ADMIN — TESTOSTERONE CYPIONATE 200 MG: 200 INJECTION, SOLUTION INTRAMUSCULAR at 08:03

## 2023-03-23 ENCOUNTER — PATIENT MESSAGE (OUTPATIENT)
Dept: ADMINISTRATIVE | Facility: HOSPITAL | Age: 78
End: 2023-03-23
Payer: MEDICARE

## 2023-03-27 ENCOUNTER — TELEPHONE (OUTPATIENT)
Dept: UROLOGY | Facility: CLINIC | Age: 78
End: 2023-03-27
Payer: MEDICARE

## 2023-03-27 NOTE — TELEPHONE ENCOUNTER
Called pt to let him know of the new location of his appointment. Pt will not be seen in [address], but he will be seen on the 5th floor in [address].

## 2023-03-28 ENCOUNTER — CLINICAL SUPPORT (OUTPATIENT)
Dept: UROLOGY | Facility: CLINIC | Age: 78
End: 2023-03-28
Payer: MEDICARE

## 2023-03-28 DIAGNOSIS — E29.1 HYPOGONADISM IN MALE: Primary | ICD-10-CM

## 2023-03-28 PROCEDURE — 96372 THER/PROPH/DIAG INJ SC/IM: CPT | Mod: HCNC,S$GLB,, | Performed by: NURSE PRACTITIONER

## 2023-03-28 PROCEDURE — 96372 PR INJECTION,THERAP/PROPH/DIAG2ST, IM OR SUBCUT: ICD-10-PCS | Mod: HCNC,S$GLB,, | Performed by: NURSE PRACTITIONER

## 2023-03-28 RX ADMIN — TESTOSTERONE CYPIONATE 200 MG: 200 INJECTION, SOLUTION INTRAMUSCULAR at 08:03

## 2023-04-04 ENCOUNTER — PATIENT OUTREACH (OUTPATIENT)
Dept: ADMINISTRATIVE | Facility: HOSPITAL | Age: 78
End: 2023-04-04
Payer: MEDICARE

## 2023-04-04 DIAGNOSIS — M10.9 GOUT, UNSPECIFIED CAUSE, UNSPECIFIED CHRONICITY, UNSPECIFIED SITE: ICD-10-CM

## 2023-04-04 DIAGNOSIS — R35.0 BENIGN PROSTATIC HYPERPLASIA WITH URINARY FREQUENCY: ICD-10-CM

## 2023-04-04 DIAGNOSIS — R73.03 PREDIABETES: ICD-10-CM

## 2023-04-04 DIAGNOSIS — I70.0 ATHEROSCLEROSIS OF AORTA: Primary | ICD-10-CM

## 2023-04-04 DIAGNOSIS — N40.1 BENIGN PROSTATIC HYPERPLASIA WITH URINARY FREQUENCY: ICD-10-CM

## 2023-04-04 DIAGNOSIS — I10 ESSENTIAL HYPERTENSION: ICD-10-CM

## 2023-04-10 ENCOUNTER — CLINICAL SUPPORT (OUTPATIENT)
Dept: UROLOGY | Facility: CLINIC | Age: 78
End: 2023-04-10
Attending: FAMILY MEDICINE
Payer: MEDICARE

## 2023-04-10 ENCOUNTER — LAB VISIT (OUTPATIENT)
Dept: LAB | Facility: OTHER | Age: 78
End: 2023-04-10
Attending: FAMILY MEDICINE
Payer: MEDICARE

## 2023-04-10 DIAGNOSIS — I10 ESSENTIAL HYPERTENSION: ICD-10-CM

## 2023-04-10 DIAGNOSIS — E29.1 HYPOGONADISM IN MALE: Primary | ICD-10-CM

## 2023-04-10 DIAGNOSIS — M10.9 GOUT, UNSPECIFIED CAUSE, UNSPECIFIED CHRONICITY, UNSPECIFIED SITE: ICD-10-CM

## 2023-04-10 DIAGNOSIS — I70.0 ATHEROSCLEROSIS OF AORTA: ICD-10-CM

## 2023-04-10 DIAGNOSIS — N40.1 BENIGN PROSTATIC HYPERPLASIA WITH URINARY FREQUENCY: ICD-10-CM

## 2023-04-10 DIAGNOSIS — R73.03 PREDIABETES: ICD-10-CM

## 2023-04-10 DIAGNOSIS — R35.0 BENIGN PROSTATIC HYPERPLASIA WITH URINARY FREQUENCY: ICD-10-CM

## 2023-04-10 LAB
ALBUMIN SERPL BCP-MCNC: 3.6 G/DL (ref 3.5–5.2)
ALP SERPL-CCNC: 60 U/L (ref 55–135)
ALT SERPL W/O P-5'-P-CCNC: 14 U/L (ref 10–44)
ANION GAP SERPL CALC-SCNC: 5 MMOL/L (ref 8–16)
AST SERPL-CCNC: 18 U/L (ref 10–40)
BILIRUB SERPL-MCNC: 0.9 MG/DL (ref 0.1–1)
BUN SERPL-MCNC: 14 MG/DL (ref 8–23)
CALCIUM SERPL-MCNC: 8.4 MG/DL (ref 8.7–10.5)
CHLORIDE SERPL-SCNC: 104 MMOL/L (ref 95–110)
CHOLEST SERPL-MCNC: 200 MG/DL (ref 120–199)
CHOLEST/HDLC SERPL: 3.7 {RATIO} (ref 2–5)
CO2 SERPL-SCNC: 29 MMOL/L (ref 23–29)
CREAT SERPL-MCNC: 1 MG/DL (ref 0.5–1.4)
EST. GFR  (NO RACE VARIABLE): >60 ML/MIN/1.73 M^2
ESTIMATED AVG GLUCOSE: 117 MG/DL (ref 68–131)
GLUCOSE SERPL-MCNC: 125 MG/DL (ref 70–110)
HBA1C MFR BLD: 5.7 % (ref 4–5.6)
HDLC SERPL-MCNC: 54 MG/DL (ref 40–75)
HDLC SERPL: 27 % (ref 20–50)
LDLC SERPL CALC-MCNC: 133 MG/DL (ref 63–159)
NONHDLC SERPL-MCNC: 146 MG/DL
POTASSIUM SERPL-SCNC: 4.4 MMOL/L (ref 3.5–5.1)
PROT SERPL-MCNC: 6.7 G/DL (ref 6–8.4)
SODIUM SERPL-SCNC: 138 MMOL/L (ref 136–145)
TRIGL SERPL-MCNC: 65 MG/DL (ref 30–150)
TSH SERPL DL<=0.005 MIU/L-ACNC: 1.47 UIU/ML (ref 0.4–4)

## 2023-04-10 PROCEDURE — 83036 HEMOGLOBIN GLYCOSYLATED A1C: CPT | Mod: HCNC | Performed by: FAMILY MEDICINE

## 2023-04-10 PROCEDURE — 96372 PR INJECTION,THERAP/PROPH/DIAG2ST, IM OR SUBCUT: ICD-10-PCS | Mod: HCNC,S$GLB,, | Performed by: FAMILY MEDICINE

## 2023-04-10 PROCEDURE — 96372 THER/PROPH/DIAG INJ SC/IM: CPT | Mod: HCNC,S$GLB,, | Performed by: FAMILY MEDICINE

## 2023-04-10 PROCEDURE — 80061 LIPID PANEL: CPT | Mod: HCNC | Performed by: FAMILY MEDICINE

## 2023-04-10 PROCEDURE — 84443 ASSAY THYROID STIM HORMONE: CPT | Mod: HCNC | Performed by: FAMILY MEDICINE

## 2023-04-10 PROCEDURE — 36415 COLL VENOUS BLD VENIPUNCTURE: CPT | Mod: HCNC | Performed by: FAMILY MEDICINE

## 2023-04-10 PROCEDURE — 80053 COMPREHEN METABOLIC PANEL: CPT | Mod: HCNC | Performed by: FAMILY MEDICINE

## 2023-04-10 RX ADMIN — TESTOSTERONE CYPIONATE 200 MG: 200 INJECTION, SOLUTION INTRAMUSCULAR at 08:04

## 2023-04-18 ENCOUNTER — OFFICE VISIT (OUTPATIENT)
Dept: PRIMARY CARE CLINIC | Facility: CLINIC | Age: 78
End: 2023-04-18
Attending: FAMILY MEDICINE
Payer: MEDICARE

## 2023-04-18 VITALS
SYSTOLIC BLOOD PRESSURE: 124 MMHG | HEIGHT: 73 IN | BODY MASS INDEX: 29.36 KG/M2 | OXYGEN SATURATION: 98 % | HEART RATE: 85 BPM | WEIGHT: 221.56 LBS | DIASTOLIC BLOOD PRESSURE: 72 MMHG

## 2023-04-18 DIAGNOSIS — R35.0 BENIGN PROSTATIC HYPERPLASIA WITH URINARY FREQUENCY: ICD-10-CM

## 2023-04-18 DIAGNOSIS — M10.9 GOUT, UNSPECIFIED CAUSE, UNSPECIFIED CHRONICITY, UNSPECIFIED SITE: ICD-10-CM

## 2023-04-18 DIAGNOSIS — Z00.00 ANNUAL PHYSICAL EXAM: ICD-10-CM

## 2023-04-18 DIAGNOSIS — N40.1 BENIGN PROSTATIC HYPERPLASIA WITH URINARY FREQUENCY: ICD-10-CM

## 2023-04-18 DIAGNOSIS — Z12.83 SKIN CANCER SCREENING: ICD-10-CM

## 2023-04-18 DIAGNOSIS — I70.0 ATHEROSCLEROSIS OF AORTA: ICD-10-CM

## 2023-04-18 DIAGNOSIS — E83.51 HYPOCALCEMIA: ICD-10-CM

## 2023-04-18 DIAGNOSIS — I10 ESSENTIAL HYPERTENSION: Primary | ICD-10-CM

## 2023-04-18 DIAGNOSIS — Z78.9 STATIN INTOLERANCE: ICD-10-CM

## 2023-04-18 DIAGNOSIS — Z12.11 SCREEN FOR COLON CANCER: ICD-10-CM

## 2023-04-18 DIAGNOSIS — E78.00 PURE HYPERCHOLESTEROLEMIA: ICD-10-CM

## 2023-04-18 PROCEDURE — 3288F FALL RISK ASSESSMENT DOCD: CPT | Mod: HCNC,CPTII,S$GLB, | Performed by: FAMILY MEDICINE

## 2023-04-18 PROCEDURE — 99999 PR PBB SHADOW E&M-EST. PATIENT-LVL III: CPT | Mod: PBBFAC,HCNC,, | Performed by: FAMILY MEDICINE

## 2023-04-18 PROCEDURE — 1101F PR PT FALLS ASSESS DOC 0-1 FALLS W/OUT INJ PAST YR: ICD-10-PCS | Mod: HCNC,CPTII,S$GLB, | Performed by: FAMILY MEDICINE

## 2023-04-18 PROCEDURE — 1159F PR MEDICATION LIST DOCUMENTED IN MEDICAL RECORD: ICD-10-PCS | Mod: HCNC,CPTII,S$GLB, | Performed by: FAMILY MEDICINE

## 2023-04-18 PROCEDURE — 3074F SYST BP LT 130 MM HG: CPT | Mod: HCNC,CPTII,S$GLB, | Performed by: FAMILY MEDICINE

## 2023-04-18 PROCEDURE — 1126F PR PAIN SEVERITY QUANTIFIED, NO PAIN PRESENT: ICD-10-PCS | Mod: HCNC,CPTII,S$GLB, | Performed by: FAMILY MEDICINE

## 2023-04-18 PROCEDURE — 1101F PT FALLS ASSESS-DOCD LE1/YR: CPT | Mod: HCNC,CPTII,S$GLB, | Performed by: FAMILY MEDICINE

## 2023-04-18 PROCEDURE — 99397 PER PM REEVAL EST PAT 65+ YR: CPT | Mod: HCNC,S$GLB,, | Performed by: FAMILY MEDICINE

## 2023-04-18 PROCEDURE — 1159F MED LIST DOCD IN RCRD: CPT | Mod: HCNC,CPTII,S$GLB, | Performed by: FAMILY MEDICINE

## 2023-04-18 PROCEDURE — 3074F PR MOST RECENT SYSTOLIC BLOOD PRESSURE < 130 MM HG: ICD-10-PCS | Mod: HCNC,CPTII,S$GLB, | Performed by: FAMILY MEDICINE

## 2023-04-18 PROCEDURE — 3078F DIAST BP <80 MM HG: CPT | Mod: HCNC,CPTII,S$GLB, | Performed by: FAMILY MEDICINE

## 2023-04-18 PROCEDURE — 3288F PR FALLS RISK ASSESSMENT DOCUMENTED: ICD-10-PCS | Mod: HCNC,CPTII,S$GLB, | Performed by: FAMILY MEDICINE

## 2023-04-18 PROCEDURE — 1126F AMNT PAIN NOTED NONE PRSNT: CPT | Mod: HCNC,CPTII,S$GLB, | Performed by: FAMILY MEDICINE

## 2023-04-18 PROCEDURE — 99999 PR PBB SHADOW E&M-EST. PATIENT-LVL III: ICD-10-PCS | Mod: PBBFAC,HCNC,, | Performed by: FAMILY MEDICINE

## 2023-04-18 PROCEDURE — 1160F RVW MEDS BY RX/DR IN RCRD: CPT | Mod: HCNC,CPTII,S$GLB, | Performed by: FAMILY MEDICINE

## 2023-04-18 PROCEDURE — 3078F PR MOST RECENT DIASTOLIC BLOOD PRESSURE < 80 MM HG: ICD-10-PCS | Mod: HCNC,CPTII,S$GLB, | Performed by: FAMILY MEDICINE

## 2023-04-18 PROCEDURE — 1160F PR REVIEW ALL MEDS BY PRESCRIBER/CLIN PHARMACIST DOCUMENTED: ICD-10-PCS | Mod: HCNC,CPTII,S$GLB, | Performed by: FAMILY MEDICINE

## 2023-04-18 PROCEDURE — 99397 PR PREVENTIVE VISIT,EST,65 & OVER: ICD-10-PCS | Mod: HCNC,S$GLB,, | Performed by: FAMILY MEDICINE

## 2023-04-18 RX ORDER — ALLOPURINOL 300 MG/1
300 TABLET ORAL DAILY
Qty: 90 TABLET | Refills: 1 | Status: SHIPPED | OUTPATIENT
Start: 2023-04-18 | End: 2023-11-03 | Stop reason: SDUPTHER

## 2023-04-18 NOTE — PROGRESS NOTES
Subjective:       Patient ID: Arnaldo Donovan is a 78 y.o. male.    Chief Complaint: Annual Exam    Pt presents today for annual exam as well as follow up for HTN follow up as well. . otw feels well has cut back on ETOH    Is walking daily  Admits he does enjoy pastries      Hypertension  Pertinent negatives include no shortness of breath.   Review of Systems   Constitutional: Negative.  Negative for activity change, appetite change and fatigue.   HENT: Negative.     Eyes: Negative.    Respiratory: Negative.  Negative for chest tightness and shortness of breath.    Cardiovascular: Negative.    Gastrointestinal: Negative.  Negative for abdominal pain.   Endocrine: Negative.    Genitourinary: Negative.  Negative for difficulty urinating, dysuria, frequency and urgency.   Musculoskeletal: Negative.  Negative for arthralgias, gait problem and joint swelling.   Skin: Negative.  Negative for color change, pallor and rash.   Allergic/Immunologic: Negative.    Neurological:  Negative for dizziness, weakness, light-headedness and numbness.   Hematological: Negative.    Psychiatric/Behavioral: Negative.  Negative for decreased concentration, dysphoric mood, self-injury, sleep disturbance and suicidal ideas. The patient is not nervous/anxious.    All other systems reviewed and are negative.    Objective:      Physical Exam  Vitals reviewed.   Constitutional:       General: He is not in acute distress.     Appearance: Normal appearance. He is well-developed and normal weight. He is not ill-appearing or diaphoretic.   HENT:      Head: Normocephalic and atraumatic.      Right Ear: Tympanic membrane, ear canal and external ear normal. There is no impacted cerumen.      Left Ear: Tympanic membrane, ear canal and external ear normal. There is no impacted cerumen.      Nose: Nose normal. No congestion or rhinorrhea.      Mouth/Throat:      Pharynx: No oropharyngeal exudate or posterior oropharyngeal erythema.   Eyes:       General:         Right eye: No discharge.         Left eye: No discharge.      Extraocular Movements: Extraocular movements intact.      Conjunctiva/sclera: Conjunctivae normal.      Pupils: Pupils are equal, round, and reactive to light.   Neck:      Thyroid: No thyromegaly.   Cardiovascular:      Rate and Rhythm: Normal rate and regular rhythm.      Heart sounds: Normal heart sounds. No murmur heard.  Pulmonary:      Effort: Pulmonary effort is normal. No respiratory distress.      Breath sounds: Normal breath sounds. No wheezing or rales.   Chest:      Chest wall: No tenderness.   Abdominal:      General: Bowel sounds are normal. There is no distension.      Palpations: Abdomen is soft. There is no mass.      Tenderness: There is no abdominal tenderness. There is no guarding or rebound.   Musculoskeletal:         General: No tenderness. Normal range of motion.      Cervical back: Normal range of motion and neck supple.      Right lower leg: No edema.      Left lower leg: No edema.   Lymphadenopathy:      Cervical: No cervical adenopathy.   Skin:     General: Skin is warm and dry.      Coloration: Skin is not pale.      Findings: No erythema.          Neurological:      Mental Status: He is alert and oriented to person, place, and time.      Cranial Nerves: No cranial nerve deficit.      Motor: No abnormal muscle tone.      Coordination: Coordination normal.      Deep Tendon Reflexes: Reflexes are normal and symmetric. Reflexes normal.   Psychiatric:         Mood and Affect: Mood normal.         Behavior: Behavior normal.         Thought Content: Thought content normal.         Judgment: Judgment normal.       Assessment:       1. Essential hypertension    2. Gout, unspecified cause, unspecified chronicity, unspecified site    3. Pure hypercholesterolemia    4. Hypocalcemia    5. Screen for colon cancer    6. Skin cancer screening    7. Statin intolerance    8. Benign prostatic hyperplasia with urinary frequency     9. Atherosclerosis of aorta    10. Annual physical exam        Plan:       Annual PE wnl.  Labs pending.    BPH: Re referred to uro  Moles: refer to derm for skin screen  HTN: controlled.    Aa: stable and pt has statin allergy      ED prompts d/w pt  RTC prn symptoms  Essential hypertension    Gout, unspecified cause, unspecified chronicity, unspecified site  -     allopurinoL (ZYLOPRIM) 300 MG tablet; Take 1 tablet (300 mg total) by mouth once daily.  Dispense: 90 tablet; Refill: 1    Pure hypercholesterolemia    Hypocalcemia  -     Basic Metabolic Panel; Future; Expected date: 04/18/2023    Screen for colon cancer  -     Ambulatory referral/consult to Gastroenterology; Future; Expected date: 04/25/2023    Skin cancer screening  -     Ambulatory referral/consult to Dermatology; Future; Expected date: 04/25/2023    Statin intolerance    Benign prostatic hyperplasia with urinary frequency    Atherosclerosis of aorta    Annual physical exam      Labs pending  PE wnl  Lifestyle mods d/w pt

## 2023-04-19 ENCOUNTER — TELEPHONE (OUTPATIENT)
Dept: PRIMARY CARE CLINIC | Facility: CLINIC | Age: 78
End: 2023-04-19
Payer: MEDICARE

## 2023-04-19 ENCOUNTER — LAB VISIT (OUTPATIENT)
Dept: LAB | Facility: HOSPITAL | Age: 78
End: 2023-04-19
Attending: FAMILY MEDICINE
Payer: MEDICARE

## 2023-04-19 DIAGNOSIS — E83.51 HYPOCALCEMIA: ICD-10-CM

## 2023-04-19 LAB
ANION GAP SERPL CALC-SCNC: 8 MMOL/L (ref 8–16)
BUN SERPL-MCNC: 16 MG/DL (ref 8–23)
CALCIUM SERPL-MCNC: 8.8 MG/DL (ref 8.7–10.5)
CHLORIDE SERPL-SCNC: 102 MMOL/L (ref 95–110)
CO2 SERPL-SCNC: 27 MMOL/L (ref 23–29)
CREAT SERPL-MCNC: 1.1 MG/DL (ref 0.5–1.4)
EST. GFR  (NO RACE VARIABLE): >60 ML/MIN/1.73 M^2
GLUCOSE SERPL-MCNC: 159 MG/DL (ref 70–110)
POTASSIUM SERPL-SCNC: 4.7 MMOL/L (ref 3.5–5.1)
SODIUM SERPL-SCNC: 137 MMOL/L (ref 136–145)

## 2023-04-19 PROCEDURE — 36415 COLL VENOUS BLD VENIPUNCTURE: CPT | Mod: HCNC,PN | Performed by: FAMILY MEDICINE

## 2023-04-19 PROCEDURE — 80048 BASIC METABOLIC PNL TOTAL CA: CPT | Mod: HCNC | Performed by: FAMILY MEDICINE

## 2023-04-19 NOTE — TELEPHONE ENCOUNTER
----- Message from Xiao Turner MD sent at 4/19/2023  2:39 PM CDT -----  Please let pt know that his calcium corrected. Thanks, PV

## 2023-04-20 NOTE — TELEPHONE ENCOUNTER
Informed pt that the calcium corrected itself and he is good. Pt verbalized understanding. Pt has no further questions or concerns.

## 2023-04-25 ENCOUNTER — CLINICAL SUPPORT (OUTPATIENT)
Dept: UROLOGY | Facility: CLINIC | Age: 78
End: 2023-04-25
Payer: MEDICARE

## 2023-04-25 DIAGNOSIS — E29.1 HYPOGONADISM IN MALE: Primary | ICD-10-CM

## 2023-04-25 PROCEDURE — 96372 PR INJECTION,THERAP/PROPH/DIAG2ST, IM OR SUBCUT: ICD-10-PCS | Mod: HCNC,S$GLB,, | Performed by: NURSE PRACTITIONER

## 2023-04-25 PROCEDURE — 96372 THER/PROPH/DIAG INJ SC/IM: CPT | Mod: HCNC,S$GLB,, | Performed by: NURSE PRACTITIONER

## 2023-04-25 RX ORDER — TESTOSTERONE CYPIONATE 200 MG/ML
200 INJECTION, SOLUTION INTRAMUSCULAR
Status: COMPLETED | OUTPATIENT
Start: 2023-04-25 | End: 2023-04-25

## 2023-04-25 RX ADMIN — TESTOSTERONE CYPIONATE 200 MG: 200 INJECTION, SOLUTION INTRAMUSCULAR at 08:04

## 2023-05-09 ENCOUNTER — CLINICAL SUPPORT (OUTPATIENT)
Dept: UROLOGY | Facility: CLINIC | Age: 78
End: 2023-05-09
Payer: MEDICARE

## 2023-05-09 DIAGNOSIS — E29.1 HYPOGONADISM IN MALE: Primary | ICD-10-CM

## 2023-05-09 PROCEDURE — 96372 PR INJECTION,THERAP/PROPH/DIAG2ST, IM OR SUBCUT: ICD-10-PCS | Mod: S$GLB,,, | Performed by: NURSE PRACTITIONER

## 2023-05-09 PROCEDURE — 96372 THER/PROPH/DIAG INJ SC/IM: CPT | Mod: S$GLB,,, | Performed by: NURSE PRACTITIONER

## 2023-05-09 RX ORDER — TESTOSTERONE CYPIONATE 200 MG/ML
200 INJECTION, SOLUTION INTRAMUSCULAR
Status: DISCONTINUED | OUTPATIENT
Start: 2023-05-09 | End: 2023-09-05

## 2023-05-09 RX ADMIN — TESTOSTERONE CYPIONATE 200 MG: 200 INJECTION, SOLUTION INTRAMUSCULAR at 08:05

## 2023-05-23 ENCOUNTER — CLINICAL SUPPORT (OUTPATIENT)
Dept: UROLOGY | Facility: CLINIC | Age: 78
End: 2023-05-23
Payer: MEDICARE

## 2023-05-23 DIAGNOSIS — E29.1 HYPOGONADISM IN MALE: Primary | ICD-10-CM

## 2023-05-23 PROCEDURE — 96372 THER/PROPH/DIAG INJ SC/IM: CPT | Mod: S$GLB,,, | Performed by: NURSE PRACTITIONER

## 2023-05-23 PROCEDURE — 96372 PR INJECTION,THERAP/PROPH/DIAG2ST, IM OR SUBCUT: ICD-10-PCS | Mod: S$GLB,,, | Performed by: NURSE PRACTITIONER

## 2023-05-23 RX ADMIN — TESTOSTERONE CYPIONATE 200 MG: 200 INJECTION, SOLUTION INTRAMUSCULAR at 08:05

## 2023-06-06 ENCOUNTER — CLINICAL SUPPORT (OUTPATIENT)
Dept: UROLOGY | Facility: CLINIC | Age: 78
End: 2023-06-06
Payer: MEDICARE

## 2023-06-06 PROCEDURE — 96372 THER/PROPH/DIAG INJ SC/IM: CPT | Mod: S$GLB,,, | Performed by: NURSE PRACTITIONER

## 2023-06-06 PROCEDURE — 96372 PR INJECTION,THERAP/PROPH/DIAG2ST, IM OR SUBCUT: ICD-10-PCS | Mod: S$GLB,,, | Performed by: NURSE PRACTITIONER

## 2023-06-06 RX ADMIN — TESTOSTERONE CYPIONATE 200 MG: 200 INJECTION, SOLUTION INTRAMUSCULAR at 08:06

## 2023-06-12 DIAGNOSIS — M17.12 LOCALIZED OSTEOARTHRITIS OF LEFT KNEE: Primary | ICD-10-CM

## 2023-06-12 DIAGNOSIS — R35.0 BENIGN PROSTATIC HYPERPLASIA WITH URINARY FREQUENCY: ICD-10-CM

## 2023-06-12 DIAGNOSIS — N40.1 BENIGN PROSTATIC HYPERPLASIA WITH URINARY FREQUENCY: ICD-10-CM

## 2023-06-12 RX ORDER — TAMSULOSIN HYDROCHLORIDE 0.4 MG/1
0.4 CAPSULE ORAL DAILY
Qty: 90 CAPSULE | Refills: 0 | Status: SHIPPED | OUTPATIENT
Start: 2023-06-12 | End: 2023-09-12

## 2023-06-12 NOTE — TELEPHONE ENCOUNTER
Care Due:                  Date            Visit Type   Department     Provider  --------------------------------------------------------------------------------                                MYCHART                              ANNUAL                              CHECKUP/PHY  [facility] PRIMARY  Last Visit: 04-      [facility]           Xiao Turner  Next Visit: None Scheduled  None         None Found                                                            Last  Test          Frequency    Reason                     Performed    Due Date  --------------------------------------------------------------------------------    Uric Acid...  12 months..  allopurinoL..............  Not Found    Overdue    [employer] Embedded Care Due Messages. Reference number: [id number].   6/12/2023 9:01:07 AM CDT

## 2023-06-12 NOTE — TELEPHONE ENCOUNTER
Refill Encounter    PCP Visits: Recent Visits  Date Type Provider Dept   04/18/23 Office Visit Xiao Turner MD [facility]   12/06/22 Office Visit Xiao Turner MD [facility]   Showing recent visits within past 360 days and meeting all other requirements  Future Appointments  No visits were found meeting these conditions.  Showing future appointments within next 720 days and meeting all other requirements     Last 3 Blood Pressure:   BP Readings from Last 3 Encounters:   04/18/23 124/72   01/11/23 (!) 145/81   12/13/22 (!) 148/80     Preferred Pharmacy:   [id number] - [place], [place] - [address]  [address] [place] [address]  Phone: [phone] Fax: [phone]    Requested RX:  Requested Prescriptions     Pending Prescriptions Disp Refills    tamsulosin (FLOMAX) 0.4 mg Cap 90 capsule 1     Sig: Take 1 capsule (0.4 mg total) by mouth once daily.      RX Route: Normal

## 2023-06-20 ENCOUNTER — CLINICAL SUPPORT (OUTPATIENT)
Dept: UROLOGY | Facility: CLINIC | Age: 78
End: 2023-06-20
Payer: MEDICARE

## 2023-06-20 PROCEDURE — 96372 THER/PROPH/DIAG INJ SC/IM: CPT | Mod: S$GLB,,, | Performed by: NURSE PRACTITIONER

## 2023-06-20 PROCEDURE — 96372 PR INJECTION,THERAP/PROPH/DIAG2ST, IM OR SUBCUT: ICD-10-PCS | Mod: S$GLB,,, | Performed by: NURSE PRACTITIONER

## 2023-06-20 RX ADMIN — TESTOSTERONE CYPIONATE 200 MG: 200 INJECTION, SOLUTION INTRAMUSCULAR at 08:06

## 2023-06-20 NOTE — PROGRESS NOTES
Testosterone 200 mg IM injection given to RIGHT Dorsalgluteal muscle.  Tolerated well.  Return in 2 weeks.

## 2023-06-27 NOTE — PROGRESS NOTES
"CC: left knee pain    Arnaldo is here today for follow up evaluation of his left knee pain. Patient reports his pain is 0/10 today. He had left intra-articular knee CSI at visit 2022 and admits to appreciating improvement in his pain following.  He admits to increased sensation of "shifting, grinding and rubbing of bones" beginning approximately 1 month ago. He gestures to the anteromedial aspect of the left knee when asked where he appreciates this sensation. He denies new falls or trauma since his last visit.     Recall from visit on 2022  78 y.o. Male presents today for evaluation of his left knee pain. He admits to left knee pain for the past 1 month without known mechanism of injury. He gestures to the medial and posterior aspects of the left knee when asked where he hurts. He admits to feeling swollen and tight today.   How lon month  What makes it better: Ibuprofen   What makes it worse: Standing after being seated for a period of time  Does it radiate: Denies  Attempted treatments: Admits to taking ibuprofen as needed. Denies history of left knee surgery or injection.   Pain score: 0/10  Any mechanical symptoms: Denies  Feelings of instability: Denies  Affect on ADLs: Denies    PAST MEDICAL HISTORY:   Past Medical History:   Diagnosis Date    Allergy     seasonal    Cataract     Gout, joint     Hyperlipidemia     Hypertension     Ocular rosacea     Vitreous floaters      PAST SURGICAL HISTORY:   Past Surgical History:   Procedure Laterality Date    VASECTOMY         FAMILY HISTORY:   Family History   Problem Relation Age of Onset    Hypertension Mother     Myasthenia gravis Mother         affects eye    Dementia Father     No Known Problems Sister     No Known Problems Brother     No Known Problems Daughter     Melanoma Neg Hx     Cataracts Neg Hx     Glaucoma Neg Hx     Macular degeneration Neg Hx        SOCIAL HISTORY:   Social History     Socioeconomic History    Marital status:  " "  Occupational History    Occupation:      Employer: [employer]   Tobacco Use    Smoking status: Former     Packs/day: 0.50     Years: 15.00     Pack years: 7.50     Types: Cigarettes     Quit date: 10/19/1977     Years since quittin.7     Passive exposure: Past    Smokeless tobacco: Never   Substance and Sexual Activity    Alcohol use: Yes     Comment: 3-4 times weekly    Drug use: No    Sexual activity: Yes     Partners: Female     Comment:  with 3 children       MEDICATIONS:     Current Outpatient Medications:     allopurinoL (ZYLOPRIM) 300 MG tablet, Take 1 tablet (300 mg total) by mouth once daily., Disp: 90 tablet, Rfl: 1    ivermectin (SOOLANTRA) 1 % Crea, AAA on face qhs and wash off in morning (Patient not taking: Reported on 2023), Disp: 60 g, Rfl: 3    tamsulosin (FLOMAX) 0.4 mg Cap, Take 1 capsule (0.4 mg total) by mouth once daily., Disp: 90 capsule, Rfl: 0    testosterone cypionate (DEPOTESTOTERONE CYPIONATE) 200 mg/mL injection, , Disp: , Rfl:     valsartan (DIOVAN) 320 MG tablet, Take 1 tablet (320 mg total) by mouth once daily., Disp: 90 tablet, Rfl: 3    Current Facility-Administered Medications:     testosterone cypionate injection 200 mg, 200 mg, Intramuscular, Q14 Days, Michell Pinto NP, 200 mg at 23 0815    ALLERGIES:   Review of patient's allergies indicates:   Allergen Reactions    Amlodipine Swelling     Bilateral feet swelling.     Doxycycline      HEADACHE (PRESSURE)    Statins-hmg-coa reductase inhibitors         PHYSICAL EXAMINATION:  /62   Ht 6' 1" (1.854 m)   Wt 100.2 kg (221 lb)   BMI 29.16 kg/m²   Vitals signs and nursing note have been reviewed.  General: In no acute distress, well developed, well nourished, no diaphoresis  Eyes: EOM full and smooth, no eye redness or discharge  HENT: normocephalic and atraumatic, neck supple, trachea midline, no nasal discharge, no external ear redness or discharge  Cardiovascular: 2+ and symmetric " DP pulses bilaterally, no LE edema  Lungs: respirations non-labored, no conversational dyspnea   Abd: non-distended, no rigidity  MSK: no amputation or deformity, no swelling of extremities  Neuro: AAOx3, CN2-12 grossly intact  Skin: No rashes, warm and dry  Psychiatric: cooperative, pleasant, mood and affect appropriate for age    MUSCULOSKELETAL EXAM:    LEFT KNEE EXAMINATION   Affected side is compared to contralateral knee     Observation:  No edema, erythema, ecchymosis noted.  Left suprapatellar effusion.  No muscle atrophy of the thighs and calves noted.  No obvious bony deformities noted.   No genu valgus/varum noted. No recurvatum noted.    No tibial internal/external torsion.      Tenderness:  Patella - present med inf  Lateral joint line - none  Quad tendon - none   Medial joint line - none  Patellar tendon - none  Medial plica - none  Tibial tubercle - none   Lateral plica - none  Pes anserine - none   MCL prox - none  Distal ITB - none   MCL distal - none  MFC - none    LCL prox - none  LFC - none    LCL distal - none  Tibia - none    Fibula - none    No obvious bursae, plicae, popliteal cysts, or tendon derangement palpated.          ROM:   Active extension to 0° on left without hyperextension, lag, or patellar J sign.   Active extension to 0° on right without hyperextension, lag, or patellar J sign.   Crepitus present with extension bilaterally.  Active flexion to 135° on left and 135° on right.    Strength: (bilaterally)  Knee Flexion - 5/5 on left and 5/5 on right  Knee Extension - 5/5 on left and 5/5 on right  Hip Flexion - 5/5 on left and 5/5 on right  Hip Extension - 5/5 on left and 5/5 on right  Ankle dorsiflexion - 5/5 on left and 5/5 on right  Ankle Plantarflexion - 5/5 on left and 5/5 on right    Patellofemoral Exam:  Patellar ballottement - negative  Bulge sign - positive  Patellar grind - positive  No patellar laxity with medial and lateral translation   No apprehension with medial and  "lateral patellar translation.     Meniscus Testing:     No pain with terminal extension and flexion.  Melyssas test - negative   Bounce home test - negative    Ligament Testing:  Lachman's test - negative  No laxity with varus testing at 0 and 30 degrees.  No laxity with valgus testing at 0 and 30 degrees.    IT Band Testing:  Noble Compression test - negative    Neurovascular Examination:   Normal gait without antalgia.  Sensation intact to light touch in the obturator, lateral/intermediate/medial/posterior femoral cutaneous, saphenous, and common peroneal nerves bilaterally.  Pulses intact at the DP and PT arteries bilaterally.    Capillary refill intact <2 seconds in all toes bilaterally.    IMAGIN. X-ray obtained 2021 due to knee pain   2. X-ray images were reviewed personally by me and then directly with patient.  3. FINDINGS: X-ray images obtained demonstrate no fracture or dislocation, joint spaces maintained, degenerative changes bilaterally. Osteophytes at the femorotibial and patellofemoral joints. Narrowing of the medial compartments. Kellgren and Bubba grade 3.  4. IMPRESSION: As above.     1. X-ray ordered due to left knee pain   2. X-ray images were reviewed personally by me and then directly with patient.  3. FINDINGS: X-ray images obtained demonstrate no fracture or dislocation, degenerative changes bilaterally slightly progressed from last study. Kellgren and Bubba grade 3  4. IMPRESSION: As above.         ASSESSMENT:      ICD-10-CM ICD-9-CM   1. Chronic pain of left knee  M25.562 719.46    G89.29 338.29   2. Localized osteoarthritis of left knee  M17.12 715.36         PLAN:  1-2.  Left knee pain/osteoarthritis - improved, then recent exacerbation    - Arnaldo admits to increased sensation of left knee "shifting, grinding and rubbing of bones" over the past 1 month without new mechanism of injury. He had left knee intra-articular CSI at visit 2022 and admits to appreciating " improvement in his pain following.     - XRs ordered today and compared to study obtained 04/22/2021 and images were personally reviewed with the patient. See above for further detail.    - We reviewed the natural history of osteoarthritis and a multipronged treatment approach. We reviewed the importance of addressing three different aspects to best manage this condition. Controlling the intra-articular immune reaction through medications and/or injections, improving local stability through bracing and/or injection, and improving functional stability through hip, core, and ankle strength, stability and mobility which may benefit from formal physical therapy.    - He is not interested in injections today as he is not having pain.  We did discuss the VSI for both knees which he will consider and may want to move forward with in the future.    - Continue with home exercise program.      Future planning includes - possible repeat CSI if pain begins, viscosupplementation    All questions were answered to the best of my ability and all concerns were addressed at this time.    Follow up as needed.       This note is dictated using the M*Modal Fluency Direct word recognition program. There are word recognition mistakes that are occasionally missed on review.      Total time spent face-to face with patient counseling or coordinating care including prognosis, differential diagnosis, risks and benefits of treatment, instructions, compliance risk reductions as well as non-face-to-face time personally spent reviewing medial record, medical documentation, and coordination of care.     EST MINUTES X   30145 10-19    67186 20-29    37955 30-39 X   99215 40-54    NEW     45856 15-29    77921 30-44    37186 45-59    26241 60-74    PHONE      5-10    14815 11-20    57927 21-30

## 2023-06-28 ENCOUNTER — APPOINTMENT (OUTPATIENT)
Dept: RADIOLOGY | Facility: OTHER | Age: 78
End: 2023-06-28
Attending: ORTHOPAEDIC SURGERY
Payer: MEDICARE

## 2023-06-28 ENCOUNTER — OFFICE VISIT (OUTPATIENT)
Dept: SPORTS MEDICINE | Facility: CLINIC | Age: 78
End: 2023-06-28
Payer: MEDICARE

## 2023-06-28 VITALS
HEIGHT: 73 IN | SYSTOLIC BLOOD PRESSURE: 125 MMHG | BODY MASS INDEX: 29.29 KG/M2 | DIASTOLIC BLOOD PRESSURE: 62 MMHG | WEIGHT: 221 LBS

## 2023-06-28 DIAGNOSIS — G89.29 CHRONIC PAIN OF LEFT KNEE: Primary | ICD-10-CM

## 2023-06-28 DIAGNOSIS — M25.562 CHRONIC PAIN OF LEFT KNEE: Primary | ICD-10-CM

## 2023-06-28 DIAGNOSIS — M17.12 LOCALIZED OSTEOARTHRITIS OF LEFT KNEE: ICD-10-CM

## 2023-06-28 PROCEDURE — 99999 PR PBB SHADOW E&M-EST. PATIENT-LVL III: ICD-10-PCS | Mod: PBBFAC,,, | Performed by: ORTHOPAEDIC SURGERY

## 2023-06-28 PROCEDURE — 1101F PT FALLS ASSESS-DOCD LE1/YR: CPT | Mod: CPTII,S$GLB,, | Performed by: ORTHOPAEDIC SURGERY

## 2023-06-28 PROCEDURE — 3074F PR MOST RECENT SYSTOLIC BLOOD PRESSURE < 130 MM HG: ICD-10-PCS | Mod: CPTII,S$GLB,, | Performed by: ORTHOPAEDIC SURGERY

## 2023-06-28 PROCEDURE — 73564 XR KNEE ORTHO BILAT WITH FLEXION: ICD-10-PCS | Mod: 26,50,, | Performed by: RADIOLOGY

## 2023-06-28 PROCEDURE — 99214 PR OFFICE/OUTPT VISIT, EST, LEVL IV, 30-39 MIN: ICD-10-PCS | Mod: S$GLB,,, | Performed by: ORTHOPAEDIC SURGERY

## 2023-06-28 PROCEDURE — 3288F PR FALLS RISK ASSESSMENT DOCUMENTED: ICD-10-PCS | Mod: CPTII,S$GLB,, | Performed by: ORTHOPAEDIC SURGERY

## 2023-06-28 PROCEDURE — 1159F PR MEDICATION LIST DOCUMENTED IN MEDICAL RECORD: ICD-10-PCS | Mod: CPTII,S$GLB,, | Performed by: ORTHOPAEDIC SURGERY

## 2023-06-28 PROCEDURE — 73564 X-RAY EXAM KNEE 4 OR MORE: CPT | Mod: TC,50

## 2023-06-28 PROCEDURE — 1126F PR PAIN SEVERITY QUANTIFIED, NO PAIN PRESENT: ICD-10-PCS | Mod: CPTII,S$GLB,, | Performed by: ORTHOPAEDIC SURGERY

## 2023-06-28 PROCEDURE — 3074F SYST BP LT 130 MM HG: CPT | Mod: CPTII,S$GLB,, | Performed by: ORTHOPAEDIC SURGERY

## 2023-06-28 PROCEDURE — 1101F PR PT FALLS ASSESS DOC 0-1 FALLS W/OUT INJ PAST YR: ICD-10-PCS | Mod: CPTII,S$GLB,, | Performed by: ORTHOPAEDIC SURGERY

## 2023-06-28 PROCEDURE — 1160F PR REVIEW ALL MEDS BY PRESCRIBER/CLIN PHARMACIST DOCUMENTED: ICD-10-PCS | Mod: CPTII,S$GLB,, | Performed by: ORTHOPAEDIC SURGERY

## 2023-06-28 PROCEDURE — 73564 X-RAY EXAM KNEE 4 OR MORE: CPT | Mod: 26,50,, | Performed by: RADIOLOGY

## 2023-06-28 PROCEDURE — 3288F FALL RISK ASSESSMENT DOCD: CPT | Mod: CPTII,S$GLB,, | Performed by: ORTHOPAEDIC SURGERY

## 2023-06-28 PROCEDURE — 3078F PR MOST RECENT DIASTOLIC BLOOD PRESSURE < 80 MM HG: ICD-10-PCS | Mod: CPTII,S$GLB,, | Performed by: ORTHOPAEDIC SURGERY

## 2023-06-28 PROCEDURE — 99999 PR PBB SHADOW E&M-EST. PATIENT-LVL III: CPT | Mod: PBBFAC,,, | Performed by: ORTHOPAEDIC SURGERY

## 2023-06-28 PROCEDURE — 3078F DIAST BP <80 MM HG: CPT | Mod: CPTII,S$GLB,, | Performed by: ORTHOPAEDIC SURGERY

## 2023-06-28 PROCEDURE — 1126F AMNT PAIN NOTED NONE PRSNT: CPT | Mod: CPTII,S$GLB,, | Performed by: ORTHOPAEDIC SURGERY

## 2023-06-28 PROCEDURE — 99214 OFFICE O/P EST MOD 30 MIN: CPT | Mod: S$GLB,,, | Performed by: ORTHOPAEDIC SURGERY

## 2023-06-28 PROCEDURE — 1159F MED LIST DOCD IN RCRD: CPT | Mod: CPTII,S$GLB,, | Performed by: ORTHOPAEDIC SURGERY

## 2023-06-28 PROCEDURE — 1160F RVW MEDS BY RX/DR IN RCRD: CPT | Mod: CPTII,S$GLB,, | Performed by: ORTHOPAEDIC SURGERY

## 2023-07-03 ENCOUNTER — CLINICAL SUPPORT (OUTPATIENT)
Dept: UROLOGY | Facility: CLINIC | Age: 78
End: 2023-07-03
Payer: MEDICARE

## 2023-07-03 DIAGNOSIS — E34.9 TESTOSTERONE DEFICIENCY: Primary | ICD-10-CM

## 2023-07-03 PROCEDURE — 96372 PR INJECTION,THERAP/PROPH/DIAG2ST, IM OR SUBCUT: ICD-10-PCS | Mod: S$GLB,,, | Performed by: NURSE PRACTITIONER

## 2023-07-03 PROCEDURE — 96372 THER/PROPH/DIAG INJ SC/IM: CPT | Mod: S$GLB,,, | Performed by: NURSE PRACTITIONER

## 2023-07-03 RX ADMIN — TESTOSTERONE CYPIONATE 200 MG: 200 INJECTION, SOLUTION INTRAMUSCULAR at 08:07

## 2023-07-17 ENCOUNTER — CLINICAL SUPPORT (OUTPATIENT)
Dept: UROLOGY | Facility: CLINIC | Age: 78
End: 2023-07-17
Payer: MEDICARE

## 2023-07-17 DIAGNOSIS — E29.1 HYPOGONADISM IN MALE: Primary | ICD-10-CM

## 2023-07-17 PROCEDURE — 96372 THER/PROPH/DIAG INJ SC/IM: CPT | Mod: HCNC,S$GLB,, | Performed by: NURSE PRACTITIONER

## 2023-07-17 PROCEDURE — 96372 PR INJECTION,THERAP/PROPH/DIAG2ST, IM OR SUBCUT: ICD-10-PCS | Mod: HCNC,S$GLB,, | Performed by: NURSE PRACTITIONER

## 2023-07-17 RX ADMIN — TESTOSTERONE CYPIONATE 200 MG: 200 INJECTION, SOLUTION INTRAMUSCULAR at 08:07

## 2023-07-24 ENCOUNTER — OFFICE VISIT (OUTPATIENT)
Dept: DERMATOLOGY | Facility: CLINIC | Age: 78
End: 2023-07-24
Payer: MEDICARE

## 2023-07-24 DIAGNOSIS — D22.9 MULTIPLE BENIGN NEVI: ICD-10-CM

## 2023-07-24 DIAGNOSIS — L82.1 SEBORRHEIC KERATOSES: ICD-10-CM

## 2023-07-24 DIAGNOSIS — L72.0 EPIDERMAL INCLUSION CYST: ICD-10-CM

## 2023-07-24 DIAGNOSIS — D48.5 NEOPLASM OF UNCERTAIN BEHAVIOR OF SKIN: Primary | ICD-10-CM

## 2023-07-24 DIAGNOSIS — Z12.83 SKIN CANCER SCREENING: ICD-10-CM

## 2023-07-24 DIAGNOSIS — L81.4 LENTIGO: ICD-10-CM

## 2023-07-24 DIAGNOSIS — D17.9 LIPOMA, UNSPECIFIED SITE: ICD-10-CM

## 2023-07-24 DIAGNOSIS — D18.01 CHERRY ANGIOMA: ICD-10-CM

## 2023-07-24 PROCEDURE — 88305 TISSUE EXAM BY PATHOLOGIST: ICD-10-PCS | Mod: 26,HCNC,, | Performed by: PATHOLOGY

## 2023-07-24 PROCEDURE — 88305 TISSUE EXAM BY PATHOLOGIST: CPT | Mod: HCNC | Performed by: PATHOLOGY

## 2023-07-24 PROCEDURE — 1126F PR PAIN SEVERITY QUANTIFIED, NO PAIN PRESENT: ICD-10-PCS | Mod: HCNC,CPTII,S$GLB, | Performed by: STUDENT IN AN ORGANIZED HEALTH CARE EDUCATION/TRAINING PROGRAM

## 2023-07-24 PROCEDURE — 88342 IMHCHEM/IMCYTCHM 1ST ANTB: CPT | Mod: HCNC | Performed by: PATHOLOGY

## 2023-07-24 PROCEDURE — 99213 PR OFFICE/OUTPT VISIT, EST, LEVL III, 20-29 MIN: ICD-10-PCS | Mod: 25,HCNC,S$GLB, | Performed by: STUDENT IN AN ORGANIZED HEALTH CARE EDUCATION/TRAINING PROGRAM

## 2023-07-24 PROCEDURE — 88305 TISSUE EXAM BY PATHOLOGIST: CPT | Mod: 26,HCNC,, | Performed by: PATHOLOGY

## 2023-07-24 PROCEDURE — 1160F PR REVIEW ALL MEDS BY PRESCRIBER/CLIN PHARMACIST DOCUMENTED: ICD-10-PCS | Mod: HCNC,CPTII,S$GLB, | Performed by: STUDENT IN AN ORGANIZED HEALTH CARE EDUCATION/TRAINING PROGRAM

## 2023-07-24 PROCEDURE — 88341 IMHCHEM/IMCYTCHM EA ADD ANTB: CPT | Mod: 26,HCNC,, | Performed by: PATHOLOGY

## 2023-07-24 PROCEDURE — 1160F RVW MEDS BY RX/DR IN RCRD: CPT | Mod: HCNC,CPTII,S$GLB, | Performed by: STUDENT IN AN ORGANIZED HEALTH CARE EDUCATION/TRAINING PROGRAM

## 2023-07-24 PROCEDURE — 99213 OFFICE O/P EST LOW 20 MIN: CPT | Mod: 25,HCNC,S$GLB, | Performed by: STUDENT IN AN ORGANIZED HEALTH CARE EDUCATION/TRAINING PROGRAM

## 2023-07-24 PROCEDURE — 88341 IMHCHEM/IMCYTCHM EA ADD ANTB: CPT | Mod: HCNC | Performed by: PATHOLOGY

## 2023-07-24 PROCEDURE — 1159F MED LIST DOCD IN RCRD: CPT | Mod: HCNC,CPTII,S$GLB, | Performed by: STUDENT IN AN ORGANIZED HEALTH CARE EDUCATION/TRAINING PROGRAM

## 2023-07-24 PROCEDURE — 99999 PR PBB SHADOW E&M-EST. PATIENT-LVL III: ICD-10-PCS | Mod: PBBFAC,HCNC,, | Performed by: STUDENT IN AN ORGANIZED HEALTH CARE EDUCATION/TRAINING PROGRAM

## 2023-07-24 PROCEDURE — 1159F PR MEDICATION LIST DOCUMENTED IN MEDICAL RECORD: ICD-10-PCS | Mod: HCNC,CPTII,S$GLB, | Performed by: STUDENT IN AN ORGANIZED HEALTH CARE EDUCATION/TRAINING PROGRAM

## 2023-07-24 PROCEDURE — 11104 PR PUNCH BIOPSY, SKIN, SINGLE LESION: ICD-10-PCS | Mod: HCNC,S$GLB,, | Performed by: STUDENT IN AN ORGANIZED HEALTH CARE EDUCATION/TRAINING PROGRAM

## 2023-07-24 PROCEDURE — 1126F AMNT PAIN NOTED NONE PRSNT: CPT | Mod: HCNC,CPTII,S$GLB, | Performed by: STUDENT IN AN ORGANIZED HEALTH CARE EDUCATION/TRAINING PROGRAM

## 2023-07-24 PROCEDURE — 88342 CHG IMMUNOCYTOCHEMISTRY: ICD-10-PCS | Mod: 26,HCNC,, | Performed by: PATHOLOGY

## 2023-07-24 PROCEDURE — 88342 IMHCHEM/IMCYTCHM 1ST ANTB: CPT | Mod: 26,HCNC,, | Performed by: PATHOLOGY

## 2023-07-24 PROCEDURE — 99999 PR PBB SHADOW E&M-EST. PATIENT-LVL III: CPT | Mod: PBBFAC,HCNC,, | Performed by: STUDENT IN AN ORGANIZED HEALTH CARE EDUCATION/TRAINING PROGRAM

## 2023-07-24 PROCEDURE — 11104 PUNCH BX SKIN SINGLE LESION: CPT | Mod: HCNC,S$GLB,, | Performed by: STUDENT IN AN ORGANIZED HEALTH CARE EDUCATION/TRAINING PROGRAM

## 2023-07-24 PROCEDURE — 88341 PR IHC OR ICC EACH ADD'L SINGLE ANTIBODY  STAINPR: ICD-10-PCS | Mod: 26,HCNC,, | Performed by: PATHOLOGY

## 2023-07-24 NOTE — PATIENT INSTRUCTIONS

## 2023-07-31 LAB
FINAL PATHOLOGIC DIAGNOSIS: NORMAL
Lab: NORMAL

## 2023-08-01 ENCOUNTER — TELEPHONE (OUTPATIENT)
Dept: DERMATOLOGY | Facility: CLINIC | Age: 78
End: 2023-08-01
Payer: MEDICARE

## 2023-08-01 NOTE — TELEPHONE ENCOUNTER
----- Message from Taco Hussein MD sent at 7/31/2023  3:54 PM CDT -----  SKIN, LEFT SUPERIOR CHEST LESION, SHAVE BIOPSY:   - Junctional melanocytic nevus with moderate architectural disorder and cytologic atypia, overlying   an intradermal nevus, see comment.     COMMENT:   A). The dermal component of this lesion appears mature and without cytologic atypia. The junctional   component does show features of a dysplastic nevus. Both components extend to biopsy edges, limiting   interpretation of the entire lesion. Complete excision of the entire lesion for further evaluation may be   warranted. Clinical correlation is recommended    ##Staff: Please schedule excision

## 2023-08-07 ENCOUNTER — CLINICAL SUPPORT (OUTPATIENT)
Dept: UROLOGY | Facility: CLINIC | Age: 78
End: 2023-08-07
Payer: MEDICARE

## 2023-08-07 ENCOUNTER — CLINICAL SUPPORT (OUTPATIENT)
Dept: DERMATOLOGY | Facility: CLINIC | Age: 78
End: 2023-08-07
Payer: MEDICARE

## 2023-08-07 DIAGNOSIS — Z48.02 VISIT FOR SUTURE REMOVAL: Primary | ICD-10-CM

## 2023-08-07 DIAGNOSIS — E29.1 HYPOGONADISM IN MALE: Primary | ICD-10-CM

## 2023-08-07 PROCEDURE — 96372 PR INJECTION,THERAP/PROPH/DIAG2ST, IM OR SUBCUT: ICD-10-PCS | Mod: HCNC,S$GLB,, | Performed by: NURSE PRACTITIONER

## 2023-08-07 PROCEDURE — 96372 THER/PROPH/DIAG INJ SC/IM: CPT | Mod: HCNC,S$GLB,, | Performed by: NURSE PRACTITIONER

## 2023-08-07 RX ADMIN — TESTOSTERONE CYPIONATE 200 MG: 200 INJECTION, SOLUTION INTRAMUSCULAR at 09:08

## 2023-08-07 NOTE — PROGRESS NOTES
IM Testosterone injection given to LEFT Dorsalgluteal muscle.  Patient tolerated procedure well.  Return appointment scheduled by .

## 2023-08-18 NOTE — PROGRESS NOTES
"Patient arrives today for testosterone injection. Risks, benefits, side effects, administration, frequency and reasons warranting provider notification reviewed with patient. Patient verbalizes understanding.     {HormoneInj:82086} administered IM to {RIGHT/LEFT:78034} outer quadrant of gluteus using aseptic technique and 22 gauge 1.5 inch needle.     Site secured with Band-Aid, needle tip remains intact, patient tolerated well without pain.   Patient's next injection scheduled prior to clinic departure.    Metric Last Due   PSA Lab Results   Component Value Date    PSA 0.94 04/26/2022     ***     Testosterone  No results found for: "TESTOS"  ***   CBC Lab Results   Component Value Date    WBC 6.51 12/13/2022    HGB 15.3 12/13/2022    HCT 47.5 12/13/2022    MCV 85 12/13/2022     12/13/2022    ***   Last OV  ***   Labs due every 3 months.    "

## 2023-08-21 ENCOUNTER — CLINICAL SUPPORT (OUTPATIENT)
Dept: UROLOGY | Facility: CLINIC | Age: 78
End: 2023-08-21
Payer: MEDICARE

## 2023-08-21 DIAGNOSIS — E29.1 HYPOGONADISM IN MALE: Primary | ICD-10-CM

## 2023-08-21 PROCEDURE — 96372 PR INJECTION,THERAP/PROPH/DIAG2ST, IM OR SUBCUT: ICD-10-PCS | Mod: HCNC,S$GLB,, | Performed by: NURSE PRACTITIONER

## 2023-08-21 PROCEDURE — 96372 THER/PROPH/DIAG INJ SC/IM: CPT | Mod: HCNC,S$GLB,, | Performed by: NURSE PRACTITIONER

## 2023-08-21 RX ADMIN — TESTOSTERONE CYPIONATE 200 MG: 200 INJECTION, SOLUTION INTRAMUSCULAR at 08:08

## 2023-08-24 ENCOUNTER — PROCEDURE VISIT (OUTPATIENT)
Dept: DERMATOLOGY | Facility: CLINIC | Age: 78
End: 2023-08-24
Payer: MEDICARE

## 2023-08-24 DIAGNOSIS — D23.9 DYSPLASTIC NEVUS: Primary | ICD-10-CM

## 2023-08-24 PROCEDURE — 88342 IMHCHEM/IMCYTCHM 1ST ANTB: CPT | Mod: 26,HCNC,, | Performed by: STUDENT IN AN ORGANIZED HEALTH CARE EDUCATION/TRAINING PROGRAM

## 2023-08-24 PROCEDURE — 88342 CHG IMMUNOCYTOCHEMISTRY: ICD-10-PCS | Mod: 26,HCNC,, | Performed by: STUDENT IN AN ORGANIZED HEALTH CARE EDUCATION/TRAINING PROGRAM

## 2023-08-24 PROCEDURE — 99499 NO LOS: ICD-10-PCS | Mod: HCNC,S$GLB,, | Performed by: STUDENT IN AN ORGANIZED HEALTH CARE EDUCATION/TRAINING PROGRAM

## 2023-08-24 PROCEDURE — 88305 TISSUE EXAM BY PATHOLOGIST: CPT | Mod: HCNC | Performed by: STUDENT IN AN ORGANIZED HEALTH CARE EDUCATION/TRAINING PROGRAM

## 2023-08-24 PROCEDURE — 99499 UNLISTED E&M SERVICE: CPT | Mod: HCNC,S$GLB,, | Performed by: STUDENT IN AN ORGANIZED HEALTH CARE EDUCATION/TRAINING PROGRAM

## 2023-08-24 PROCEDURE — 88305 TISSUE EXAM BY PATHOLOGIST: CPT | Mod: 26,HCNC,, | Performed by: STUDENT IN AN ORGANIZED HEALTH CARE EDUCATION/TRAINING PROGRAM

## 2023-08-24 PROCEDURE — 11403 EXC TR-EXT B9+MARG 2.1-3CM: CPT | Mod: HCNC,S$GLB,, | Performed by: STUDENT IN AN ORGANIZED HEALTH CARE EDUCATION/TRAINING PROGRAM

## 2023-08-24 PROCEDURE — 11403 PR EXC SKIN BENIG 2.1-3 CM TRUNK,ARM,LEG: ICD-10-PCS | Mod: HCNC,S$GLB,, | Performed by: STUDENT IN AN ORGANIZED HEALTH CARE EDUCATION/TRAINING PROGRAM

## 2023-08-24 PROCEDURE — 12032 PR LAYR CLOS WND TRUNK,ARM,LEG 2.6-7.5 CM: ICD-10-PCS | Mod: HCNC,51,S$GLB, | Performed by: STUDENT IN AN ORGANIZED HEALTH CARE EDUCATION/TRAINING PROGRAM

## 2023-08-24 PROCEDURE — 12032 INTMD RPR S/A/T/EXT 2.6-7.5: CPT | Mod: HCNC,51,S$GLB, | Performed by: STUDENT IN AN ORGANIZED HEALTH CARE EDUCATION/TRAINING PROGRAM

## 2023-08-24 PROCEDURE — 88305 TISSUE EXAM BY PATHOLOGIST: ICD-10-PCS | Mod: 26,HCNC,, | Performed by: STUDENT IN AN ORGANIZED HEALTH CARE EDUCATION/TRAINING PROGRAM

## 2023-08-24 NOTE — PATIENT INSTRUCTIONS
Post- Operative Wound Care    Your doctor has performed local skin surgery today.  Vaseline ointment and a pressure bandage were placed after the surgery.  It is very important that you keep this bandage in place for 24 hours.  This will decrease the risk of post - operative infection and bleeding.  After 24 hours, you may remove the band aid and wash the area with warm soap and water and apply Vaseline ointment.  Many patients prefer to use Neosporin or Bacitracin ointment.  This is acceptable; however know that you can develop an allergy to this medication even if you have used it safely for years.  It is important to keep the area moist.  Letting it dry out and get air slows healing time, will worsen the scar, and make it more difficult to remove the stitches.  Band aid is optional after first 24 hours.    It is best NOT to use hydrogen peroxide or antibacterial soap to wash the operative site.       If you notice increasing redness, tenderness, pain, or yellow drainage at the biopsy or surgical site, please notify your doctor.  These are signs of an infection.    If your biopsy/surgical site is bleeding, apply firm pressure for 15 minutes straight.  Repeat for another 15 minutes, if it is still bleeding.   If the surgical site continues to bleed, then please contact your doctor.      For [facility] users:   You will receive your pathology results in [facility] as soon as they are available. Please be assured that your physician/provider will review your results and contact you should additional treatment be required. This is one more way [facility] is putting you first.       [facility] [facility] [place] [address]/ [phone] FAX/ [email or web address]

## 2023-08-24 NOTE — PROGRESS NOTES
PROCEDURE: Elliptical excision with intermediate layered repair in order to decrease dead space and decrease tension.    ANESTHETIC: 6 cc 1% Xylocaine with Epinephrine 1:100,000, buffered    SURGEON: Taco Cardozo M.D.    ASSISTANTS:  joan humphries md    PREOPERATIVE DIAGNOSIS:  Moderately Atypical Nevus    POSTOPERATIVE DIAGNOSIS:  Same as preoperative diagnosis    PATHOLOGIC DIAGNOSIS: Pending    LOCATION: left superior chest    INITIAL LESION SIZE: 1.9 cm    EXCISED DIAMETER: 2.5 cm    PREPARATION: The diagnosis, procedure, alternatives, benefits and risks, including but not limited to: infection, bleeding/bruising, drug reactions, pain, scar or cosmetic defect, local sensation disturbances, wound dehiscence (separation of wound edges after sutures removed) and/or recurrence of present condition were explained to the patient. The patient elected to proceed.  Patient's identity was verified using 2 patient identifiers and the side and site was verified.  Time out period with surgeon, assistant and patient in surgical suite was taken.    PROCEDURE: The location noted above was prepped, draped, and anesthetized in the usual sterile fashion per  taco cardozo . Lesional tissue was carefully marked with at least 3 mm margins of clinically normal skin in all directions. A fusiform elliptical excision was done with #15 blade carried down completely through the dermis into the deep subcutaneous tissues to the level of the non-muscle fascia, and dissection was carried out in that plane.  Electrocoagulation was used to obtain hemostasis. Blood loss was minimal. The wound was then approximated in a layered fashion with subcutaneous and intradermal sutures of 4.0 Monocryl, approximately 2 in number, and the wound was then superficially closed with simple interrupted sutures of 4.0 Prolene.    The patient tolerated the procedure well.    The area was cleaned and dressed appropriately and the patient was given wound care  instructions, as well as an appointment for follow-up evaluation.    LENGTH OF REPAIR: 3.5 cm

## 2023-08-29 ENCOUNTER — OFFICE VISIT (OUTPATIENT)
Dept: UROLOGY | Facility: CLINIC | Age: 78
End: 2023-08-29
Payer: MEDICARE

## 2023-08-29 VITALS
BODY MASS INDEX: 30.33 KG/M2 | DIASTOLIC BLOOD PRESSURE: 68 MMHG | HEART RATE: 78 BPM | WEIGHT: 228.81 LBS | OXYGEN SATURATION: 96 % | SYSTOLIC BLOOD PRESSURE: 135 MMHG | HEIGHT: 73 IN

## 2023-08-29 DIAGNOSIS — E29.1 HYPOGONADISM IN MALE: Primary | ICD-10-CM

## 2023-08-29 DIAGNOSIS — N40.1 BPH ASSOCIATED WITH NOCTURIA: ICD-10-CM

## 2023-08-29 DIAGNOSIS — R35.1 BPH ASSOCIATED WITH NOCTURIA: ICD-10-CM

## 2023-08-29 PROCEDURE — 1126F PR PAIN SEVERITY QUANTIFIED, NO PAIN PRESENT: ICD-10-PCS | Mod: HCNC,CPTII,S$GLB, | Performed by: NURSE PRACTITIONER

## 2023-08-29 PROCEDURE — 1126F AMNT PAIN NOTED NONE PRSNT: CPT | Mod: HCNC,CPTII,S$GLB, | Performed by: NURSE PRACTITIONER

## 2023-08-29 PROCEDURE — 99213 PR OFFICE/OUTPT VISIT, EST, LEVL III, 20-29 MIN: ICD-10-PCS | Mod: HCNC,S$GLB,, | Performed by: NURSE PRACTITIONER

## 2023-08-29 PROCEDURE — 99213 OFFICE O/P EST LOW 20 MIN: CPT | Mod: HCNC,S$GLB,, | Performed by: NURSE PRACTITIONER

## 2023-08-29 PROCEDURE — 3075F SYST BP GE 130 - 139MM HG: CPT | Mod: HCNC,CPTII,S$GLB, | Performed by: NURSE PRACTITIONER

## 2023-08-29 PROCEDURE — 1159F PR MEDICATION LIST DOCUMENTED IN MEDICAL RECORD: ICD-10-PCS | Mod: HCNC,CPTII,S$GLB, | Performed by: NURSE PRACTITIONER

## 2023-08-29 PROCEDURE — 3075F PR MOST RECENT SYSTOLIC BLOOD PRESS GE 130-139MM HG: ICD-10-PCS | Mod: HCNC,CPTII,S$GLB, | Performed by: NURSE PRACTITIONER

## 2023-08-29 PROCEDURE — 1159F MED LIST DOCD IN RCRD: CPT | Mod: HCNC,CPTII,S$GLB, | Performed by: NURSE PRACTITIONER

## 2023-08-29 PROCEDURE — 3078F PR MOST RECENT DIASTOLIC BLOOD PRESSURE < 80 MM HG: ICD-10-PCS | Mod: HCNC,CPTII,S$GLB, | Performed by: NURSE PRACTITIONER

## 2023-08-29 PROCEDURE — 1160F RVW MEDS BY RX/DR IN RCRD: CPT | Mod: HCNC,CPTII,S$GLB, | Performed by: NURSE PRACTITIONER

## 2023-08-29 PROCEDURE — 1160F PR REVIEW ALL MEDS BY PRESCRIBER/CLIN PHARMACIST DOCUMENTED: ICD-10-PCS | Mod: HCNC,CPTII,S$GLB, | Performed by: NURSE PRACTITIONER

## 2023-08-29 PROCEDURE — 3078F DIAST BP <80 MM HG: CPT | Mod: HCNC,CPTII,S$GLB, | Performed by: NURSE PRACTITIONER

## 2023-08-29 NOTE — PROGRESS NOTES
Subjective:      Arnaldo Donovan is a 78 y.o. male who returns today regarding his low testosterone.     The patient was diagnosed with low testosterone in 2021. He has been receiving testosterone cypionate injections (200 mg) in clinic every 2 weeks with much improvement in symptoms. States that he begins to notice the recurrence of symptoms (low libido) about 4-5 days before his next injection.       Testosterone, Total   Latest Ref Rng 304 - 1227 ng/dL   9/9/2021 195 (L)    9/16/2021 168 (L)    11/19/2021 1061    4/26/2022 939    12/13/2022 1155      On flomax for LUTS- well controlled. Reports nocturia several times per night. Mild dependent edema.     Possible family history of prostate cancer (father- 80s).     The following portions of the patient's history were reviewed and updated as appropriate: allergies, current medications, past family history, past medical history, past social history, past surgical history and problem list.    Review of Systems  Constitutional: no fever or chills  ENT: no nasal congestion or sore throat  Respiratory: no cough or shortness of breath  Cardiovascular: no chest pain or palpitations  Gastrointestinal: no nausea or vomiting, tolerating diet  Genitourinary: as per HPI  Hematologic/Lymphatic: no easy bruising or lymphadenopathy  Musculoskeletal: no arthralgias or myalgias  Neurological: no seizures or tremors  Behavioral/Psych: no auditory or visual hallucinations     Objective:   Vitals:   Vitals:    08/29/23 0929   BP: 135/68   Pulse: 78     Physical Exam   General: alert and oriented, no acute distress  Head: normocephalic, atraumatic  Neck: supple, normal ROM  Respiratory: Symmetric expansion, non-labored breathing  Cardiovascular: regular rate and rhythm  Abdomen: soft, non tender, non distended   Genitourinary:   Prostate: normal for age, normal consistency, non tender, no specific nodules, size: 30 gm; seminal vesicles not palpated  Rectum: normal rectal tone, no  rectal mass, normal perineum  Skin: normal coloration and turgor, no rashes, no suspicious skin lesions noted  Neuro: alert and oriented x3, no gross deficits  Psych: normal judgment and insight, normal mood/affect, and non-anxious    Lab Review   Urinalysis demonstrates negative for all components  Lab Results   Component Value Date    WBC 6.51 12/13/2022    HGB 15.3 12/13/2022    HCT 47.5 12/13/2022    HCT 50 12/01/2022    MCV 85 12/13/2022     12/13/2022     Lab Results   Component Value Date    CREATININE 1.1 04/19/2023    BUN 16 04/19/2023     Lab Results   Component Value Date    PSA 0.94 04/26/2022     Imaging   None    Assessment:     1. Hypogonadism in male    2. BPH associated with nocturia      Plan:   Arnaldo was seen today for annual.    Diagnoses and all orders for this visit:    Hypogonadism in male  -     CBC Auto Differential; Future  -     Testosterone; Future  -     PSA, Screening; Future    BPH associated with nocturia  -     CBC Auto Differential; Future  -     Testosterone; Future  -     PSA, Screening; Future    Plan:  --Continue Flomax   --Labs prior to next injection-CBC, testosterone and PSA. Will potentially increase testosterone dosage pending trough lab results  --For now continue with testosterone cypionate injections 200 mg Q 2 weeks   --Follow up annually with PSA, testosterone and CBC for ERMA    ADDENDUM:  9/5/23    Component      Latest Ref Rng 9/5/2023   Testosterone, Total      304 - 1227 ng/dL 513    PSA, Screen      0.00 - 4.00 ng/mL 0.98      --Increase testosterone dosage to 300 mg Q 14 days  --Testosterone level in 8 weeks  --Follow up annually with PSA, testosterone and CBC for ERMA

## 2023-08-29 NOTE — Clinical Note
Testosterone level in 8 weeks Then follow up with me in 1 year with PSA, testosterone and CBC. Thanks

## 2023-08-31 ENCOUNTER — PATIENT OUTREACH (OUTPATIENT)
Dept: ADMINISTRATIVE | Facility: HOSPITAL | Age: 78
End: 2023-08-31
Payer: MEDICARE

## 2023-08-31 LAB
FINAL PATHOLOGIC DIAGNOSIS: NORMAL
Lab: NORMAL

## 2023-09-05 ENCOUNTER — LAB VISIT (OUTPATIENT)
Dept: LAB | Facility: OTHER | Age: 78
End: 2023-09-05
Attending: NURSE PRACTITIONER
Payer: MEDICARE

## 2023-09-05 DIAGNOSIS — E29.1 HYPOGONADISM IN MALE: ICD-10-CM

## 2023-09-05 DIAGNOSIS — N40.1 BPH ASSOCIATED WITH NOCTURIA: ICD-10-CM

## 2023-09-05 DIAGNOSIS — R35.1 BPH ASSOCIATED WITH NOCTURIA: ICD-10-CM

## 2023-09-05 LAB
BASOPHILS # BLD AUTO: 0.05 K/UL (ref 0–0.2)
BASOPHILS NFR BLD: 0.7 % (ref 0–1.9)
COMPLEXED PSA SERPL-MCNC: 0.98 NG/ML (ref 0–4)
DIFFERENTIAL METHOD: ABNORMAL
EOSINOPHIL # BLD AUTO: 0.7 K/UL (ref 0–0.5)
EOSINOPHIL NFR BLD: 9.9 % (ref 0–8)
ERYTHROCYTE [DISTWIDTH] IN BLOOD BY AUTOMATED COUNT: 17.4 % (ref 11.5–14.5)
HCT VFR BLD AUTO: 50.1 % (ref 40–54)
HGB BLD-MCNC: 16.1 G/DL (ref 14–18)
IMM GRANULOCYTES # BLD AUTO: 0.04 K/UL (ref 0–0.04)
IMM GRANULOCYTES NFR BLD AUTO: 0.5 % (ref 0–0.5)
LYMPHOCYTES # BLD AUTO: 2.5 K/UL (ref 1–4.8)
LYMPHOCYTES NFR BLD: 33.7 % (ref 18–48)
MCH RBC QN AUTO: 27.1 PG (ref 27–31)
MCHC RBC AUTO-ENTMCNC: 32.1 G/DL (ref 32–36)
MCV RBC AUTO: 84 FL (ref 82–98)
MONOCYTES # BLD AUTO: 0.8 K/UL (ref 0.3–1)
MONOCYTES NFR BLD: 10.3 % (ref 4–15)
NEUTROPHILS # BLD AUTO: 3.3 K/UL (ref 1.8–7.7)
NEUTROPHILS NFR BLD: 44.9 % (ref 38–73)
NRBC BLD-RTO: 0 /100 WBC
PLATELET # BLD AUTO: 208 K/UL (ref 150–450)
PMV BLD AUTO: 10.3 FL (ref 9.2–12.9)
RBC # BLD AUTO: 5.95 M/UL (ref 4.6–6.2)
TESTOST SERPL-MCNC: 513 NG/DL (ref 304–1227)
WBC # BLD AUTO: 7.35 K/UL (ref 3.9–12.7)

## 2023-09-05 PROCEDURE — 84153 ASSAY OF PSA TOTAL: CPT | Mod: HCNC | Performed by: NURSE PRACTITIONER

## 2023-09-05 PROCEDURE — 85025 COMPLETE CBC W/AUTO DIFF WBC: CPT | Mod: HCNC | Performed by: NURSE PRACTITIONER

## 2023-09-05 PROCEDURE — 36415 COLL VENOUS BLD VENIPUNCTURE: CPT | Mod: HCNC | Performed by: NURSE PRACTITIONER

## 2023-09-05 PROCEDURE — 84403 ASSAY OF TOTAL TESTOSTERONE: CPT | Mod: HCNC | Performed by: NURSE PRACTITIONER

## 2023-09-05 RX ORDER — TESTOSTERONE CYPIONATE 200 MG/ML
300 INJECTION, SOLUTION INTRAMUSCULAR
Status: COMPLETED | OUTPATIENT
Start: 2023-09-05 | End: 2023-11-27

## 2023-09-06 ENCOUNTER — CLINICAL SUPPORT (OUTPATIENT)
Dept: UROLOGY | Facility: CLINIC | Age: 78
End: 2023-09-06
Payer: MEDICARE

## 2023-09-06 DIAGNOSIS — E29.1 HYPOGONADISM IN MALE: Primary | ICD-10-CM

## 2023-09-06 PROCEDURE — 96372 PR INJECTION,THERAP/PROPH/DIAG2ST, IM OR SUBCUT: ICD-10-PCS | Mod: HCNC,S$GLB,, | Performed by: NURSE PRACTITIONER

## 2023-09-06 PROCEDURE — 96372 THER/PROPH/DIAG INJ SC/IM: CPT | Mod: HCNC,S$GLB,, | Performed by: NURSE PRACTITIONER

## 2023-09-06 RX ADMIN — TESTOSTERONE CYPIONATE 300 MG: 200 INJECTION, SOLUTION INTRAMUSCULAR at 08:09

## 2023-09-06 NOTE — PROGRESS NOTES
300 mg IM Testosterone injection given to RIGHT Dorsalgluteal muscle.  Patient tolerated procedure well.  Repeat labs in 8 weeks.

## 2023-09-07 ENCOUNTER — CLINICAL SUPPORT (OUTPATIENT)
Dept: DERMATOLOGY | Facility: CLINIC | Age: 78
End: 2023-09-07
Payer: MEDICARE

## 2023-09-07 DIAGNOSIS — Z48.02 VISIT FOR SUTURE REMOVAL: Primary | ICD-10-CM

## 2023-09-07 NOTE — PROGRESS NOTES
Suture Removal note:  CC: 78 y.o. male patient is here for suture removal.         HPI: Patient is s/p excision of intradermal melanocytic nevus from the left upper chest on 08/24/2023.  Patient reports no problems.    WOUND PE:  Sutures intact.  Wound healing well.  Good approximation of skin edges.  No signs or symptoms of infection.    IMPRESSION:  margins clear.    PLAN:  Sutures removed today.  Continue wound care.    RTC: In 6 months.

## 2023-09-08 DIAGNOSIS — R35.0 BENIGN PROSTATIC HYPERPLASIA WITH URINARY FREQUENCY: ICD-10-CM

## 2023-09-08 DIAGNOSIS — N40.1 BENIGN PROSTATIC HYPERPLASIA WITH URINARY FREQUENCY: ICD-10-CM

## 2023-09-12 RX ORDER — TAMSULOSIN HYDROCHLORIDE 0.4 MG/1
1 CAPSULE ORAL
Qty: 90 CAPSULE | Refills: 0 | Status: SHIPPED | OUTPATIENT
Start: 2023-09-12 | End: 2023-12-10

## 2023-09-12 NOTE — TELEPHONE ENCOUNTER
Refill Encounter    PCP Visits: Recent Visits  Date Type Provider Dept   04/18/23 Office Visit Xiao Carvalho MD [facility]   12/06/22 Office Visit Xiao Carvalho MD [facility]   Showing recent visits within past 360 days and meeting all other requirements  Future Appointments  No visits were found meeting these conditions.  Showing future appointments within next 720 days and meeting all other requirements     Last 3 Blood Pressure:   BP Readings from Last 3 Encounters:   08/29/23 135/68   06/28/23 125/62   04/18/23 124/72     Preferred Pharmacy:   [facility]/pharmacy #[id number] - [place] - [address]  [address]  [place] [address]  Phone: [phone] Fax: [phone]    Requested RX:  Requested Prescriptions     Signed Prescriptions Disp Refills    tamsulosin (FLOMAX) 0.4 mg Cap 90 capsule 0     Sig: TAKE 1 CAPSULE BY MOUTH EVERY DAY     Authorizing Provider: XIAO CARVALHO      RX Route: Normal

## 2023-09-21 ENCOUNTER — CLINICAL SUPPORT (OUTPATIENT)
Dept: UROLOGY | Facility: CLINIC | Age: 78
End: 2023-09-21
Payer: MEDICARE

## 2023-09-21 DIAGNOSIS — E29.1 HYPOGONADISM IN MALE: Primary | ICD-10-CM

## 2023-09-21 PROCEDURE — 96372 THER/PROPH/DIAG INJ SC/IM: CPT | Mod: HCNC,S$GLB,, | Performed by: NURSE PRACTITIONER

## 2023-09-21 PROCEDURE — 96372 PR INJECTION,THERAP/PROPH/DIAG2ST, IM OR SUBCUT: ICD-10-PCS | Mod: HCNC,S$GLB,, | Performed by: NURSE PRACTITIONER

## 2023-09-21 RX ADMIN — TESTOSTERONE CYPIONATE 300 MG: 200 INJECTION, SOLUTION INTRAMUSCULAR at 09:09

## 2023-09-21 NOTE — PROGRESS NOTES
300 mg IM Testosterone injection given to LEFT Dorsalgluteal muscle.  Patient tolerated procedure well.  Return to clinic in 2 weeks..

## 2023-10-02 ENCOUNTER — CLINICAL SUPPORT (OUTPATIENT)
Dept: UROLOGY | Facility: CLINIC | Age: 78
End: 2023-10-02
Payer: MEDICARE

## 2023-10-02 DIAGNOSIS — E29.1 HYPOGONADISM IN MALE: Primary | ICD-10-CM

## 2023-10-02 PROCEDURE — 96372 PR INJECTION,THERAP/PROPH/DIAG2ST, IM OR SUBCUT: ICD-10-PCS | Mod: HCNC,S$GLB,, | Performed by: NURSE PRACTITIONER

## 2023-10-02 PROCEDURE — 96372 THER/PROPH/DIAG INJ SC/IM: CPT | Mod: HCNC,S$GLB,, | Performed by: NURSE PRACTITIONER

## 2023-10-02 RX ADMIN — TESTOSTERONE CYPIONATE 300 MG: 200 INJECTION, SOLUTION INTRAMUSCULAR at 08:10

## 2023-10-16 ENCOUNTER — CLINICAL SUPPORT (OUTPATIENT)
Dept: UROLOGY | Facility: CLINIC | Age: 78
End: 2023-10-16
Payer: MEDICARE

## 2023-10-16 VITALS — BODY MASS INDEX: 29.67 KG/M2 | WEIGHT: 224.88 LBS

## 2023-10-16 DIAGNOSIS — E29.1 HYPOGONADISM IN MALE: Primary | ICD-10-CM

## 2023-10-16 PROCEDURE — 96372 PR INJECTION,THERAP/PROPH/DIAG2ST, IM OR SUBCUT: ICD-10-PCS | Mod: HCNC,S$GLB,, | Performed by: NURSE PRACTITIONER

## 2023-10-16 PROCEDURE — 96372 THER/PROPH/DIAG INJ SC/IM: CPT | Mod: HCNC,S$GLB,, | Performed by: NURSE PRACTITIONER

## 2023-10-16 RX ADMIN — TESTOSTERONE CYPIONATE 300 MG: 200 INJECTION, SOLUTION INTRAMUSCULAR at 08:10

## 2023-10-16 NOTE — PROGRESS NOTES
300 mg IM Testosterone injection given to RIGHT Dorsalgluteal muscle.  Patient tolerated procedure well.  Return to clinic in 2 weeks.

## 2023-10-31 ENCOUNTER — LAB VISIT (OUTPATIENT)
Dept: LAB | Facility: OTHER | Age: 78
End: 2023-10-31
Attending: NURSE PRACTITIONER
Payer: MEDICARE

## 2023-10-31 ENCOUNTER — CLINICAL SUPPORT (OUTPATIENT)
Dept: UROLOGY | Facility: CLINIC | Age: 78
End: 2023-10-31
Attending: FAMILY MEDICINE
Payer: MEDICARE

## 2023-10-31 DIAGNOSIS — E29.1 HYPOGONADISM IN MALE: Primary | ICD-10-CM

## 2023-10-31 DIAGNOSIS — E29.1 HYPOGONADISM IN MALE: ICD-10-CM

## 2023-10-31 LAB — TESTOST SERPL-MCNC: 944 NG/DL (ref 304–1227)

## 2023-10-31 PROCEDURE — 96372 THER/PROPH/DIAG INJ SC/IM: CPT | Mod: HCNC,S$GLB,, | Performed by: FAMILY MEDICINE

## 2023-10-31 PROCEDURE — 84403 ASSAY OF TOTAL TESTOSTERONE: CPT | Mod: HCNC | Performed by: NURSE PRACTITIONER

## 2023-10-31 PROCEDURE — 96372 PR INJECTION,THERAP/PROPH/DIAG2ST, IM OR SUBCUT: ICD-10-PCS | Mod: HCNC,S$GLB,, | Performed by: FAMILY MEDICINE

## 2023-10-31 PROCEDURE — 36415 COLL VENOUS BLD VENIPUNCTURE: CPT | Mod: HCNC | Performed by: NURSE PRACTITIONER

## 2023-10-31 RX ADMIN — TESTOSTERONE CYPIONATE 300 MG: 200 INJECTION, SOLUTION INTRAMUSCULAR at 08:10

## 2023-10-31 NOTE — PROGRESS NOTES
Pt here today for testosterone injection. Testosterone cypionate 300mg IM administered to LEFT dorso gluteal. Tolerated well. Return in 2 Weeks.

## 2023-11-03 DIAGNOSIS — M10.9 GOUT, UNSPECIFIED CAUSE, UNSPECIFIED CHRONICITY, UNSPECIFIED SITE: ICD-10-CM

## 2023-11-03 NOTE — TELEPHONE ENCOUNTER
Care Due:                  Date            Visit Type   Department     Provider  --------------------------------------------------------------------------------                                MYCHART                              ANNUAL                              CHECKUP/PHY  [facility]  Last Visit: 04-      [facility]           Xiao Turner  Next Visit: None Scheduled  None         None Found                                                            Last  Test          Frequency    Reason                     Performed    Due Date  --------------------------------------------------------------------------------    Uric Acid...  12 months..  allopurinoL..............  Not Found    Overdue    [employer] Embedded Care Due Messages. Reference number: [id number].   11/03/2023 4:37:02 PM CDT

## 2023-11-04 RX ORDER — ALLOPURINOL 300 MG/1
300 TABLET ORAL DAILY
Qty: 90 TABLET | Refills: 1 | Status: SHIPPED | OUTPATIENT
Start: 2023-11-04 | End: 2024-03-02 | Stop reason: SDUPTHER

## 2023-11-27 ENCOUNTER — CLINICAL SUPPORT (OUTPATIENT)
Dept: UROLOGY | Facility: CLINIC | Age: 78
End: 2023-11-27
Payer: MEDICARE

## 2023-11-27 DIAGNOSIS — E29.1 HYPOGONADISM IN MALE: Primary | ICD-10-CM

## 2023-11-27 PROCEDURE — 96372 THER/PROPH/DIAG INJ SC/IM: CPT | Mod: HCNC,S$GLB,, | Performed by: NURSE PRACTITIONER

## 2023-11-27 PROCEDURE — 96372 PR INJECTION,THERAP/PROPH/DIAG2ST, IM OR SUBCUT: ICD-10-PCS | Mod: HCNC,S$GLB,, | Performed by: NURSE PRACTITIONER

## 2023-11-27 RX ORDER — TESTOSTERONE CYPIONATE 200 MG/ML
300 INJECTION, SOLUTION INTRAMUSCULAR
Status: SHIPPED | OUTPATIENT
Start: 2023-11-27 | End: 2024-04-15

## 2023-11-27 RX ADMIN — TESTOSTERONE CYPIONATE 300 MG: 200 INJECTION, SOLUTION INTRAMUSCULAR at 08:11

## 2023-11-27 NOTE — PROGRESS NOTES
Pt here today for testosterone injection. Testosterone cypionate 300 mg IM administered to RIGHT dorso gluteal. Tolerated well. Return in 2 Weeks.

## 2023-12-09 DIAGNOSIS — N40.1 BENIGN PROSTATIC HYPERPLASIA WITH URINARY FREQUENCY: ICD-10-CM

## 2023-12-09 DIAGNOSIS — R35.0 BENIGN PROSTATIC HYPERPLASIA WITH URINARY FREQUENCY: ICD-10-CM

## 2023-12-09 NOTE — TELEPHONE ENCOUNTER
No care due was identified.  [facility] Embedded Care Due Messages. Reference number: [id number].   12/09/2023 12:47:58 AM CST

## 2023-12-10 RX ORDER — TAMSULOSIN HYDROCHLORIDE 0.4 MG/1
1 CAPSULE ORAL
Qty: 90 CAPSULE | Refills: 1 | Status: SHIPPED | OUTPATIENT
Start: 2023-12-10

## 2023-12-10 NOTE — TELEPHONE ENCOUNTER
Refill Decision Note   Arnaldo Donovan  is requesting a refill authorization.  Brief Assessment and Rationale for Refill:  Approve     Medication Therapy Plan:         Comments:     Note composed:4:19 PM 12/10/2023

## 2023-12-11 ENCOUNTER — CLINICAL SUPPORT (OUTPATIENT)
Dept: UROLOGY | Facility: CLINIC | Age: 78
End: 2023-12-11
Payer: MEDICARE

## 2023-12-11 DIAGNOSIS — E29.1 HYPOGONADISM IN MALE: Primary | ICD-10-CM

## 2023-12-11 PROCEDURE — 96372 PR INJECTION,THERAP/PROPH/DIAG2ST, IM OR SUBCUT: ICD-10-PCS | Mod: HCNC,S$GLB,, | Performed by: NURSE PRACTITIONER

## 2023-12-11 PROCEDURE — 96372 THER/PROPH/DIAG INJ SC/IM: CPT | Mod: HCNC,S$GLB,, | Performed by: NURSE PRACTITIONER

## 2023-12-11 RX ADMIN — TESTOSTERONE CYPIONATE 300 MG: 200 INJECTION, SOLUTION INTRAMUSCULAR at 08:12

## 2023-12-26 ENCOUNTER — CLINICAL SUPPORT (OUTPATIENT)
Dept: UROLOGY | Facility: CLINIC | Age: 78
End: 2023-12-26
Payer: MEDICARE

## 2023-12-26 DIAGNOSIS — E29.1 HYPOGONADISM IN MALE: Primary | ICD-10-CM

## 2023-12-26 PROCEDURE — 96372 PR INJECTION,THERAP/PROPH/DIAG2ST, IM OR SUBCUT: ICD-10-PCS | Mod: HCNC,S$GLB,, | Performed by: NURSE PRACTITIONER

## 2023-12-26 PROCEDURE — 96372 THER/PROPH/DIAG INJ SC/IM: CPT | Mod: HCNC,S$GLB,, | Performed by: NURSE PRACTITIONER

## 2023-12-26 RX ADMIN — TESTOSTERONE CYPIONATE 300 MG: 200 INJECTION, SOLUTION INTRAMUSCULAR at 08:12

## 2023-12-26 NOTE — PROGRESS NOTES
Pt here today for testosterone injection. Testosterone cypionate 300mg IM administered to R dorso gluteal. Tolerated well. Return in 2 Weeks.

## 2024-01-09 ENCOUNTER — CLINICAL SUPPORT (OUTPATIENT)
Dept: UROLOGY | Facility: CLINIC | Age: 79
End: 2024-01-09
Payer: MEDICARE

## 2024-01-09 DIAGNOSIS — E29.1 HYPOGONADISM IN MALE: Primary | ICD-10-CM

## 2024-01-09 PROCEDURE — 96372 THER/PROPH/DIAG INJ SC/IM: CPT | Mod: HCNC,S$GLB,, | Performed by: NURSE PRACTITIONER

## 2024-01-09 RX ADMIN — TESTOSTERONE CYPIONATE 300 MG: 200 INJECTION, SOLUTION INTRAMUSCULAR at 08:01

## 2024-01-09 NOTE — PROGRESS NOTES
Pt here today for testosterone injection. Testosterone cypionate 300mg IM administered to L dorso gluteal. Tolerated well. Return in 2 Weeks.

## 2024-01-12 ENCOUNTER — PATIENT MESSAGE (OUTPATIENT)
Dept: PRIMARY CARE CLINIC | Facility: CLINIC | Age: 79
End: 2024-01-12
Payer: MEDICARE

## 2024-01-14 DIAGNOSIS — I10 ESSENTIAL HYPERTENSION: ICD-10-CM

## 2024-01-14 NOTE — TELEPHONE ENCOUNTER
Care Due:                  Date            Visit Type   Department     Provider  --------------------------------------------------------------------------------                                MYCHART                              ANNUAL                              CHECKUP/PHY  [facility] PRIMARY  Last Visit: 04-      S            MARTHA Turner                              MYCHART                              FOLLOWUP/OF  [facility] PRIMARY  Next Visit: 04-      FICE VISIT   CARE           Xiao Turner                                                            Last  Test          Frequency    Reason                     Performed    Due Date  --------------------------------------------------------------------------------    CMP.........  12 months..  allopurinoL..............  04-   04-    Uric Acid...  12 months..  allopurinoL..............  Not Found    Overdue    [employer] Embedded Care Due Messages. Reference number: [id number].   1/14/2024 12:27:52 AM CST

## 2024-01-16 RX ORDER — VALSARTAN 320 MG/1
320 TABLET ORAL
Qty: 90 TABLET | Refills: 0 | Status: SHIPPED | OUTPATIENT
Start: 2024-01-16 | End: 2024-04-15

## 2024-01-16 NOTE — TELEPHONE ENCOUNTER
Provider Staff:  Action required for this patient     Please see care gap opportunities below in Care Due Message.    Thanks!  [facility]     Appointments      Date Provider   Last Visit   4/18/2023 Xiao Turner MD   Next Visit   4/16/2024 Xiao Turner MD      Refill Decision Note   Arnaldo Donovan  is requesting a refill authorization.  Brief Assessment and Rationale for Refill:  Approve     Medication Therapy Plan:         Comments:     Note composed:2:58 AM 01/16/2024

## 2024-01-19 ENCOUNTER — OFFICE VISIT (OUTPATIENT)
Dept: DERMATOLOGY | Facility: CLINIC | Age: 79
End: 2024-01-19
Payer: MEDICARE

## 2024-01-19 DIAGNOSIS — I78.1 TELANGIECTASIA: ICD-10-CM

## 2024-01-19 DIAGNOSIS — L71.9 ROSACEA: Primary | ICD-10-CM

## 2024-01-19 PROCEDURE — 1101F PT FALLS ASSESS-DOCD LE1/YR: CPT | Mod: CPTII,S$GLB,, | Performed by: DERMATOLOGY

## 2024-01-19 PROCEDURE — 99214 OFFICE O/P EST MOD 30 MIN: CPT | Mod: S$GLB,,, | Performed by: DERMATOLOGY

## 2024-01-19 PROCEDURE — 1160F RVW MEDS BY RX/DR IN RCRD: CPT | Mod: CPTII,S$GLB,, | Performed by: DERMATOLOGY

## 2024-01-19 PROCEDURE — 1159F MED LIST DOCD IN RCRD: CPT | Mod: CPTII,S$GLB,, | Performed by: DERMATOLOGY

## 2024-01-19 PROCEDURE — 3288F FALL RISK ASSESSMENT DOCD: CPT | Mod: CPTII,S$GLB,, | Performed by: DERMATOLOGY

## 2024-01-19 PROCEDURE — 1126F AMNT PAIN NOTED NONE PRSNT: CPT | Mod: CPTII,S$GLB,, | Performed by: DERMATOLOGY

## 2024-01-19 NOTE — PROGRESS NOTES
Patient Information  Name: Arnaldo Donovan  : 1945  MRN: [id number]     Referring Physician:  No ref. provider found   Primary Care Physician:  Xiao Turner MD   Date of Visit: 2024      Subjective:     History of Present lllness:    Arnaldo Donovan is a 78 y.o. male who presents with a chief complaint of rosacea.  Location: nose and cheeks  Signs/Symptoms: 1 month +  Symptom course: worsening  Current treatment: azelaic acid 15% + ivermectin 1% + metronidazole 1% cream- has not been effective lately because it is old, worked well in the past    Patient was last seen: 2021.  Prior notes by myself reviewed.   Clinical documentation obtained by nursing staff reviewed.    Review of Systems    Objective:   Physical Exam   Constitutional: He appears well-developed and well-nourished. No distress.   Neurological: He is alert and oriented to person, place, and time. He is not disoriented.   Psychiatric: He has a normal mood and affect.   Skin:   Areas Examined (abnormalities noted in diagram):   Head / Face Inspection Performed            Diagram Legend     Erythematous scaling macule/papule c/w actinic keratosis       Vascular papule c/w angioma      Pigmented verrucoid papule/plaque c/w seborrheic keratosis      Yellow umbilicated papule c/w sebaceous hyperplasia      Irregularly shaped tan macule c/w lentigo     1-2 mm smooth white papules consistent with Milia      Movable subcutaneous cyst with punctum c/w epidermal inclusion cyst      Subcutaneous movable cyst c/w pilar cyst      Firm pink to brown papule c/w dermatofibroma      Pedunculated fleshy papule(s) c/w skin tag(s)      Evenly pigmented macule c/w junctional nevus     Mildly variegated pigmented, slightly irregular-bordered macule c/w mildly atypical nevus      Flesh colored to evenly pigmented papule c/w intradermal nevus       Pink pearly papule/plaque c/w basal cell carcinoma      Erythematous hyperkeratotic cursted plaque c/w  SCC      Surgical scar with no sign of skin cancer recurrence      Open and closed comedones      Inflammatory papules and pustules      Verrucoid papule consistent consistent with wart     Erythematous eczematous patches and plaques     Dystrophic onycholytic nail with subungual debris c/w onychomycosis     Umbilicated papule    Erythematous-base heme-crusted tan verrucoid plaque consistent with inflamed seborrheic keratosis     Erythematous Silvery Scaling Plaque c/w Psoriasis     See annotation    No images are attached to the encounter or orders placed in the encounter.      [] Data reviewed  [] Prior external notes reviewed  [] Independent review of test  [] Management discussed with another provider  [] Independent historian    Assessment / Plan:        Rosacea  - chronic problem, not at treatment goal  -     metroNIDAZOLE (NORITATE) 1 % cream; Compound azelaic acid 15% + ivermectin 1% + metronidazole 1% cream. Apply to face once or twice daily.  Dispense: 30 g; Refill: 11    Telangiectasia  Discussed with the patient that treatment of these benign lesions is not covered by insurance as it is considered cosmetic.   Recommended and discussed risks, benefits, alternatives, and side effects of pulse dye laser (PDL).         Follow up in about 1 year (around 1/19/2025) for follow up, or sooner if symptoms worsening or not improving.      Rae Ortega MD, FAAD  [facility]

## 2024-01-23 ENCOUNTER — OFFICE VISIT (OUTPATIENT)
Dept: PRIMARY CARE CLINIC | Facility: CLINIC | Age: 79
End: 2024-01-23
Attending: FAMILY MEDICINE
Payer: MEDICARE

## 2024-01-23 ENCOUNTER — CLINICAL SUPPORT (OUTPATIENT)
Dept: UROLOGY | Facility: CLINIC | Age: 79
End: 2024-01-23
Payer: MEDICARE

## 2024-01-23 DIAGNOSIS — E29.1 HYPOGONADISM IN MALE: Primary | ICD-10-CM

## 2024-01-23 DIAGNOSIS — R60.0 BILATERAL LEG EDEMA: Primary | ICD-10-CM

## 2024-01-23 PROCEDURE — 99214 OFFICE O/P EST MOD 30 MIN: CPT | Mod: HCNC,95,, | Performed by: FAMILY MEDICINE

## 2024-01-23 PROCEDURE — 96372 THER/PROPH/DIAG INJ SC/IM: CPT | Mod: HCNC,S$GLB,, | Performed by: NURSE PRACTITIONER

## 2024-01-23 RX ADMIN — TESTOSTERONE CYPIONATE 300 MG: 200 INJECTION, SOLUTION INTRAMUSCULAR at 08:01

## 2024-02-07 ENCOUNTER — CLINICAL SUPPORT (OUTPATIENT)
Dept: UROLOGY | Facility: CLINIC | Age: 79
End: 2024-02-07
Payer: MEDICARE

## 2024-02-07 DIAGNOSIS — E29.1 HYPOGONADISM IN MALE: Primary | ICD-10-CM

## 2024-02-07 PROCEDURE — 96372 THER/PROPH/DIAG INJ SC/IM: CPT | Mod: HCNC,S$GLB,, | Performed by: NURSE PRACTITIONER

## 2024-02-07 RX ADMIN — TESTOSTERONE CYPIONATE 300 MG: 200 INJECTION, SOLUTION INTRAMUSCULAR at 02:02

## 2024-02-07 NOTE — PROGRESS NOTES
Pt here today for testosterone injection. Testosterone cypionate 200mg IM administered to L dorso gluteal. Tolerated well. Return in 2 Weeks.

## 2024-02-15 PROBLEM — I77.819 ECTATIC AORTA: Status: RESOLVED | Noted: 2017-10-19 | Resolved: 2024-02-15

## 2024-02-15 PROBLEM — I70.0 ATHEROSCLEROSIS OF AORTA: Status: RESOLVED | Noted: 2017-10-19 | Resolved: 2024-02-15

## 2024-02-15 NOTE — PROGRESS NOTES
Subjective:       Patient ID: Arnaldo Donovan is a 78 y.o. male.    Chief Complaint:  Bilateral peripheral edema lower extremities    The patient location is:  Home  The chief complaint leading to consultation is:  Above    Visit type: audiovisual    Face to Face time with patient:  15 minutes 20 minutes of total time spent on the encounter, which includes face to face time and non-face to face time preparing to see the patient (eg, review of tests), Obtaining and/or reviewing separately obtained history, Documenting clinical information in the electronic or other health record, Independently interpreting results (not separately reported) and communicating results to the patient/family/caregiver, or Care coordination (not separately reported).     Each patient to whom he or she provides medical services by telemedicine is:  (1) informed of the relationship between the physician and patient and the respective role of any other health care provider with respect to management of the patient; and (2) notified that he or she may decline to receive medical services by telemedicine and may withdraw from such care at any time.    Notes:     Patient states that he is having some concern that at the end of his workday he notices bilateral lower extremity edema.  States that this is only worse after he has worked all day.  He says that were stands for the majority of the time the patient.  His like there are any downward position.  Patient denies any shortness a breath chest pain dizziness headaches or any history of pulmonary issues or liver problems.      Review of Systems   Constitutional:  Negative for activity change and unexpected weight change.   HENT:  Negative for hearing loss, rhinorrhea and trouble swallowing.    Eyes:  Negative for discharge and visual disturbance.   Respiratory:  Negative for chest tightness and wheezing.    Cardiovascular:  Positive for leg swelling. Negative for chest pain and palpitations.    Gastrointestinal:  Negative for blood in stool, constipation, diarrhea and vomiting.   Endocrine: Negative for polydipsia and polyuria.   Genitourinary:  Negative for difficulty urinating, hematuria and urgency.   Musculoskeletal:  Positive for neck pain. Negative for arthralgias and joint swelling.   Neurological:  Negative for weakness and headaches.   Psychiatric/Behavioral:  Negative for confusion and dysphoric mood.    All other systems reviewed and are negative.        Objective:      Physical Exam  Vitals reviewed.   Constitutional:       Appearance: Normal appearance. He is well-developed.   HENT:      Head: Normocephalic and atraumatic.   Eyes:      Extraocular Movements: Extraocular movements intact.   Pulmonary:      Effort: Pulmonary effort is normal.   Musculoskeletal:         General: Normal range of motion.      Cervical back: Neck supple.      Right lower leg: No edema.      Left lower leg: No edema.   Neurological:      Mental Status: He is alert and oriented to person, place, and time.   Psychiatric:         Mood and Affect: Mood normal.         Behavior: Behavior normal.         Thought Content: Thought content normal.         Judgment: Judgment normal.         Assessment:     Bilateral lower extremity edema      Plan:   Explained to patient that my suspicion for anything significant such as a DVT is low.  His blood pressures are at his baseline and are relatively well-controlled.  I suspect that this is positional edema as a result of his work days and his legs being down while sitting and standing for long periods of time.  Patient encouraged to wear compression stockings to hydrate and also could be a result of varicose veins which he also has.  Patient reassured today and will follow up with me if symptoms worsen

## 2024-02-20 ENCOUNTER — CLINICAL SUPPORT (OUTPATIENT)
Dept: UROLOGY | Facility: CLINIC | Age: 79
End: 2024-02-20
Payer: MEDICARE

## 2024-02-20 DIAGNOSIS — E29.1 HYPOGONADISM IN MALE: Primary | ICD-10-CM

## 2024-02-20 PROCEDURE — 96372 THER/PROPH/DIAG INJ SC/IM: CPT | Mod: HCNC,S$GLB,, | Performed by: NURSE PRACTITIONER

## 2024-02-20 RX ADMIN — TESTOSTERONE CYPIONATE 300 MG: 200 INJECTION, SOLUTION INTRAMUSCULAR at 07:02

## 2024-02-24 ENCOUNTER — PATIENT MESSAGE (OUTPATIENT)
Dept: DERMATOLOGY | Facility: CLINIC | Age: 79
End: 2024-02-24
Payer: MEDICARE

## 2024-02-27 ENCOUNTER — TELEPHONE (OUTPATIENT)
Dept: UROLOGY | Facility: CLINIC | Age: 79
End: 2024-02-27
Payer: MEDICARE

## 2024-02-27 NOTE — TELEPHONE ENCOUNTER
----- Message from Brynn Maru Abeba sent at 2/27/2024  9:55 AM CST -----  Regarding: call back  Type: Patient Call Back    Who called: pt     What is the request in detail: requesting call back to reschedule upcoming testosterone injection, is out of town 3/5 to 3/8    Can the clinic reply by [facility]?no    Would the patient rather a call back or a response via [facility]? call    Best call back number: [phone]     Additional Information: won't be free to come in until 3/8

## 2024-03-02 DIAGNOSIS — M10.9 GOUT, UNSPECIFIED CAUSE, UNSPECIFIED CHRONICITY, UNSPECIFIED SITE: ICD-10-CM

## 2024-03-02 NOTE — TELEPHONE ENCOUNTER
No care due was identified.  [facility] Embedded Care Due Messages. Reference number: [id number].   3/02/2024 3:31:03 PM CST

## 2024-03-04 ENCOUNTER — TELEPHONE (OUTPATIENT)
Dept: PRIMARY CARE CLINIC | Facility: CLINIC | Age: 79
End: 2024-03-04
Payer: MEDICARE

## 2024-03-04 RX ORDER — ALLOPURINOL 300 MG/1
300 TABLET ORAL DAILY
Qty: 7 TABLET | Refills: 0 | Status: SHIPPED | OUTPATIENT
Start: 2024-03-04 | End: 2024-04-16

## 2024-03-04 RX ORDER — ALLOPURINOL 300 MG/1
300 TABLET ORAL DAILY
Qty: 90 TABLET | Refills: 1 | Status: SHIPPED | OUTPATIENT
Start: 2024-03-04

## 2024-03-04 NOTE — TELEPHONE ENCOUNTER
----- Message from Tosha Castillo MA sent at 3/4/2024  9:37 AM CST -----  Regarding: Refill Request  Who Called:MAKAYLA JOSHI [[id number]]           New Prescription or Refill : Refill      RX Name and Strength:  allopurinoL (ZYLOPRIM) 300 MG tablet            30 day or 90 day RX: 1 week supply           Local or Mail Order : LOCAL            Preferred Pharmacy:[facility] [id number] [facility] - [address] - [address]       Would the patient rather a call back or a response via [facility]? call        Best Call Back Number:  [phone]         Additional Information: PT WILL BE OUT OF TOWN FOR A WEEK

## 2024-03-04 NOTE — TELEPHONE ENCOUNTER
Orders Placed This Encounter    allopurinoL (ZYLOPRIM) 300 MG tablet     Dr. Amy Pedro D.O.   [facility]

## 2024-03-04 NOTE — TELEPHONE ENCOUNTER
----- Message from Anel Ruvalcaba sent at 3/4/2024  9:27 AM CST -----  Regarding: refill  Name of caller: Arnaldo       What is the requesting detail: pt is requesting a refill for allopurinoL (ZYLOPRIM) 300 MG table. States he is completely out and needs this filled today.please advise       [id number] [address]    Can the clinic reply by [facility]:       What number to call back: [phone]

## 2024-03-04 NOTE — TELEPHONE ENCOUNTER
Refill Encounter    PCP Visits: Recent Visits  Date Type Provider Dept   01/23/24 Office Visit Xiao Turner MD [facility] Primary Care   04/18/23 Office Visit Xiao Turner MD [facility]   Showing recent visits within past 360 days and meeting all other requirements  Future Appointments  Date Type Provider Dept   04/16/24 Appointment Xiao Turner MD [facility] Primary Care   Showing future appointments within next 720 days and meeting all other requirements     Last 3 Blood Pressure:   BP Readings from Last 3 Encounters:   08/29/23 135/68   06/28/23 125/62   04/18/23 124/72     Preferred Pharmacy:   [facility]/pharm[facility] #[id number] - [place], [place] - [address]  [address] [address]  Phone: [phone] Fax: [phone]    Requested RX:  Requested Prescriptions     Pending Prescriptions Disp Refills    allopurinoL (ZYLOPRIM) 300 MG tablet 90 tablet 1     Sig: Take 1 tablet (300 mg total) by mouth once daily.      RX Route: Normal

## 2024-03-04 NOTE — TELEPHONE ENCOUNTER
Refill Encounter    PCP Visits: Recent Visits  Date Type Provider Dept   01/23/24 Office Visit Xiao Turner MD [facility] Primary Care   04/18/23 Office Visit Xiao Turner MD [facility]   Showing recent visits within past 360 days and meeting all other requirements  Future Appointments  Date Type Provider Dept   04/16/24 Appointment Xiao Turner MD [facility]   Showing future appointments within next 720 days and meeting all other requirements     Last 3 Blood Pressure:   BP Readings from Last 3 Encounters:   08/29/23 135/68   06/28/23 125/62   04/18/23 124/72     Preferred Pharmacy:   [facility] [id number] [address] [facility] - [address] [place] [address]  Phone: [phone] Fax: [phone]    Requested RX:  Requested Prescriptions      No prescriptions requested or ordered in this encounter      RX Route: Normal

## 2024-03-08 ENCOUNTER — PROCEDURE VISIT (OUTPATIENT)
Dept: DERMATOLOGY | Facility: CLINIC | Age: 79
End: 2024-03-08
Payer: MEDICARE

## 2024-03-08 DIAGNOSIS — I78.1 TELANGIECTASIA: ICD-10-CM

## 2024-03-08 DIAGNOSIS — Z41.1 ENCOUNTER FOR COSMETIC LASER PROCEDURE: Primary | ICD-10-CM

## 2024-03-08 PROCEDURE — 99499 UNLISTED E&M SERVICE: CPT | Mod: CSM,S$GLB,, | Performed by: DERMATOLOGY

## 2024-03-08 PROCEDURE — 17999 UNLISTD PX SKN MUC MEMB SUBQ: CPT | Mod: CSM,S$GLB,, | Performed by: DERMATOLOGY

## 2024-03-08 NOTE — PROGRESS NOTES
Arnaldo Donovan is a 79 y.o. male who is here today for consult for laser treatment of telangiectasias on nose.   Discussed risks, benefits, alternatives, and side effects of pulse dye laser (PDL).  The patient was given the opportunity to ask any questions, and all questions were answered to the patient's satisfaction.  The patient verbalized understanding, elected to proceed, and signed a written informed consent.

## 2024-03-11 ENCOUNTER — CLINICAL SUPPORT (OUTPATIENT)
Dept: UROLOGY | Facility: CLINIC | Age: 79
End: 2024-03-11
Payer: MEDICARE

## 2024-03-11 DIAGNOSIS — E29.1 HYPOGONADISM IN MALE: Primary | ICD-10-CM

## 2024-03-11 PROCEDURE — 96372 THER/PROPH/DIAG INJ SC/IM: CPT | Mod: HCNC,S$GLB,, | Performed by: NURSE PRACTITIONER

## 2024-03-11 RX ADMIN — TESTOSTERONE CYPIONATE 300 MG: 200 INJECTION, SOLUTION INTRAMUSCULAR at 09:03

## 2024-03-25 ENCOUNTER — CLINICAL SUPPORT (OUTPATIENT)
Dept: UROLOGY | Facility: CLINIC | Age: 79
End: 2024-03-25
Payer: MEDICARE

## 2024-03-25 DIAGNOSIS — E29.1 HYPOGONADISM IN MALE: Primary | ICD-10-CM

## 2024-03-25 PROCEDURE — 96372 THER/PROPH/DIAG INJ SC/IM: CPT | Mod: HCNC,S$GLB,, | Performed by: NURSE PRACTITIONER

## 2024-03-25 RX ADMIN — TESTOSTERONE CYPIONATE 300 MG: 200 INJECTION, SOLUTION INTRAMUSCULAR at 11:03

## 2024-03-25 NOTE — PROGRESS NOTES
Pt here today for testosterone injection. Testosterone cypionate 300mg IM administered to L dorso gluteal. Tolerated well. Return in 2 Weeks.      Due for annual visit/labs 08/24.

## 2024-04-08 ENCOUNTER — CLINICAL SUPPORT (OUTPATIENT)
Dept: UROLOGY | Facility: CLINIC | Age: 79
End: 2024-04-08
Payer: MEDICARE

## 2024-04-08 DIAGNOSIS — E29.1 HYPOGONADISM IN MALE: Primary | ICD-10-CM

## 2024-04-08 PROCEDURE — 96372 THER/PROPH/DIAG INJ SC/IM: CPT | Mod: HCNC,S$GLB,, | Performed by: NURSE PRACTITIONER

## 2024-04-08 RX ADMIN — TESTOSTERONE CYPIONATE 300 MG: 200 INJECTION, SOLUTION INTRAMUSCULAR at 08:04

## 2024-04-08 NOTE — PROGRESS NOTES
Pt here today for testosterone injection. Testosterone cypionate 300mg IM administered to R dorso gluteal. Tolerated well. Return in 2 Weeks.       Due for annual visit/labs 08/24.

## 2024-04-12 ENCOUNTER — PROCEDURE VISIT (OUTPATIENT)
Dept: DERMATOLOGY | Facility: CLINIC | Age: 79
End: 2024-04-12
Payer: MEDICARE

## 2024-04-12 DIAGNOSIS — Z41.1 ENCOUNTER FOR COSMETIC LASER PROCEDURE: Primary | ICD-10-CM

## 2024-04-12 PROCEDURE — 99499 UNLISTED E&M SERVICE: CPT | Mod: CSM,S$GLB,, | Performed by: DERMATOLOGY

## 2024-04-12 PROCEDURE — 17999 UNLISTD PX SKN MUC MEMB SUBQ: CPT | Mod: CSM,S$GLB,, | Performed by: DERMATOLOGY

## 2024-04-15 DIAGNOSIS — I10 ESSENTIAL HYPERTENSION: ICD-10-CM

## 2024-04-15 RX ORDER — VALSARTAN 320 MG/1
320 TABLET ORAL
Qty: 90 TABLET | Refills: 0 | Status: SHIPPED | OUTPATIENT
Start: 2024-04-15

## 2024-04-15 NOTE — TELEPHONE ENCOUNTER
Refill Routing Note   Medication(s) are not appropriate for processing by [facility] for the following reason(s):        Required labs outdated    [facility] action(s):  Defer     Requires labs : Yes             Appointments  past 12m or future 3m with PCP    Date Provider   Last Visit   1/23/2024 Xiao Turner MD   Next Visit   4/16/2024 Xiao Turner MD   ED visits in past 90 days: 0        Note composed:11:58 AM 04/15/2024

## 2024-04-15 NOTE — TELEPHONE ENCOUNTER
Care Due:                  Date            Visit Type   Department     Provider  --------------------------------------------------------------------------------                                ESTABLISHED                              PATIENT -    [facility] PRIMARY  Last Visit: 01-      VIRTUAL      CARE           Xiao Turner                              MYCHART                              FOLLOWUP/OF  [facility] PRIMARY  Next Visit: 04-      FICE VISIT   CARE           Xiao Turner                                                            Last  Test          Frequency    Reason                     Performed    Due Date  --------------------------------------------------------------------------------    CMP.........  12 months..  allopurinoL, valsartan...  04-   04-    Uric Acid...  12 months..  allopurinoL..............  Not Found    Overdue    [employer] Embedded Care Due Messages. Reference number: [id number].   4/15/2024 12:48:15 AM CDT

## 2024-04-16 ENCOUNTER — OFFICE VISIT (OUTPATIENT)
Dept: PRIMARY CARE CLINIC | Facility: CLINIC | Age: 79
End: 2024-04-16
Attending: FAMILY MEDICINE
Payer: MEDICARE

## 2024-04-16 VITALS
WEIGHT: 224.56 LBS | SYSTOLIC BLOOD PRESSURE: 126 MMHG | OXYGEN SATURATION: 98 % | DIASTOLIC BLOOD PRESSURE: 84 MMHG | HEIGHT: 73 IN | BODY MASS INDEX: 29.76 KG/M2 | HEART RATE: 62 BPM

## 2024-04-16 DIAGNOSIS — R79.9 ABNORMAL BLOOD CHEMISTRY LEVEL: ICD-10-CM

## 2024-04-16 DIAGNOSIS — E66.9 OBESITY (BMI 30.0-34.9): ICD-10-CM

## 2024-04-16 DIAGNOSIS — M1A.00X0 IDIOPATHIC CHRONIC GOUT WITHOUT TOPHUS, UNSPECIFIED SITE: ICD-10-CM

## 2024-04-16 DIAGNOSIS — I10 ESSENTIAL HYPERTENSION: ICD-10-CM

## 2024-04-16 DIAGNOSIS — J30.2 SEASONAL ALLERGIES: ICD-10-CM

## 2024-04-16 DIAGNOSIS — L71.9 ROSACEA: ICD-10-CM

## 2024-04-16 DIAGNOSIS — Z12.5 ENCOUNTER FOR SCREENING FOR MALIGNANT NEOPLASM OF PROSTATE: ICD-10-CM

## 2024-04-16 DIAGNOSIS — R53.83 OTHER FATIGUE: ICD-10-CM

## 2024-04-16 PROBLEM — E66.811 OBESITY (BMI 30.0-34.9): Status: RESOLVED | Noted: 2017-10-19 | Resolved: 2024-04-16

## 2024-04-16 PROBLEM — M79.10 MYALGIA: Status: ACTIVE | Noted: 2019-05-06

## 2024-04-16 PROCEDURE — 99999 PR PBB SHADOW E&M-EST. PATIENT-LVL III: CPT | Mod: PBBFAC,HCNC,, | Performed by: FAMILY MEDICINE

## 2024-04-16 PROCEDURE — 1159F MED LIST DOCD IN RCRD: CPT | Mod: HCNC,CPTII,S$GLB, | Performed by: FAMILY MEDICINE

## 2024-04-16 PROCEDURE — 99214 OFFICE O/P EST MOD 30 MIN: CPT | Mod: HCNC,S$GLB,, | Performed by: FAMILY MEDICINE

## 2024-04-16 PROCEDURE — 3288F FALL RISK ASSESSMENT DOCD: CPT | Mod: HCNC,CPTII,S$GLB, | Performed by: FAMILY MEDICINE

## 2024-04-16 PROCEDURE — 1126F AMNT PAIN NOTED NONE PRSNT: CPT | Mod: HCNC,CPTII,S$GLB, | Performed by: FAMILY MEDICINE

## 2024-04-16 PROCEDURE — 3079F DIAST BP 80-89 MM HG: CPT | Mod: HCNC,CPTII,S$GLB, | Performed by: FAMILY MEDICINE

## 2024-04-16 PROCEDURE — 3074F SYST BP LT 130 MM HG: CPT | Mod: HCNC,CPTII,S$GLB, | Performed by: FAMILY MEDICINE

## 2024-04-16 PROCEDURE — 1101F PT FALLS ASSESS-DOCD LE1/YR: CPT | Mod: HCNC,CPTII,S$GLB, | Performed by: FAMILY MEDICINE

## 2024-04-16 PROCEDURE — 1160F RVW MEDS BY RX/DR IN RCRD: CPT | Mod: HCNC,CPTII,S$GLB, | Performed by: FAMILY MEDICINE

## 2024-04-16 RX ORDER — TESTOSTERONE CYPIONATE 200 MG/ML
INJECTION, SOLUTION INTRAMUSCULAR
COMMUNITY
Start: 2024-02-07

## 2024-04-16 NOTE — PROGRESS NOTES
Subjective:       Patient ID: Arnaldo Donovan is a 79 y.o. male.    Chief Complaint: Annual Exam    Pt presents today for annual exam as well as follow up for HTN follow up as well. .   Is walking daily      Sees urology to follow his PSA is on tamsulosin      Hypertension  Pertinent negatives include no shortness of breath.     Review of Systems   Constitutional: Negative.  Negative for activity change, appetite change and fatigue.   HENT: Negative.     Eyes: Negative.    Respiratory: Negative.  Negative for chest tightness and shortness of breath.    Cardiovascular: Negative.    Gastrointestinal: Negative.  Negative for abdominal pain.   Endocrine: Negative.    Genitourinary: Negative.  Negative for difficulty urinating, dysuria, frequency and urgency.   Musculoskeletal: Negative.  Negative for arthralgias, gait problem and joint swelling.   Skin: Negative.  Negative for color change, pallor and rash.   Allergic/Immunologic: Negative.    Neurological:  Negative for dizziness, weakness, light-headedness and numbness.   Hematological: Negative.    Psychiatric/Behavioral: Negative.  Negative for decreased concentration, dysphoric mood, self-injury, sleep disturbance and suicidal ideas. The patient is not nervous/anxious.    All other systems reviewed and are negative.      Objective:      Physical Exam  Vitals reviewed.   Constitutional:       General: He is not in acute distress.     Appearance: Normal appearance. He is well-developed and normal weight. He is not ill-appearing or diaphoretic.   HENT:      Head: Normocephalic and atraumatic.      Right Ear: Tympanic membrane, ear canal and external ear normal. There is no impacted cerumen.      Left Ear: Tympanic membrane, ear canal and external ear normal. There is no impacted cerumen.      Nose: Nose normal. No congestion or rhinorrhea.      Mouth/Throat:      Pharynx: No oropharyngeal exudate or posterior oropharyngeal erythema.   Eyes:      General:          Right eye: No discharge.         Left eye: No discharge.      Extraocular Movements: Extraocular movements intact.      Conjunctiva/sclera: Conjunctivae normal.      Pupils: Pupils are equal, round, and reactive to light.   Neck:      Thyroid: No thyromegaly.   Cardiovascular:      Rate and Rhythm: Normal rate and regular rhythm.      Heart sounds: Normal heart sounds. No murmur heard.  Pulmonary:      Effort: Pulmonary effort is normal. No respiratory distress.      Breath sounds: Normal breath sounds. No wheezing or rales.   Chest:      Chest wall: No tenderness.   Abdominal:      General: Bowel sounds are normal. There is no distension.      Palpations: Abdomen is soft. There is no mass.      Tenderness: There is no abdominal tenderness. There is no guarding or rebound.   Musculoskeletal:         General: No tenderness. Normal range of motion.      Cervical back: Normal range of motion and neck supple.      Right lower leg: No edema.      Left lower leg: No edema.   Lymphadenopathy:      Cervical: No cervical adenopathy.   Skin:     General: Skin is warm and dry.      Coloration: Skin is not pale.      Findings: No erythema.          Neurological:      Mental Status: He is alert and oriented to person, place, and time.      Cranial Nerves: No cranial nerve deficit.      Motor: No abnormal muscle tone.      Coordination: Coordination normal.      Deep Tendon Reflexes: Reflexes are normal and symmetric. Reflexes normal.   Psychiatric:         Mood and Affect: Mood normal.         Behavior: Behavior normal.         Thought Content: Thought content normal.         Judgment: Judgment normal.         Assessment:       1. Abnormal blood chemistry level    2. Other fatigue    3. Encounter for screening for malignant neoplasm of prostate        Plan:       Annual PE wnl.  Labs pending.    BPH: Re referred to uro  Moles:  She is derm for skin screen  HTN: controlled.    Aa: stable and pt has statin allergy      ED prompts  d/w pt  RTC prn symptoms  Abnormal blood chemistry level  -     Hemoglobin A1C; Future; Expected date: 04/16/2024  -     Lipid Panel; Future; Expected date: 04/16/2024  -     Comprehensive Metabolic Panel; Future; Expected date: 04/16/2024  -     CBC Auto Differential; Future; Expected date: 04/16/2024  -     TSH; Future; Expected date: 04/16/2024    Other fatigue  -     TSH; Future; Expected date: 04/16/2024    Encounter for screening for malignant neoplasm of prostate      Labs pending  PE wnl  Lifestyle mods d/w pt  Gout stable on allopurinol hypertension controlled on medicine

## 2024-04-17 ENCOUNTER — LAB VISIT (OUTPATIENT)
Dept: LAB | Facility: OTHER | Age: 79
End: 2024-04-17
Attending: FAMILY MEDICINE
Payer: MEDICARE

## 2024-04-17 DIAGNOSIS — R79.9 ABNORMAL BLOOD CHEMISTRY LEVEL: ICD-10-CM

## 2024-04-17 DIAGNOSIS — R53.83 OTHER FATIGUE: ICD-10-CM

## 2024-04-17 LAB
ALBUMIN SERPL BCP-MCNC: 3.5 G/DL (ref 3.5–5.2)
ALP SERPL-CCNC: 53 U/L (ref 55–135)
ALT SERPL W/O P-5'-P-CCNC: 16 U/L (ref 10–44)
ANION GAP SERPL CALC-SCNC: 6 MMOL/L (ref 8–16)
AST SERPL-CCNC: 20 U/L (ref 10–40)
BASOPHILS # BLD AUTO: 0.05 K/UL (ref 0–0.2)
BASOPHILS NFR BLD: 0.6 % (ref 0–1.9)
BILIRUB SERPL-MCNC: 1 MG/DL (ref 0.1–1)
BUN SERPL-MCNC: 14 MG/DL (ref 8–23)
CALCIUM SERPL-MCNC: 8.7 MG/DL (ref 8.7–10.5)
CHLORIDE SERPL-SCNC: 106 MMOL/L (ref 95–110)
CHOLEST SERPL-MCNC: 178 MG/DL (ref 120–199)
CHOLEST/HDLC SERPL: 3.9 {RATIO} (ref 2–5)
CO2 SERPL-SCNC: 24 MMOL/L (ref 23–29)
CREAT SERPL-MCNC: 0.9 MG/DL (ref 0.5–1.4)
DIFFERENTIAL METHOD BLD: ABNORMAL
EOSINOPHIL # BLD AUTO: 0.8 K/UL (ref 0–0.5)
EOSINOPHIL NFR BLD: 8.9 % (ref 0–8)
ERYTHROCYTE [DISTWIDTH] IN BLOOD BY AUTOMATED COUNT: 17 % (ref 11.5–14.5)
EST. GFR  (NO RACE VARIABLE): >60 ML/MIN/1.73 M^2
ESTIMATED AVG GLUCOSE: 117 MG/DL (ref 68–131)
GLUCOSE SERPL-MCNC: 112 MG/DL (ref 70–110)
HBA1C MFR BLD: 5.7 % (ref 4–5.6)
HCT VFR BLD AUTO: 52.3 % (ref 40–54)
HDLC SERPL-MCNC: 46 MG/DL (ref 40–75)
HDLC SERPL: 25.8 % (ref 20–50)
HGB BLD-MCNC: 16.8 G/DL (ref 14–18)
IMM GRANULOCYTES # BLD AUTO: 0.03 K/UL (ref 0–0.04)
IMM GRANULOCYTES NFR BLD AUTO: 0.4 % (ref 0–0.5)
LDLC SERPL CALC-MCNC: 118 MG/DL (ref 63–159)
LYMPHOCYTES # BLD AUTO: 2.4 K/UL (ref 1–4.8)
LYMPHOCYTES NFR BLD: 27.9 % (ref 18–48)
MCH RBC QN AUTO: 27.5 PG (ref 27–31)
MCHC RBC AUTO-ENTMCNC: 32.1 G/DL (ref 32–36)
MCV RBC AUTO: 86 FL (ref 82–98)
MONOCYTES # BLD AUTO: 0.8 K/UL (ref 0.3–1)
MONOCYTES NFR BLD: 9.8 % (ref 4–15)
NEUTROPHILS # BLD AUTO: 4.5 K/UL (ref 1.8–7.7)
NEUTROPHILS NFR BLD: 52.4 % (ref 38–73)
NONHDLC SERPL-MCNC: 132 MG/DL
NRBC BLD-RTO: 0 /100 WBC
PLATELET # BLD AUTO: 215 K/UL (ref 150–450)
PMV BLD AUTO: 10.4 FL (ref 9.2–12.9)
POTASSIUM SERPL-SCNC: 4.7 MMOL/L (ref 3.5–5.1)
PROT SERPL-MCNC: 6.6 G/DL (ref 6–8.4)
RBC # BLD AUTO: 6.12 M/UL (ref 4.6–6.2)
SODIUM SERPL-SCNC: 136 MMOL/L (ref 136–145)
TRIGL SERPL-MCNC: 70 MG/DL (ref 30–150)
TSH SERPL DL<=0.005 MIU/L-ACNC: 1.61 UIU/ML (ref 0.4–4)
WBC # BLD AUTO: 8.53 K/UL (ref 3.9–12.7)

## 2024-04-17 PROCEDURE — 36415 COLL VENOUS BLD VENIPUNCTURE: CPT | Mod: HCNC | Performed by: FAMILY MEDICINE

## 2024-04-17 PROCEDURE — 84443 ASSAY THYROID STIM HORMONE: CPT | Mod: HCNC | Performed by: FAMILY MEDICINE

## 2024-04-17 PROCEDURE — 80053 COMPREHEN METABOLIC PANEL: CPT | Mod: HCNC | Performed by: FAMILY MEDICINE

## 2024-04-17 PROCEDURE — 80061 LIPID PANEL: CPT | Mod: HCNC | Performed by: FAMILY MEDICINE

## 2024-04-17 PROCEDURE — 85025 COMPLETE CBC W/AUTO DIFF WBC: CPT | Mod: HCNC | Performed by: FAMILY MEDICINE

## 2024-04-17 PROCEDURE — 83036 HEMOGLOBIN GLYCOSYLATED A1C: CPT | Mod: HCNC | Performed by: FAMILY MEDICINE

## 2024-04-22 ENCOUNTER — CLINICAL SUPPORT (OUTPATIENT)
Dept: UROLOGY | Facility: CLINIC | Age: 79
End: 2024-04-22
Payer: MEDICARE

## 2024-04-22 DIAGNOSIS — E29.1 HYPOGONADISM IN MALE: ICD-10-CM

## 2024-04-22 PROCEDURE — 96372 THER/PROPH/DIAG INJ SC/IM: CPT | Mod: HCNC,S$GLB,, | Performed by: NURSE PRACTITIONER

## 2024-04-22 RX ORDER — TESTOSTERONE CYPIONATE 200 MG/ML
300 INJECTION, SOLUTION INTRAMUSCULAR
Status: SHIPPED | OUTPATIENT
Start: 2024-04-22 | End: 2024-09-09

## 2024-04-22 RX ADMIN — TESTOSTERONE CYPIONATE 300 MG: 200 INJECTION, SOLUTION INTRAMUSCULAR at 08:04

## 2024-04-22 NOTE — PROGRESS NOTES
Pt arrived for testosterone injection. 300 MG was injected into left dorso gluteal. Pt tolerated well. Will return in 2 weeks.

## 2024-05-06 ENCOUNTER — CLINICAL SUPPORT (OUTPATIENT)
Dept: UROLOGY | Facility: CLINIC | Age: 79
End: 2024-05-06
Payer: MEDICARE

## 2024-05-06 DIAGNOSIS — E29.1 HYPOGONADISM IN MALE: Primary | ICD-10-CM

## 2024-05-06 PROCEDURE — 96372 THER/PROPH/DIAG INJ SC/IM: CPT | Mod: HCNC,S$GLB,, | Performed by: NURSE PRACTITIONER

## 2024-05-06 RX ADMIN — TESTOSTERONE CYPIONATE 300 MG: 200 INJECTION, SOLUTION INTRAMUSCULAR at 08:05

## 2024-05-06 NOTE — PROGRESS NOTES
Pt arrived for testosterone injection. 300 MG was injected into right dorso gluteal. Pt tolerated well. Will return in 2 weeks.

## 2024-05-20 ENCOUNTER — CLINICAL SUPPORT (OUTPATIENT)
Dept: UROLOGY | Facility: CLINIC | Age: 79
End: 2024-05-20
Payer: MEDICARE

## 2024-05-20 DIAGNOSIS — E29.1 HYPOGONADISM IN MALE: Primary | ICD-10-CM

## 2024-05-20 PROCEDURE — 96372 THER/PROPH/DIAG INJ SC/IM: CPT | Mod: HCNC,S$GLB,, | Performed by: NURSE PRACTITIONER

## 2024-05-20 RX ADMIN — TESTOSTERONE CYPIONATE 300 MG: 200 INJECTION, SOLUTION INTRAMUSCULAR at 08:05

## 2024-05-20 NOTE — PROGRESS NOTES
Pt arrived for testosterone injection. 300 MG was injected into LEFT dorso gluteal. Pt tolerated well. Will return in 2 weeks. Due for f/u 8/23.

## 2024-05-31 NOTE — PROGRESS NOTES
Pt arrived for testosterone injection. 300 MG was injected into R dorso gluteal. Pt tolerated well. Will return in 2 weeks. Due for f/u 8/23.

## 2024-06-03 ENCOUNTER — CLINICAL SUPPORT (OUTPATIENT)
Dept: UROLOGY | Facility: CLINIC | Age: 79
End: 2024-06-03
Payer: MEDICARE

## 2024-06-03 DIAGNOSIS — E29.1 HYPOGONADISM IN MALE: Primary | ICD-10-CM

## 2024-06-03 PROCEDURE — 96372 THER/PROPH/DIAG INJ SC/IM: CPT | Mod: HCNC,S$GLB,, | Performed by: NURSE PRACTITIONER

## 2024-06-03 RX ADMIN — TESTOSTERONE CYPIONATE 300 MG: 200 INJECTION, SOLUTION INTRAMUSCULAR at 09:06

## 2024-06-06 ENCOUNTER — TELEPHONE (OUTPATIENT)
Dept: ADMINISTRATIVE | Facility: CLINIC | Age: 79
End: 2024-06-06
Payer: MEDICARE

## 2024-06-06 NOTE — TELEPHONE ENCOUNTER
Called pt, informed pt I was calling to remind pt of his in office EAWV on 6/7/24; pt stated he needed to cancel the appt because something just came up; pt declined to reschedule at this time and stated he would call the office back when he was ready to reschedule; appt canceled per pt request

## 2024-06-10 DIAGNOSIS — R35.0 BENIGN PROSTATIC HYPERPLASIA WITH URINARY FREQUENCY: ICD-10-CM

## 2024-06-10 DIAGNOSIS — N40.1 BENIGN PROSTATIC HYPERPLASIA WITH URINARY FREQUENCY: ICD-10-CM

## 2024-06-10 RX ORDER — TAMSULOSIN HYDROCHLORIDE 0.4 MG/1
1 CAPSULE ORAL
Qty: 90 CAPSULE | Refills: 3 | Status: SHIPPED | OUTPATIENT
Start: 2024-06-10

## 2024-06-10 NOTE — TELEPHONE ENCOUNTER
Refill Decision Note   Arnaldo Donovan  is requesting a refill authorization.  Brief Assessment and Rationale for Refill:  Approve     Medication Therapy Plan:         Comments:     Note composed:2:42 AM 06/10/2024

## 2024-06-10 NOTE — TELEPHONE ENCOUNTER
No care due was identified.  [facility] Embedded Care Due Messages. Reference number: [id number].   6/10/2024 12:23:02 AM CDT

## 2024-06-17 ENCOUNTER — CLINICAL SUPPORT (OUTPATIENT)
Dept: UROLOGY | Facility: CLINIC | Age: 79
End: 2024-06-17
Payer: MEDICARE

## 2024-06-17 DIAGNOSIS — E29.1 HYPOGONADISM IN MALE: Primary | ICD-10-CM

## 2024-06-17 PROCEDURE — 96372 THER/PROPH/DIAG INJ SC/IM: CPT | Mod: HCNC,S$GLB,, | Performed by: NURSE PRACTITIONER

## 2024-06-17 RX ADMIN — TESTOSTERONE CYPIONATE 300 MG: 200 INJECTION, SOLUTION INTRAMUSCULAR at 08:06

## 2024-06-17 NOTE — PROGRESS NOTES
Pt arrived for testosterone injection. 300 MG was injected into L dorso gluteal. Pt tolerated well. Will return in 2 weeks. Due for f/u 8/23.

## 2024-06-28 NOTE — PROGRESS NOTES
Pt arrived for testosterone injection. 300 MG was injected into RIGHT dorso gluteal. Pt tolerated well. Will return in 2 weeks. Due for f/u 8/23.

## 2024-07-01 ENCOUNTER — CLINICAL SUPPORT (OUTPATIENT)
Dept: UROLOGY | Facility: CLINIC | Age: 79
End: 2024-07-01
Payer: MEDICARE

## 2024-07-01 DIAGNOSIS — E29.1 HYPOGONADISM IN MALE: Primary | ICD-10-CM

## 2024-07-01 PROCEDURE — 96372 THER/PROPH/DIAG INJ SC/IM: CPT | Mod: HCNC,S$GLB,, | Performed by: NURSE PRACTITIONER

## 2024-07-01 RX ADMIN — TESTOSTERONE CYPIONATE 300 MG: 200 INJECTION, SOLUTION INTRAMUSCULAR at 08:07

## 2024-07-14 DIAGNOSIS — I10 ESSENTIAL HYPERTENSION: ICD-10-CM

## 2024-07-14 RX ORDER — VALSARTAN 320 MG/1
320 TABLET ORAL
Qty: 90 TABLET | Refills: 2 | Status: SHIPPED | OUTPATIENT
Start: 2024-07-14

## 2024-07-14 NOTE — TELEPHONE ENCOUNTER
Provider Staff:  Action required for this patient    Requires labs      Please see care gap opportunities below in Care Due Message.    Thanks!  [facility]     Appointments      Date Provider   Last Visit   4/16/2024 Xiao Turner MD   Next Visit   Visit date not found Xiao Turner MD     Refill Decision Note   Arnaldo Donovan  is requesting a refill authorization.  Brief Assessment and Rationale for Refill:  Approve     Medication Therapy Plan:        Comments:     Note composed:10:46 AM 07/14/2024

## 2024-07-14 NOTE — TELEPHONE ENCOUNTER
Care Due:                  Date            Visit Type   Department     Provider  --------------------------------------------------------------------------------                                MYCHART                              FOLLOWUP/OF  [facility] PRIMARY  Last Visit: 04-      FICE VISIT   CARE           Xiao Turner  Next Visit: None Scheduled  None         None Found                                                            Last  Test          Frequency    Reason                     Performed    Due Date  --------------------------------------------------------------------------------    Uric Acid...  12 months..  allopurinoL..............  Not Found    Overdue    [employer] Embedded Care Due Messages. Reference number: [id number].   7/14/2024 12:41:25 AM CDT

## 2024-07-15 ENCOUNTER — CLINICAL SUPPORT (OUTPATIENT)
Dept: UROLOGY | Facility: CLINIC | Age: 79
End: 2024-07-15
Payer: MEDICARE

## 2024-07-15 DIAGNOSIS — E29.1 HYPOGONADISM IN MALE: Primary | ICD-10-CM

## 2024-07-15 PROCEDURE — 96372 THER/PROPH/DIAG INJ SC/IM: CPT | Mod: HCNC,S$GLB,, | Performed by: NURSE PRACTITIONER

## 2024-07-15 RX ADMIN — TESTOSTERONE CYPIONATE 300 MG: 200 INJECTION, SOLUTION INTRAMUSCULAR at 03:07

## 2024-07-15 NOTE — PROGRESS NOTES
Pt arrived for testosterone injection. 300 MG was injected into LEFT dorso gluteal. Pt tolerated well. Will return in 2 weeks. Due for f/u 8/24.

## 2024-07-29 ENCOUNTER — CLINICAL SUPPORT (OUTPATIENT)
Dept: UROLOGY | Facility: CLINIC | Age: 79
End: 2024-07-29
Payer: MEDICARE

## 2024-07-29 DIAGNOSIS — E29.1 HYPOGONADISM IN MALE: Primary | ICD-10-CM

## 2024-07-29 PROCEDURE — 96372 THER/PROPH/DIAG INJ SC/IM: CPT | Mod: HCNC,S$GLB,, | Performed by: NURSE PRACTITIONER

## 2024-07-29 RX ADMIN — TESTOSTERONE CYPIONATE 300 MG: 200 INJECTION, SOLUTION INTRAMUSCULAR at 12:07

## 2024-07-29 NOTE — PROGRESS NOTES
Pt arrived for testosterone injection. 300 MG was injected into right dorso gluteal. Pt tolerated well. Will return in 2 weeks. Due for f/u 8/24.

## 2024-08-17 DIAGNOSIS — M10.9 GOUT, UNSPECIFIED CAUSE, UNSPECIFIED CHRONICITY, UNSPECIFIED SITE: ICD-10-CM

## 2024-08-17 NOTE — TELEPHONE ENCOUNTER
No care due was identified.  [facility] Embedded Care Due Messages. Reference number: [id number].   8/17/2024 9:36:30 AM CDT

## 2024-08-18 NOTE — TELEPHONE ENCOUNTER
Refill Routing Note   Medication(s) are not appropriate for processing by [facility] for the following reason(s):        Required labs outdated    [facility] action(s):  Defer             Appointments  past 12m or future 3m with PCP    Date Provider   Last Visit   4/16/2024 Xiao Turner MD   Next Visit   Visit date not found Xiao Turner MD   ED visits in past 90 days: 0        Note composed:9:59 PM 08/17/2024

## 2024-08-20 ENCOUNTER — CLINICAL SUPPORT (OUTPATIENT)
Dept: UROLOGY | Facility: CLINIC | Age: 79
End: 2024-08-20
Payer: MEDICARE

## 2024-08-20 ENCOUNTER — LAB VISIT (OUTPATIENT)
Dept: LAB | Facility: OTHER | Age: 79
End: 2024-08-20
Attending: NURSE PRACTITIONER
Payer: MEDICARE

## 2024-08-20 DIAGNOSIS — E29.1 HYPOGONADISM IN MALE: Primary | ICD-10-CM

## 2024-08-20 DIAGNOSIS — N40.1 BPH ASSOCIATED WITH NOCTURIA: ICD-10-CM

## 2024-08-20 DIAGNOSIS — E29.1 HYPOGONADISM IN MALE: ICD-10-CM

## 2024-08-20 DIAGNOSIS — R35.1 BPH ASSOCIATED WITH NOCTURIA: ICD-10-CM

## 2024-08-20 LAB
BASOPHILS # BLD AUTO: 0.03 K/UL (ref 0–0.2)
BASOPHILS NFR BLD: 0.4 % (ref 0–1.9)
COMPLEXED PSA SERPL-MCNC: 1.5 NG/ML (ref 0–4)
DIFFERENTIAL METHOD BLD: ABNORMAL
EOSINOPHIL # BLD AUTO: 0.6 K/UL (ref 0–0.5)
EOSINOPHIL NFR BLD: 7.5 % (ref 0–8)
ERYTHROCYTE [DISTWIDTH] IN BLOOD BY AUTOMATED COUNT: 17.3 % (ref 11.5–14.5)
HCT VFR BLD AUTO: 53.9 % (ref 40–54)
HGB BLD-MCNC: 17.1 G/DL (ref 14–18)
IMM GRANULOCYTES # BLD AUTO: 0.03 K/UL (ref 0–0.04)
IMM GRANULOCYTES NFR BLD AUTO: 0.4 % (ref 0–0.5)
LYMPHOCYTES # BLD AUTO: 1.9 K/UL (ref 1–4.8)
LYMPHOCYTES NFR BLD: 24.4 % (ref 18–48)
MCH RBC QN AUTO: 27.8 PG (ref 27–31)
MCHC RBC AUTO-ENTMCNC: 31.7 G/DL (ref 32–36)
MCV RBC AUTO: 88 FL (ref 82–98)
MONOCYTES # BLD AUTO: 0.8 K/UL (ref 0.3–1)
MONOCYTES NFR BLD: 9.9 % (ref 4–15)
NEUTROPHILS # BLD AUTO: 4.6 K/UL (ref 1.8–7.7)
NEUTROPHILS NFR BLD: 57.4 % (ref 38–73)
NRBC BLD-RTO: 0 /100 WBC
PLATELET # BLD AUTO: 195 K/UL (ref 150–450)
PMV BLD AUTO: 11.4 FL (ref 9.2–12.9)
RBC # BLD AUTO: 6.16 M/UL (ref 4.6–6.2)
TESTOST SERPL-MCNC: 341 NG/DL (ref 304–1227)
WBC # BLD AUTO: 7.91 K/UL (ref 3.9–12.7)

## 2024-08-20 PROCEDURE — 84153 ASSAY OF PSA TOTAL: CPT | Mod: HCNC | Performed by: NURSE PRACTITIONER

## 2024-08-20 PROCEDURE — 36415 COLL VENOUS BLD VENIPUNCTURE: CPT | Mod: HCNC | Performed by: NURSE PRACTITIONER

## 2024-08-20 PROCEDURE — 85025 COMPLETE CBC W/AUTO DIFF WBC: CPT | Mod: HCNC | Performed by: NURSE PRACTITIONER

## 2024-08-20 PROCEDURE — 84403 ASSAY OF TOTAL TESTOSTERONE: CPT | Mod: HCNC | Performed by: NURSE PRACTITIONER

## 2024-08-20 RX ORDER — ALLOPURINOL 300 MG/1
300 TABLET ORAL
Qty: 90 TABLET | Refills: 1 | Status: SHIPPED | OUTPATIENT
Start: 2024-08-20

## 2024-08-20 RX ADMIN — TESTOSTERONE CYPIONATE 300 MG: 200 INJECTION, SOLUTION INTRAMUSCULAR at 08:08

## 2024-08-20 NOTE — PROGRESS NOTES
Pt arrived for testosterone injection. 300 MG was injected into LEFT dorso gluteal. Pt tolerated well. Will return in 2 weeks. Due for f/u 8/24- scheduled.

## 2024-09-05 ENCOUNTER — OFFICE VISIT (OUTPATIENT)
Dept: UROLOGY | Facility: CLINIC | Age: 79
End: 2024-09-05
Payer: MEDICARE

## 2024-09-05 VITALS
OXYGEN SATURATION: 96 % | WEIGHT: 224.63 LBS | BODY MASS INDEX: 29.77 KG/M2 | RESPIRATION RATE: 18 BRPM | HEART RATE: 69 BPM | DIASTOLIC BLOOD PRESSURE: 71 MMHG | SYSTOLIC BLOOD PRESSURE: 146 MMHG | HEIGHT: 73 IN | TEMPERATURE: 98 F

## 2024-09-05 DIAGNOSIS — Z12.5 PROSTATE CANCER SCREENING: ICD-10-CM

## 2024-09-05 DIAGNOSIS — N40.1 BPH ASSOCIATED WITH NOCTURIA: ICD-10-CM

## 2024-09-05 DIAGNOSIS — E29.1 HYPOGONADISM IN MALE: ICD-10-CM

## 2024-09-05 DIAGNOSIS — R35.1 BPH ASSOCIATED WITH NOCTURIA: ICD-10-CM

## 2024-09-05 DIAGNOSIS — N52.1 ERECTILE DYSFUNCTION DUE TO ARTERIAL DISEASE: Primary | ICD-10-CM

## 2024-09-05 DIAGNOSIS — Z80.42 FAMILY HISTORY OF PROSTATE CANCER: ICD-10-CM

## 2024-09-05 DIAGNOSIS — I77.9 ERECTILE DYSFUNCTION DUE TO ARTERIAL DISEASE: Primary | ICD-10-CM

## 2024-09-05 PROCEDURE — 1101F PT FALLS ASSESS-DOCD LE1/YR: CPT | Mod: HCNC,CPTII,S$GLB, | Performed by: NURSE PRACTITIONER

## 2024-09-05 PROCEDURE — 3077F SYST BP >= 140 MM HG: CPT | Mod: HCNC,CPTII,S$GLB, | Performed by: NURSE PRACTITIONER

## 2024-09-05 PROCEDURE — 3078F DIAST BP <80 MM HG: CPT | Mod: HCNC,CPTII,S$GLB, | Performed by: NURSE PRACTITIONER

## 2024-09-05 PROCEDURE — 99214 OFFICE O/P EST MOD 30 MIN: CPT | Mod: HCNC,S$GLB,, | Performed by: NURSE PRACTITIONER

## 2024-09-05 PROCEDURE — 3288F FALL RISK ASSESSMENT DOCD: CPT | Mod: HCNC,CPTII,S$GLB, | Performed by: NURSE PRACTITIONER

## 2024-09-05 PROCEDURE — 1159F MED LIST DOCD IN RCRD: CPT | Mod: HCNC,CPTII,S$GLB, | Performed by: NURSE PRACTITIONER

## 2024-09-05 PROCEDURE — 1126F AMNT PAIN NOTED NONE PRSNT: CPT | Mod: HCNC,CPTII,S$GLB, | Performed by: NURSE PRACTITIONER

## 2024-09-05 PROCEDURE — 1160F RVW MEDS BY RX/DR IN RCRD: CPT | Mod: HCNC,CPTII,S$GLB, | Performed by: NURSE PRACTITIONER

## 2024-09-05 RX ORDER — TADALAFIL 20 MG/1
20 TABLET ORAL
Qty: 30 TABLET | Refills: 11 | Status: SHIPPED | OUTPATIENT
Start: 2024-09-05 | End: 2025-09-05

## 2024-09-05 NOTE — PROGRESS NOTES
Subjective:      Arnaldo Donovan is a 79 y.o. male who returns today regarding his hypogonadism.     The patient was diagnosed with low testosterone in 2021. He has been receiving testosterone cypionate injections (300 mg) in clinic every 2 weeks with much improvement in symptoms.      On flomax for LUTS- well controlled. Reports nocturia several times per night. Mild dependent edema.     Reports some issues with ED lately. Has not taken medication for this before. Not on nitro.      Possible family history of prostate cancer (father- 80s).       Testosterone, Total   Latest Ref Rng 304 - 1227 ng/dL   9/5/2023 513    10/31/2023 944    8/20/2024 341      Component      Latest Ref Rng 9/5/2023 8/20/2024   Prostate Specific Antigen      0.00 - 4.00 ng/mL 0.98     PSA Diagnostic      0.00 - 4.00 ng/mL  1.5        The following portions of the patient's history were reviewed and updated as appropriate: allergies, current medications, past family history, past medical history, past social history, past surgical history and problem list.    Review of Systems  Constitutional: no fever or chills  ENT: no nasal congestion or sore throat  Respiratory: no cough or shortness of breath  Cardiovascular: no chest pain or palpitations  Gastrointestinal: no nausea or vomiting, tolerating diet  Genitourinary: as per HPI  Hematologic/Lymphatic: no easy bruising or lymphadenopathy  Musculoskeletal: no arthralgias or myalgias  Neurological: no seizures or tremors  Behavioral/Psych: no auditory or visual hallucinations     Objective:   Vitals:   Vitals:    09/05/24 1030   BP: (!) 146/71   Pulse: 69   Resp: 18   Temp: 97.7 °F (36.5 °C)     Physical Exam   General: alert and oriented, no acute distress  Head: normocephalic, atraumatic  Neck: supple, normal ROM  Respiratory: Symmetric expansion, non-labored breathing  Cardiovascular: regular rate and rhythm  Abdomen: soft, non tender, non distended  Genitourinary:   Prostate: normal for  age, normal consistency, non tender, no specific nodules, size: 35 gm; seminal vesicles not palpated  Rectum: normal rectal tone, no rectal mass, normal perineum  Skin: normal coloration and turgor, no rashes, no suspicious skin lesions noted  Neuro: alert and oriented x3, no gross deficits  Psych: normal judgment and insight, normal mood/affect, and non-anxious    Lab Review   Urinalysis demonstrates : no specimen  Lab Results   Component Value Date    WBC 7.91 08/20/2024    HGB 17.1 08/20/2024    HCT 53.9 08/20/2024    HCT 50 12/01/2022    MCV 88 08/20/2024     08/20/2024     Lab Results   Component Value Date    CREATININE 0.9 04/17/2024    BUN 14 04/17/2024     Lab Results   Component Value Date    PSA 0.98 09/05/2023     Imaging   None    Assessment:     1. Erectile dysfunction due to arterial disease    2. Hypogonadism in male    3. BPH associated with nocturia      Plan:   Diagnoses and all orders for this visit:    Erectile dysfunction due to arterial disease  -     tadalafiL (CIALIS) 20 MG Tab; Take 1 tablet (20 mg total) by mouth every 72 hours as needed (ED).    Hypogonadism in male    BPH associated with nocturia    Plan:  --Continue Flomax- discussed increasing dosage, pt declines for now   --Discussed common bladder irritants such as caffeine, alcohol, citrus, and acidic and spicy foods. Encouraged to minimize in diet to reduce symptoms.  --Trial of cialis PRN- reviewed dosing instructions  --Reviewed lab results- all stable  --Continue with testosterone cypionate injections 300 mg every 14 days in clinic (national shortage currently, will reach out when available)   --Follow up annually with PSA, CBC, testosterone

## 2024-09-09 ENCOUNTER — TELEPHONE (OUTPATIENT)
Dept: UROLOGY | Facility: CLINIC | Age: 79
End: 2024-09-09
Payer: MEDICARE

## 2024-09-09 NOTE — TELEPHONE ENCOUNTER
Spoke with PT  ----- Message from Alberto Pineda sent at 9/9/2024  8:58 AM CDT -----  Regarding: Injection  Name of Who is Calling:  Patient          What is the request in detail:  Please contact patient he has questions about an appointment he stated he was told someone was supposed to call him            Can the clinic reply by [facility]: Yes            What [facility]: [phone]

## 2024-09-16 ENCOUNTER — PATIENT MESSAGE (OUTPATIENT)
Dept: UROLOGY | Facility: CLINIC | Age: 79
End: 2024-09-16
Payer: MEDICARE

## 2024-09-16 DIAGNOSIS — E29.1 HYPOGONADISM MALE: Primary | ICD-10-CM

## 2024-09-17 RX ORDER — NEEDLES, DISPOSABLE 27GX1/2"
NEEDLE, DISPOSABLE MISCELLANEOUS
Qty: 100 EACH | Refills: 0 | Status: SHIPPED | OUTPATIENT
Start: 2024-09-17

## 2024-09-17 RX ORDER — TESTOSTERONE CYPIONATE 200 MG/ML
300 INJECTION, SOLUTION INTRAMUSCULAR
Qty: 19.5 ML | Refills: 0 | Status: SHIPPED | OUTPATIENT
Start: 2024-09-17 | End: 2025-03-18

## 2024-09-17 RX ORDER — SYRINGE, DISPOSABLE, 3 ML
SYRINGE, EMPTY DISPOSABLE MISCELLANEOUS
Qty: 100 EACH | Refills: 0 | Status: SHIPPED | OUTPATIENT
Start: 2024-09-17

## 2024-09-17 RX ORDER — NEEDLES, DISPOSABLE 25GX5/8"
NEEDLE, DISPOSABLE MISCELLANEOUS
Qty: 100 EACH | Refills: 0 | Status: SHIPPED | OUTPATIENT
Start: 2024-09-17

## 2024-09-18 ENCOUNTER — OFFICE VISIT (OUTPATIENT)
Dept: UROLOGY | Facility: CLINIC | Age: 79
End: 2024-09-18
Payer: MEDICARE

## 2024-09-18 VITALS
RESPIRATION RATE: 16 BRPM | WEIGHT: 224.63 LBS | BODY MASS INDEX: 29.77 KG/M2 | SYSTOLIC BLOOD PRESSURE: 159 MMHG | HEIGHT: 73 IN | DIASTOLIC BLOOD PRESSURE: 80 MMHG | HEART RATE: 56 BPM

## 2024-09-18 DIAGNOSIS — N52.9 ERECTILE DYSFUNCTION, UNSPECIFIED ERECTILE DYSFUNCTION TYPE: Primary | ICD-10-CM

## 2024-09-18 DIAGNOSIS — E29.1 HYPOGONADISM MALE: ICD-10-CM

## 2024-09-18 RX ORDER — TADALAFIL 5 MG/1
5 TABLET ORAL DAILY PRN
Qty: 30 TABLET | Refills: 11 | Status: SHIPPED | OUTPATIENT
Start: 2024-09-18 | End: 2025-09-18

## 2024-09-18 RX ORDER — TESTOSTERONE CYPIONATE 200 MG/ML
150 INJECTION, SOLUTION INTRAMUSCULAR ONCE
Status: COMPLETED | OUTPATIENT
Start: 2024-09-18 | End: 2024-09-18

## 2024-09-18 RX ADMIN — TESTOSTERONE CYPIONATE 150 MG: 200 INJECTION, SOLUTION INTRAMUSCULAR at 01:09

## 2024-09-18 NOTE — PROGRESS NOTES
"Subjective:      Arnaldo Donovan is a 79 y.o. male who returns today for injection teaching.    The patient was diagnosed with low testosterone in 2021. He has been receiving testosterone cypionate injections (300 mg) in clinic every 2 weeks with much improvement in symptoms.      On flomax for LUTS- well controlled. Reports nocturia several times per night. Mild dependent edema.      Reports heart burn with 20 mg cialis has been splitting this to 10 mg dosage with good results.     Possible family history of prostate cancer (father- 80s).        Testosterone, Total   Latest Ref Rng 304 - 1227 ng/dL   9/5/2023 513    10/31/2023 944    8/20/2024 341       Component      Latest Ref Rng 9/5/2023 8/20/2024   Prostate Specific Antigen      0.00 - 4.00 ng/mL 0.98      PSA Diagnostic      0.00 - 4.00 ng/mL   1.5       Due to backorder of testosterone cypionate in clinic he has elected to proceed with injection teaching for home injection.    The following portions of the patient's history were reviewed and updated as appropriate: allergies, current medications, past family history, past medical history, past social history, past surgical history and problem list.    Review of Systems  Constitutional: no fever or chills  ENT: no nasal congestion or sore throat  Respiratory: no cough or shortness of breath  Cardiovascular: no chest pain or palpitations  Gastrointestinal: no nausea or vomiting, tolerating diet  Genitourinary: as per HPI  Hematologic/Lymphatic: no easy bruising or lymphadenopathy  Musculoskeletal: no arthralgias or myalgias  Neurological: no seizures or tremors  Behavioral/Psych: no auditory or visual hallucinations     Objective:   Vitals: BP (!) 159/80 (BP Location: Left arm, Patient Position: Sitting, BP Method: Medium (Automatic))   Pulse (!) 56   Resp 16   Ht 6' 1" (1.854 m)   Wt 101.9 kg (224 lb 10.4 oz)   BMI 29.64 kg/m²     Physical Exam   General: alert and oriented, no acute distress  Head: " normocephalic, atraumatic  Neck: supple, normal ROM  Respiratory: Symmetric expansion, non-labored breathing  Cardiovascular: regular rate and rhythm  Abdomen: soft, non tender, non distended  Genitourinary: deferred  Skin: normal coloration and turgor, no rashes, no suspicious skin lesions noted  Neuro: alert and oriented x3, no gross deficits  Psych: normal judgment and insight, normal mood/affect, and non-anxious    Lab Review   Urinalysis demonstrates : no specimen  Lab Results   Component Value Date    WBC 7.91 08/20/2024    HGB 17.1 08/20/2024    HCT 53.9 08/20/2024    HCT 50 12/01/2022    MCV 88 08/20/2024     08/20/2024     Lab Results   Component Value Date    CREATININE 0.9 04/17/2024    BUN 14 04/17/2024     Lab Results   Component Value Date    PSA 0.98 09/05/2023     Imaging   None    Pt here today for testosterone injection.  Testosterone cypionate 150mg IM administered to left dorso glut.  Tolerated well. (PT SUPPLIED MEDICATION)    Assessment:     1. Erectile dysfunction, unspecified erectile dysfunction type    2. Hypogonadism male      Plan:   Diagnoses and all orders for this visit:    Erectile dysfunction, unspecified erectile dysfunction type  -     tadalafiL (CIALIS) 5 MG tablet; Take 1 tablet (5 mg total) by mouth daily as needed for Erectile Dysfunction.    Hypogonadism male  -     testosterone cypionate injection 150 mg    Plan:  --Patient instructed on the proper preparation and administration of testosterone cypionate. He will self inject 150 mg (0.75 cc)  testosterone cypionate IM weekly   --He was able to return demonstrate the technique  --Trial of cialis 5-10 mg PRN for ED   --Follow up annually

## 2024-11-07 ENCOUNTER — PATIENT OUTREACH (OUTPATIENT)
Dept: ADMINISTRATIVE | Facility: HOSPITAL | Age: 79
End: 2024-11-07
Payer: MEDICARE

## 2024-11-07 VITALS — SYSTOLIC BLOOD PRESSURE: 125 MMHG | DIASTOLIC BLOOD PRESSURE: 83 MMHG

## 2024-11-12 DIAGNOSIS — M10.9 GOUT, UNSPECIFIED CAUSE, UNSPECIFIED CHRONICITY, UNSPECIFIED SITE: ICD-10-CM

## 2024-11-12 RX ORDER — ALLOPURINOL 300 MG/1
300 TABLET ORAL DAILY
Qty: 90 TABLET | Refills: 1 | Status: SHIPPED | OUTPATIENT
Start: 2024-11-12

## 2024-11-12 NOTE — TELEPHONE ENCOUNTER
Refill Encounter    PCP Visits: Recent Visits  Date Type Provider Dept   04/16/24 Office Visit Xiao Turner MD [facility]   01/23/24 Office Visit Xiao Turner MD [facility]   Showing recent visits within past 360 days and meeting all other requirements  Future Appointments  No visits were found meeting these conditions.  Showing future appointments within next 720 days and meeting all other requirements     Last 3 Blood Pressure:   BP Readings from Last 3 Encounters:   11/07/24 125/83   09/18/24 (!) 159/80   09/05/24 (!) 146/71     Preferred Pharmacy:   [id number] - [place], [place] - [address]  [address]  [facility] [address]  Phone: [phone] Fax: [phone]    [place], [place] - [address]  [address] [facility] [address]  Phone: [phone] Fax: [phone]    [facility] [address] [address] [employer] - [place] [address]  Phone: [phone] Fax: [phone]    [facility]  [address] [address]  Phone: [phone] Fax: [phone]    [facility] [address] [place] [address]  [address] [place] [address]  Phone: [phone] Fax: [phone]    Requested RX:  Requested Prescriptions     Pending Prescriptions Disp Refills    allopurinoL (ZYLOPRIM) 300 MG tablet 90 tablet 1     Sig: Take 1 tablet (300 mg total) by mouth.      RX Route: Normal

## 2024-11-12 NOTE — TELEPHONE ENCOUNTER
Care Due:                  Date            Visit Type   Department     Provider  --------------------------------------------------------------------------------                                MYCHART                              FOLLOWUP/OF  [facility] PRIMARY  Last Visit: 04-      FICE VISIT   CARE           Xiao Turner  Next Visit: None Scheduled  None         None Found                                                            Last  Test          Frequency    Reason                     Performed    Due Date  --------------------------------------------------------------------------------    Uric Acid...  12 months..  allopurinoL..............  Not Found    Overdue    [employer] Embedded Care Due Messages. Reference number: [id number].   11/12/2024 4:38:01 PM CST

## 2024-12-04 ENCOUNTER — PATIENT OUTREACH (OUTPATIENT)
Dept: ADMINISTRATIVE | Facility: HOSPITAL | Age: 79
End: 2024-12-04
Payer: MEDICARE

## 2024-12-28 DIAGNOSIS — E29.1 HYPOGONADISM MALE: ICD-10-CM

## 2024-12-30 RX ORDER — TESTOSTERONE CYPIONATE 200 MG/ML
300 INJECTION, SOLUTION INTRAMUSCULAR
Qty: 19.5 ML | Refills: 0 | Status: SHIPPED | OUTPATIENT
Start: 2024-12-30 | End: 2025-06-30

## 2025-02-13 ENCOUNTER — OFFICE VISIT (OUTPATIENT)
Dept: DERMATOLOGY | Facility: CLINIC | Age: 80
End: 2025-02-13
Payer: MEDICARE

## 2025-02-13 DIAGNOSIS — L81.4 LENTIGO: ICD-10-CM

## 2025-02-13 DIAGNOSIS — Z86.018 HISTORY OF DYSPLASTIC NEVUS: ICD-10-CM

## 2025-02-13 DIAGNOSIS — Z12.83 SKIN CANCER SCREENING: ICD-10-CM

## 2025-02-13 DIAGNOSIS — D18.01 CHERRY ANGIOMA: ICD-10-CM

## 2025-02-13 DIAGNOSIS — L82.1 SEBORRHEIC KERATOSES: ICD-10-CM

## 2025-02-13 DIAGNOSIS — L71.9 ROSACEA: Primary | ICD-10-CM

## 2025-02-13 DIAGNOSIS — D22.9 MULTIPLE BENIGN NEVI: ICD-10-CM

## 2025-02-13 NOTE — PATIENT INSTRUCTIONS
Your prescription has been sent to the compounding pharmacy [facility]  They are located in [place] at [address] just before the [address].  If you have any questions, please call the pharmacy at [phone].  Website: [email or web address]

## 2025-02-13 NOTE — PROGRESS NOTES
"  Patient Information  Name: Arnaldo Donovan  : 1945  MRN: [id number]     Referring Physician:  No ref. provider found   Primary Care Physician:  Xiao Turner MD   Date of Visit: 25      Subjective:     History of Present lllness:    Arnaldo Donovan is a 79 y.o. male who presents with a chief complaint of moles and Rosacea.  Patient is here today for a "mole" check.   Today, patient has no additional complaints. Denies any new, changing, or symptomatic lesions on the skin.    Pt also states the rosacea is well-controlled just needs refills on compound azelaic acid 15% + ivermectin 1% + metronidazole 1% cream.     Patient was last seen: 2024.  Prior notes by myself reviewed.   Clinical documentation obtained by nursing staff reviewed.    Review of Systems    Objective:   Physical Exam   Constitutional: He appears well-developed and well-nourished. No distress.   HENT:   Mouth/Throat: Lips normal.    Eyes: Lids are normal.  No conjunctival no injection.   Neurological: He is alert and oriented to person, place, and time. He is not disoriented.   Psychiatric: He has a normal mood and affect.   Skin:   Areas Examined (abnormalities noted in diagram):   Scalp / Hair Palpated and Inspected  Head / Face Inspection Performed  Neck Inspection Performed  Chest / Axilla Inspection Performed  Abdomen Inspection Performed  Genitals / Buttocks / Groin Inspection Performed  Back Inspection Performed  RUE Inspected  LUE Inspection Performed  RLE Inspected  LLE Inspection Performed  Nails and Digits Inspection Performed                 Diagram Legend     Erythematous scaling macule/papule c/w actinic keratosis       Vascular papule c/w angioma      Pigmented verrucoid papule/plaque c/w seborrheic keratosis      Yellow umbilicated papule c/w sebaceous hyperplasia      Irregularly shaped tan macule c/w lentigo     1-2 mm smooth white papules consistent with Milia      Movable subcutaneous cyst with punctum " c/w epidermal inclusion cyst      Subcutaneous movable cyst c/w pilar cyst      Firm pink to brown papule c/w dermatofibroma      Pedunculated fleshy papule(s) c/w skin tag(s)      Evenly pigmented macule c/w junctional nevus     Mildly variegated pigmented, slightly irregular-bordered macule c/w mildly atypical nevus      Flesh colored to evenly pigmented papule c/w intradermal nevus       Pink pearly papule/plaque c/w basal cell carcinoma      Erythematous hyperkeratotic cursted plaque c/w SCC      Surgical scar with no sign of skin cancer recurrence      Open and closed comedones      Inflammatory papules and pustules      Verrucoid papule consistent consistent with wart     Erythematous eczematous patches and plaques     Dystrophic onycholytic nail with subungual debris c/w onychomycosis     Umbilicated papule    Erythematous-base heme-crusted tan verrucoid plaque consistent with inflamed seborrheic keratosis     Erythematous Silvery Scaling Plaque c/w Psoriasis     See annotation    No images are attached to the encounter or orders placed in the encounter.      [] Data reviewed  [] Prior external notes reviewed  [] Independent review of test  [] Management discussed with another provider  [] Independent historian    Assessment / Plan:        Rosacea   - stable and chronic  -     metroNIDAZOLE (NORITATE) 1 % cream; Compound azelaic acid 15% + ivermectin 1% + metronidazole 1% cream. Apply to face once or twice daily.  Dispense: 30 g; Refill: 11    Multiple benign nevi  Multiple benign-appearing nevi present on exam today. Reassurance provided. Counseled patient to periodically examine moles and return to clinic if any changes in size, shape, or color are noted or if it becomes symptomatic (bleeding, itching, pain, etc).  Recommend using a broad-spectrum, water-resistant sunscreen with SPF of 30 or higher--reapply every 2 hours. Seek shade, wear sun-protective clothing, and perform regular skin  self-exams.    Seborrheic keratoses  These are benign, inherited growths without a malignant potential. Reassurance given to patient. No treatment is necessary.    Cherry angioma  These are benign vascular lesions that are inherited. Treatment is not necessary.    Lentigo  These are benign sun spots which should be monitored for changes. Daily sun protection will reduce the number of new lesions.   Recommend using a broad-spectrum, water-resistant sunscreen with SPF of 30 or higher--reapply every 2 hours. Seek shade, wear sun-protective clothing, and perform regular skin self-exams.    History of dysplastic nevus, moderate atypia   - stable and chronic  Area(s) of previous dysplastic nevus evaluated with no evidence of recurrence. Reassurance provided.    Skin cancer screening  Total body skin examination performed today as noted in physical exam. No lesions suspicious for malignancy were seen.  Recommend using a broad-spectrum, water-resistant sunscreen with SPF of 30 or higher--reapply every 2 hours. Seek shade, wear sun-protective clothing, and perform regular skin self-exams.       Follow up in about 1 year (around 2/13/2026) for TBSE, or sooner if any new problems or changing lesions.      Rae Ortega MD, FAAD  [facility]

## 2025-02-21 DIAGNOSIS — Z00.00 ENCOUNTER FOR MEDICARE ANNUAL WELLNESS EXAM: ICD-10-CM

## 2025-03-12 DIAGNOSIS — E29.1 HYPOGONADISM MALE: ICD-10-CM

## 2025-03-12 RX ORDER — TESTOSTERONE CYPIONATE 200 MG/ML
300 INJECTION, SOLUTION INTRAMUSCULAR
Qty: 19.5 ML | Refills: 0 | Status: SHIPPED | OUTPATIENT
Start: 2025-03-12 | End: 2025-09-10

## 2025-05-06 ENCOUNTER — OFFICE VISIT (OUTPATIENT)
Dept: PRIMARY CARE CLINIC | Facility: CLINIC | Age: 80
End: 2025-05-06
Attending: FAMILY MEDICINE
Payer: MEDICARE

## 2025-05-06 VITALS
WEIGHT: 220.38 LBS | HEIGHT: 73 IN | BODY MASS INDEX: 29.21 KG/M2 | HEART RATE: 75 BPM | SYSTOLIC BLOOD PRESSURE: 123 MMHG | DIASTOLIC BLOOD PRESSURE: 75 MMHG | OXYGEN SATURATION: 97 %

## 2025-05-06 DIAGNOSIS — L71.9 ROSACEA: ICD-10-CM

## 2025-05-06 DIAGNOSIS — J30.2 SEASONAL ALLERGIES: ICD-10-CM

## 2025-05-06 DIAGNOSIS — R79.9 ABNORMAL FINDING OF BLOOD CHEMISTRY, UNSPECIFIED: ICD-10-CM

## 2025-05-06 DIAGNOSIS — E78.00 PURE HYPERCHOLESTEROLEMIA: ICD-10-CM

## 2025-05-06 DIAGNOSIS — I10 ESSENTIAL HYPERTENSION: Primary | ICD-10-CM

## 2025-05-06 DIAGNOSIS — M10.9 GOUT, UNSPECIFIED CAUSE, UNSPECIFIED CHRONICITY, UNSPECIFIED SITE: ICD-10-CM

## 2025-05-06 PROCEDURE — 99215 OFFICE O/P EST HI 40 MIN: CPT | Mod: HCNC,S$GLB,, | Performed by: FAMILY MEDICINE

## 2025-05-06 PROCEDURE — 1101F PT FALLS ASSESS-DOCD LE1/YR: CPT | Mod: CPTII,HCNC,S$GLB, | Performed by: FAMILY MEDICINE

## 2025-05-06 PROCEDURE — 1126F AMNT PAIN NOTED NONE PRSNT: CPT | Mod: CPTII,HCNC,S$GLB, | Performed by: FAMILY MEDICINE

## 2025-05-06 PROCEDURE — 3078F DIAST BP <80 MM HG: CPT | Mod: CPTII,HCNC,S$GLB, | Performed by: FAMILY MEDICINE

## 2025-05-06 PROCEDURE — 99999 PR PBB SHADOW E&M-EST. PATIENT-LVL III: CPT | Mod: PBBFAC,HCNC,, | Performed by: FAMILY MEDICINE

## 2025-05-06 PROCEDURE — 3074F SYST BP LT 130 MM HG: CPT | Mod: CPTII,HCNC,S$GLB, | Performed by: FAMILY MEDICINE

## 2025-05-06 PROCEDURE — G2211 COMPLEX E/M VISIT ADD ON: HCPCS | Mod: HCNC,S$GLB,, | Performed by: FAMILY MEDICINE

## 2025-05-06 PROCEDURE — 3288F FALL RISK ASSESSMENT DOCD: CPT | Mod: CPTII,HCNC,S$GLB, | Performed by: FAMILY MEDICINE

## 2025-05-06 RX ORDER — ALLOPURINOL 300 MG/1
300 TABLET ORAL DAILY
Qty: 90 TABLET | Refills: 1 | Status: SHIPPED | OUTPATIENT
Start: 2025-05-06 | End: 2025-05-08

## 2025-05-07 ENCOUNTER — PATIENT MESSAGE (OUTPATIENT)
Dept: UROLOGY | Facility: CLINIC | Age: 80
End: 2025-05-07
Payer: MEDICARE

## 2025-05-08 DIAGNOSIS — M10.9 GOUT, UNSPECIFIED CAUSE, UNSPECIFIED CHRONICITY, UNSPECIFIED SITE: ICD-10-CM

## 2025-05-08 RX ORDER — ALLOPURINOL 300 MG/1
300 TABLET ORAL
Qty: 90 TABLET | Refills: 0 | Status: SHIPPED | OUTPATIENT
Start: 2025-05-08

## 2025-05-08 NOTE — TELEPHONE ENCOUNTER
Care Due:                  Date            Visit Type   Department     Provider  --------------------------------------------------------------------------------                                EP -                              PRIMARY      [facility] PRIMARY  Last Visit: 05-      CARE ([facility])   CARE           Xiao Turner  Next Visit: None Scheduled  None         None Found                                                            Last  Test          Frequency    Reason                     Performed    Due Date  --------------------------------------------------------------------------------    CMP.........  12 months..  allopurinoL, valsartan...  04- 04-    Uric Acid...  12 months..  allopurinoL..............  Not Found    Overdue    [employer] Embedded Care Due Messages. Reference number: [id number].   5/08/2025 3:12:18 PM CDT

## 2025-05-08 NOTE — TELEPHONE ENCOUNTER
Refill Routing Note   Medication(s) are not appropriate for processing by [facility] for the following reason(s):        Required labs outdated    [facility] action(s):  Defer               Appointments  past 12m or future 3m with PCP    Date Provider   Last Visit   5/6/2025 Xiao Turner MD   Next Visit   Visit date not found Xiao Turner MD   ED visits in past 90 days: 0        Note composed:3:24 PM 05/08/2025

## 2025-05-09 ENCOUNTER — LAB VISIT (OUTPATIENT)
Dept: LAB | Facility: OTHER | Age: 80
End: 2025-05-09
Attending: FAMILY MEDICINE
Payer: MEDICARE

## 2025-05-09 DIAGNOSIS — R79.9 ABNORMAL FINDING OF BLOOD CHEMISTRY, UNSPECIFIED: ICD-10-CM

## 2025-05-09 DIAGNOSIS — L71.9 ROSACEA: ICD-10-CM

## 2025-05-09 DIAGNOSIS — E78.00 PURE HYPERCHOLESTEROLEMIA: ICD-10-CM

## 2025-05-09 DIAGNOSIS — I10 ESSENTIAL HYPERTENSION: ICD-10-CM

## 2025-05-09 LAB
ALBUMIN SERPL BCP-MCNC: 3.4 G/DL (ref 3.5–5.2)
ALP SERPL-CCNC: 49 UNIT/L (ref 40–150)
ALT SERPL W/O P-5'-P-CCNC: 13 UNIT/L (ref 10–44)
ANION GAP (OHS): 6 MMOL/L (ref 8–16)
AST SERPL-CCNC: 18 UNIT/L (ref 11–45)
BILIRUB SERPL-MCNC: 1.2 MG/DL (ref 0.1–1)
BUN SERPL-MCNC: 15 MG/DL (ref 8–23)
CALCIUM SERPL-MCNC: 8.7 MG/DL (ref 8.7–10.5)
CHLORIDE SERPL-SCNC: 108 MMOL/L (ref 95–110)
CHOLEST SERPL-MCNC: 183 MG/DL (ref 120–199)
CHOLEST/HDLC SERPL: 3.4 {RATIO} (ref 2–5)
CO2 SERPL-SCNC: 25 MMOL/L (ref 23–29)
CREAT SERPL-MCNC: 1 MG/DL (ref 0.5–1.4)
EAG (OHS): 114 MG/DL (ref 68–131)
GFR SERPLBLD CREATININE-BSD FMLA CKD-EPI: >60 ML/MIN/1.73/M2
GLUCOSE SERPL-MCNC: 107 MG/DL (ref 70–110)
HBA1C MFR BLD: 5.6 % (ref 4–5.6)
HDLC SERPL-MCNC: 54 MG/DL (ref 40–75)
HDLC SERPL: 29.5 % (ref 20–50)
LDLC SERPL CALC-MCNC: 116.6 MG/DL (ref 63–159)
NONHDLC SERPL-MCNC: 129 MG/DL
POTASSIUM SERPL-SCNC: 4.6 MMOL/L (ref 3.5–5.1)
PROT SERPL-MCNC: 6.5 GM/DL (ref 6–8.4)
SODIUM SERPL-SCNC: 139 MMOL/L (ref 136–145)
TRIGL SERPL-MCNC: 62 MG/DL (ref 30–150)
TSH SERPL-ACNC: 1.32 UIU/ML (ref 0.4–4)

## 2025-05-09 PROCEDURE — 83036 HEMOGLOBIN GLYCOSYLATED A1C: CPT | Mod: HCNC

## 2025-05-09 PROCEDURE — 36415 COLL VENOUS BLD VENIPUNCTURE: CPT | Mod: HCNC

## 2025-05-09 PROCEDURE — 84443 ASSAY THYROID STIM HORMONE: CPT | Mod: HCNC

## 2025-05-09 PROCEDURE — 80061 LIPID PANEL: CPT | Mod: HCNC

## 2025-05-09 PROCEDURE — 80053 COMPREHEN METABOLIC PANEL: CPT | Mod: HCNC

## 2025-05-13 ENCOUNTER — OFFICE VISIT (OUTPATIENT)
Dept: UROLOGY | Facility: CLINIC | Age: 80
End: 2025-05-13
Payer: MEDICARE

## 2025-05-13 DIAGNOSIS — N53.14 RETROGRADE EJACULATION: ICD-10-CM

## 2025-05-13 DIAGNOSIS — E29.1 HYPOGONADISM MALE: ICD-10-CM

## 2025-05-13 DIAGNOSIS — N13.8 BPH WITH OBSTRUCTION/LOWER URINARY TRACT SYMPTOMS: Primary | ICD-10-CM

## 2025-05-13 DIAGNOSIS — N40.1 BPH WITH OBSTRUCTION/LOWER URINARY TRACT SYMPTOMS: Primary | ICD-10-CM

## 2025-05-13 RX ORDER — TESTOSTERONE CYPIONATE 200 MG/ML
150 INJECTION, SOLUTION INTRAMUSCULAR WEEKLY
Qty: 19.5 ML | Refills: 0 | Status: SHIPPED | OUTPATIENT
Start: 2025-05-13 | End: 2025-11-11

## 2025-05-13 RX ORDER — ALFUZOSIN HYDROCHLORIDE 10 MG/1
10 TABLET, EXTENDED RELEASE ORAL DAILY
Qty: 30 TABLET | Refills: 11 | Status: SHIPPED | OUTPATIENT
Start: 2025-05-13 | End: 2026-05-13

## 2025-05-13 NOTE — PROGRESS NOTES
Subjective:      Arnaldo Donovan is a 80 y.o. male who returns today regarding his flomax.     The patient was diagnosed with low testosterone in 2021. He has been receiving testosterone cypionate injections (300 mg) in clinic every 2 weeks with much improvement in symptoms, switched to self inject in September which is going well.      On flomax for LUTS- well controlled. Bothered by retrograde ejaculation/no ejaculate.      Cialis 5 mg PRN for ED.      Possible family history of prostate cancer (father- 80s).      Component      Latest Ref Rng 8/20/2024   PSA Diagnostic      0.00 - 4.00 ng/mL 1.5    Testosterone, Total      304 - 1227 ng/dL 341      The following portions of the patient's history were reviewed and updated as appropriate: allergies, current medications, past family history, past medical history, past social history, past surgical history and problem list.    Review of Systems  Constitutional: no fever or chills  ENT: no nasal congestion or sore throat  Respiratory: no cough or shortness of breath  Cardiovascular: no chest pain or palpitations  Gastrointestinal: no nausea or vomiting, tolerating diet  Genitourinary: as per HPI  Hematologic/Lymphatic: no easy bruising or lymphadenopathy  Musculoskeletal: no arthralgias or myalgias  Neurological: no seizures or tremors  Behavioral/Psych: no auditory or visual hallucinations     Objective:   Vitals: There were no vitals taken for this visit.    Physical Exam   Deferred, VV    Lab Review   Urinalysis demonstrates : no specimen, virtual   Lab Results   Component Value Date    WBC 7.91 08/20/2024    HGB 17.1 08/20/2024    HCT 53.9 08/20/2024    HCT 50 12/01/2022    MCV 88 08/20/2024     08/20/2024     Lab Results   Component Value Date    CREATININE 1.0 05/09/2025    BUN 15 05/09/2025     Lab Results   Component Value Date    PSA 0.98 09/05/2023     Imaging   None    Assessment:     1. BPH with obstruction/lower urinary tract symptoms    2.  Hypogonadism male    3. Retrograde ejaculation      Plan:   Diagnoses and all orders for this visit:    BPH with obstruction/lower urinary tract symptoms  -     alfuzosin (UROXATRAL) 10 mg Tb24; Take 1 tablet (10 mg total) by mouth once daily.    Hypogonadism male  -     testosterone cypionate (DEPOTESTOTERONE CYPIONATE) 200 mg/mL injection; Inject 0.75 mLs (150 mg total) into the muscle once a week.    Retrograde ejaculation      Plan:  --D/C flomax and trial uroxatral  --Continue TRT  --Labs as scheduled   --Follow up with Dr. Turner for bili concern      The patient location is: [place]  The chief complaint leading to consultation is: 14 min    Visit type: audiovisual    Face to Face time with patient: 14 min  20 minutes of total time spent on the encounter, which includes face to face time and non-face to face time preparing to see the patient (eg, review of tests), Obtaining and/or reviewing separately obtained history, Documenting clinical information in the electronic or other health record, Independently interpreting results (not separately reported) and communicating results to the patient/family/caregiver, or Care coordination (not separately reported).     Each patient to whom he or she provides medical services by telemedicine is:  (1) informed of the relationship between the physician and patient and the respective role of any other health care provider with respect to management of the patient; and (2) notified that he or she may decline to receive medical services by telemedicine and may withdraw from such care at any time.    Notes: see above

## 2025-05-20 ENCOUNTER — TELEPHONE (OUTPATIENT)
Dept: PRIMARY CARE CLINIC | Facility: CLINIC | Age: 80
End: 2025-05-20

## 2025-05-20 ENCOUNTER — CLINICAL SUPPORT (OUTPATIENT)
Dept: PRIMARY CARE CLINIC | Facility: CLINIC | Age: 80
End: 2025-05-20
Payer: MEDICARE

## 2025-05-20 VITALS — SYSTOLIC BLOOD PRESSURE: 124 MMHG | OXYGEN SATURATION: 97 % | HEART RATE: 64 BPM | DIASTOLIC BLOOD PRESSURE: 74 MMHG

## 2025-05-20 DIAGNOSIS — I10 ESSENTIAL HYPERTENSION: Primary | ICD-10-CM

## 2025-05-20 PROCEDURE — 99999 PR PBB SHADOW E&M-EST. PATIENT-LVL III: CPT | Mod: PBBFAC,HCNC,,

## 2025-05-20 NOTE — TELEPHONE ENCOUNTER
Patient in clinic for bp check, manual bp 124/74 P 64, patient denies sob, chest pain, headache, dizziness and vision issues. Patient reports taking bp meds 1 hour ago, reports he rarely eats salt to help bp. Patient has lists of home bp from right  and left arm listed below:

## 2025-05-20 NOTE — PROGRESS NOTES
[phone] [facility] Arnaldo Donovan 80 y.o. male is here for Blood Pressure check. in person    Manual Blood pressure reading was  124 74, Pulse 64. (Checked at the beginning of the visit)    If high, was it repeated after 15 minutes? yes    Pt's Home blood pressure machine read in office no     Diagnosed with Hypertension yes.    Patient took blood pressure medication today yes.  Last dose of blood pressure medication was taken at 8am.     All Medications and OTC medication updated yes    Does patient have record of home blood pressure readings / Blood Pressure Log yes.     Does the pt have any complaints today in regards to their blood pressure medication? no. Complains of nothing. Patient is asymptomatic.     Were you sitting still for 5-10 minutes prior to taking your Blood pressure? yes     Has your blood pressure monitor ever been checked? no When was last time we checked your blood pressure monitor?     Updated vitals yes        Creatinine   Date Value Ref Range Status   05/09/2025 1.0 0.5 - 1.4 mg/dL Final     Sodium   Date Value Ref Range Status   05/09/2025 139 136 - 145 mmol/L Final   04/17/2024 136 136 - 145 mmol/L Final     Potassium   Date Value Ref Range Status   05/09/2025 4.6 3.5 - 5.1 mmol/L Final   04/17/2024 4.7 3.5 - 5.1 mmol/L Final      Manual Blood pressure reading was  124/74, Pulse 64. (Checked at the end of the visit)    If high, was it repeated after 15 minutes? yes      Follow up date is .     Dr. Turner notified.       Validate Previous Accession (Can Hide Previous Accession In Settings Tab): No

## 2025-06-11 ENCOUNTER — RESULTS FOLLOW-UP (OUTPATIENT)
Dept: PRIMARY CARE CLINIC | Facility: CLINIC | Age: 80
End: 2025-06-11

## 2025-06-13 DIAGNOSIS — I10 ESSENTIAL HYPERTENSION: ICD-10-CM

## 2025-06-13 RX ORDER — VALSARTAN 320 MG/1
320 TABLET ORAL
Qty: 90 TABLET | Refills: 3 | Status: SHIPPED | OUTPATIENT
Start: 2025-06-13

## 2025-06-13 NOTE — TELEPHONE ENCOUNTER
Provider Staff:  Action required for this patient    Requires labs      Please see care gap opportunities below in Care Due Message.    Thanks!  [facility]     Appointments      Date Provider   Last Visit   5/6/2025 Xiao Turner MD   Next Visit   Visit date not found Xiao Turner MD     Refill Decision Note   Arnaldo Donovan  is requesting a refill authorization.  Brief Assessment and Rationale for Refill:  Approve     Medication Therapy Plan:         Comments:     Note composed:8:31 AM 06/13/2025

## 2025-06-13 NOTE — TELEPHONE ENCOUNTER
Care Due:                  Date            Visit Type   Department     Provider  --------------------------------------------------------------------------------                                EP -                              PRIMARY      [facility]  Last Visit: 05-      CARE ([facility])   CARE           Xiao Turner  Next Visit: None Scheduled  None         None Found                                                            Last  Test          Frequency    Reason                     Performed    Due Date  --------------------------------------------------------------------------------    CBC.........  12 months..  allopurinoL..............  08-   08-    [facility] Embedded Care Due Messages. Reference number: [id number].   6/13/2025 12:25:40 AM CDT

## 2025-06-29 NOTE — PROGRESS NOTES
Patient ID: Arnaldo Donovan is a 80 y.o. male.    Chief Complaint: Annual Exam    History of Present Illness    CHIEF COMPLAINT:  Patient presents today for follow up of elevated blood pressure.    HYPERTENSION:  He reports elevated blood pressure despite medication compliance. Home blood pressure readings were around 133 at highest. He denies headaches or shortness of breath. He experiences mild edema that becomes noticeable when wearing tight socks.    DIET AND EXERCISE:  He walks at least 3 times per week and walks to and from car at work, covering approximately three quarters of a mile. He performs modified push-ups and stretching exercises in the evening. He reports consuming excessive sugar in his diet.    MEDICATIONS:  He takes Cialis, allopurinol, and valsartan. He discontinued tamsulosin due to sexual side effects.    LABS:  A1C was 5.7.      ROS:  General: -fever, -chills, -fatigue, -weight gain, -weight loss  Eyes: -vision changes, -redness, -discharge  ENT: -ear pain, -nasal congestion, -sore throat  Cardiovascular: -chest pain, -palpitations, +lower extremity edema, +limb swelling  Respiratory: -cough, -shortness of breath  Gastrointestinal: -abdominal pain, -nausea, -vomiting, -diarrhea, -constipation, -blood in stool  Genitourinary: -dysuria, -hematuria, -frequency  Musculoskeletal: -joint pain, -muscle pain  Skin: -rash, -lesion  Neurological: -headache, -dizziness, -numbness, -tingling  Psychiatric: -anxiety, -depression, -sleep difficulty  Male Genitourinary: +sexual difficulty, pain, or concern, +change in sexual performance, +erectile dysfunction, +impaired arousal         Physical Exam    General: No acute distress. Well-developed. Well-nourished.  Eyes: EOMI. Sclerae anicteric.  HENT: Normocephalic. Atraumatic. Nares patent. Moist oral mucosa.  Ears: Bilateral TMs clear. Bilateral EACs clear. Superficial cerumen in ears.  Cardiovascular: Regular rate. Regular rhythm. No murmurs. No rubs. No  gallops. Normal S1, S2.  Respiratory: Normal respiratory effort. Clear to auscultation bilaterally. No rales. No rhonchi. No wheezing.  Abdomen: Soft. Non-tender. Non-distended. Normoactive bowel sounds.  Musculoskeletal: No  obvious deformity.  Extremities: No lower extremity edema. Varicosity in ankles. Slight edema in ankles.  Neurological: Alert & oriented x3. No slurred speech. Normal gait.  Psychiatric: Normal mood. Normal affect. Good insight. Good judgment.  Skin: Warm. Dry. No rash.         Assessment & Plan    I10 Essential (primary) hypertension  R60.0 Localized edema  I83.93 Asymptomatic varicose veins of bilateral lower extremities  H61.23 Impacted cerumen, bilateral  R73.03 Prediabetes  Z72.4 Inappropriate diet and eating habits    IMPRESSION:  - Assessed elevated BP, noting inconsistency with previous readings and home measurements.  - Evaluated for potential HTN-related symptoms (headaches, shortness of breath, edema).  - Determined need for closer BP monitoring to guide treatment decisions.  - Reviewed lab history and current orders from urology team.  - Assessed exercise routine and diet in relation to overall health management.  - Considered A1C level (5.7) in context of reported high sugar intake.    ESSENTIAL HYPERTENSION:  - Monitored blood pressure, noting readings around 133 at home with higher readings this morning.  - Explained importance of consistent BP measurement technique (same arm, time, cuff) and measuring BP at least 2 hours after taking medication.  - Advised patient to resume daily home BP readings with minimum of 12 readings before next appointment.  - Scheduled follow up in 2 weeks for nurse visit BP check with BP log.  - Patient to contact office for telemedicine visit if medication adjustment is necessary based on nurse visit results.    LOCALIZED EDEMA AND VARICOSE VEINS:  - Noted slight edema when wearing tight socks with physical exam confirming ankle swelling attributed to  varicosity in lower extremities.  - Educated patient on relationship between hydration, electrolytes, and renal function.  - Recommend increasing water intake to half of body weight in ounces per day.    IMPACTED CERUMEN:  - Physical exam revealed superficial cerumen in both ears.    PREDIABETES AND DIET:  - A1C level of 5.7 indicates prediabetes, with patient acknowledging excessive sugar consumption.  - Explained importance of maintaining muscle mass through resistance exercises as patient ages.  - Patient to continue current exercise routine of walking and modified push-ups, with recommendation to incorporate more resistance exercises.    FOLLOW-UP AND LABS:  - Ordered comprehensive labs including lipid panel, A1C, thyroid test, and kidney/liver/electrolyte panel.  - Laboratory work scheduled for Friday at 8 a.m. at [facility], or patient may schedule through portal.  - Will consider messaging urology NP Michell regarding alternatives to tamsulosin due to sexual side effects.        Visit today is associated with current or anticipated ongoing medical care related to this patient's single serious condition/complex condition of HTN. The patient will return to see me as these issues will be followed longitudinally            This note was generated with the assistance of ambient listening technology. Verbal consent was obtained by the patient and accompanying visitor(s) for the recording of patient appointment to facilitate this note. I attest to having reviewed and edited the generated note for accuracy, though some syntax or spelling errors may persist. Please contact the author of this note for any clarification.

## 2025-07-21 ENCOUNTER — HOSPITAL ENCOUNTER (OUTPATIENT)
Dept: RADIOLOGY | Facility: HOSPITAL | Age: 80
Discharge: HOME | End: 2025-07-21
Attending: PHYSICIAN ASSISTANT
Payer: MEDICARE

## 2025-07-21 ENCOUNTER — OFFICE VISIT (OUTPATIENT)
Facility: CLINIC | Age: 80
End: 2025-07-21
Payer: MEDICARE

## 2025-07-21 DIAGNOSIS — M25.551 RIGHT HIP PAIN: ICD-10-CM

## 2025-07-21 DIAGNOSIS — M25.551 ARTHRALGIA OF RIGHT HIP: Primary | ICD-10-CM

## 2025-07-21 PROCEDURE — 72100 X-RAY EXAM L-S SPINE 2/3 VWS: CPT | Mod: TC,HCNC

## 2025-07-21 PROCEDURE — 99999 PR PBB SHADOW E&M-EST. PATIENT-LVL III: CPT | Mod: PBBFAC,HCNC,, | Performed by: PHYSICIAN ASSISTANT

## 2025-07-21 PROCEDURE — 99204 OFFICE O/P NEW MOD 45 MIN: CPT | Mod: S$GLB,,, | Performed by: PHYSICIAN ASSISTANT

## 2025-07-21 PROCEDURE — 73502 X-RAY EXAM HIP UNI 2-3 VIEWS: CPT | Mod: 26,HCNC,RT, | Performed by: RADIOLOGY

## 2025-07-21 PROCEDURE — 72100 X-RAY EXAM L-S SPINE 2/3 VWS: CPT | Mod: 26,HCNC,, | Performed by: RADIOLOGY

## 2025-07-21 PROCEDURE — 1159F MED LIST DOCD IN RCRD: CPT | Mod: CPTII,S$GLB,, | Performed by: PHYSICIAN ASSISTANT

## 2025-07-21 PROCEDURE — 1160F RVW MEDS BY RX/DR IN RCRD: CPT | Mod: CPTII,S$GLB,, | Performed by: PHYSICIAN ASSISTANT

## 2025-07-21 PROCEDURE — 73502 X-RAY EXAM HIP UNI 2-3 VIEWS: CPT | Mod: TC,HCNC,RT

## 2025-07-21 NOTE — PROGRESS NOTES
Subjective:      Patient ID: Arnaldo Donovan is a 80 y.o. male.    Chief Complaint: No chief complaint on file.      HPI:     History of Present Illness    CHIEF COMPLAINT:  - Right hip and groin pain    HPI:  Arnaldo presents with right hip and groin pain that started gradually a few weeks ago, initially noticed while walking. Pain has progressively worsened and is now constant, bothersome even at rest and when lying down at night. It radiates to the right buttock when standing for prolonged periods and occasionally down the right leg to the side of the knee, described as mild. Relief is found by lifting the foot or placing it on a curb.C/O  pain that extends to right knee at times.    He first noticed significant back pain during a ghost tour with grandchildren, where prolonged standing caused discomfort. He denies any recent trauma or fall. To manage pain at night, he keeps his knee flexed while sleeping. He has been taking Tylenol for pain relief, which provides minimal benefit.    He denies changes in bowel or bladder function, tingling, testicular pain, and any history of hernias.      MEDICATIONS:  - Tylenol: Minimal benefit.      ROS:  Gastrointestinal: -bowel incontinence  Male Genitourinary: -testicular pain  Musculoskeletal: +limb pain, +pain with movement            PAST MEDICAL HISTORY:    Past Medical History:   Diagnosis Date    Allergy     seasonal    Cataract     Gout, joint     Hyperlipidemia     Hypertension     Joint pain January 2019    related to above arthitis    Ocular rosacea     Vitreous floaters      PAST SURGICAL HISTORY:    Past Surgical History:   Procedure Laterality Date    VASECTOMY  1988     FAMILY HISTORY:    Family History   Problem Relation Name Age of Onset    Hypertension Mother      Myasthenia gravis Mother          affects eye    Dementia Father      No Known Problems Sister      No Known Problems Brother      No Known Problems Daughter      Melanoma Neg Hx      Cataracts  Neg Hx      Glaucoma Neg Hx      Macular degeneration Neg Hx       SOCIAL HISTORY:    Social History     Occupational History    Occupation:      Employer: [employer]   Tobacco Use    Smoking status: Former     Current packs/day: 0.00     Average packs/day: 0.5 packs/day for 17.0 years (8.5 ttl pk-yrs)     Types: Cigarettes     Start date: 10/1/1960     Quit date: 10/19/1977     Years since quittin.7     Passive exposure: Past    Smokeless tobacco: Never   Substance and Sexual Activity    Alcohol use: Yes     Alcohol/week: 4.0 standard drinks of alcohol     Types: 4 Glasses of wine per week     Comment: Occasionaly have beer or gin (gin and tonic) in lieu of wine    Drug use: Not Currently     Frequency: 4.0 times per week     Types: LSD, Marijuana, Mescaline     Comment: LSD and Mescaline were very infrequent many years ago.    Sexual activity: Yes     Partners: Female     Birth control/protection: None     Comment:  with 3 children      MEDICATIONS: Current Medications[1]  ALLERGIES:   Review of patient's allergies indicates:   Allergen Reactions    Doxycycline      HEADACHE (PRESSURE)    Statins-hmg-coa reductase inhibitors        Review of Systems:  Constitution: Negative for chills, fever and night sweats.   HENT: Negative for congestion and headaches.    Eyes: Negative for blurred vision or vision loss.  Cardiovascular: Negative for chest pain and syncope.   Respiratory: Negative for cough and shortness of breath.    Endocrine: Negative for polydipsia, polyphagia and polyuria.   Hematologic/Lymphatic: Negative for bleeding problem. Does not bruise/bleed easily.   Skin: Negative for dry skin, itching and rash.   Musculoskeletal: See HPI.   Gastrointestinal: Negative for abdominal pain and bowel incontinence.   Genitourinary: Negative for bladder incontinence and nocturia.   Neurological: Negative for disturbances in coordination, loss of balance and seizures.   Psychiatric/Behavioral:  Negative for depression. The patient does not have insomnia.    Allergic/Immunologic: Negative for hives and persistent infections.        Objective:      There were no vitals filed for this visit.    PHYSICAL EXAM:  General: Alert & oriented x3, well-developed and well-nourished, in no acute distress, sitting comfortably in the exam room.  Skin: Warm and dry. Capillary refill less than 2 seconds.   Head: Normocephalic and atraumatic.   Eyes: Sclera appear normal.   Nose: No deformities seen.   Ears: No deformities seen.   Neck: No tracheal deviation present.   Pulmonary/Chest: Breathing unlabored.   Neurological: Alert and oriented to person, place, and time.   Psychiatric: Mood is pleasant and affect appropriate.       Lumbar Spine Exam  =Well maintained rom  =No pain with palpation or rom  =M&S intact  =Symetric b/l LE reflexes    right HIP EXAMINATION     OBSERVATION / INSPECTION  Gait:   antalgic  Alignment:  Neutral   Scars:   None   Muscle atrophy: None   Effusion:  None   Warmth:  None   Leg lengths:   Equal     TENDERNESS       Trochanteric bursa   -   Piriformis    -   SI joint    -   Psoas tendon   -   Rectus insertion  -   Adductor insertion  -   Pubic symphysis  -     ROM: (* = pain)    Flexion:    110 degrees with rt groin pain  External rotation: 25 degrees  Internal rotation with axial load: 20 degrees  Internal rotation without axial load: 20 degrees  Abduction:  45 degrees  Adduction:   20 degrees    SPECIAL TESTS:  Pain w/ forced internal rotation (FADIR): Pain right groin  Pain w/ forced external rotation (MARCIAL): Negative   Circumduction test:    Minor rt groin pain  Stinchfield test:    Negative   Log roll:      Negative   Snapping hip (internal):   Negative   Sit-up pain:     Negative   Resisted sit-up pain:    Negative   Trendelenburg test:    Negative       EXTREMITY NEURO-VASCULAR EXAMINATION:   Sensation:  Grossly intact to light touch all dermatomal regions.   Motor Function:  Fully  intact motor function at hip, knee, foot and ankle    DTRs;  quadriceps and  achilles 2+.  No clonus and downgoing Babinski.    Vascular status:  DP and PT pulses 2+, brisk capillary refill, symmetric.    Skin: intact, compartments soft.      Imaging:   Xrays Right hip=Mild degenerative changes--No acute fracture or dislocation  =Xray Lumbar spine==No acute fracture or dislocation        Assessment:       1. Arthralgia of right hip    2. Right hip pain        Plan:       Orders Placed This Encounter    X-Ray Lumbar Spine 2 Or 3 Views    X-Ray Hip 2 or 3 views Right with Pelvis when performed    MRI Hip Without Contrast Right    Ambulatory referral/consult to Orthopedics       Right hip pain  =MRI right hip to r/o AVN(patient with right groin pain at rest and with walking)  =F/U with [facility] s/p MRI  =Tylenol/Motrin prn pain          All of the patient's questions were answered and the patient will contact us if they have any questions or concerns in the interim.    Jaquan Mina PA-C  [facility]    This note was generated with the assistance of ambient listening technology. Verbal consent was obtained by the patient and accompanying visitor(s) for the recording of patient appointment to facilitate this note. I attest to having reviewed and edited the generated note for accuracy, though some syntax or spelling errors may persist. Please contact the author of this note for any clarification.               [1]   Current Outpatient Medications:     alfuzosin (UROXATRAL) 10 mg Tb24, Take 1 tablet (10 mg total) by mouth once daily., Disp: 30 tablet, Rfl: 11    allopurinoL (ZYLOPRIM) 300 MG tablet, TAKE 1 TABLET BY MOUTH EVERY DAY, Disp: 90 tablet, Rfl: 0    ivermectin (SOOLANTRA) 1 % Crea, AAA on face qhs and wash off in morning (Patient not taking: Reported on 5/6/2025), Disp: 60 g, Rfl: 3    metroNIDAZOLE (NORITATE) 1 % cream, Compound azelaic acid 15% + ivermectin 1% +  "metronidazole 1% cream. Apply to face once or twice daily., Disp: 30 g, Rfl: 11    needle, disp, 18 G 18 gauge x 1" Ndle, For T injection, Disp: 100 each, Rfl: 0    needle, disp, 21 G (HYPODERMIC NEEDLES) 21 gauge x 1 1/2" Ndle, For T injection, Disp: 100 each, Rfl: 0    syringe, disposable, (BD LUER-MIA SYRINGE) 3 mL Syrg, For T injection, Disp: 100 each, Rfl: 0    tadalafiL (CIALIS) 5 MG tablet, Take 1 tablet (5 mg total) by mouth daily as needed for Erectile Dysfunction., Disp: 30 tablet, Rfl: 11    testosterone cypionate (DEPOTESTOTERONE CYPIONATE) 200 mg/mL injection, Inject 0.75 mLs (150 mg total) into the muscle once a week., Disp: 19.5 mL, Rfl: 0    valsartan (DIOVAN) 320 MG tablet, TAKE 1 TABLET BY MOUTH ONCE DAILY., Disp: 90 tablet, Rfl: 3    "

## 2025-07-24 ENCOUNTER — HOSPITAL ENCOUNTER (OUTPATIENT)
Dept: RADIOLOGY | Facility: OTHER | Age: 80
Discharge: HOME | End: 2025-07-24
Attending: PHYSICIAN ASSISTANT
Payer: MEDICARE

## 2025-07-24 DIAGNOSIS — M25.551 ARTHRALGIA OF RIGHT HIP: ICD-10-CM

## 2025-07-24 PROCEDURE — 73721 MRI JNT OF LWR EXTRE W/O DYE: CPT | Mod: TC,RT

## 2025-07-24 PROCEDURE — 73721 MRI JNT OF LWR EXTRE W/O DYE: CPT | Mod: 26,RT,, | Performed by: RADIOLOGY

## 2025-07-25 NOTE — PROGRESS NOTES
CC: right hip pain    80 y.o. Male presents today for evaluation of his right anterior groin/ hip pain that started about 3 weeks ago while he was walking when he started to feel discomfort. He notes that the pain improved with further walking that day. He denies mechanical symptoms but does endorse a jolt sensation occasionally. Ibuprofen makes the pain more manageable.     How long: about 3 weeks   What makes it better: NSAIDs, rest   What makes it worse: standing, increased activity   Does it radiate: down right lateral leg   Attempted treatments: NSAIDs  Pain score: 5/10  Any mechanical symptoms: denies   Feelings of instability: denies   Affecting ADLs: yes    Occupation:      PAST MEDICAL HISTORY:   Past Medical History:   Diagnosis Date    Allergy     seasonal    Cataract     Gout, joint     Hyperlipidemia     Hypertension     Joint pain January 2019    related to above arthitis    Ocular rosacea     Vitreous floaters        PAST SURGICAL HISTORY:   Past Surgical History:   Procedure Laterality Date    VASECTOMY  1988       FAMILY HISTORY:   Family History   Problem Relation Name Age of Onset    Hypertension Mother      Myasthenia gravis Mother          affects eye    Dementia Father      No Known Problems Sister      No Known Problems Brother      No Known Problems Daughter      Melanoma Neg Hx      Cataracts Neg Hx      Glaucoma Neg Hx      Macular degeneration Neg Hx         SOCIAL HISTORY:   Social History[1]    MEDICATIONS:   Current Medications[2]    ALLERGIES:   Review of patient's allergies indicates:   Allergen Reactions    Doxycycline      HEADACHE (PRESSURE)    Statins-hmg-coa reductase inhibitors         PHYSICAL EXAMINATION:  BP (!) 147/73 (Patient Position: Sitting)   Pulse 70   Wt 101.8 kg (224 lb 5.1 oz)   BMI 29.60 kg/m²   Vitals signs and nursing note have been reviewed.  General: In no acute distress, well developed, well nourished, no diaphoresis  Eyes: EOM full and smooth, no eye  redness or discharge  HENT: normocephalic and atraumatic, neck supple, trachea midline, no nasal discharge, no external ear redness or discharge  Cardiovascular: no LE edema  Lungs: respirations non-labored, no conversational dyspnea   Abd: non-distended, no rigidity  MSK: no amputation or deformity, no swelling of extremities  Neuro: AAOx3, CN2-12 grossly intact  Skin: No rashes, warm and dry  Psychiatric: cooperative, pleasant, mood and affect appropriate for age    HIP: RIGHT  The affected hip is compared to the contralateral hip.    Observation:    There is no edema, erythema, or ecchymosis in the lumbosacral region.   No obvious pelvic obliquity while standing.    No thoracolumbar scoliosis observed.    No midline skin abnormalities.  No atrophy noted in the lower limbs.    ROM:   Passive hip flexion to 120° on left and 120° on right.    Passive hip internal rotation to 25° on left and 25° on right.    Passive hip external rotation to 30° on left and 30° on right.    Passive hip abduction to 45° on left and 45° on right.    Pain at anterior hip with int and ext rotation    Tenderness To Palpation:  No tenderness at the ASIS, AIIS, PSIS, PIIS, iliac crest, pubic bones, ischial tuberosity.  No tenderness throughout the lumbar spine, iliolumbar region, or posterior pelvis.  No tenderness throughout the sacrum or piriformis  No tenderness over the greater trochanteric bursa or greater/lesser trochanters.  + tenderness at the glut attachments on the greater trochanter  No tenderness over proximal IT band or hip flexor musculature.    Strength Testing: (*pain)  Hip flexion - 5/5 on left and 5/5 on right  Hip adduction - 5/5 on left and 5/5 on right  Hip abduction - 5/5 on left and 5/5 on right  Knee flexion - 5/5 on left and 5/5 on right  Knee extension - 5/5 on left and 5/5 on right    Special Tests:  Standing Trendelenburg test - negative    Seated straight leg raise - negative  Supine straight leg raise -  negative  Hamstring flexibility symmetric  Quadriceps flexibility symmetric    Log roll - negative  MARCIAL - positive in anterior groin   FADIR - positive in anterior groin   Scour test - negative  No pain with posterior hip capsule compression    ASIS compression test - negative  SI drawer test - negative   Thigh thrust test - negative     Shaw test reveals tight iliopsoas, rectus femoris, and IT band.  Piriformis test (Bonnet's) - negative  Ely's test - negative    Lisandro test - positive   Pedro compression test - negative    Fulcrum test - negative    Neurovascular Exam:  Normal gait without Trendelenburg.  Hamstring/gluteal firing pattern normal and symmetric.  1+/4 reflexes at L4 and S1 dermatomes  1+ femoral, DP, and PT pulses BL.  No skin changes, no abnormal hair distribution.  Sensation intact to light touch throughout the obturator and medial/lateral/posterior femoral cutaneous nerves.  Capillary refill intact to <2 seconds in all lower limb digits.      IMAGIN. X-ray ordered 25 due to right hip pain   2. X-ray images were reviewed personally by me and then directly with patient.  3. FINDINGS: X-ray images obtained demonstrate degenerative changes. No fracture, dislocation, or bone destruction seen.   4. IMPRESSION: As above.       IMAGIN. MRI ordered 25 due to right hip pain   2. MRI images were reviewed personally by me and then directly with patient.  3. FINDINGS: MRI images obtained demonstrate extensive right hip cartilage loss with prominent subchondral marrow edema in the right acetabulum and moderate sized joint effusion, as above. No evidence of AVN or fracture.   4. IMPRESSION: As above.       ASSESSMENT:      ICD-10-CM ICD-9-CM   1. Arthralgia of right hip  M25.551 719.45   2. Primary osteoarthritis of right hip  M16.11 715.15         PLAN:  1-2. Acute right hip pain/hip osteoarthritis- new problem     - Arnaldo admits to a 3 week history of right anterior hip pain that started  while he walking a long distance that improves with rest and NSAIDs.     - Right hip MRI and XRs ordered 7/21/25 and images were personally reviewed with the patient. See above for further detail.    - Symptoms, exam, and imaging are most consistent with acute arthritic flare of right hip.  We discussed the importance of decreasing inflammation and strengthening and stabilizing to help promote and maintain symptom improvement/resolution.  This is commonly accomplished with a short course of an anti-inflammatory and icing in addition to osteopathic manipulation, a home exercise program or physical therapy.    - We reviewed the natural history of osteoarthritis and a multipronged treatment approach. We reviewed the importance of addressing three different aspects to best manage this condition. Controlling the intra-articular immune reaction through medications and/or injections, improving local stability through bracing and/or injection, and improving functional stability through hip, core, and ankle strength, stability and mobility which may benefit from formal physical therapy.    - HEP for hip osteoarthritis prescribed today. Handouts printed, provided, explained, and exercises were demonstrated as needed. Encouraged to do daily. [address] HOME EXERCISE PROGRAM (HEP):  The patient was taught a homegoing physical therapy regimen as described above by provider with assistance of sports medicine assistant. The patient demonstrated understanding of the exercises and proper technique of their execution. This process took 15 minutes.     - Celebrex 200 mg BID for 2 weeks followed by as needed. Advised to avoid other NSAIDs.       Future planning includes - possible intra-articular hip CSI, formal physical    All questions were answered to the best of my ability and all concerns were addressed at this time.    Follow up in 4-6 weeks for above, or sooner if needed.      This note is dictated using the M*Modal Fluency Direct  word recognition program. There are word recognition mistakes that are occasionally missed on review.                 [1]   Social History  Socioeconomic History    Marital status:    Occupational History    Occupation:      Employer: [employer]   Tobacco Use    Smoking status: Former     Current packs/day: 0.00     Average packs/day: 0.5 packs/day for 17.0 years (8.5 ttl pk-yrs)     Types: Cigarettes     Start date: 10/1/1960     Quit date: 10/19/1977     Years since quittin.8     Passive exposure: Past    Smokeless tobacco: Never   Substance and Sexual Activity    Alcohol use: Yes     Alcohol/week: 4.0 standard drinks of alcohol     Types: 4 Glasses of wine per week     Comment: Occasionaly have beer or gin (gin and tonic) in lieu of wine    Drug use: Not Currently     Frequency: 4.0 times per week     Types: LSD, Marijuana, Mescaline     Comment: LSD and Mescaline were very infrequent many years ago.    Sexual activity: Yes     Partners: Female     Birth control/protection: None     Comment:  with 3 children     Social Drivers of Health     Financial Resource Strain: Low Risk  (2025)    Overall Financial Resource Strain (CARDIA)     Difficulty of Paying Living Expenses: Not hard at all   Food Insecurity: No Food Insecurity (2025)    Hunger Vital Sign     Worried About Running Out of Food in the Last Year: Never true     Ran Out of Food in the Last Year: Never true   Transportation Needs: No Transportation Needs (2025)    PRAPARE - Transportation     Lack of Transportation (Medical): No     Lack of Transportation (Non-Medical): No   Physical Activity: Sufficiently Active (2025)    Exercise Vital Sign     Days of Exercise per Week: 3 days     Minutes of Exercise per Session: 60 min   Stress: No Stress Concern Present (2025)    [employer] [place] [employer] - Occupational Stress Questionnaire     Feeling of Stress : Only a little   Housing Stability: Low  "Risk  (5/6/2025)    Housing Stability Vital Sign     Unable to Pay for Housing in the Last Year: No     Homeless in the Last Year: No   [2]   Current Outpatient Medications:     alfuzosin (UROXATRAL) 10 mg Tb24, Take 1 tablet (10 mg total) by mouth once daily., Disp: 30 tablet, Rfl: 11    allopurinoL (ZYLOPRIM) 300 MG tablet, TAKE 1 TABLET BY MOUTH EVERY DAY, Disp: 90 tablet, Rfl: 0    celecoxib (CELEBREX) 200 MG capsule, Take 1 capsule (200 mg total) by mouth 2 (two) times daily., Disp: 60 capsule, Rfl: 0    ivermectin (SOOLANTRA) 1 % Crea, AAA on face qhs and wash off in morning (Patient not taking: Reported on 5/6/2025), Disp: 60 g, Rfl: 3    metroNIDAZOLE (NORITATE) 1 % cream, Compound azelaic acid 15% + ivermectin 1% + metronidazole 1% cream. Apply to face once or twice daily., Disp: 30 g, Rfl: 11    needle, disp, 18 G 18 gauge x 1" Ndle, For T injection, Disp: 100 each, Rfl: 0    needle, disp, 21 G (HYPODERMIC NEEDLES) 21 gauge x 1 1/2" Ndle, For T injection, Disp: 100 each, Rfl: 0    syringe, disposable, (BD LUER-MIA SYRINGE) 3 mL Syrg, For T injection, Disp: 100 each, Rfl: 0    tadalafiL (CIALIS) 5 MG tablet, Take 1 tablet (5 mg total) by mouth daily as needed for Erectile Dysfunction., Disp: 30 tablet, Rfl: 11    testosterone cypionate (DEPOTESTOTERONE CYPIONATE) 200 mg/mL injection, Inject 0.75 mLs (150 mg total) into the muscle once a week., Disp: 19.5 mL, Rfl: 0    valsartan (DIOVAN) 320 MG tablet, TAKE 1 TABLET BY MOUTH ONCE DAILY., Disp: 90 tablet, Rfl: 3    "

## 2025-07-29 ENCOUNTER — OFFICE VISIT (OUTPATIENT)
Dept: SPORTS MEDICINE | Facility: CLINIC | Age: 80
End: 2025-07-29
Payer: MEDICARE

## 2025-07-29 VITALS
WEIGHT: 224.31 LBS | DIASTOLIC BLOOD PRESSURE: 73 MMHG | BODY MASS INDEX: 29.6 KG/M2 | SYSTOLIC BLOOD PRESSURE: 147 MMHG | HEART RATE: 70 BPM

## 2025-07-29 DIAGNOSIS — M16.11 PRIMARY OSTEOARTHRITIS OF RIGHT HIP: ICD-10-CM

## 2025-07-29 DIAGNOSIS — M25.551 ARTHRALGIA OF RIGHT HIP: Primary | ICD-10-CM

## 2025-07-29 PROCEDURE — 99999 PR PBB SHADOW E&M-EST. PATIENT-LVL III: CPT | Mod: PBBFAC,,, | Performed by: ORTHOPAEDIC SURGERY

## 2025-07-29 RX ORDER — CELECOXIB 200 MG/1
200 CAPSULE ORAL 2 TIMES DAILY
Qty: 60 CAPSULE | Refills: 0 | Status: SHIPPED | OUTPATIENT
Start: 2025-07-29

## 2025-08-04 ENCOUNTER — PATIENT MESSAGE (OUTPATIENT)
Dept: ADMINISTRATIVE | Facility: HOSPITAL | Age: 80
End: 2025-08-04
Payer: MEDICARE

## 2025-08-06 ENCOUNTER — PATIENT MESSAGE (OUTPATIENT)
Dept: SPORTS MEDICINE | Facility: CLINIC | Age: 80
End: 2025-08-06
Payer: MEDICARE

## 2025-08-07 DIAGNOSIS — M10.9 GOUT, UNSPECIFIED CAUSE, UNSPECIFIED CHRONICITY, UNSPECIFIED SITE: ICD-10-CM

## 2025-08-07 RX ORDER — ALLOPURINOL 300 MG/1
300 TABLET ORAL
Qty: 90 TABLET | Refills: 0 | Status: SHIPPED | OUTPATIENT
Start: 2025-08-07

## 2025-08-07 NOTE — TELEPHONE ENCOUNTER
Refill Encounter    PCP Visits: Recent Visits  Date Type Provider Dept   05/06/25 Office Visit Xiao Turner MD [facility]   Showing recent visits within past 360 days and meeting all other requirements  Future Appointments  No visits were found meeting these conditions.  Showing future appointments within next 720 days and meeting all other requirements      Last 3 Blood Pressure:   BP Readings from Last 3 Encounters:   07/29/25 (!) 147/73   05/20/25 124/74   05/06/25 123/75     Preferred Pharmacy:   [facility]/pharmacy #[id number] - [place], [place] - [address]  [address]  [facility] [address]  Phone: [phone] Fax: [phone]    [facility] - [place], [place] - [address]  [address] [address]  Phone: [phone] Fax: [phone]    [facility] [address] [address] [employer] - [place] [address]  Phone: [phone] Fax: [phone]    [facility]  [address]  [facility] [address]  Phone: [phone] Fax: [phone]    [facility] [address]  [address] [place] [address]  Phone: [phone] Fax: [phone]    Requested RX:  Requested Prescriptions     Pending Prescriptions Disp Refills    allopurinoL (ZYLOPRIM) 300 MG tablet [Pharmacy Med Name: ALLOPURINOL 300 MG TABLET] 90 tablet 0     Sig: TAKE 1 TABLET BY MOUTH EVERY DAY      RX Route: Normal

## 2025-08-20 DIAGNOSIS — M16.11 PRIMARY OSTEOARTHRITIS OF RIGHT HIP: Primary | ICD-10-CM

## 2025-08-20 DIAGNOSIS — M25.551 ARTHRALGIA OF RIGHT HIP: ICD-10-CM

## 2025-08-21 ENCOUNTER — CLINICAL SUPPORT (OUTPATIENT)
Dept: REHABILITATION | Facility: OTHER | Age: 80
End: 2025-08-21
Payer: MEDICARE

## 2025-08-21 DIAGNOSIS — R29.898 RIGHT LEG WEAKNESS: Primary | ICD-10-CM

## 2025-08-21 PROCEDURE — 97530 THERAPEUTIC ACTIVITIES: CPT | Mod: HCNC,PN

## 2025-08-21 PROCEDURE — 97161 PT EVAL LOW COMPLEX 20 MIN: CPT | Mod: HCNC,PN

## 2025-08-26 ENCOUNTER — CLINICAL SUPPORT (OUTPATIENT)
Dept: REHABILITATION | Facility: OTHER | Age: 80
End: 2025-08-26
Payer: MEDICARE

## 2025-08-26 DIAGNOSIS — R29.898 RIGHT LEG WEAKNESS: Primary | ICD-10-CM

## 2025-08-26 PROCEDURE — 97112 NEUROMUSCULAR REEDUCATION: CPT | Mod: HCNC,PN,CQ

## 2025-08-26 PROCEDURE — 97530 THERAPEUTIC ACTIVITIES: CPT | Mod: HCNC,PN,CQ

## 2025-08-28 ENCOUNTER — CLINICAL SUPPORT (OUTPATIENT)
Dept: REHABILITATION | Facility: OTHER | Age: 80
End: 2025-08-28
Payer: MEDICARE

## 2025-08-28 ENCOUNTER — PATIENT MESSAGE (OUTPATIENT)
Dept: SPORTS MEDICINE | Facility: CLINIC | Age: 80
End: 2025-08-28
Payer: MEDICARE

## 2025-08-28 DIAGNOSIS — R29.898 RIGHT LEG WEAKNESS: Primary | ICD-10-CM

## 2025-08-28 PROCEDURE — 97530 THERAPEUTIC ACTIVITIES: CPT | Mod: HCNC,PN

## 2025-08-28 PROCEDURE — 97112 NEUROMUSCULAR REEDUCATION: CPT | Mod: HCNC,PN

## 2025-09-02 ENCOUNTER — OFFICE VISIT (OUTPATIENT)
Dept: SPORTS MEDICINE | Facility: CLINIC | Age: 80
End: 2025-09-02
Payer: MEDICARE

## 2025-09-02 VITALS
HEIGHT: 73 IN | WEIGHT: 224.56 LBS | SYSTOLIC BLOOD PRESSURE: 144 MMHG | DIASTOLIC BLOOD PRESSURE: 84 MMHG | BODY MASS INDEX: 29.76 KG/M2

## 2025-09-02 DIAGNOSIS — M16.11 PRIMARY OSTEOARTHRITIS OF RIGHT HIP: ICD-10-CM

## 2025-09-02 DIAGNOSIS — M25.551 ARTHRALGIA OF RIGHT HIP: Primary | ICD-10-CM

## 2025-09-02 PROCEDURE — 20611 DRAIN/INJ JOINT/BURSA W/US: CPT | Mod: HCNC,RT,S$GLB, | Performed by: ORTHOPAEDIC SURGERY

## 2025-09-02 PROCEDURE — 3079F DIAST BP 80-89 MM HG: CPT | Mod: CPTII,HCNC,S$GLB, | Performed by: ORTHOPAEDIC SURGERY

## 2025-09-02 PROCEDURE — 3077F SYST BP >= 140 MM HG: CPT | Mod: CPTII,HCNC,S$GLB, | Performed by: ORTHOPAEDIC SURGERY

## 2025-09-02 PROCEDURE — 99999 PR PBB SHADOW E&M-EST. PATIENT-LVL III: CPT | Mod: PBBFAC,HCNC,, | Performed by: ORTHOPAEDIC SURGERY

## 2025-09-02 PROCEDURE — 1125F AMNT PAIN NOTED PAIN PRSNT: CPT | Mod: CPTII,HCNC,S$GLB, | Performed by: ORTHOPAEDIC SURGERY

## 2025-09-02 PROCEDURE — 3288F FALL RISK ASSESSMENT DOCD: CPT | Mod: CPTII,HCNC,S$GLB, | Performed by: ORTHOPAEDIC SURGERY

## 2025-09-02 PROCEDURE — 1159F MED LIST DOCD IN RCRD: CPT | Mod: CPTII,HCNC,S$GLB, | Performed by: ORTHOPAEDIC SURGERY

## 2025-09-02 PROCEDURE — 1101F PT FALLS ASSESS-DOCD LE1/YR: CPT | Mod: CPTII,HCNC,S$GLB, | Performed by: ORTHOPAEDIC SURGERY

## 2025-09-02 PROCEDURE — 99214 OFFICE O/P EST MOD 30 MIN: CPT | Mod: 25,HCNC,S$GLB, | Performed by: ORTHOPAEDIC SURGERY

## 2025-09-02 PROCEDURE — 1160F RVW MEDS BY RX/DR IN RCRD: CPT | Mod: CPTII,HCNC,S$GLB, | Performed by: ORTHOPAEDIC SURGERY

## 2025-09-02 RX ORDER — TRIAMCINOLONE ACETONIDE 40 MG/ML
40 INJECTION, SUSPENSION INTRA-ARTICULAR; INTRAMUSCULAR
Status: COMPLETED | OUTPATIENT
Start: 2025-09-02 | End: 2025-09-02

## 2025-09-02 RX ADMIN — TRIAMCINOLONE ACETONIDE 40 MG: 40 INJECTION, SUSPENSION INTRA-ARTICULAR; INTRAMUSCULAR at 02:09
